# Patient Record
Sex: MALE | Race: BLACK OR AFRICAN AMERICAN | NOT HISPANIC OR LATINO | Employment: OTHER | URBAN - METROPOLITAN AREA
[De-identification: names, ages, dates, MRNs, and addresses within clinical notes are randomized per-mention and may not be internally consistent; named-entity substitution may affect disease eponyms.]

---

## 2023-03-13 PROBLEM — D53.9 MACROCYTIC ANEMIA: Status: ACTIVE | Noted: 2023-03-13

## 2023-03-13 PROBLEM — R04.0 EPISTAXIS: Status: ACTIVE | Noted: 2023-03-13

## 2023-03-13 PROBLEM — N28.9 RENAL INSUFFICIENCY: Status: ACTIVE | Noted: 2023-03-13

## 2023-03-13 PROBLEM — R97.20 ELEVATED PSA: Status: ACTIVE | Noted: 2023-03-13

## 2023-03-13 PROBLEM — R73.9 HYPERGLYCEMIA: Status: ACTIVE | Noted: 2023-03-13

## 2023-03-13 PROBLEM — J06.9 URTI (ACUTE UPPER RESPIRATORY INFECTION): Status: ACTIVE | Noted: 2023-03-13

## 2023-03-13 PROBLEM — N28.9 ABNORMAL KIDNEY FUNCTION: Status: ACTIVE | Noted: 2023-03-13

## 2023-03-13 PROBLEM — I10 BENIGN ESSENTIAL HYPERTENSION: Status: ACTIVE | Noted: 2023-03-13

## 2023-03-13 PROBLEM — K63.5 COLON POLYP: Status: ACTIVE | Noted: 2023-03-13

## 2023-03-13 PROBLEM — R91.8 PULMONARY NODULES: Status: ACTIVE | Noted: 2023-03-13

## 2023-03-13 PROBLEM — R50.9 FEVER WITH CHILLS: Status: ACTIVE | Noted: 2023-03-13
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
## 2023-03-13 PROBLEM — R29.6 RECURRENT FALLS: Status: ACTIVE | Noted: 2023-03-13

## 2023-03-13 PROBLEM — R36.1 HEMATOSPERMIA: Status: ACTIVE | Noted: 2023-03-13

## 2023-03-13 PROBLEM — F17.200 NICOTINE DEPENDENCE: Status: ACTIVE | Noted: 2023-03-13

## 2023-03-13 PROBLEM — R60.0 BILATERAL LEG EDEMA: Status: ACTIVE | Noted: 2023-03-13

## 2023-03-13 PROBLEM — I10 SEVERE HYPERTENSION: Status: ACTIVE | Noted: 2023-03-13

## 2023-03-13 PROBLEM — J44.9 COPD (CHRONIC OBSTRUCTIVE PULMONARY DISEASE) (MULTI): Status: ACTIVE | Noted: 2023-03-13

## 2023-03-13 PROBLEM — R79.89 ELEVATED LFTS: Status: ACTIVE | Noted: 2023-03-13

## 2023-03-13 PROBLEM — B19.20 HEPATITIS C: Status: ACTIVE | Noted: 2023-03-13

## 2023-03-13 PROBLEM — R51.9 FREQUENT HEADACHES: Status: ACTIVE | Noted: 2023-03-13

## 2023-03-13 PROBLEM — M79.672 LEFT FOOT PAIN: Status: ACTIVE | Noted: 2023-03-13

## 2023-03-13 PROBLEM — B18.2 CHRONIC VIRAL HEPATITIS C (MULTI): Status: ACTIVE | Noted: 2023-03-13

## 2023-03-13 PROBLEM — E55.9 VITAMIN D DEFICIENCY: Status: ACTIVE | Noted: 2023-03-13

## 2023-03-13 PROBLEM — N52.9 ORGANIC IMPOTENCE: Status: ACTIVE | Noted: 2023-03-13

## 2023-03-13 PROBLEM — R63.4 WEIGHT LOSS: Status: ACTIVE | Noted: 2023-03-13

## 2023-03-13 PROBLEM — L29.9 PRURITUS: Status: ACTIVE | Noted: 2023-03-13

## 2023-03-13 PROBLEM — J11.1 INFLUENZA: Status: ACTIVE | Noted: 2023-03-13

## 2023-03-13 RX ORDER — PANTOPRAZOLE SODIUM 40 MG/1
TABLET, DELAYED RELEASE ORAL
COMMUNITY
End: 2023-11-16 | Stop reason: WASHOUT

## 2023-03-13 RX ORDER — ALBUTEROL SULFATE 90 UG/1
1 AEROSOL, METERED RESPIRATORY (INHALATION)
COMMUNITY

## 2023-03-13 RX ORDER — AMLODIPINE BESYLATE 10 MG/1
1 TABLET ORAL DAILY
COMMUNITY
End: 2023-10-03 | Stop reason: SDUPTHER

## 2023-03-13 RX ORDER — ALBUTEROL SULFATE 0.83 MG/ML
SOLUTION RESPIRATORY (INHALATION) 4 TIMES DAILY PRN
COMMUNITY

## 2023-03-13 RX ORDER — LISINOPRIL 20 MG/1
1 TABLET ORAL DAILY
COMMUNITY
End: 2023-03-28 | Stop reason: SDUPTHER

## 2023-03-13 RX ORDER — FLUTICASONE FUROATE, UMECLIDINIUM BROMIDE AND VILANTEROL TRIFENATATE 100; 62.5; 25 UG/1; UG/1; UG/1
1 POWDER RESPIRATORY (INHALATION)
COMMUNITY

## 2023-03-13 RX ORDER — AZITHROMYCIN 250 MG/1
250 TABLET, FILM COATED ORAL DAILY
COMMUNITY

## 2023-03-13 RX ORDER — PREDNISONE 10 MG/1
0.5 TABLET ORAL DAILY
COMMUNITY
End: 2023-10-04 | Stop reason: SDUPTHER

## 2023-03-16 ENCOUNTER — OFFICE VISIT (OUTPATIENT)
Dept: PRIMARY CARE | Facility: CLINIC | Age: 73
End: 2023-03-16
Payer: COMMERCIAL

## 2023-03-16 VITALS
WEIGHT: 140 LBS | BODY MASS INDEX: 20.67 KG/M2 | DIASTOLIC BLOOD PRESSURE: 80 MMHG | OXYGEN SATURATION: 92 % | SYSTOLIC BLOOD PRESSURE: 130 MMHG

## 2023-03-16 DIAGNOSIS — Z00.00 HEALTHCARE MAINTENANCE: ICD-10-CM

## 2023-03-16 DIAGNOSIS — J44.9 CHRONIC OBSTRUCTIVE PULMONARY DISEASE, UNSPECIFIED COPD TYPE (MULTI): Primary | ICD-10-CM

## 2023-03-16 DIAGNOSIS — I10 BENIGN ESSENTIAL HYPERTENSION: ICD-10-CM
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
## 2023-03-16 PROBLEM — M79.672 LEFT FOOT PAIN: Status: RESOLVED | Noted: 2023-03-13 | Resolved: 2023-03-16

## 2023-03-16 PROBLEM — R36.1 HEMATOSPERMIA: Status: RESOLVED | Noted: 2023-03-13 | Resolved: 2023-03-16

## 2023-03-16 PROCEDURE — 3075F SYST BP GE 130 - 139MM HG: CPT | Performed by: INTERNAL MEDICINE

## 2023-03-16 PROCEDURE — 3079F DIAST BP 80-89 MM HG: CPT | Performed by: INTERNAL MEDICINE

## 2023-03-16 PROCEDURE — 3008F BODY MASS INDEX DOCD: CPT | Performed by: INTERNAL MEDICINE

## 2023-03-16 PROCEDURE — 99214 OFFICE O/P EST MOD 30 MIN: CPT | Performed by: INTERNAL MEDICINE

## 2023-03-16 ASSESSMENT — ENCOUNTER SYMPTOMS
DYSURIA: 0
HEMATURIA: 0
ABDOMINAL DISTENTION: 0
TREMORS: 0
CHILLS: 0
HEADACHES: 0
DIFFICULTY URINATING: 0
MYALGIAS: 0
WHEEZING: 0
STRIDOR: 0
WEAKNESS: 0
POLYDIPSIA: 0
COUGH: 0
SHORTNESS OF BREATH: 0
NUMBNESS: 0
CONFUSION: 0
APPETITE CHANGE: 0
FATIGUE: 0
FEVER: 0
PALPITATIONS: 0

## 2023-03-16 NOTE — ASSESSMENT & PLAN NOTE
- on 3L oxygen at baseline  - controlled with albuterol and trelegy  - recent hospitalization for acute on chronic resp failure in February 2023  - back to baseline

## 2023-03-16 NOTE — PROGRESS NOTES
Subjective   Patient ID: Kalyan Armstrong is a 72 y.o. male with a PMH of COPD (on 3L oxygen at base), HTN, and recent hospitalization, who presents to [facility] clinic for post-hospitalization follow-up. He states that he is back at his baseline. Chronically, gets short of breath with exertion but returns back to baseline after a short break. Does not need any medication refill at this moment.    Likes to fish for fun.     HPI  Past Medical History:   Diagnosis Date    Cough, unspecified 06/20/2014    Cough    Encounter for immunization 01/19/2015    Need for influenza vaccination    Encounter for screening for malignant neoplasm of prostate     Encounter for screening for malignant neoplasm of prostate    Other conditions influencing health status 05/20/2013    Digital Blood Vessel Rupture    Personal history of colonic polyps     History of colon polyps    Personal history of other specified conditions 05/20/2013    History of shortness of breath    Personal history of other specified conditions 06/20/2014    History of shortness of breath     Past Surgical History:   Procedure Laterality Date    APPENDECTOMY  02/21/2013    Appendectomy    COLONOSCOPY  02/21/2013    Complete Colonoscopy     Family History   Problem Relation Name Age of Onset    Dementia Mother          Patient has no known allergies.    Review of Systems   Constitutional:  Negative for appetite change, chills, fatigue and fever.   Eyes:  Negative for visual disturbance.   Respiratory:  Negative for cough, shortness of breath, wheezing and stridor.    Cardiovascular:  Negative for chest pain and palpitations.   Gastrointestinal:  Negative for abdominal distention.   Endocrine: Negative for polydipsia and polyuria.   Genitourinary:  Negative for difficulty urinating, dysuria, enuresis and hematuria.   Musculoskeletal:  Negative for myalgias.   Skin:  Negative for pallor.   Neurological:  Negative for tremors, weakness, numbness and headaches.    Psychiatric/Behavioral:  Negative for confusion and suicidal ideas.        Objective   Physical Exam  Constitutional:       General: He is not in acute distress.     Appearance: Normal appearance.   HENT:      Head: Normocephalic and atraumatic.      Nose: Nose normal.      Mouth/Throat:      Mouth: Mucous membranes are moist.   Eyes:      Conjunctiva/sclera: Conjunctivae normal.   Cardiovascular:      Rate and Rhythm: Normal rate and regular rhythm.      Heart sounds: Normal heart sounds.   Pulmonary:      Breath sounds: Normal breath sounds.   Abdominal:      General: Bowel sounds are normal.      Palpations: Abdomen is soft.   Musculoskeletal:         General: Swelling (LLE swelling (chronic)) present.      Cervical back: Neck supple.   Skin:     General: Skin is warm and dry.   Neurological:      General: No focal deficit present.      Mental Status: He is alert.       /80   Wt 63.5 kg (140 lb)   SpO2 92%   BMI 20.67 kg/m²     Assessment/Plan   Problem List Items Addressed This Visit          Respiratory    COPD (chronic obstructive pulmonary disease) (CMS/[facility]) - Primary     - on 3L oxygen at baseline  - controlled with albuterol and trelegy  - recent hospitalization for acute on chronic resp failure in February 2023  - back to baseline            Circulatory    Benign essential hypertension     - BP at 130/80   - controlled with amlodipine and lisinopril  - no labs at this time            Other    Healthcare maintenance

## 2023-03-28 DIAGNOSIS — I10 SEVERE HYPERTENSION: Primary | ICD-10-CM

## 2023-03-28 RX ORDER — LISINOPRIL 20 MG/1
20 TABLET ORAL DAILY
Qty: 90 TABLET | Refills: 0 | Status: SHIPPED | OUTPATIENT
Start: 2023-03-28 | End: 2023-07-06
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
## 2023-05-03 PROBLEM — N17.9 AKI (ACUTE KIDNEY INJURY) (CMS-HCC): Status: ACTIVE | Noted: 2023-05-03

## 2023-05-03 PROBLEM — R06.00 DYSPNEA: Status: ACTIVE | Noted: 2023-05-01

## 2023-05-03 PROBLEM — J96.90 RESPIRATORY FAILURE (MULTI): Status: ACTIVE | Noted: 2023-05-03

## 2023-05-03 RX ORDER — CHOLECALCIFEROL (VITAMIN D3) 50 MCG
1 TABLET ORAL DAILY
COMMUNITY
End: 2023-11-16 | Stop reason: SINTOL

## 2023-05-03 RX ORDER — HYDROCHLOROTHIAZIDE 12.5 MG/1
1 TABLET ORAL DAILY
COMMUNITY
Start: 2022-08-08 | End: 2023-11-20 | Stop reason: SDUPTHER

## 2023-05-03 RX ORDER — BUDESONIDE 0.5 MG/2ML
0.5 INHALANT ORAL
COMMUNITY
Start: 2023-03-29

## 2023-05-03 RX ORDER — ACETAMINOPHEN 325 MG/1
325 TABLET ORAL EVERY 4 HOURS PRN
COMMUNITY
Start: 2022-09-29

## 2023-05-04 ENCOUNTER — OFFICE VISIT (OUTPATIENT)
Dept: PRIMARY CARE | Facility: CLINIC | Age: 73
End: 2023-05-04
Payer: COMMERCIAL

## 2023-05-04 ENCOUNTER — PATIENT OUTREACH (OUTPATIENT)
Dept: PRIMARY CARE | Facility: CLINIC | Age: 73
End: 2023-05-04

## 2023-05-04 VITALS
HEART RATE: 115 BPM | DIASTOLIC BLOOD PRESSURE: 96 MMHG | SYSTOLIC BLOOD PRESSURE: 149 MMHG | OXYGEN SATURATION: 94 % | BODY MASS INDEX: 19.79 KG/M2 | WEIGHT: 134 LBS

## 2023-05-04 DIAGNOSIS — R06.02 SHORTNESS OF BREATH: ICD-10-CM

## 2023-05-04 DIAGNOSIS — J44.9 CHRONIC OBSTRUCTIVE PULMONARY DISEASE, UNSPECIFIED COPD TYPE (MULTI): Primary | ICD-10-CM

## 2023-05-04 DIAGNOSIS — J96.21 ACUTE ON CHRONIC RESPIRATORY FAILURE WITH HYPOXIA (MULTI): ICD-10-CM

## 2023-05-04 DIAGNOSIS — J44.1 COPD EXACERBATION (MULTI): ICD-10-CM

## 2023-05-04 DIAGNOSIS — I10 BENIGN ESSENTIAL HYPERTENSION: ICD-10-CM

## 2023-05-04 PROCEDURE — 3077F SYST BP >= 140 MM HG: CPT | Performed by: INTERNAL MEDICINE

## 2023-05-04 PROCEDURE — 3008F BODY MASS INDEX DOCD: CPT | Performed by: INTERNAL MEDICINE

## 2023-05-04 PROCEDURE — 3080F DIAST BP >= 90 MM HG: CPT | Performed by: INTERNAL MEDICINE

## 2023-05-04 PROCEDURE — 99214 OFFICE O/P EST MOD 30 MIN: CPT | Performed by: INTERNAL MEDICINE

## 2023-05-04 RX ORDER — PREDNISONE 10 MG/1
TABLET ORAL
Qty: 42 TABLET | Refills: 0 | Status: SHIPPED | OUTPATIENT
Start: 2023-05-04 | End: 2023-05-16

## 2023-05-04 NOTE — PROGRESS NOTES
Discharge Facility: [facility]  Discharge Diagnosis: Acute on chronic respiratory failure, COPD exacerbation, Hypoxia  Admission Date: 4/29/2023  Discharge Date: 5/2/2023    PCP Appointment Date: 5/4/2023 10:00  Specialist Appointment Date: 5/9/2023 11:00   Hospital Encounter and Summary: Linked

## 2023-05-04 NOTE — PROGRESS NOTES
Subjective   Patient ID: Kalyan Armstrong is a 72 y.o. male who presents for Hospital Follow-up.  HPI  A 72-year-old male with past medical history of COPD, hypertension is here in clinic today for hospital follow-up.  The patient has been admitted for COPD exacerbation and was discharged a couple of days before.  The patient denied any shortness of breath or cough and has been on prednisone taper that was prescribed at the time of discharge.  The patient is compliant with all his medications and is on his baseline oxygen 3 L at home  Review of Systems  10 point review of systems negative  Objective   Physical Exam  CVS S1-S2 normal  Respiratory system no wheezing CTAB        Extremities within normal limits      Assessment/Plan   There are no diagnoses linked to this encounter.     History of Present Illness  Hospital follow-up after COPD exacerbation  On baseline oxygen 3 L  Patient was asking about extra dose of steroid taper to have it in hand  Will prescribe tapering dose of prednisone to prevent hospital discharge  Inform him not to take this dose and call the clinic once before taking    Hypertension  Compliant to medications stable no change to any medications

## 2023-05-18 ENCOUNTER — PATIENT OUTREACH (OUTPATIENT)
Dept: PRIMARY CARE | Facility: CLINIC | Age: 73
End: 2023-05-18
Payer: COMMERCIAL

## 2023-05-18 NOTE — PROGRESS NOTES
Call regarding appt. with Dr. Lees on 5/4/2023 after hospitalization for a COPD exacerbation. At time of outreach call, the patient feels as if his condition has stabilized. The patient remains on O2 3L/NC and he is taking his medications as directed. No questions or concerns at this time.

## 2023-06-06 ENCOUNTER — TELEMEDICINE (OUTPATIENT)
Dept: PHARMACY | Facility: HOSPITAL | Age: 73
End: 2023-06-06
Payer: COMMERCIAL

## 2023-06-06 DIAGNOSIS — J44.9 CHRONIC OBSTRUCTIVE PULMONARY DISEASE, UNSPECIFIED COPD TYPE (MULTI): ICD-10-CM

## 2023-06-06 NOTE — PROGRESS NOTES
Pharmacy Post-Discharge Visit  Kalyan Arsmtrong is a 72 y.o. male was referred to Clinical Pharmacy Team to complete a post-discharge medication optimization and monitoring visit.  The patient was referred for their COPD.    Admission Date: 4/29/2023  Discharge Date: 5/2/2023    Referring Provider: Rosa Lees, DO    Subjective   Allergies   Allergen Reactions    Other Unknown       [facility] [address] [place] [address] AT [facility] & [address] [address]  Phone: [phone] Fax: [phone]      Social History     Social History Narrative    Not on file      Notable Medication changes following discharge:  None    HPI    Patient presents today for initial post-discharge platinum visit with the pharmacist for COPD management. Patient mentioned he has not been a smoker for about 10 years and no longer has any cravings.    COPD ASSESSMENT    Rescue Inhaler Use:  -How many times per week do you use your rescue inhale? Patient uses his albuterol nebulizing solution 2-3 times per day and budesonide nebulizer once per day    Inhaler Technique:  -How do you use your rescue inhaler?  -nebulizer    -How do you use your maintenance inhaler?  -Patient uses Trelegy inhaler properly    Sx Management:  -Increased cough? no  -Increased sputum production? no  -Increased SOB? yes - patient was experiencing SOB on Sunday but took an extra dose of prednisone and used his nebulizers per instructions from his PCP    Exacerbation Hx:  -When was your last hospitalization for an exacerbation? 4/29  -When was the last time you were treated with antibiotics and/or steroids? 4/29    Review of Systems    Objective     There were no vitals taken for this visit.     LAB  Lab Results   Component Value Date    BILITOT 0.6 04/29/2023    CALCIUM 8.9 05/02/2023    CO2 28 05/02/2023     05/02/2023    CREATININE 1.18 05/02/2023    GLUCOSE 80 05/02/2023    ALKPHOS 37 04/29/2023    K 4.1  05/02/2023    PROT 6.9 04/29/2023     05/02/2023    AST 29 04/29/2023    ALT 22 04/29/2023    BUN 21 05/02/2023    ANIONGAP 11 05/02/2023    MG 2.20 09/27/2022    PHOS 5.1 (H) 02/04/2023    ALBUMIN 4.0 04/29/2023    GFRMALE 66 05/02/2023     Lab Results   Component Value Date    TRIG 98 06/30/2022    CHOL 217 (H) 06/30/2022    .7 06/30/2022     Lab Results   Component Value Date    HGBA1C 5.3 06/30/2022       Current Outpatient Medications on File Prior to Visit   Medication Sig Dispense Refill    acetaminophen (Tylenol) 325 mg tablet Take 1 tablet (325 mg) by mouth every 4 hours if needed for moderate pain (4 - 6).      albuterol 2.5 mg /3 mL (0.083 %) nebulizer solution Take by nebulization 4 times a day as needed. 1 vial      albuterol 90 mcg/actuation inhaler Inhale 1 puff. Every 4-6 hrs prn      amLODIPine (Norvasc) 10 mg tablet Take 1 tablet (10 mg) by mouth once daily.      azithromycin (Zithromax) 250 mg tablet Take 1 tablet (250 mg) by mouth once daily.      budesonide (Pulmicort) 0.5 mg/2 mL nebulizer solution Take 2 mL (0.5 mg) by nebulization 2 times a day.      fluticasone-umeclidin-vilanter (Trelegy Ellipta) 100-62.5-25 mcg blister with device Inhale 1 puff once daily.      lisinopril 20 mg tablet Take 1 tablet (20 mg) by mouth once daily. 90 tablet 0    predniSONE (Deltasone) 10 mg tablet Take 0.5 tablets (5 mg) by mouth once daily.      cholecalciferol (Vitamin D-3) 50 MCG (2000 UT) tablet Take 1 tablet (50 mcg) by mouth once daily.      hydroCHLOROthiazide (HYDRODiuril) 12.5 mg tablet Take 1 tablet (12.5 mg) by mouth once daily.      pantoprazole (ProtoNix) 40 mg EC tablet Take by mouth. Do not crush, chew, or split.       No current facility-administered medications on file prior to visit.      HISTORICAL PHARMACOTHERAPY  -Martin Butt    DRUG INTERACTIONS  - none    Assessment/Plan   Problem List Items Addressed This Visit          Respiratory    COPD (chronic obstructive pulmonary  disease) (CMS/[facility])   1) Continue all medications as prescribed. Patient is being treated appropriately for his COPD. Med list updated and all patient questions were answered.    Continue all meds under the continuation of care with the referring provider and clinical pharmacy team.    Nano Simon, PharmTAYLOR  [facility] PGY-1 Resident   [phone]    Verbal consent to manage patient's drug therapy was obtained from the patient. They were informed they may decline to participate or withdraw from participation in pharmacy services at any time.

## 2023-06-28 ENCOUNTER — PATIENT OUTREACH (OUTPATIENT)
Dept: PRIMARY CARE | Facility: CLINIC | Age: 73
End: 2023-06-28
Payer: COMMERCIAL

## 2023-06-28 NOTE — PROGRESS NOTES
Call placed regarding two month post discharge follow up call. At time of outreach call, the patient feels as if his condition has returned to baseline. He states he is doing well and his breathing is good. The pt is staying indoors with current air quality warnings. No questions or concerns at this time.

## 2023-07-03 DIAGNOSIS — I10 SEVERE HYPERTENSION: ICD-10-CM

## 2023-07-06 RX ORDER — LISINOPRIL 20 MG/1
TABLET ORAL
Qty: 90 TABLET | Refills: 0 | Status: SHIPPED | OUTPATIENT
Start: 2023-07-06 | End: 2023-11-20 | Stop reason: SDUPTHER

## 2023-08-08 ENCOUNTER — PATIENT OUTREACH (OUTPATIENT)
Dept: PRIMARY CARE | Facility: CLINIC | Age: 73
End: 2023-08-08
Payer: COMMERCIAL

## 2023-09-12 ENCOUNTER — PATIENT OUTREACH (OUTPATIENT)
Dept: PRIMARY CARE | Facility: CLINIC | Age: 73
End: 2023-09-12
Payer: COMMERCIAL

## 2023-09-12 NOTE — PROGRESS NOTES
Call placed regarding one monthly post discharge follow up. At time of outreach call, the patient feels as if his condition has returned to baseline. The pt denies any SOB or chest pain. The pt states he will call the office if he has any needs. The pt has been enrolled in TCM services since 5/4/2023 and is graduated at this time.

## 2023-10-03 DIAGNOSIS — I10 BENIGN ESSENTIAL HYPERTENSION: ICD-10-CM

## 2023-10-03 RX ORDER — AMLODIPINE BESYLATE 10 MG/1
10 TABLET ORAL DAILY
Qty: 90 TABLET | Refills: 3 | Status: SHIPPED | OUTPATIENT
Start: 2023-10-03 | End: 2023-11-16 | Stop reason: SDUPTHER

## 2023-10-04 DIAGNOSIS — J44.9 CHRONIC OBSTRUCTIVE PULMONARY DISEASE, UNSPECIFIED COPD TYPE (MULTI): Primary | ICD-10-CM

## 2023-10-04 RX ORDER — PREDNISONE 10 MG/1
5 TABLET ORAL DAILY
Qty: 30 TABLET | Refills: 2 | Status: SHIPPED | OUTPATIENT
Start: 2023-10-04 | End: 2023-12-19 | Stop reason: SDUPTHER

## 2023-11-16 ENCOUNTER — OFFICE VISIT (OUTPATIENT)
Dept: PRIMARY CARE | Facility: CLINIC | Age: 73
End: 2023-11-16
Payer: COMMERCIAL

## 2023-11-16 VITALS — OXYGEN SATURATION: 89 % | SYSTOLIC BLOOD PRESSURE: 129 MMHG | DIASTOLIC BLOOD PRESSURE: 81 MMHG

## 2023-11-16 DIAGNOSIS — Z87.891 ENCOUNTER FOR SCREENING FOR ABDOMINAL AORTIC ANEURYSM (AAA) IN PATIENT 50 YEARS OF AGE OR OLDER WITH HISTORY OF SMOKING: Primary | ICD-10-CM

## 2023-11-16 DIAGNOSIS — Z13.6 ENCOUNTER FOR SCREENING FOR ABDOMINAL AORTIC ANEURYSM (AAA) IN PATIENT 50 YEARS OF AGE OR OLDER WITH HISTORY OF SMOKING: Primary | ICD-10-CM

## 2023-11-16 DIAGNOSIS — I10 BENIGN ESSENTIAL HYPERTENSION: ICD-10-CM

## 2023-11-16 DIAGNOSIS — R91.8 PULMONARY NODULES: ICD-10-CM

## 2023-11-16 PROBLEM — Z74.1: Status: ACTIVE | Noted: 2023-11-16

## 2023-11-16 PROCEDURE — 1126F AMNT PAIN NOTED NONE PRSNT: CPT | Performed by: INTERNAL MEDICINE

## 2023-11-16 PROCEDURE — 3079F DIAST BP 80-89 MM HG: CPT | Performed by: INTERNAL MEDICINE

## 2023-11-16 PROCEDURE — 1160F RVW MEDS BY RX/DR IN RCRD: CPT | Performed by: INTERNAL MEDICINE

## 2023-11-16 PROCEDURE — 1036F TOBACCO NON-USER: CPT | Performed by: INTERNAL MEDICINE

## 2023-11-16 PROCEDURE — 1159F MED LIST DOCD IN RCRD: CPT | Performed by: INTERNAL MEDICINE

## 2023-11-16 PROCEDURE — G0439 PPPS, SUBSEQ VISIT: HCPCS | Performed by: INTERNAL MEDICINE

## 2023-11-16 PROCEDURE — 3074F SYST BP LT 130 MM HG: CPT | Performed by: INTERNAL MEDICINE

## 2023-11-16 PROCEDURE — 3008F BODY MASS INDEX DOCD: CPT | Performed by: INTERNAL MEDICINE

## 2023-11-16 PROCEDURE — 1170F FXNL STATUS ASSESSED: CPT | Performed by: INTERNAL MEDICINE

## 2023-11-16 PROCEDURE — 99214 OFFICE O/P EST MOD 30 MIN: CPT | Performed by: INTERNAL MEDICINE

## 2023-11-16 RX ORDER — AMLODIPINE BESYLATE 10 MG/1
10 TABLET ORAL DAILY
Qty: 90 TABLET | Refills: 1 | Status: SHIPPED | OUTPATIENT
Start: 2023-11-16 | End: 2023-11-20 | Stop reason: SDUPTHER

## 2023-11-16 ASSESSMENT — ENCOUNTER SYMPTOMS
OCCASIONAL FEELINGS OF UNSTEADINESS: 1
DYSPNEA ON EXERTION: 1
COUGH: 0
SNORING: 0
PALPITATIONS: 0
NAUSEA: 0
WHEEZING: 0
CHILLS: 0
WEIGHT LOSS: 0
DEPRESSION: 0
WEIGHT GAIN: 0
DIARRHEA: 0
HEADACHES: 0
DIZZINESS: 0
CONSTIPATION: 0
HEMATURIA: 0
SORE THROAT: 0
HEARTBURN: 0
VOMITING: 0
ABDOMINAL PAIN: 0
DECREASED APPETITE: 0
LOSS OF SENSATION IN FEET: 0
EYES NEGATIVE: 1
LIGHT-HEADEDNESS: 0
DYSURIA: 0
ORTHOPNEA: 0
HEMATOCHEZIA: 0
FEVER: 0
MUSCULOSKELETAL NEGATIVE: 1
SHORTNESS OF BREATH: 1

## 2023-11-16 ASSESSMENT — PATIENT HEALTH QUESTIONNAIRE - PHQ9
2. FEELING DOWN, DEPRESSED OR HOPELESS: NOT AT ALL
1. LITTLE INTEREST OR PLEASURE IN DOING THINGS: NOT AT ALL
SUM OF ALL RESPONSES TO PHQ9 QUESTIONS 1 AND 2: 0

## 2023-11-16 ASSESSMENT — ACTIVITIES OF DAILY LIVING (ADL)
BATHING: INDEPENDENT
TAKING_MEDICATION: INDEPENDENT
MANAGING_FINANCES: NEEDS ASSISTANCE
DRESSING: INDEPENDENT
DOING_HOUSEWORK: INDEPENDENT
GROCERY_SHOPPING: NEEDS ASSISTANCE

## 2023-11-16 NOTE — PROGRESS NOTES
Subjective   Kalyan Armstrong is a 73 y.o. male with PMH of COPD on 4L O2, lung nodules, chronic Hep C, HTN, who presents for a wellness visit. Currently, no acute complaints. Since most recent admission for COPD exacerbation in July, the patient has not felt any acute worsening of breathing. Tolerating his current regimen well. Frequently follows up with pulmonology. Patient and wife inquiring about wheelchair.    Chief Complaint:  Medicare Annual Wellness Visit Subsequent (Request orders for wheel chair)      Review of Systems   Constitutional: Negative for chills, decreased appetite, fever, weight gain and weight loss.   HENT:  Positive for hearing loss. Negative for ear pain, sore throat and tinnitus.    Eyes: Negative.         Reports dry eyes from oxygen, otherwise unremarkable   Cardiovascular:  Positive for dyspnea on exertion (Reports mild SOB on exertion, relieved at rest). Negative for chest pain, orthopnea and palpitations.   Respiratory:  Positive for shortness of breath (On exertion, better at rest). Negative for cough, snoring and wheezing.    Musculoskeletal: Negative.    Gastrointestinal:  Negative for abdominal pain, constipation, diarrhea, heartburn, hematochezia, melena, nausea and vomiting.   Genitourinary:  Negative for dysuria, hematuria and urgency.   Neurological:  Negative for dizziness (Only has mild dizziness on exertion in association with SOB, better at rest), headaches and light-headedness.       Objective   Vitals reviewed.   Constitutional:       Appearance: Not in distress. Chronically ill-appearing.   Pulmonary:      Effort: Pulmonary effort is normal.      Breath sounds: Normal breath sounds. No wheezing. No rhonchi.      Comments: On 4L  Chest:      Chest wall: Not tender to palpatation.   Cardiovascular:      Normal rate. Regular rhythm.      Murmurs: There is no murmur.   Pulses:     Intact distal pulses.   Edema:     Peripheral edema absent.   Abdominal:      General: There is  no distension.      Palpations: Abdomen is soft.      Tenderness: There is no abdominal tenderness.   Musculoskeletal: Normal range of motion.         General: No tenderness. Neurological:      General: No focal deficit present.      Mental Status: Alert and oriented to person, place and time.       Lab Review:   No visits with results within 2 Month(s) from this visit.   Latest known visit with results is:   [facility] Encounter on 10/26/2022   Component Date Value    Magnesium RBC 10/26/2022 5.4     Glucose 10/26/2022 124 (H)     Sodium 10/26/2022 139     Potassium 10/26/2022 4.9     Chloride 10/26/2022 98     Bicarbonate 10/26/2022 31     Anion Gap 10/26/2022 15     Urea Nitrogen 10/26/2022 22     Creatinine 10/26/2022 1.25     GFR MALE 10/26/2022 62     Calcium 10/26/2022 9.9     Phosphorus 10/26/2022 4.3     Albumin 10/26/2022 4.3      Lab Results   Component Value Date     (L) 07/08/2023    K 4.1 07/08/2023    CL 95 (L) 07/08/2023    CO2 28 07/08/2023    BUN 21 07/08/2023    CREATININE 1.29 07/08/2023    GLUCOSE 86 07/08/2023    CALCIUM 9.1 07/08/2023     Lab Results   Component Value Date    TROPONINI 0.02 03/09/2022     Lab Results   Component Value Date    WBC 10.8 07/08/2023    HGB 13.4 (L) 07/08/2023    HCT 39.8 (L) 07/08/2023    MCV 91 07/08/2023     07/08/2023     Lab Results   Component Value Date    CHOL 217 (H) 06/30/2022    TRIG 98 06/30/2022    .7 06/30/2022       Assessment/Plan   The encounter diagnosis was Benign essential hypertension.    Medicare Wellness Billing Compliance Satisfied    *This is a visual tool to show completion of required items on the day of the visit. Green checks will only appear on the date of visit.    Review all medications by prescribing practitioner or clinical pharmacist (such as prescriptions, OTCs, herbal therapies and supplements) documented in the medical record    Past Medical, Surgical, and Family History reviewed and updated in chart     Tobacco Use Reviewed    Alcohol Use Reviewed    Illicit Drug Use Reviewed    PHQ2/9    Falls in Last Year Reviewed    Home Safety Risk Factors Reviewed    Cognitive Impairment Reviewed    Patient Self Assessment and Health Status    Current Diet Reviewed    Exercise Frequency    ADL - Hearing Impairment    ADL - Bathing    ADL - Dressing    ADL - Walks in Home    IADL - Managing Finances    IADL - Grocery Shopping    IADL - Taking Medications    IADL - Doing Housework      #Medicare wellness  #Wheelchair request  - Patient declined flu shot   - Discussed wheelchair options with patient. Patient and wife would like a fold up manual wheelchair. Will fax form to [facility]  - Ordered AAA screening  - Ordered repeat low dose CT scan for next year in January to follow up with lung nodules  - Patient will follow up in 6 months    #HTN  - /81 and stable  - Continue home amlodipine 10 mg, ordered refill  - Continue home hydrochlorothiazide 12.5 mg  - Continue home lisinopril 20 mg    #COPD  - On 4L O2 @ baseline  - Patient currently denies any signs of exacerbation since most recent admission in July  - Continue albuterol nebulizer and inhaler  - Continue azithromycin 250 mg daily  - Continue Pulmicort  - Continue Trelegy  - Continue prednisone 5 mg daily  - Patient frequently follows up with pulmonology outpatient, continue to do so

## 2023-11-20 ENCOUNTER — OFFICE VISIT (OUTPATIENT)
Dept: CARDIOLOGY | Facility: CLINIC | Age: 73
End: 2023-11-20
Payer: COMMERCIAL

## 2023-11-20 VITALS
OXYGEN SATURATION: 92 % | HEIGHT: 69 IN | DIASTOLIC BLOOD PRESSURE: 76 MMHG | WEIGHT: 136 LBS | HEART RATE: 84 BPM | BODY MASS INDEX: 20.14 KG/M2 | SYSTOLIC BLOOD PRESSURE: 116 MMHG

## 2023-11-20 DIAGNOSIS — I10 SEVERE HYPERTENSION: ICD-10-CM

## 2023-11-20 DIAGNOSIS — I10 BENIGN ESSENTIAL HYPERTENSION: Primary | ICD-10-CM

## 2023-11-20 PROCEDURE — 1126F AMNT PAIN NOTED NONE PRSNT: CPT | Performed by: INTERNAL MEDICINE

## 2023-11-20 PROCEDURE — 1160F RVW MEDS BY RX/DR IN RCRD: CPT | Performed by: INTERNAL MEDICINE

## 2023-11-20 PROCEDURE — 1159F MED LIST DOCD IN RCRD: CPT | Performed by: INTERNAL MEDICINE

## 2023-11-20 PROCEDURE — 1036F TOBACCO NON-USER: CPT | Performed by: INTERNAL MEDICINE

## 2023-11-20 PROCEDURE — 93005 ELECTROCARDIOGRAM TRACING: CPT | Performed by: INTERNAL MEDICINE

## 2023-11-20 PROCEDURE — 99214 OFFICE O/P EST MOD 30 MIN: CPT | Performed by: INTERNAL MEDICINE

## 2023-11-20 PROCEDURE — 3078F DIAST BP <80 MM HG: CPT | Performed by: INTERNAL MEDICINE

## 2023-11-20 PROCEDURE — 3074F SYST BP LT 130 MM HG: CPT | Performed by: INTERNAL MEDICINE

## 2023-11-20 PROCEDURE — 93010 ELECTROCARDIOGRAM REPORT: CPT | Performed by: INTERNAL MEDICINE

## 2023-11-20 PROCEDURE — 99214 OFFICE O/P EST MOD 30 MIN: CPT | Mod: 25 | Performed by: INTERNAL MEDICINE

## 2023-11-20 PROCEDURE — 3008F BODY MASS INDEX DOCD: CPT | Performed by: INTERNAL MEDICINE

## 2023-11-20 RX ORDER — AMLODIPINE BESYLATE 10 MG/1
10 TABLET ORAL DAILY
Qty: 90 TABLET | Refills: 1 | Status: SHIPPED | OUTPATIENT
Start: 2023-11-20

## 2023-11-20 RX ORDER — HYDROCHLOROTHIAZIDE 12.5 MG/1
12.5 TABLET ORAL DAILY
Qty: 90 TABLET | Refills: 3 | Status: SHIPPED | OUTPATIENT
Start: 2023-11-20 | End: 2024-11-14

## 2023-11-20 RX ORDER — LISINOPRIL 20 MG/1
20 TABLET ORAL DAILY
Qty: 90 TABLET | Refills: 0 | Status: SHIPPED | OUTPATIENT
Start: 2023-11-20 | End: 2024-05-16

## 2023-11-20 ASSESSMENT — COLUMBIA-SUICIDE SEVERITY RATING SCALE - C-SSRS
2. HAVE YOU ACTUALLY HAD ANY THOUGHTS OF KILLING YOURSELF?: NO
6. HAVE YOU EVER DONE ANYTHING, STARTED TO DO ANYTHING, OR PREPARED TO DO ANYTHING TO END YOUR LIFE?: NO
1. IN THE PAST MONTH, HAVE YOU WISHED YOU WERE DEAD OR WISHED YOU COULD GO TO SLEEP AND NOT WAKE UP?: NO

## 2023-11-20 ASSESSMENT — PAIN SCALES - GENERAL: PAINLEVEL: 0-NO PAIN

## 2023-11-20 ASSESSMENT — PATIENT HEALTH QUESTIONNAIRE - PHQ9
SUM OF ALL RESPONSES TO PHQ9 QUESTIONS 1 AND 2: 0
2. FEELING DOWN, DEPRESSED OR HOPELESS: NOT AT ALL
1. LITTLE INTEREST OR PLEASURE IN DOING THINGS: NOT AT ALL

## 2023-11-20 ASSESSMENT — ENCOUNTER SYMPTOMS
LOSS OF SENSATION IN FEET: 0
OCCASIONAL FEELINGS OF UNSTEADINESS: 0
DEPRESSION: 0

## 2023-11-20 NOTE — PROGRESS NOTES
Primary Care Physician: Rosa Lees DO  Date of Visit: 11/20/2023  1:40 PM EST  [place] [facility] [place] [address]     Chief Complaint:   Chief Complaint   Patient presents with    Follow-up        HPI / Summary:   Kalyan Armstrong is a 73 y.o. male presents for followup.  73-year-old male with a history of heart failure preserved EF COPD is presenting in 6-month office follow-up evaluation I picked up his care from my colleague Dr. Craig who is retired his respiratory status remained stable he has not experienced any signs of hypervolemia        Specialty Problems          Cardiology Problems    Benign essential hypertension     Added by problem list migration; 2013-2-19 Moved to suppressed 11/25/13 9:12PM         Nicotine dependence    Severe hypertension        Past Medical History:   Diagnosis Date    Cataract     COPD (chronic obstructive pulmonary disease) (CMS/[facility])     Cough, unspecified 06/20/2014    Cough    Emphysema of lung (CMS/[facility])     Encounter for immunization 01/19/2015    Need for influenza vaccination    Encounter for screening for malignant neoplasm of prostate     Encounter for screening for malignant neoplasm of prostate    Other conditions influencing health status 05/20/2013    Digital Blood Vessel Rupture    Personal history of colonic polyps     History of colon polyps    Personal history of other specified conditions 05/20/2013    History of shortness of breath    Personal history of other specified conditions 06/20/2014    History of shortness of breath          Past Surgical History:   Procedure Laterality Date    APPENDECTOMY  02/21/2013    Appendectomy    COLONOSCOPY  02/21/2013    Complete Colonoscopy    EYE SURGERY            Social History:   reports that he quit smoking about 17 years ago. His smoking use included cigarettes. He has never used smokeless tobacco. He reports current alcohol use. He reports that he does not currently use drugs.      Allergies:  Allergies  "  Allergen Reactions    Other Unknown       Outpatient Medications:  Current Outpatient Medications   Medication Instructions    acetaminophen (TYLENOL) 325 mg, oral, Every 4 hours PRN    albuterol 2.5 mg /3 mL (0.083 %) nebulizer solution nebulization, 4 times daily PRN, 1 vial    albuterol 90 mcg/actuation inhaler 1 puff, inhalation, Every 4-6 hrs prn    amLODIPine (NORVASC) 10 mg, oral, Daily    azithromycin (ZITHROMAX) 250 mg, oral, Daily    budesonide (PULMICORT) 0.5 mg, nebulization, 2 times daily RT    fluticasone-umeclidin-vilanter (Trelegy Ellipta) 100-62.5-25 mcg blister with device 1 puff, inhalation, Daily RT    hydroCHLOROthiazide (HYDRODIURIL) 12.5 mg, oral, Daily    lisinopril 20 mg, oral, Daily    predniSONE (DELTASONE) 5 mg, oral, Daily       ROS     Physical Exam:  Vitals:    11/20/23 1355   BP: 116/76   BP Location: Left arm   Patient Position: Sitting   BP Cuff Size: Adult   Pulse: 84   SpO2: 92%   Weight: 61.7 kg (136 lb)   Height: 1.753 m (5' 9\")     Wt Readings from Last 5 Encounters:   11/20/23 61.7 kg (136 lb)   07/12/23 59.4 kg (131 lb)   06/23/23 60.9 kg (134 lb 4 oz)   05/04/23 60.8 kg (134 lb)   03/29/23 63 kg (139 lb)     Body mass index is 20.08 kg/m².   Well-developed male flat JVP distant heart sounds without gallop.  Lungs were clear abdomen soft no dependent edema     Last Labs:  CMP:  Recent Labs     07/08/23  0524 07/07/23  0544 07/05/23 2040 05/02/23  0628 05/01/23  0617   * 133* 138 137 136   K 4.1 4.1 4.7 4.1 4.1   CL 95* 98 97* 102 97*   CO2 28 26 29 28 32   ANIONGAP 15 13 17 11 11   BUN 21 30* 17 21 25*   CREATININE 1.29 1.64* 1.36* 1.18 1.35*   GLUCOSE 86 130* 116* 80 80     Recent Labs     07/05/23 2040 04/29/23  0924 03/07/23  0838 02/05/23  0510 02/04/23  1617 02/04/23  0828   ALBUMIN 4.5 4.0 4.5 4.0 4.4 4.7   ALKPHOS 37 37 56 31*  --  45   ALT 21 22 23 14  --  19   AST 28 29 34 20  --  30   BILITOT 0.6 0.6 0.4 0.4  --  0.5     CBC:  Recent Labs     " 07/08/23  0524 07/07/23  0544 07/05/23  2040 05/02/23  0628 05/01/23  0617   WBC 10.8 14.4* 10.1 10.5 12.9*   HGB 13.4* 11.8* 14.7 12.0* 12.9*   HCT 39.8* 32.6* 42.2 36.7* 38.5*    255 306 243 271   MCV 91 85 88 93 91     COAG:   Recent Labs     07/05/23  2040 03/07/23  0838 02/04/23  0828 08/05/22  1014 06/21/22  0607 06/17/22  1800 06/17/22  0726 04/19/22  2130 03/16/22  1620 10/01/21  0112 12/07/20  1921   INR 0.9 1.0 1.0 1.0 0.9  --    < >  --    < >  --   --    DDIMERVTE  --   --   --   --   --  1,109*  --  798*  --  739* 1,669*    < > = values in this interval not displayed.     HEME/ENDO:  Recent Labs     06/30/22  1100 06/19/22  1727 02/08/22  0556 02/20/20  0955 08/02/19  0845   FERRITIN  --  369*  --   --   --    IRONSAT  --  41  --   --   --    TSH 1.28  --  0.88 0.90  --    HGBA1C 5.3  --   --   --  4.9      CARDIAC:   Recent Labs     07/06/23  0156 07/05/23  2209 07/05/23 2040 04/29/23  0924 03/07/23  0838 02/04/23  0828 12/15/22  1302 12/15/22  0906 09/27/22  1848 09/27/22  1609   TROPHS 22* 26* 24* 14 15 16   < > 17   < > 13   BNP  --   --  66  --  25 116*  --  45  --  75    < > = values in this interval not displayed.     Recent Labs     06/30/22  1100 08/02/19  0845   CHOL 217* 195   LDLF 92 68   .7 118.0   TRIG 98 45       Last Cardiology Tests:  ECG:      Echo:  Echo Results:  No results found for this or any previous visit from the past 3650 days.       Cath:      Stress Test:  Stress Results:  No results found for this or any previous visit from the past 365 days.         Cardiac Imaging:  Vascular US lower extremity venous duplex bilateral  Narrative: Interpreted By:  SOFIA BLAKE MD  MRN: [id number]  Patient Name: DASHA MEDEROS     STUDY:  DUPLEX LOWER EXTREMITY VEINS, BILATERAL;  7/5/2023 9:29 pm     INDICATION:  lle edema sob .     COMPARISON:  None.     ACCESSION NUMBER(S):  [id number]     ORDERING CLINICIAN:  MANSOOR RAMIREZ     TECHNIQUE:  Vascular ultrasound of  the bilateral lower extremities was performed.  Real-time compression views as well as Gray scale, color Doppler and  spectral Doppler waveform analysis was performed.     FINDINGS:  Evaluation of the visualized portions of the bilateral common femoral  vein, proximal, mid, and distal femoral vein, and popliteal vein were  performed.  Evaluation of the visualized portions of the  posterior  tibial and peroneal veins were also performed.     Limitations:  None.     The evaluated veins demonstrate normal compressibility. There is  intact venous flow demonstrating normal respiratory variability and  normal augmentation of flow with calf compression. Therefore, there  is no ultrasonographic evidence for deep vein thrombosis within the  evaluated veins.     Respiratory variation and augmentation to calf pressure was noted.     Impression: No evidence of deep vein thrombosis in the bilateral lower  extremities.  XR chest 1 view  Narrative: Interpreted By:  SOFIA BLAKE MD  MRN: [id number]  Patient Name: DASHA MEDEROS     STUDY:  CHEST 1 VIEW;  7/5/2023 9:00 pm     INDICATION:  Chest Pain .     COMPARISON:  Radiographs of the chest dated 04/29/2023.     ACCESSION NUMBER(S):  [id number]     ORDERING CLINICIAN:  MNASOOR RAMIREZ     FINDINGS:  AP radiograph of the chest was provided.           CARDIOMEDIASTINAL SILHOUETTE:  Cardiomediastinal silhouette is normal in size and configuration.     LUNGS:  Lungs are hyperinflated, consistent with patient's history of  advanced emphysema, without evidence of focal consolidation, pleural  effusion or pneumothorax. Mild prominence of the interstitial  markings in the upper lungs similar to prior exam     ABDOMEN:  No remarkable upper abdominal findings.     BONES:  No acute osseous changes.     Impression: 1.  Hyperinflated lungs consistent with advanced emphysema, without  evidence of focal consolidation, pleural effusion or pneumothorax.           Assessment/Plan      73-year-old male with heart failure and preserved LV ejection fraction severe COPD on oxygen.  Euvolemic consider addition of SGLT2i and/or spironolactone if evidence of hypervolemia develops.  Reviewed signs and symptoms of heart failure.  I placed referral for RSV vaccine through his community pharmacy at his request thank you for allowing me to participate in his care we will follow-up with us in 6 months      Orders:  Orders Placed This Encounter   Procedures    RSV, Recombinant, 60 years and older (AREXVY)    ECG 12 Lead      Followup Appts:  Future Appointments   Date Time Provider Department Center   5/9/2024  8:00 AM Rosa Lees DO [id number]           ____________________________________________________________  Zoran Infante MD    Senior Attending Physician  [facility]  [facility]

## 2023-11-22 LAB
ATRIAL RATE: 84 BPM
P AXIS: 82 DEGREES
P OFFSET: 195 MS
P ONSET: 153 MS
PR INTERVAL: 126 MS
Q ONSET: 216 MS
QRS COUNT: 14 BEATS
QRS DURATION: 88 MS
QT INTERVAL: 360 MS
QTC CALCULATION(BAZETT): 425 MS
QTC FREDERICIA: 402 MS
R AXIS: 80 DEGREES
T AXIS: 60 DEGREES
T OFFSET: 396 MS
VENTRICULAR RATE: 84 BPM

## 2023-11-23 ENCOUNTER — APPOINTMENT (OUTPATIENT)
Dept: RADIOLOGY | Facility: HOSPITAL | Age: 73
DRG: 191 | End: 2023-11-23
Payer: COMMERCIAL

## 2023-11-23 ENCOUNTER — HOSPITAL ENCOUNTER (INPATIENT)
Facility: HOSPITAL | Age: 73
LOS: 2 days | Discharge: HOME | DRG: 191 | End: 2023-11-26
Attending: EMERGENCY MEDICINE | Admitting: HOSPITALIST
Payer: COMMERCIAL

## 2023-11-23 DIAGNOSIS — J44.1 ACUTE EXACERBATION OF CHRONIC OBSTRUCTIVE PULMONARY DISEASE (COPD) (MULTI): Primary | ICD-10-CM

## 2023-11-23 DIAGNOSIS — J44.9 CHRONIC OBSTRUCTIVE PULMONARY DISEASE, UNSPECIFIED COPD TYPE (MULTI): ICD-10-CM

## 2023-11-23 LAB
ALBUMIN SERPL BCP-MCNC: 4.8 G/DL (ref 3.4–5)
ALP SERPL-CCNC: 59 U/L (ref 33–136)
ALT SERPL W P-5'-P-CCNC: 16 U/L (ref 10–52)
ANION GAP SERPL CALC-SCNC: 21 MMOL/L (ref 10–20)
AST SERPL W P-5'-P-CCNC: 40 U/L (ref 9–39)
BASOPHILS # BLD AUTO: 0.2 X10*3/UL (ref 0–0.1)
BASOPHILS NFR BLD AUTO: 1.6 %
BILIRUB SERPL-MCNC: 0.5 MG/DL (ref 0–1.2)
BNP SERPL-MCNC: 24 PG/ML (ref 0–99)
BUN SERPL-MCNC: 21 MG/DL (ref 6–23)
CALCIUM SERPL-MCNC: 9.9 MG/DL (ref 8.6–10.3)
CARDIAC TROPONIN I PNL SERPL HS: 15 NG/L (ref 0–20)
CHLORIDE SERPL-SCNC: 98 MMOL/L (ref 98–107)
CO2 SERPL-SCNC: 23 MMOL/L (ref 21–32)
CREAT SERPL-MCNC: 1.19 MG/DL (ref 0.5–1.3)
EOSINOPHIL # BLD AUTO: 0.17 X10*3/UL (ref 0–0.4)
EOSINOPHIL NFR BLD AUTO: 1.4 %
ERYTHROCYTE [DISTWIDTH] IN BLOOD BY AUTOMATED COUNT: 13 % (ref 11.5–14.5)
GFR SERPL CREATININE-BSD FRML MDRD: 65 ML/MIN/1.73M*2
GLUCOSE SERPL-MCNC: 96 MG/DL (ref 74–99)
HCT VFR BLD AUTO: 39.5 % (ref 41–52)
HGB BLD-MCNC: 13.1 G/DL (ref 13.5–17.5)
IMM GRANULOCYTES # BLD AUTO: 0.04 X10*3/UL (ref 0–0.5)
IMM GRANULOCYTES NFR BLD AUTO: 0.3 % (ref 0–0.9)
LYMPHOCYTES # BLD AUTO: 1.86 X10*3/UL (ref 0.8–3)
LYMPHOCYTES NFR BLD AUTO: 15.3 %
MCH RBC QN AUTO: 29.6 PG (ref 26–34)
MCHC RBC AUTO-ENTMCNC: 33.2 G/DL (ref 32–36)
MCV RBC AUTO: 89 FL (ref 80–100)
MONOCYTES # BLD AUTO: 1.19 X10*3/UL (ref 0.05–0.8)
MONOCYTES NFR BLD AUTO: 9.8 %
NEUTROPHILS # BLD AUTO: 8.7 X10*3/UL (ref 1.6–5.5)
NEUTROPHILS NFR BLD AUTO: 71.6 %
NRBC BLD-RTO: 0 /100 WBCS (ref 0–0)
PLATELET # BLD AUTO: 291 X10*3/UL (ref 150–450)
POTASSIUM SERPL-SCNC: 5.6 MMOL/L (ref 3.5–5.3)
PROT SERPL-MCNC: 8.1 G/DL (ref 6.4–8.2)
RBC # BLD AUTO: 4.42 X10*6/UL (ref 4.5–5.9)
SODIUM SERPL-SCNC: 136 MMOL/L (ref 136–145)
WBC # BLD AUTO: 12.2 X10*3/UL (ref 4.4–11.3)

## 2023-11-23 PROCEDURE — 85025 COMPLETE CBC W/AUTO DIFF WBC: CPT | Performed by: EMERGENCY MEDICINE

## 2023-11-23 PROCEDURE — 83880 ASSAY OF NATRIURETIC PEPTIDE: CPT | Performed by: EMERGENCY MEDICINE

## 2023-11-23 PROCEDURE — 80053 COMPREHEN METABOLIC PANEL: CPT | Performed by: EMERGENCY MEDICINE

## 2023-11-23 PROCEDURE — 71045 X-RAY EXAM CHEST 1 VIEW: CPT | Performed by: RADIOLOGY

## 2023-11-23 PROCEDURE — 36415 COLL VENOUS BLD VENIPUNCTURE: CPT | Performed by: EMERGENCY MEDICINE

## 2023-11-23 PROCEDURE — 36600 WITHDRAWAL OF ARTERIAL BLOOD: CPT

## 2023-11-23 PROCEDURE — 94660 CPAP INITIATION&MGMT: CPT

## 2023-11-23 PROCEDURE — 84484 ASSAY OF TROPONIN QUANT: CPT | Performed by: EMERGENCY MEDICINE

## 2023-11-23 PROCEDURE — 82435 ASSAY OF BLOOD CHLORIDE: CPT | Mod: 59 | Performed by: EMERGENCY MEDICINE

## 2023-11-23 PROCEDURE — 94640 AIRWAY INHALATION TREATMENT: CPT

## 2023-11-23 PROCEDURE — 2500000002 HC RX 250 W HCPCS SELF ADMINISTERED DRUGS (ALT 637 FOR MEDICARE OP, ALT 636 FOR OP/ED): Performed by: EMERGENCY MEDICINE

## 2023-11-23 PROCEDURE — 71045 X-RAY EXAM CHEST 1 VIEW: CPT

## 2023-11-23 PROCEDURE — 2500000005 HC RX 250 GENERAL PHARMACY W/O HCPCS: Performed by: EMERGENCY MEDICINE

## 2023-11-23 PROCEDURE — 99285 EMERGENCY DEPT VISIT HI MDM: CPT | Mod: 25 | Performed by: EMERGENCY MEDICINE

## 2023-11-23 RX ORDER — IPRATROPIUM BROMIDE AND ALBUTEROL SULFATE 2.5; .5 MG/3ML; MG/3ML
SOLUTION RESPIRATORY (INHALATION)
Status: DISPENSED
Start: 2023-11-23 | End: 2023-11-24

## 2023-11-23 RX ORDER — IPRATROPIUM BROMIDE AND ALBUTEROL SULFATE 2.5; .5 MG/3ML; MG/3ML
9 SOLUTION RESPIRATORY (INHALATION) ONCE
Status: COMPLETED | OUTPATIENT
Start: 2023-11-23 | End: 2023-11-23

## 2023-11-23 RX ADMIN — Medication 70 PERCENT: at 20:27

## 2023-11-23 RX ADMIN — IPRATROPIUM BROMIDE AND ALBUTEROL SULFATE 9 ML: 2.5; .5 SOLUTION RESPIRATORY (INHALATION) at 20:26

## 2023-11-23 ASSESSMENT — PAIN SCALES - GENERAL
PAINLEVEL_OUTOF10: 5 - MODERATE PAIN
PAINLEVEL_OUTOF10: 3

## 2023-11-23 ASSESSMENT — COLUMBIA-SUICIDE SEVERITY RATING SCALE - C-SSRS
1. IN THE PAST MONTH, HAVE YOU WISHED YOU WERE DEAD OR WISHED YOU COULD GO TO SLEEP AND NOT WAKE UP?: NO
2. HAVE YOU ACTUALLY HAD ANY THOUGHTS OF KILLING YOURSELF?: NO
6. HAVE YOU EVER DONE ANYTHING, STARTED TO DO ANYTHING, OR PREPARED TO DO ANYTHING TO END YOUR LIFE?: NO

## 2023-11-23 ASSESSMENT — PAIN - FUNCTIONAL ASSESSMENT: PAIN_FUNCTIONAL_ASSESSMENT: WONG-BAKER FACES

## 2023-11-23 NOTE — Clinical Note
ED UM Review Complete - Patient Meets Inpatient Criteria  [facility]

## 2023-11-24 PROBLEM — J44.1 ACUTE EXACERBATION OF CHRONIC OBSTRUCTIVE PULMONARY DISEASE (COPD) (MULTI): Status: ACTIVE | Noted: 2023-11-24

## 2023-11-24 LAB
ANION GAP SERPL CALC-SCNC: 14 MMOL/L (ref 10–20)
BUN SERPL-MCNC: 26 MG/DL (ref 6–23)
CALCIUM SERPL-MCNC: 9.4 MG/DL (ref 8.6–10.3)
CHLORIDE SERPL-SCNC: 98 MMOL/L (ref 98–107)
CO2 SERPL-SCNC: 27 MMOL/L (ref 21–32)
CREAT SERPL-MCNC: 1.18 MG/DL (ref 0.5–1.3)
GFR SERPL CREATININE-BSD FRML MDRD: 66 ML/MIN/1.73M*2
GLUCOSE SERPL-MCNC: 168 MG/DL (ref 74–99)
POTASSIUM SERPL-SCNC: 4 MMOL/L (ref 3.5–5.3)
SODIUM SERPL-SCNC: 135 MMOL/L (ref 136–145)

## 2023-11-24 PROCEDURE — 2500000004 HC RX 250 GENERAL PHARMACY W/ HCPCS (ALT 636 FOR OP/ED): Mod: 59 | Performed by: HOSPITALIST

## 2023-11-24 PROCEDURE — 36415 COLL VENOUS BLD VENIPUNCTURE: CPT | Performed by: INTERNAL MEDICINE

## 2023-11-24 PROCEDURE — 1100000001 HC PRIVATE ROOM DAILY

## 2023-11-24 PROCEDURE — 80048 BASIC METABOLIC PNL TOTAL CA: CPT | Performed by: INTERNAL MEDICINE

## 2023-11-24 PROCEDURE — 94660 CPAP INITIATION&MGMT: CPT

## 2023-11-24 PROCEDURE — 94640 AIRWAY INHALATION TREATMENT: CPT | Mod: MUEWO

## 2023-11-24 PROCEDURE — 99222 1ST HOSP IP/OBS MODERATE 55: CPT | Performed by: NURSE PRACTITIONER

## 2023-11-24 PROCEDURE — 94640 AIRWAY INHALATION TREATMENT: CPT

## 2023-11-24 PROCEDURE — 96372 THER/PROPH/DIAG INJ SC/IM: CPT | Performed by: HOSPITALIST

## 2023-11-24 PROCEDURE — 96374 THER/PROPH/DIAG INJ IV PUSH: CPT | Mod: 59

## 2023-11-24 PROCEDURE — 2500000001 HC RX 250 WO HCPCS SELF ADMINISTERED DRUGS (ALT 637 FOR MEDICARE OP): Performed by: HOSPITALIST

## 2023-11-24 PROCEDURE — 2500000002 HC RX 250 W HCPCS SELF ADMINISTERED DRUGS (ALT 637 FOR MEDICARE OP, ALT 636 FOR OP/ED): Mod: MUE | Performed by: HOSPITALIST

## 2023-11-24 PROCEDURE — 99223 1ST HOSP IP/OBS HIGH 75: CPT | Performed by: HOSPITALIST

## 2023-11-24 PROCEDURE — 5A09357 ASSISTANCE WITH RESPIRATORY VENTILATION, LESS THAN 24 CONSECUTIVE HOURS, CONTINUOUS POSITIVE AIRWAY PRESSURE: ICD-10-PCS | Performed by: INTERNAL MEDICINE

## 2023-11-24 PROCEDURE — 2500000002 HC RX 250 W HCPCS SELF ADMINISTERED DRUGS (ALT 637 FOR MEDICARE OP, ALT 636 FOR OP/ED): Performed by: HOSPITALIST

## 2023-11-24 PROCEDURE — 96376 TX/PRO/DX INJ SAME DRUG ADON: CPT

## 2023-11-24 RX ORDER — GUAIFENESIN 100 MG/5ML
200 SOLUTION ORAL EVERY 4 HOURS PRN
Status: DISCONTINUED | OUTPATIENT
Start: 2023-11-24 | End: 2023-11-26 | Stop reason: HOSPADM

## 2023-11-24 RX ORDER — ONDANSETRON HYDROCHLORIDE 2 MG/ML
4 INJECTION, SOLUTION INTRAVENOUS EVERY 8 HOURS PRN
Status: DISCONTINUED | OUTPATIENT
Start: 2023-11-24 | End: 2023-11-26 | Stop reason: HOSPADM

## 2023-11-24 RX ORDER — FLUTICASONE FUROATE AND VILANTEROL 100; 25 UG/1; UG/1
1 POWDER RESPIRATORY (INHALATION)
Status: DISCONTINUED | OUTPATIENT
Start: 2023-11-24 | End: 2023-11-24 | Stop reason: CLARIF

## 2023-11-24 RX ORDER — IPRATROPIUM BROMIDE AND ALBUTEROL SULFATE 2.5; .5 MG/3ML; MG/3ML
3 SOLUTION RESPIRATORY (INHALATION) EVERY 2 HOUR PRN
Status: DISCONTINUED | OUTPATIENT
Start: 2023-11-24 | End: 2023-11-26 | Stop reason: HOSPADM

## 2023-11-24 RX ORDER — ACETAMINOPHEN 325 MG/1
650 TABLET ORAL EVERY 4 HOURS PRN
Status: DISCONTINUED | OUTPATIENT
Start: 2023-11-24 | End: 2023-11-26 | Stop reason: HOSPADM

## 2023-11-24 RX ORDER — IPRATROPIUM BROMIDE AND ALBUTEROL SULFATE 2.5; .5 MG/3ML; MG/3ML
3 SOLUTION RESPIRATORY (INHALATION)
Status: DISCONTINUED | OUTPATIENT
Start: 2023-11-24 | End: 2023-11-24

## 2023-11-24 RX ORDER — FORMOTEROL FUMARATE DIHYDRATE 20 UG/2ML
20 SOLUTION RESPIRATORY (INHALATION)
Status: DISCONTINUED | OUTPATIENT
Start: 2023-11-24 | End: 2023-11-26 | Stop reason: HOSPADM

## 2023-11-24 RX ORDER — BUDESONIDE 0.5 MG/2ML
0.5 INHALANT ORAL
Status: DISCONTINUED | OUTPATIENT
Start: 2023-11-24 | End: 2023-11-24

## 2023-11-24 RX ORDER — LISINOPRIL 20 MG/1
20 TABLET ORAL DAILY
Status: DISCONTINUED | OUTPATIENT
Start: 2023-11-24 | End: 2023-11-26 | Stop reason: HOSPADM

## 2023-11-24 RX ORDER — ENOXAPARIN SODIUM 100 MG/ML
40 INJECTION SUBCUTANEOUS EVERY 24 HOURS
Status: DISCONTINUED | OUTPATIENT
Start: 2023-11-24 | End: 2023-11-26 | Stop reason: HOSPADM

## 2023-11-24 RX ORDER — BUDESONIDE 0.5 MG/2ML
0.5 INHALANT ORAL
Status: DISCONTINUED | OUTPATIENT
Start: 2023-11-24 | End: 2023-11-26 | Stop reason: HOSPADM

## 2023-11-24 RX ORDER — POLYETHYLENE GLYCOL 3350 17 G/17G
17 POWDER, FOR SOLUTION ORAL DAILY
Status: DISCONTINUED | OUTPATIENT
Start: 2023-11-24 | End: 2023-11-26 | Stop reason: HOSPADM

## 2023-11-24 RX ORDER — AZITHROMYCIN 500 MG/1
250 TABLET, FILM COATED ORAL
Status: DISCONTINUED | OUTPATIENT
Start: 2023-11-24 | End: 2023-11-26 | Stop reason: HOSPADM

## 2023-11-24 RX ORDER — AMLODIPINE BESYLATE 10 MG/1
10 TABLET ORAL DAILY
Status: DISCONTINUED | OUTPATIENT
Start: 2023-11-24 | End: 2023-11-26 | Stop reason: HOSPADM

## 2023-11-24 RX ORDER — ONDANSETRON 4 MG/1
4 TABLET, ORALLY DISINTEGRATING ORAL EVERY 8 HOURS PRN
Status: DISCONTINUED | OUTPATIENT
Start: 2023-11-24 | End: 2023-11-26 | Stop reason: HOSPADM

## 2023-11-24 RX ORDER — IPRATROPIUM BROMIDE AND ALBUTEROL SULFATE 2.5; .5 MG/3ML; MG/3ML
3 SOLUTION RESPIRATORY (INHALATION)
Status: DISCONTINUED | OUTPATIENT
Start: 2023-11-24 | End: 2023-11-26 | Stop reason: HOSPADM

## 2023-11-24 RX ADMIN — AMLODIPINE BESYLATE 10 MG: 10 TABLET ORAL at 10:20

## 2023-11-24 RX ADMIN — IPRATROPIUM BROMIDE AND ALBUTEROL SULFATE 3 ML: 2.5; .5 SOLUTION RESPIRATORY (INHALATION) at 03:46

## 2023-11-24 RX ADMIN — METHYLPREDNISOLONE SODIUM SUCCINATE 40 MG: 40 INJECTION, POWDER, FOR SOLUTION INTRAMUSCULAR; INTRAVENOUS at 11:37

## 2023-11-24 RX ADMIN — METHYLPREDNISOLONE SODIUM SUCCINATE 40 MG: 40 INJECTION, POWDER, FOR SOLUTION INTRAMUSCULAR; INTRAVENOUS at 07:22

## 2023-11-24 RX ADMIN — IPRATROPIUM BROMIDE AND ALBUTEROL SULFATE 3 ML: 2.5; .5 SOLUTION RESPIRATORY (INHALATION) at 10:18

## 2023-11-24 RX ADMIN — FORMOTEROL FUMARATE DIHYDRATE 20 MCG: 20 SOLUTION RESPIRATORY (INHALATION) at 07:20

## 2023-11-24 RX ADMIN — BUDESONIDE INHALATION 0.5 MG: 0.5 SUSPENSION RESPIRATORY (INHALATION) at 19:55

## 2023-11-24 RX ADMIN — IPRATROPIUM BROMIDE AND ALBUTEROL SULFATE 3 ML: 2.5; .5 SOLUTION RESPIRATORY (INHALATION) at 07:20

## 2023-11-24 RX ADMIN — ENOXAPARIN SODIUM 40 MG: 40 INJECTION SUBCUTANEOUS at 10:19

## 2023-11-24 RX ADMIN — LISINOPRIL 20 MG: 20 TABLET ORAL at 10:20

## 2023-11-24 RX ADMIN — AZITHROMYCIN 250 MG: 500 TABLET, FILM COATED ORAL at 10:19

## 2023-11-24 RX ADMIN — IPRATROPIUM BROMIDE AND ALBUTEROL SULFATE 3 ML: 2.5; .5 SOLUTION RESPIRATORY (INHALATION) at 19:54

## 2023-11-24 RX ADMIN — BUDESONIDE INHALATION 0.5 MG: 0.5 SUSPENSION RESPIRATORY (INHALATION) at 07:20

## 2023-11-24 RX ADMIN — ACETAMINOPHEN 650 MG: 325 TABLET ORAL at 20:52

## 2023-11-24 RX ADMIN — ACETAMINOPHEN 650 MG: 325 TABLET ORAL at 10:20

## 2023-11-24 RX ADMIN — METHYLPREDNISOLONE SODIUM SUCCINATE 40 MG: 40 INJECTION, POWDER, FOR SOLUTION INTRAMUSCULAR; INTRAVENOUS at 20:52

## 2023-11-24 RX ADMIN — IPRATROPIUM BROMIDE AND ALBUTEROL SULFATE 3 ML: 2.5; .5 SOLUTION RESPIRATORY (INHALATION) at 14:40

## 2023-11-24 RX ADMIN — FORMOTEROL FUMARATE DIHYDRATE 20 MCG: 20 SOLUTION RESPIRATORY (INHALATION) at 19:55

## 2023-11-24 SDOH — ECONOMIC STABILITY: FOOD INSECURITY: WITHIN THE PAST 12 MONTHS, THE FOOD YOU BOUGHT JUST DIDN'T LAST AND YOU DIDN'T HAVE MONEY TO GET MORE.: NEVER TRUE

## 2023-11-24 SDOH — ECONOMIC STABILITY: INCOME INSECURITY: IN THE PAST 12 MONTHS, HAS THE ELECTRIC, GAS, OIL, OR WATER COMPANY THREATENED TO SHUT OFF SERVICE IN YOUR HOME?: NO

## 2023-11-24 SDOH — SOCIAL STABILITY: SOCIAL INSECURITY: ARE YOU OR HAVE YOU BEEN THREATENED OR ABUSED PHYSICALLY, EMOTIONALLY, OR SEXUALLY BY ANYONE?: NO

## 2023-11-24 SDOH — HEALTH STABILITY: PHYSICAL HEALTH: ON AVERAGE, HOW MANY MINUTES DO YOU ENGAGE IN EXERCISE AT THIS LEVEL?: 0 MIN

## 2023-11-24 SDOH — HEALTH STABILITY: MENTAL HEALTH: HOW MANY STANDARD DRINKS CONTAINING ALCOHOL DO YOU HAVE ON A TYPICAL DAY?: PATIENT DOES NOT DRINK

## 2023-11-24 SDOH — HEALTH STABILITY: MENTAL HEALTH: HOW OFTEN DO YOU HAVE A DRINK CONTAINING ALCOHOL?: NEVER

## 2023-11-24 SDOH — SOCIAL STABILITY: SOCIAL NETWORK: ARE YOU MARRIED, WIDOWED, DIVORCED, SEPARATED, NEVER MARRIED, OR LIVING WITH A PARTNER?: MARRIED

## 2023-11-24 SDOH — SOCIAL STABILITY: SOCIAL INSECURITY
WITHIN THE LAST YEAR, HAVE TO BEEN RAPED OR FORCED TO HAVE ANY KIND OF SEXUAL ACTIVITY BY YOUR PARTNER OR EX-PARTNER?: NO

## 2023-11-24 SDOH — ECONOMIC STABILITY: HOUSING INSECURITY
IN THE LAST 12 MONTHS, WAS THERE A TIME WHEN YOU DID NOT HAVE A STEADY PLACE TO SLEEP OR SLEPT IN A SHELTER (INCLUDING NOW)?: NO

## 2023-11-24 SDOH — HEALTH STABILITY: MENTAL HEALTH
STRESS IS WHEN SOMEONE FEELS TENSE, NERVOUS, ANXIOUS, OR CAN'T SLEEP AT NIGHT BECAUSE THEIR MIND IS TROUBLED. HOW STRESSED ARE YOU?: TO SOME EXTENT

## 2023-11-24 SDOH — SOCIAL STABILITY: SOCIAL NETWORK: HOW OFTEN DO YOU ATTENT MEETINGS OF THE CLUB OR ORGANIZATION YOU BELONG TO?: NEVER

## 2023-11-24 SDOH — ECONOMIC STABILITY: INCOME INSECURITY: HOW HARD IS IT FOR YOU TO PAY FOR THE VERY BASICS LIKE FOOD, HOUSING, MEDICAL CARE, AND HEATING?: NOT HARD AT ALL

## 2023-11-24 SDOH — HEALTH STABILITY: MENTAL HEALTH: HOW OFTEN DO YOU HAVE 6 OR MORE DRINKS ON ONE OCCASION?: NEVER

## 2023-11-24 SDOH — ECONOMIC STABILITY: TRANSPORTATION INSECURITY
IN THE PAST 12 MONTHS, HAS LACK OF TRANSPORTATION KEPT YOU FROM MEETINGS, WORK, OR FROM GETTING THINGS NEEDED FOR DAILY LIVING?: NO

## 2023-11-24 SDOH — SOCIAL STABILITY: SOCIAL INSECURITY: DO YOU FEEL UNSAFE GOING BACK TO THE PLACE WHERE YOU ARE LIVING?: NO

## 2023-11-24 SDOH — HEALTH STABILITY: PHYSICAL HEALTH: ON AVERAGE, HOW MANY DAYS PER WEEK DO YOU ENGAGE IN MODERATE TO STRENUOUS EXERCISE (LIKE A BRISK WALK)?: 0 DAYS

## 2023-11-24 SDOH — SOCIAL STABILITY: SOCIAL NETWORK
DO YOU BELONG TO ANY CLUBS OR ORGANIZATIONS SUCH AS CHURCH GROUPS UNIONS, FRATERNAL OR ATHLETIC GROUPS, OR SCHOOL GROUPS?: NO

## 2023-11-24 SDOH — SOCIAL STABILITY: SOCIAL INSECURITY
WITHIN THE LAST YEAR, HAVE YOU BEEN KICKED, HIT, SLAPPED, OR OTHERWISE PHYSICALLY HURT BY YOUR PARTNER OR EX-PARTNER?: NO

## 2023-11-24 SDOH — SOCIAL STABILITY: SOCIAL INSECURITY: ABUSE: ADULT

## 2023-11-24 SDOH — ECONOMIC STABILITY: FOOD INSECURITY: WITHIN THE PAST 12 MONTHS, YOU WORRIED THAT YOUR FOOD WOULD RUN OUT BEFORE YOU GOT MONEY TO BUY MORE.: NEVER TRUE

## 2023-11-24 SDOH — SOCIAL STABILITY: SOCIAL INSECURITY: WITHIN THE LAST YEAR, HAVE YOU BEEN HUMILIATED OR EMOTIONALLY ABUSED IN OTHER WAYS BY YOUR PARTNER OR EX-PARTNER?: NO

## 2023-11-24 SDOH — SOCIAL STABILITY: SOCIAL INSECURITY: ARE THERE ANY APPARENT SIGNS OF INJURIES/BEHAVIORS THAT COULD BE RELATED TO ABUSE/NEGLECT?: NO

## 2023-11-24 SDOH — SOCIAL STABILITY: SOCIAL INSECURITY: WITHIN THE LAST YEAR, HAVE YOU BEEN AFRAID OF YOUR PARTNER OR EX-PARTNER?: NO

## 2023-11-24 SDOH — SOCIAL STABILITY: SOCIAL NETWORK
IN A TYPICAL WEEK, HOW MANY TIMES DO YOU TALK ON THE PHONE WITH FAMILY, FRIENDS, OR NEIGHBORS?: MORE THAN THREE TIMES A WEEK

## 2023-11-24 SDOH — ECONOMIC STABILITY: HOUSING INSECURITY: IN THE LAST 12 MONTHS, HOW MANY PLACES HAVE YOU LIVED?: 1

## 2023-11-24 SDOH — SOCIAL STABILITY: SOCIAL NETWORK: HOW OFTEN DO YOU ATTEND CHURCH OR RELIGIOUS SERVICES?: NEVER

## 2023-11-24 SDOH — ECONOMIC STABILITY: INCOME INSECURITY: IN THE LAST 12 MONTHS, WAS THERE A TIME WHEN YOU WERE NOT ABLE TO PAY THE MORTGAGE OR RENT ON TIME?: NO

## 2023-11-24 SDOH — SOCIAL STABILITY: SOCIAL INSECURITY: DO YOU FEEL ANYONE HAS EXPLOITED OR TAKEN ADVANTAGE OF YOU FINANCIALLY OR OF YOUR PERSONAL PROPERTY?: NO

## 2023-11-24 SDOH — SOCIAL STABILITY: SOCIAL INSECURITY: WERE YOU ABLE TO COMPLETE ALL THE BEHAVIORAL HEALTH SCREENINGS?: YES

## 2023-11-24 SDOH — SOCIAL STABILITY: SOCIAL INSECURITY: HAVE YOU HAD THOUGHTS OF HARMING ANYONE ELSE?: YES

## 2023-11-24 SDOH — SOCIAL STABILITY: SOCIAL INSECURITY: DOES ANYONE TRY TO KEEP YOU FROM HAVING/CONTACTING OTHER FRIENDS OR DOING THINGS OUTSIDE YOUR HOME?: NO

## 2023-11-24 SDOH — SOCIAL STABILITY: SOCIAL INSECURITY: HAS ANYONE EVER THREATENED TO HURT YOUR FAMILY OR YOUR PETS?: NO

## 2023-11-24 SDOH — ECONOMIC STABILITY: TRANSPORTATION INSECURITY
IN THE PAST 12 MONTHS, HAS THE LACK OF TRANSPORTATION KEPT YOU FROM MEDICAL APPOINTMENTS OR FROM GETTING MEDICATIONS?: NO

## 2023-11-24 SDOH — SOCIAL STABILITY: SOCIAL NETWORK: HOW OFTEN DO YOU GET TOGETHER WITH FRIENDS OR RELATIVES?: MORE THAN THREE TIMES A WEEK

## 2023-11-24 ASSESSMENT — COGNITIVE AND FUNCTIONAL STATUS - GENERAL
CLIMB 3 TO 5 STEPS WITH RAILING: A LITTLE
DAILY ACTIVITIY SCORE: 24
MOBILITY SCORE: 23
PATIENT BASELINE BEDBOUND: NO

## 2023-11-24 ASSESSMENT — ENCOUNTER SYMPTOMS
WOUND: 0
PSYCHIATRIC NEGATIVE: 1
SORE THROAT: 0
ARTHRALGIAS: 0
DIARRHEA: 0
FEVER: 1
MUSCULOSKELETAL NEGATIVE: 1
NEUROLOGICAL NEGATIVE: 1
CONSTIPATION: 0
CHILLS: 0
DIZZINESS: 0
CHEST TIGHTNESS: 0
CARDIOVASCULAR NEGATIVE: 1
DIAPHORESIS: 1
FATIGUE: 1
WEAKNESS: 0
VOMITING: 0
ALLERGIC/IMMUNOLOGIC NEGATIVE: 1
MYALGIAS: 0
WHEEZING: 1
SHORTNESS OF BREATH: 1
HEADACHES: 0
ACTIVITY CHANGE: 1
NERVOUS/ANXIOUS: 0
NAUSEA: 0
COUGH: 1
UNEXPECTED WEIGHT CHANGE: 0
HEMATOLOGIC/LYMPHATIC NEGATIVE: 1
ABDOMINAL DISTENTION: 0
CONSTITUTIONAL NEGATIVE: 1
APPETITE CHANGE: 0
EYES NEGATIVE: 1
DYSPHORIC MOOD: 0
BRUISES/BLEEDS EASILY: 0
GASTROINTESTINAL NEGATIVE: 1

## 2023-11-24 ASSESSMENT — ACTIVITIES OF DAILY LIVING (ADL)
DRESSING YOURSELF: INDEPENDENT
FEEDING YOURSELF: INDEPENDENT
HEARING - RIGHT EAR: FUNCTIONAL
BATHING: INDEPENDENT
TOILETING: INDEPENDENT
JUDGMENT_ADEQUATE_SAFELY_COMPLETE_DAILY_ACTIVITIES: YES
LACK_OF_TRANSPORTATION: NO
ASSISTIVE_DEVICE: WALKER
GROOMING: INDEPENDENT
PATIENT'S MEMORY ADEQUATE TO SAFELY COMPLETE DAILY ACTIVITIES?: YES
ADEQUATE_TO_COMPLETE_ADL: YES
HEARING - LEFT EAR: FUNCTIONAL
WALKS IN HOME: INDEPENDENT

## 2023-11-24 ASSESSMENT — PATIENT HEALTH QUESTIONNAIRE - PHQ9
SUM OF ALL RESPONSES TO PHQ9 QUESTIONS 1 & 2: 0
2. FEELING DOWN, DEPRESSED OR HOPELESS: NOT AT ALL
1. LITTLE INTEREST OR PLEASURE IN DOING THINGS: NOT AT ALL

## 2023-11-24 ASSESSMENT — PAIN SCALES - GENERAL: PAINLEVEL_OUTOF10: 3

## 2023-11-24 ASSESSMENT — LIFESTYLE VARIABLES
HOW OFTEN DO YOU HAVE A DRINK CONTAINING ALCOHOL: MONTHLY OR LESS
HOW OFTEN DO YOU HAVE 6 OR MORE DRINKS ON ONE OCCASION: NEVER
SKIP TO QUESTIONS 9-10: 1
AUDIT-C TOTAL SCORE: 0
HOW MANY STANDARD DRINKS CONTAINING ALCOHOL DO YOU HAVE ON A TYPICAL DAY: 1 OR 2
AUDIT-C TOTAL SCORE: 1
SKIP TO QUESTIONS 9-10: 1
AUDIT-C TOTAL SCORE: 1

## 2023-11-24 ASSESSMENT — PAIN - FUNCTIONAL ASSESSMENT: PAIN_FUNCTIONAL_ASSESSMENT: 0-10

## 2023-11-24 ASSESSMENT — PAIN DESCRIPTION - LOCATION: LOCATION: HEAD

## 2023-11-24 NOTE — PROGRESS NOTES
Home with wife     11/24/23 0906   Current Planned Discharge Disposition   Current Planned Discharge Disposition Home

## 2023-11-24 NOTE — PROGRESS NOTES
Transitional Care Coordination Progress Note:  Plan per Medical/Surgical team: treatment of COPD with PO ATB, IV solumedrol, Duoneb, inhalers, BIPAP  Status:inpatient  Payor source: medicare A/B  Discharge disposition: home with wife  Potential Barriers: CARMEN 5.6  ADOD: 11/26/2023  BAILEY Velasquez RN, BSN Transitional Care Coordinator ED# [phone]      11/24/23 0907   Discharge Planning   Living Arrangements Spouse/significant other   Support Systems Spouse/significant other   Assistance Needed pulm work up, home oxygen in place   Type of Residence Private residence   Number of Stairs to Enter Residence 5   Number of Stairs Within Residence 14   Home or Post Acute Services None   Patient expects to be discharged to: home with wife   Does the patient need discharge transport arranged? Yes   RoundTrip coordination needed? Yes   Has discharge transport been arranged? No   Financial Resource Strain   How hard is it for you to pay for the very basics like food, housing, medical care, and heating? Not hard   Housing Stability   In the last 12 months, was there a time when you were not able to pay the mortgage or rent on time? N   In the last 12 months, how many places have you lived? 1   In the last 12 months, was there a time when you did not have a steady place to sleep or slept in a shelter (including now)? N   Transportation Needs   In the past 12 months, has lack of transportation kept you from medical appointments or from getting medications? no   In the past 12 months, has lack of transportation kept you from meetings, work, or from getting things needed for daily living? No

## 2023-11-24 NOTE — PROGRESS NOTES
11/24/23 0910   Physical Activity   On average, how many days per week do you engage in moderate to strenuous exercise (like a brisk walk)? 0 days   On average, how many minutes do you engage in exercise at this level? 0 min   Financial Resource Strain   How hard is it for you to pay for the very basics like food, housing, medical care, and heating? Not hard   Housing Stability   In the last 12 months, was there a time when you were not able to pay the mortgage or rent on time? N   In the last 12 months, how many places have you lived? 1   In the last 12 months, was there a time when you did not have a steady place to sleep or slept in a shelter (including now)? N   Transportation Needs   In the past 12 months, has lack of transportation kept you from medical appointments or from getting medications? no   In the past 12 months, has lack of transportation kept you from meetings, work, or from getting things needed for daily living? No   Food Insecurity   Within the past 12 months, you worried that your food would run out before you got the money to buy more. Never true   Within the past 12 months, the food you bought just didn't last and you didn't have money to get more. Never true   Stress   Do you feel stress - tense, restless, nervous, or anxious, or unable to sleep at night because your mind is troubled all the time - these days? To some exte   Social Connections   In a typical week, how many times do you talk on the phone with family, friends, or neighbors? More than 3   How often do you get together with friends or relatives? More than 3   How often do you attend Latter day or Roman Catholic services? Never   Do you belong to any clubs or organizations such as Latter day groups, unions, fraternal or athletic groups, or school groups? No   How often do you attend meetings of the clubs or organizations you belong to? Never   Are you , , , , never , or living with a partner?     Intimate Partner Violence   Within the last year, have you been afraid of your partner or ex-partner? No   Within the last year, have you been humiliated or emotionally abused in other ways by your partner or ex-partner? No   Within the last year, have you been kicked, hit, slapped, or otherwise physically hurt by your partner or ex-partner? No   Within the last year, have you been raped or forced to have any kind of sexual activity by your partner or ex-partner? No   Alcohol Use   Q1: How often do you have a drink containing alcohol? Never   Q2: How many drinks containing alcohol do you have on a typical day when you are drinking? None   Q3: How often do you have six or more drinks on one occasion? Never   Utilities   In the past 12 months has the electric, gas, oil, or water company threatened to shut off services in your home? No

## 2023-11-24 NOTE — ED PROVIDER NOTES
HPI   No chief complaint on file.      Shortness of breath    History of present patient is a 73-year-old male with history of COPD, hepatitis presenting to the emergency department with complaints of shortness of breath.  According to the patient, his shortness of breath started this morning.  Patient states that this evening, the shortness of breath became unbearable and as result EMS was called.  On arrival of EMS, the patient was found to be hypoxic and in respiratory distress.  The patient was given DuoNebs and arrived to the hospital as well as Solu-Medrol.  After this, the patient was still extremely short of breath but somewhat improved.  Patient was brought to the emergency department for further evaluation.        History provided by:  EMS personnel  History limited by:  Acuity of condition   used: No                        No data recorded                Patient History   Past Medical History:   Diagnosis Date    Cataract     COPD (chronic obstructive pulmonary disease) (CMS/[facility])     Cough, unspecified 06/20/2014    Cough    Emphysema of lung (CMS/[facility])     Encounter for immunization 01/19/2015    Need for influenza vaccination    Encounter for screening for malignant neoplasm of prostate     Encounter for screening for malignant neoplasm of prostate    Other conditions influencing health status 05/20/2013    Digital Blood Vessel Rupture    Personal history of colonic polyps     History of colon polyps    Personal history of other specified conditions 05/20/2013    History of shortness of breath    Personal history of other specified conditions 06/20/2014    History of shortness of breath     Past Surgical History:   Procedure Laterality Date    APPENDECTOMY  02/21/2013    Appendectomy    COLONOSCOPY  02/21/2013    Complete Colonoscopy    EYE SURGERY       Family History   Problem Relation Name Age of Onset    Dementia Mother      Diabetes Sister       Social History     Tobacco Use     Smoking status: Former     Years: 15     Types: Cigarettes     Quit date:      Years since quittin.9    Smokeless tobacco: Never   Substance Use Topics    Alcohol use: Yes     Comment: 1 cup of scotch a week    Drug use: Not Currently     Comment: Former drug user, has not used any in 50 years       Physical Exam   ED Triage Vitals   Temp Pulse Resp BP   -- -- -- --      SpO2 Temp src Heart Rate Source Patient Position   -- -- -- --      BP Location FiO2 (%)     -- --       Physical Exam  Vitals and nursing note reviewed.   Constitutional:       General: He is in acute distress.      Appearance: He is well-developed.   HENT:      Head: Normocephalic and atraumatic.   Eyes:      Conjunctiva/sclera: Conjunctivae normal.   Cardiovascular:      Rate and Rhythm: Normal rate and regular rhythm.      Heart sounds: No murmur heard.  Pulmonary:      Effort: Tachypnea and accessory muscle usage present. No respiratory distress.      Breath sounds: Decreased breath sounds and wheezing present.   Abdominal:      Palpations: Abdomen is soft.      Tenderness: There is no abdominal tenderness.   Musculoskeletal:         General: No swelling.      Cervical back: Neck supple.   Skin:     General: Skin is warm and dry.      Capillary Refill: Capillary refill takes less than 2 seconds.   Neurological:      Mental Status: He is alert.   Psychiatric:         Mood and Affect: Mood normal.         ED Course & MDM   Diagnoses as of 23 0344   Acute exacerbation of chronic obstructive pulmonary disease (COPD) (CMS/[facility])       Medical Decision Making  Medical decision making: On arrival to the emergency department, the patient was visibly dyspneic as result I ordered the patient further breathing treatments as well as laboratory studies and imaging.    CBC demonstrated a white cell count of 12.2 but no other significant abnormalities Chem-7 was essentially within normal limits.  ABG demonstrated a mild hypercapnic respiratory  failure with pH of 7.38 and pCO2 of 55.  Patient's BNP was only 24 troponin was normal.  Patient's chest x-ray demonstrated no significant acute intrathoracic abnormality.    During his time in the emergency department, the patient had gradual improvement of his dyspnea.  I spoke with the hospitalist regarding this patient's case they agreed the patient could be bed to their service for further evaluation and therapy.  The patient was then admitted to the hospital in improving condition.    Amount and/or Complexity of Data Reviewed  Independent Historian: EMS  Labs: ordered. Decision-making details documented in ED Course.  Radiology: ordered. Decision-making details documented in ED Course.        Procedure  Procedures     Alexander Castillo MD  12/08/23 0358

## 2023-11-24 NOTE — H&P
"History Of Present Illness  Kalyan Armstrong is a 73 y.o. male with a PMH of chronic hypoxemic respiratory failure, uses 4 LPM oxygen continuously at home, COPD, HTN, CKD,  presenting with SOB.  Shortness of breath started yesterday in the morning.   Denies associated chest pain.   Had a mild fever, temp 100. \"Something\". Felt hot.   No shaking chills.   Used both his MDI and his nebulizer x 1 treatment with out relief.   Has been wearing his oxygen 4 LPM all the time at home.   Denies N/V/D    Has been placed on BiPAp with previous admissions.   Never intubated for COPD exacerbation.     Work up in the ED:  WBC ct slightly elevated at 12.2.   BMP unremarkable.   CXR unremarkable.          Past Medical History  Past Medical History:   Diagnosis Date    Cataract     COPD (chronic obstructive pulmonary disease) (CMS/[facility])     Cough, unspecified 06/20/2014    Cough    Emphysema of lung (CMS/[facility])     Encounter for immunization 01/19/2015    Need for influenza vaccination    Encounter for screening for malignant neoplasm of prostate     Encounter for screening for malignant neoplasm of prostate    Other conditions influencing health status 05/20/2013    Digital Blood Vessel Rupture    Personal history of colonic polyps     History of colon polyps    Personal history of other specified conditions 05/20/2013    History of shortness of breath    Personal history of other specified conditions 06/20/2014    History of shortness of breath       Surgical History  Past Surgical History:   Procedure Laterality Date    APPENDECTOMY  02/21/2013    Appendectomy    COLONOSCOPY  02/21/2013    Complete Colonoscopy    EYE SURGERY          Social History  He reports that he quit smoking about 17 years ago. His smoking use included cigarettes. He has never used smokeless tobacco. He reports current alcohol use. He reports that he does not currently use drugs.    Family History  Family History   Problem Relation Name Age of Onset    Dementia " Mother      Diabetes Sister          Allergies  Other    Review of Systems   Constitutional: Negative.    HENT: Negative.     Eyes: Negative.    Respiratory:  Positive for cough and shortness of breath. Negative for chest tightness.    Cardiovascular: Negative.  Negative for chest pain.   Gastrointestinal: Negative.    Genitourinary: Negative.    Musculoskeletal: Negative.    Skin: Negative.    Allergic/Immunologic: Negative.    Neurological: Negative.    Hematological: Negative.    Psychiatric/Behavioral: Negative.          Physical Exam  Vitals reviewed.   Constitutional:       Comments: Thin elderly male, currently on BiPAP. No distress. Alert and able to answer questions appropriately.    HENT:      Head: Normocephalic and atraumatic.      Mouth/Throat:      Mouth: Mucous membranes are moist.   Eyes:      Extraocular Movements: Extraocular movements intact.      Conjunctiva/sclera: Conjunctivae normal.      Pupils: Pupils are equal, round, and reactive to light.   Cardiovascular:      Rate and Rhythm: Regular rhythm. Tachycardia present.      Pulses: Normal pulses.      Heart sounds: Normal heart sounds. No murmur heard.  Pulmonary:      Effort: Pulmonary effort is normal.      Breath sounds: Normal breath sounds.      Comments: No wheezing heard. Good airflow.  Abdominal:      General: Abdomen is flat. Bowel sounds are normal.      Palpations: Abdomen is soft.   Musculoskeletal:         General: Normal range of motion.   Skin:     General: Skin is warm and dry.   Neurological:      General: No focal deficit present.      Mental Status: He is alert and oriented to person, place, and time.   Psychiatric:         Mood and Affect: Mood normal.         Behavior: Behavior normal.         Thought Content: Thought content normal.         Judgment: Judgment normal.          Last Recorded Vitals  Blood pressure 117/76, pulse 86, temperature 36.7 °C (98.1 °F), temperature source Temporal, resp. rate (!) 30, height 1.829 m  (6'), weight 72.6 kg (160 lb), SpO2 99 %.    Relevant Results      Results for orders placed or performed during the hospital encounter of 11/23/23 (from the past 24 hour(s))   CBC and Auto Differential   Result Value Ref Range    WBC 12.2 (H) 4.4 - 11.3 x10*3/uL    nRBC 0.0 0.0 - 0.0 /100 WBCs    RBC 4.42 (L) 4.50 - 5.90 x10*6/uL    Hemoglobin 13.1 (L) 13.5 - 17.5 g/dL    Hematocrit 39.5 (L) 41.0 - 52.0 %    MCV 89 80 - 100 fL    MCH 29.6 26.0 - 34.0 pg    MCHC 33.2 32.0 - 36.0 g/dL    RDW 13.0 11.5 - 14.5 %    Platelets 291 150 - 450 x10*3/uL    Neutrophils % 71.6 40.0 - 80.0 %    Immature Granulocytes %, Automated 0.3 0.0 - 0.9 %    Lymphocytes % 15.3 13.0 - 44.0 %    Monocytes % 9.8 2.0 - 10.0 %    Eosinophils % 1.4 0.0 - 6.0 %    Basophils % 1.6 0.0 - 2.0 %    Neutrophils Absolute 8.70 (H) 1.60 - 5.50 x10*3/uL    Immature Granulocytes Absolute, Automated 0.04 0.00 - 0.50 x10*3/uL    Lymphocytes Absolute 1.86 0.80 - 3.00 x10*3/uL    Monocytes Absolute 1.19 (H) 0.05 - 0.80 x10*3/uL    Eosinophils Absolute 0.17 0.00 - 0.40 x10*3/uL    Basophils Absolute 0.20 (H) 0.00 - 0.10 x10*3/uL   Comprehensive metabolic panel   Result Value Ref Range    Glucose 96 74 - 99 mg/dL    Sodium 136 136 - 145 mmol/L    Potassium 5.6 (H) 3.5 - 5.3 mmol/L    Chloride 98 98 - 107 mmol/L    Bicarbonate 23 21 - 32 mmol/L    Anion Gap 21 (H) 10 - 20 mmol/L    Urea Nitrogen 21 6 - 23 mg/dL    Creatinine 1.19 0.50 - 1.30 mg/dL    eGFR 65 >60 mL/min/1.73m*2    Calcium 9.9 8.6 - 10.3 mg/dL    Albumin 4.8 3.4 - 5.0 g/dL    Alkaline Phosphatase 59 33 - 136 U/L    Total Protein 8.1 6.4 - 8.2 g/dL    AST 40 (H) 9 - 39 U/L    Bilirubin, Total 0.5 0.0 - 1.2 mg/dL    ALT 16 10 - 52 U/L   Troponin I, High Sensitivity   Result Value Ref Range    Troponin I, High Sensitivity 15 0 - 20 ng/L   B-Type Natriuretic Peptide   Result Value Ref Range    BNP 24 0 - 99 pg/mL     XR chest 1 view    Result Date: 11/23/2023  Interpreted By:  Gaston Smith,  STUDY: XR CHEST 1 VIEW;  11/23/2023 9:29 pm   INDICATION: Signs/Symptoms:SOB.   COMPARISON: 07/05/2023, 01/09/2023   ACCESSION NUMBER(S): [id number]   ORDERING CLINICIAN: LIZ CAMPBELL   FINDINGS:     There is basilar hyperlucency, coarsened interstitial markings and streaky bibasilar airspace opacities, likely sequelae of severe emphysema and scarring/fibrotic change. No consolidation, pleural effusion, or pneumothorax. Normal heart size. No acute osseous abnormality. Pulmonary nodules described on the 01/09/2023 chest CT are not well evaluated radiographically.       1. No acute cardiopulmonary process. 2. Pulmonary nodules described on the 01/09/2023 chest CT are not well evaluated radiographically. Continued surveillance/screening is recommended.     Signed by: Gaston Smith 11/23/2023 9:36 PM Dictation workstation:   [id number]    ECG 12 Lead    Result Date: 11/22/2023  Normal sinus rhythm with sinus arrhythmia Normal ECG When compared with ECG of 05-JUL-2023 20:35, Previous ECG has undetermined rhythm, needs review QRS axis Shifted left Criteria for Septal infarct are no longer Present Criteria for Lateral infarct are no longer Present T wave inversion no longer evident in Lateral leads Confirmed by Zoran Infante ([id number]) on 11/22/2023 2:56:04 PM        Assessment/Plan   Principal Problem:    Acute exacerbation of chronic obstructive pulmonary disease (COPD) (CMS/[facility], [facility])  - Currently on BiPAP  - continue oxygen 4 LPM, keep saturation greater than 89%  - Duonebs around the clock, every 4 hrs  - Azithromycin  - Solumedrol 40mg q8hrs  - Pulmonary consult    HTN  - continue Lisinopril and norvasc  - hold hydrochlorothiazide    Code status   FULL         I spent 45 minutes in the professional and overall care of this patient.      Sabrina Hathaway MD

## 2023-11-24 NOTE — PROGRESS NOTES
Walks with cane, has home oxygen through [place] in place     11/24/23 2069   [facility] Disability Status   Are you deaf or do you have serious difficulty hearing? N   Are you blind or do you have serious difficulty seeing, even when wearing glasses? N   Because of a physical, mental, or emotional condition, do you have serious difficulty concentrating, remembering, or making decisions? (5 years old or older) N   Do you have serious difficulty walking or climbing stairs? N   Do you have serious difficulty dressing or bathing? N   Because of a physical, mental, or emotional condition, do you have serious difficulty doing errands alone such as visiting the doctor? N

## 2023-11-24 NOTE — CONSULTS
Inpatient consult to Pulmonology  Consult performed by: BEATRIZ Choe-CNP  Consult ordered by: Sabrina Hathaway MD        Reason For Consult  Recurrent hospitalizations for COPD exacerbations    History Of Present Illness  Kalyan Armstrong is a 73 y.o. male (former smoker, quit 2006, ~35 pack year history) with a PMHx of HFpEF, HTN, HLD, CKD, COPD c/b chronic hypoxic respiratory failure on 4 L NC. Admitted 11/23/23 after presented to the ED with c/o SOB, cough and fever x 1 day without improvement in SOB with inhaler/nebulizer use. Per EMS note, patient noted to be in tripod position, diminished lung sounds, access muscle usage and tachypnea; was treated with duoneb x 2, solumedrol without much improvement. In ED WBC 12.2, troponin 15, BNP 24, bicarb 23, CXR without acute findings but showing severe emphysema and scarring; was treated with additional duoneb treatment, placed on Bipap and admitted for COPD exacerbation. Pulmonary consulted for assistance with COPD exacerbation.    Patient seen and examined, sitting in bed on 4 L NC and in no apparent distress; noted pursed lip breathing and mild-moderate conversational dyspnea during exam. He reports that he had been feeling pretty well up until yesterday when he was cooking Thanksgiving dinner and became very hot and short of breath; he then when to his in-laws house and his SOB became worse, noting fever and diaphoresis. He used his mother-in-law's nebulizer which helped some but was still experiencing significant SOB. He denies any recent sick contacts. Denies cough or sputum production; he had wheezing which is now improved. Per patient this is similar to his COPD exacerbations in the past. He is a patient of Dr. Villagomez and reports he wears 4 L O2 continuously, uses Trelegy 1 puff once daily, azithromycin daily, mixes budesonide/albuterol nebs and uses BID and inhaler/albuterol nebs PRN. Per Dr. Diana's last note 7/12/23, it was recommended that  patient have sleep study to attempt to qualify for Bipap but he never had the study completed.      Past Medical History  Past Medical History:   Diagnosis Date    Cataract     COPD (chronic obstructive pulmonary disease) (CMS/[facility])     Cough, unspecified 2014    Cough    Emphysema of lung (CMS/[facility])     Encounter for immunization 2015    Need for influenza vaccination    Encounter for screening for malignant neoplasm of prostate     Encounter for screening for malignant neoplasm of prostate    Other conditions influencing health status 2013    Digital Blood Vessel Rupture    Personal history of colonic polyps     History of colon polyps    Personal history of other specified conditions 2013    History of shortness of breath    Personal history of other specified conditions 2014    History of shortness of breath       Surgical History  Past Surgical History:   Procedure Laterality Date    APPENDECTOMY  2013    Appendectomy    COLONOSCOPY  2013    Complete Colonoscopy    EYE SURGERY          Social History  Social History     Tobacco Use    Smoking status: Former     Years: 15     Types: Cigarettes     Quit date:      Years since quittin.9    Smokeless tobacco: Never   Substance Use Topics    Alcohol use: Yes     Comment: 1 cup of scotch a week    Drug use: Not Currently     Comment: Former drug user, has not used any in 50 years       Family History  Family History   Problem Relation Name Age of Onset    Dementia Mother      Diabetes Sister           Allergies  Patient has no known allergies.    Review of Systems   Constitutional:  Positive for activity change, diaphoresis, fatigue and fever. Negative for appetite change, chills and unexpected weight change.   HENT:  Negative for congestion, postnasal drip and sore throat.    Eyes:  Negative for visual disturbance.   Respiratory:  Positive for cough, shortness of breath and wheezing. Negative for chest tightness.     Cardiovascular:  Negative for chest pain.   Gastrointestinal:  Negative for abdominal distention, constipation, diarrhea, nausea and vomiting.   Musculoskeletal:  Negative for arthralgias and myalgias.   Skin:  Negative for rash and wound.   Neurological:  Negative for dizziness, weakness and headaches.   Hematological:  Does not bruise/bleed easily.   Psychiatric/Behavioral:  Negative for dysphoric mood. The patient is not nervous/anxious.           Physical Exam  Vitals and nursing note reviewed.   Constitutional:       General: He is not in acute distress.     Appearance: Normal appearance. He is ill-appearing.      Comments: thin   HENT:      Head: Normocephalic.      Nose: Nose normal.      Mouth/Throat:      Mouth: Mucous membranes are moist.      Pharynx: Oropharynx is clear.   Eyes:      General: No scleral icterus.  Cardiovascular:      Rate and Rhythm: Normal rate and regular rhythm.      Pulses: Normal pulses.      Heart sounds: Normal heart sounds. No murmur heard.  Pulmonary:      Effort: No respiratory distress.      Breath sounds: No wheezing, rhonchi or rales.      Comments: Diminished breath sounds throughout, poor air movement but no obvious bronchospasm   Abdominal:      General: Bowel sounds are normal. There is no distension.      Palpations: Abdomen is soft.   Musculoskeletal:         General: Normal range of motion.      Cervical back: Normal range of motion and neck supple.      Right lower leg: No edema.      Left lower leg: No edema.   Lymphadenopathy:      Cervical: No cervical adenopathy.   Skin:     General: Skin is warm and dry.   Neurological:      Mental Status: He is alert and oriented to person, place, and time.   Psychiatric:         Mood and Affect: Mood normal.         Behavior: Behavior normal.          Vital Signs  Blood pressure 119/76, pulse 98, temperature 36.7 °C (98 °F), temperature source Temporal, resp. rate 20, height 1.829 m (6'), weight 72.6 kg (160 lb), SpO2 98  %.  Oxygen Therapy  SpO2: 98 %  Medical Gas Therapy: Supplemental oxygen  O2 Delivery Method: Nasal cannula  FiO2 (%): 40 %    Medications:  Scheduled medications  amLODIPine, 10 mg, oral, Daily  azithromycin, 250 mg, oral, q24h GULSHAN  budesonide, 0.5 mg, nebulization, BID   And  formoterol, 20 mcg, nebulization, BID  enoxaparin, 40 mg, subcutaneous, q24h  ipratropium-albuteroL, 3 mL, nebulization, 4x daily  lisinopril, 20 mg, oral, Daily  methylPREDNISolone sodium succinate (PF), 40 mg, intravenous, q8h  polyethylene glycol, 17 g, oral, Daily       PRN medications  PRN medications: acetaminophen, acetaminophen, guaiFENesin, ipratropium-albuteroL, ondansetron ODT **OR** ondansetron, oxygen      Relevant Results  Labs:  Results for orders placed or performed during the hospital encounter of 11/23/23 (from the past 24 hour(s))   CBC and Auto Differential   Result Value Ref Range    WBC 12.2 (H) 4.4 - 11.3 x10*3/uL    nRBC 0.0 0.0 - 0.0 /100 WBCs    RBC 4.42 (L) 4.50 - 5.90 x10*6/uL    Hemoglobin 13.1 (L) 13.5 - 17.5 g/dL    Hematocrit 39.5 (L) 41.0 - 52.0 %    MCV 89 80 - 100 fL    MCH 29.6 26.0 - 34.0 pg    MCHC 33.2 32.0 - 36.0 g/dL    RDW 13.0 11.5 - 14.5 %    Platelets 291 150 - 450 x10*3/uL    Neutrophils % 71.6 40.0 - 80.0 %    Immature Granulocytes %, Automated 0.3 0.0 - 0.9 %    Lymphocytes % 15.3 13.0 - 44.0 %    Monocytes % 9.8 2.0 - 10.0 %    Eosinophils % 1.4 0.0 - 6.0 %    Basophils % 1.6 0.0 - 2.0 %    Neutrophils Absolute 8.70 (H) 1.60 - 5.50 x10*3/uL    Immature Granulocytes Absolute, Automated 0.04 0.00 - 0.50 x10*3/uL    Lymphocytes Absolute 1.86 0.80 - 3.00 x10*3/uL    Monocytes Absolute 1.19 (H) 0.05 - 0.80 x10*3/uL    Eosinophils Absolute 0.17 0.00 - 0.40 x10*3/uL    Basophils Absolute 0.20 (H) 0.00 - 0.10 x10*3/uL   Comprehensive metabolic panel   Result Value Ref Range    Glucose 96 74 - 99 mg/dL    Sodium 136 136 - 145 mmol/L    Potassium 5.6 (H) 3.5 - 5.3 mmol/L    Chloride 98 98 - 107 mmol/L     Bicarbonate 23 21 - 32 mmol/L    Anion Gap 21 (H) 10 - 20 mmol/L    Urea Nitrogen 21 6 - 23 mg/dL    Creatinine 1.19 0.50 - 1.30 mg/dL    eGFR 65 >60 mL/min/1.73m*2    Calcium 9.9 8.6 - 10.3 mg/dL    Albumin 4.8 3.4 - 5.0 g/dL    Alkaline Phosphatase 59 33 - 136 U/L    Total Protein 8.1 6.4 - 8.2 g/dL    AST 40 (H) 9 - 39 U/L    Bilirubin, Total 0.5 0.0 - 1.2 mg/dL    ALT 16 10 - 52 U/L   Troponin I, High Sensitivity   Result Value Ref Range    Troponin I, High Sensitivity 15 0 - 20 ng/L   B-Type Natriuretic Peptide   Result Value Ref Range    BNP 24 0 - 99 pg/mL   Basic metabolic panel   Result Value Ref Range    Glucose 168 (H) 74 - 99 mg/dL    Sodium 135 (L) 136 - 145 mmol/L    Potassium 4.0 3.5 - 5.3 mmol/L    Chloride 98 98 - 107 mmol/L    Bicarbonate 27 21 - 32 mmol/L    Anion Gap 14 10 - 20 mmol/L    Urea Nitrogen 26 (H) 6 - 23 mg/dL    Creatinine 1.18 0.50 - 1.30 mg/dL    eGFR 66 >60 mL/min/1.73m*2    Calcium 9.4 8.6 - 10.3 mg/dL       Imaging:  XR chest 1 view  Result Date: 11/23/2023  FINDINGS:     There is basilar hyperlucency, coarsened interstitial markings and streaky bibasilar airspace opacities, likely sequelae of severe emphysema and scarring/fibrotic change. No consolidation, pleural effusion, or pneumothorax. Normal heart size. No acute osseous abnormality. Pulmonary nodules described on the 01/09/2023 chest CT are not well evaluated radiographically.       1. No acute cardiopulmonary process. 2. Pulmonary nodules described on the 01/09/2023 chest CT are not well evaluated radiographically. Continued surveillance/screening is recommended.     Signed by: Gaston Smith 11/23/2023 9:36 PM Dictation workstation:   [id number]      CT lung screening follow up CT chest wo contrast  1/9/23: IMPRESSION:  1.  Scattered pulmonary nodules and nodular opacities in bilateral  lungs, stable. There is also bandlike areas of atelectasis or  scarring in bilateral lungs more in the right lower lobe which  appear  slightly improved/less nodular compared to prior study. Recommend  follow-up chest CT in 12 months.  2. Moderate to severe diffuse emphysema with more severe involvement  of the lower lobes and lung bases.  3. Other findings as described above.         Assessment/Plan   Kalyan Armstrong is a 73 y.o. male (former smoker, quit 2006, ~35 pack year history) with a PMHx of HFpEF, HTN, HLD, CKD, COPD c/b chronic hypoxic respiratory failure on 4 L NC. Admitted 11/23/23 after presented to the ED with c/o SOB, cough and fever x 1 day without improvement in SOB with inhaler/nebulizer use. Per EMS note, patient noted to be in tripod position, diminished lung sounds, access muscle usage and tachypnea; was treated with duoneb x 2, solumedrol without much improvement. In ED WBC 12.2, troponin 15, BNP 24, bicarb 23, CXR without acute findings but showing severe emphysema and scarring; was treated with additional duoneb treatment, placed on Bipap and admitted for COPD exacerbation. Pulmonary consulted for assistance with COPD exacerbation.    Impression:  -COPD with acute exacerbation - severe emphysema on imaging; follows with Dr. Villagomez, last seen 7/12/23; home meds (Trelegy, azithromycin daily, budesonide nebs BID, albuterol nebs BID and PRN); no bronchospasm/wheezing currently   -Chronic hypoxic respiratory failure - 4 L O2 at baseline   -Pulmonary nodules - on CT 1/9/23, recommended repeat CT in 12 months     Recommendations:  -Continue spiriva respimat & breo ellipta while inpatient  -continue daily azithromycin  -continue duonebs PRN (no need to scheduled, no bronchospasm currently)  -continue supplemental O2, wean to maintain pulse ox 88-92% (on baseline 4 L NC)  -continue solumedrol 40 mg q8hr today; can likely change to prednisone taper tomorrow over 12 days (40 mg x 3, 30 mg x 3, 20 mg x 3, 10 mg x 3)  -will need pulmonary follow-up within 1-2 weeks of discharge, can either be seen in COPD follow-up clinic or  with Dr. Villagomez if able to get appointment during that time frame  -repeat CT chest outpatient around 1/9/24 for follow-up on pulmonary nodules   -will need outpatient sleep study as recommended by Dr. Villagomez to determine if qualifies for bipap     I spent 65 minutes in the professional and overall care of this patient.      Erica Mckenna, APRN-CNP

## 2023-11-25 LAB
ERYTHROCYTE [DISTWIDTH] IN BLOOD BY AUTOMATED COUNT: 13.2 % (ref 11.5–14.5)
HCT VFR BLD AUTO: 32.6 % (ref 41–52)
HGB BLD-MCNC: 10.9 G/DL (ref 13.5–17.5)
HOLD SPECIMEN: NORMAL
MCH RBC QN AUTO: 29.5 PG (ref 26–34)
MCHC RBC AUTO-ENTMCNC: 33.4 G/DL (ref 32–36)
MCV RBC AUTO: 88 FL (ref 80–100)
NRBC BLD-RTO: 0 /100 WBCS (ref 0–0)
PLATELET # BLD AUTO: 249 X10*3/UL (ref 150–450)
RBC # BLD AUTO: 3.7 X10*6/UL (ref 4.5–5.9)
WBC # BLD AUTO: 11.1 X10*3/UL (ref 4.4–11.3)

## 2023-11-25 PROCEDURE — 2500000001 HC RX 250 WO HCPCS SELF ADMINISTERED DRUGS (ALT 637 FOR MEDICARE OP): Performed by: HOSPITALIST

## 2023-11-25 PROCEDURE — 2500000002 HC RX 250 W HCPCS SELF ADMINISTERED DRUGS (ALT 637 FOR MEDICARE OP, ALT 636 FOR OP/ED): Performed by: HOSPITALIST

## 2023-11-25 PROCEDURE — 94660 CPAP INITIATION&MGMT: CPT | Mod: MUEWO

## 2023-11-25 PROCEDURE — 1100000001 HC PRIVATE ROOM DAILY

## 2023-11-25 PROCEDURE — 96376 TX/PRO/DX INJ SAME DRUG ADON: CPT

## 2023-11-25 PROCEDURE — 99232 SBSQ HOSP IP/OBS MODERATE 35: CPT | Performed by: INTERNAL MEDICINE

## 2023-11-25 PROCEDURE — 99232 SBSQ HOSP IP/OBS MODERATE 35: CPT | Performed by: NURSE PRACTITIONER

## 2023-11-25 PROCEDURE — 36415 COLL VENOUS BLD VENIPUNCTURE: CPT | Performed by: INTERNAL MEDICINE

## 2023-11-25 PROCEDURE — 96372 THER/PROPH/DIAG INJ SC/IM: CPT | Performed by: HOSPITALIST

## 2023-11-25 PROCEDURE — 2500000004 HC RX 250 GENERAL PHARMACY W/ HCPCS (ALT 636 FOR OP/ED): Performed by: HOSPITALIST

## 2023-11-25 PROCEDURE — 85027 COMPLETE CBC AUTOMATED: CPT | Performed by: INTERNAL MEDICINE

## 2023-11-25 PROCEDURE — 94640 AIRWAY INHALATION TREATMENT: CPT | Mod: MUEWO

## 2023-11-25 RX ADMIN — ACETAMINOPHEN 650 MG: 325 TABLET ORAL at 21:53

## 2023-11-25 RX ADMIN — IPRATROPIUM BROMIDE AND ALBUTEROL SULFATE 3 ML: 2.5; .5 SOLUTION RESPIRATORY (INHALATION) at 07:18

## 2023-11-25 RX ADMIN — BUDESONIDE INHALATION 0.5 MG: 0.5 SUSPENSION RESPIRATORY (INHALATION) at 20:01

## 2023-11-25 RX ADMIN — METHYLPREDNISOLONE SODIUM SUCCINATE 40 MG: 40 INJECTION, POWDER, FOR SOLUTION INTRAMUSCULAR; INTRAVENOUS at 21:51

## 2023-11-25 RX ADMIN — IPRATROPIUM BROMIDE AND ALBUTEROL SULFATE 3 ML: 2.5; .5 SOLUTION RESPIRATORY (INHALATION) at 11:04

## 2023-11-25 RX ADMIN — AZITHROMYCIN 250 MG: 500 TABLET, FILM COATED ORAL at 11:01

## 2023-11-25 RX ADMIN — ENOXAPARIN SODIUM 40 MG: 40 INJECTION SUBCUTANEOUS at 11:01

## 2023-11-25 RX ADMIN — LISINOPRIL 20 MG: 20 TABLET ORAL at 11:01

## 2023-11-25 RX ADMIN — IPRATROPIUM BROMIDE AND ALBUTEROL SULFATE 3 ML: 2.5; .5 SOLUTION RESPIRATORY (INHALATION) at 15:00

## 2023-11-25 RX ADMIN — AMLODIPINE BESYLATE 10 MG: 10 TABLET ORAL at 11:01

## 2023-11-25 RX ADMIN — FORMOTEROL FUMARATE DIHYDRATE 20 MCG: 20 SOLUTION RESPIRATORY (INHALATION) at 07:18

## 2023-11-25 RX ADMIN — IPRATROPIUM BROMIDE AND ALBUTEROL SULFATE 3 ML: 2.5; .5 SOLUTION RESPIRATORY (INHALATION) at 20:01

## 2023-11-25 RX ADMIN — POLYETHYLENE GLYCOL 3350 17 G: 17 POWDER, FOR SOLUTION ORAL at 11:05

## 2023-11-25 RX ADMIN — METHYLPREDNISOLONE SODIUM SUCCINATE 40 MG: 40 INJECTION, POWDER, FOR SOLUTION INTRAMUSCULAR; INTRAVENOUS at 11:31

## 2023-11-25 RX ADMIN — BUDESONIDE INHALATION 0.5 MG: 0.5 SUSPENSION RESPIRATORY (INHALATION) at 07:18

## 2023-11-25 RX ADMIN — METHYLPREDNISOLONE SODIUM SUCCINATE 40 MG: 40 INJECTION, POWDER, FOR SOLUTION INTRAMUSCULAR; INTRAVENOUS at 03:32

## 2023-11-25 RX ADMIN — FORMOTEROL FUMARATE DIHYDRATE 20 MCG: 20 SOLUTION RESPIRATORY (INHALATION) at 20:01

## 2023-11-25 ASSESSMENT — COGNITIVE AND FUNCTIONAL STATUS - GENERAL
DAILY ACTIVITIY SCORE: 24
MOBILITY SCORE: 22
WALKING IN HOSPITAL ROOM: A LITTLE
CLIMB 3 TO 5 STEPS WITH RAILING: A LITTLE

## 2023-11-25 ASSESSMENT — PAIN SCALES - GENERAL
PAINLEVEL_OUTOF10: 1
PAINLEVEL_OUTOF10: 0 - NO PAIN
PAINLEVEL_OUTOF10: 0 - NO PAIN

## 2023-11-25 NOTE — PROGRESS NOTES
Kalyan Armstrong is a 73 y.o. male on day 1 of admission presenting with Acute exacerbation of chronic obstructive pulmonary disease (COPD) (CMS/[facility]).      Subjective   Pt seen this morning. He's on 4L NC. Afebrile. Feels better but still very MARTINES. No overnight events reported.        Objective     Last Recorded Vitals  /71 (BP Location: Right arm, Patient Position: Lying)   Pulse 87   Temp 36.6 °C (97.9 °F) (Oral)   Resp 18   Wt 72.6 kg (160 lb)   SpO2 98%   Intake/Output last 3 Shifts:    Intake/Output Summary (Last 24 hours) at 11/25/2023 1746  Last data filed at 11/25/2023 1225  Gross per 24 hour   Intake 480 ml   Output --   Net 480 ml       Admission Weight  Weight: 72.6 kg (160 lb) (11/23/23 2004)    Daily Weight  11/23/23 : 72.6 kg (160 lb)    Image Results  XR chest 1 view  Narrative: Interpreted By:  Gaston Smith,   STUDY:  XR CHEST 1 VIEW;  11/23/2023 9:29 pm      INDICATION:  Signs/Symptoms:SOB.      COMPARISON:  07/05/2023, 01/09/2023      ACCESSION NUMBER(S):  [id number]      ORDERING CLINICIAN:  LIZ CAMPBELL      FINDINGS:          There is basilar hyperlucency, coarsened interstitial markings and  streaky bibasilar airspace opacities, likely sequelae of severe  emphysema and scarring/fibrotic change. No consolidation, pleural  effusion, or pneumothorax. Normal heart size. No acute osseous  abnormality. Pulmonary nodules described on the 01/09/2023 chest CT  are not well evaluated radiographically.      Impression: 1. No acute cardiopulmonary process.  2. Pulmonary nodules described on the 01/09/2023 chest CT are not  well evaluated radiographically. Continued surveillance/screening is  recommended.          Signed by: Gaston Smith 11/23/2023 9:36 PM  Dictation workstation:   [id number]      Physical Exam  Constitutional:       Appearance: Normal appearance.   Cardiovascular:      Rate and Rhythm: Normal rate and regular rhythm.   Pulmonary:      Effort: Pulmonary effort  is normal.      Breath sounds: Wheezing (mild) present.   Abdominal:      General: Abdomen is flat. Bowel sounds are normal.      Palpations: Abdomen is soft.   Musculoskeletal:      Cervical back: Normal range of motion.   Skin:     General: Skin is warm.   Neurological:      General: No focal deficit present.      Mental Status: He is alert and oriented to person, place, and time. Mental status is at baseline.   Psychiatric:         Mood and Affect: Mood normal.         Behavior: Behavior normal.         Thought Content: Thought content normal.         Judgment: Judgment normal.         Relevant Results               Assessment/Plan          Principal Problem:    Acute exacerbation of chronic obstructive pulmonary disease (COPD) (CMS/[facility])    11/25:  AECOPD... cxr clear, former smoker, home o2 4L, trigger likely cooking fumes related to thanksgiving meal... cont iv steroids, duonebs, inh steroids, pulm recs.   Home regimen for chronic conditions  Dvt px with lovenox  Dispo home tomorrow              Brayden Giordano MD

## 2023-11-25 NOTE — SIGNIFICANT EVENT
11/25  pt is back down to 3 liters,was told 4 liters is critical,so pt will stay on 3 per a healthcare worker,eventhough pt wears 4 at home,he will now apparently be 33 at home as well.  
Female

## 2023-11-25 NOTE — PROGRESS NOTES
Kalyan Armstrong is a 73 y.o. male on day 1 of admission presenting with Acute exacerbation of chronic obstructive pulmonary disease (COPD) (CMS/[facility]).    Subjective   Chart reviewed - afebrile, pulse ox % on 3-4 L NC. No acute events documented overnight.     Patient seen and examined, feels his breathing is almost back to baseline. His SOB/MARTINES is much improved. Only has cough in the morning with small amount of white sputum which he reports is normal for him; denies yellow sputum production. Denies wheezing, chest pain, fever or chills. Pulse ox has been in upper 90's, was on 4 L NC, turned down to 3 L NC and requested RN to reassess pulse ox in about 15 minutes.       Objective     Physical Exam  Vitals and nursing note reviewed.   Constitutional:       General: He is not in acute distress.     Appearance: Normal appearance.   HENT:      Head: Normocephalic.      Nose: Nose normal.      Mouth/Throat:      Mouth: Mucous membranes are moist.      Pharynx: Oropharynx is clear.   Eyes:      General: No scleral icterus.  Cardiovascular:      Rate and Rhythm: Normal rate and regular rhythm.      Pulses: Normal pulses.      Heart sounds: Normal heart sounds. No murmur heard.  Pulmonary:      Effort: Pulmonary effort is normal. No respiratory distress.      Breath sounds: No wheezing, rhonchi or rales.      Comments: Diminished in bilateral bases, improved air movement in upper lobes compared to yesterday  Abdominal:      General: Bowel sounds are normal. There is no distension.      Palpations: Abdomen is soft.   Musculoskeletal:         General: Normal range of motion.      Cervical back: Normal range of motion and neck supple.      Right lower leg: No edema.      Left lower leg: No edema.   Skin:     General: Skin is warm and dry.   Neurological:      Mental Status: He is alert and oriented to person, place, and time.   Psychiatric:         Mood and Affect: Mood normal.         Behavior: Behavior normal.          Last Recorded Vitals  Blood pressure 118/77, pulse 77, temperature 36.6 °C (97.9 °F), temperature source Oral, resp. rate 18, height 1.829 m (6'), weight 72.6 kg (160 lb), SpO2 98 %.  Intake/Output last 3 Shifts:  No intake/output data recorded.    Relevant Results  Medications:  Scheduled medications  amLODIPine, 10 mg, oral, Daily  azithromycin, 250 mg, oral, q24h GULSHAN  budesonide, 0.5 mg, nebulization, BID   And  formoterol, 20 mcg, nebulization, BID  enoxaparin, 40 mg, subcutaneous, q24h  ipratropium-albuteroL, 3 mL, nebulization, 4x daily  lisinopril, 20 mg, oral, Daily  methylPREDNISolone sodium succinate (PF), 40 mg, intravenous, q8h  polyethylene glycol, 17 g, oral, Daily       PRN medications  PRN medications: acetaminophen, acetaminophen, guaiFENesin, ipratropium-albuteroL, ondansetron ODT **OR** ondansetron, oxygen    Labs:  Results for orders placed or performed during the hospital encounter of 11/23/23 (from the past 24 hour(s))   Basic metabolic panel   Result Value Ref Range    Glucose 168 (H) 74 - 99 mg/dL    Sodium 135 (L) 136 - 145 mmol/L    Potassium 4.0 3.5 - 5.3 mmol/L    Chloride 98 98 - 107 mmol/L    Bicarbonate 27 21 - 32 mmol/L    Anion Gap 14 10 - 20 mmol/L    Urea Nitrogen 26 (H) 6 - 23 mg/dL    Creatinine 1.18 0.50 - 1.30 mg/dL    eGFR 66 >60 mL/min/1.73m*2    Calcium 9.4 8.6 - 10.3 mg/dL   CBC   Result Value Ref Range    WBC 11.1 4.4 - 11.3 x10*3/uL    nRBC 0.0 0.0 - 0.0 /100 WBCs    RBC 3.70 (L) 4.50 - 5.90 x10*6/uL    Hemoglobin 10.9 (L) 13.5 - 17.5 g/dL    Hematocrit 32.6 (L) 41.0 - 52.0 %    MCV 88 80 - 100 fL    MCH 29.5 26.0 - 34.0 pg    MCHC 33.4 32.0 - 36.0 g/dL    RDW 13.2 11.5 - 14.5 %    Platelets 249 150 - 450 x10*3/uL   SST TOP   Result Value Ref Range    Extra Tube Hold for add-ons.      Imaging:  XR chest 1 view  Result Date: 11/23/2023  Interpreted By:  Gaston Smith, STUDY: XR CHEST 1 VIEW;  11/23/2023 9:29 pm   INDICATION: Signs/Symptoms:SOB.   COMPARISON:  07/05/2023, 01/09/2023   ACCESSION NUMBER(S): [id number]   ORDERING CLINICIAN: LIZ CAMPBELL   FINDINGS:     There is basilar hyperlucency, coarsened interstitial markings and streaky bibasilar airspace opacities, likely sequelae of severe emphysema and scarring/fibrotic change. No consolidation, pleural effusion, or pneumothorax. Normal heart size. No acute osseous abnormality. Pulmonary nodules described on the 01/09/2023 chest CT are not well evaluated radiographically.       1. No acute cardiopulmonary process. 2. Pulmonary nodules described on the 01/09/2023 chest CT are not well evaluated radiographically. Continued surveillance/screening is recommended.     Signed by: Gaston Smith 11/23/2023 9:36 PM Dictation workstation:   [id number]    CT lung screening follow up CT chest wo contrast  1/9/23: IMPRESSION:  1.  Scattered pulmonary nodules and nodular opacities in bilateral  lungs, stable. There is also bandlike areas of atelectasis or  scarring in bilateral lungs more in the right lower lobe which appear  slightly improved/less nodular compared to prior study. Recommend  follow-up chest CT in 12 months.  2. Moderate to severe diffuse emphysema with more severe involvement  of the lower lobes and lung bases.  3. Other findings as described above.           Assessment/Plan   Kalyan Armstrong is a 73 y.o. male (former smoker, quit 2006, ~35 pack year history) with a PMHx of HFpEF, HTN, HLD, CKD, COPD c/b chronic hypoxic respiratory failure on 4 L NC. Admitted 11/23/23 after presented to the ED with c/o SOB, cough and fever x 1 day without improvement in SOB with inhaler/nebulizer use. Per EMS note, patient noted to be in tripod position, diminished lung sounds, access muscle usage and tachypnea; was treated with duoneb x 2, solumedrol without much improvement. In ED WBC 12.2, troponin 15, BNP 24, bicarb 23, CXR without acute findings but showing severe emphysema and scarring; was treated with  additional duoneb treatment, placed on Bipap and admitted for COPD exacerbation. Pulmonary consulted for assistance with COPD exacerbation.     Impression:  -COPD with acute exacerbation - severe emphysema on imaging; follows with Dr. Villagomez, last seen 7/12/23; home meds (Trelegy, azithromycin daily, budesonide nebs BID, albuterol nebs BID and PRN); no bronchospasm/wheezing currently, improved air movement 11/25  -Chronic hypoxic respiratory failure - 4 L O2 at baseline   -Pulmonary nodules - on CT 1/9/23, recommended repeat CT in 12 months      Recommendations:  -Continue formoterol, budesonide & duonebs while inpatient (resume home meds at discharge)  -continue daily azithromycin  -continue supplemental O2, wean to maintain pulse ox 88-92% (on baseline 4 L NC)  -change solumedrol to once daily today; can likely change to prednisone taper tomorrow over 12 days (40 mg x 3, 30 mg x 3, 20 mg x 3, 10 mg x 3)  -will need pulmonary follow-up within 1-2 weeks of discharge, can either be seen in COPD follow-up clinic or with Dr. Villagomez if able to get appointment during that time frame  -repeat CT chest outpatient around 1/9/24 for follow-up on pulmonary nodules   -will need outpatient sleep study as recommended by Dr. Villagomez to determine if qualifies for bipap   -ambulatory homegoing O2 study prior to discharge to re-evaluate O2 needs     I spent 35 minutes in the professional and overall care of this patient.      Erica Mckenna, APRN-CNP

## 2023-11-26 VITALS
WEIGHT: 160 LBS | TEMPERATURE: 98 F | BODY MASS INDEX: 21.67 KG/M2 | DIASTOLIC BLOOD PRESSURE: 75 MMHG | SYSTOLIC BLOOD PRESSURE: 116 MMHG | HEIGHT: 72 IN | RESPIRATION RATE: 22 BRPM | OXYGEN SATURATION: 96 % | HEART RATE: 72 BPM

## 2023-11-26 PROCEDURE — 2500000002 HC RX 250 W HCPCS SELF ADMINISTERED DRUGS (ALT 637 FOR MEDICARE OP, ALT 636 FOR OP/ED): Performed by: HOSPITALIST

## 2023-11-26 PROCEDURE — 96376 TX/PRO/DX INJ SAME DRUG ADON: CPT

## 2023-11-26 PROCEDURE — 2500000001 HC RX 250 WO HCPCS SELF ADMINISTERED DRUGS (ALT 637 FOR MEDICARE OP): Performed by: HOSPITALIST

## 2023-11-26 PROCEDURE — 94640 AIRWAY INHALATION TREATMENT: CPT | Mod: MUEWO

## 2023-11-26 PROCEDURE — 99239 HOSP IP/OBS DSCHRG MGMT >30: CPT | Performed by: INTERNAL MEDICINE

## 2023-11-26 PROCEDURE — 96372 THER/PROPH/DIAG INJ SC/IM: CPT | Performed by: HOSPITALIST

## 2023-11-26 PROCEDURE — 2500000004 HC RX 250 GENERAL PHARMACY W/ HCPCS (ALT 636 FOR OP/ED): Mod: 59 | Performed by: HOSPITALIST

## 2023-11-26 RX ORDER — PREDNISONE 20 MG/1
20 TABLET ORAL DAILY
Qty: 5 TABLET | Refills: 0 | Status: SHIPPED | OUTPATIENT
Start: 2023-11-26 | End: 2023-12-01

## 2023-11-26 RX ADMIN — ENOXAPARIN SODIUM 40 MG: 40 INJECTION SUBCUTANEOUS at 08:30

## 2023-11-26 RX ADMIN — AZITHROMYCIN 250 MG: 500 TABLET, FILM COATED ORAL at 08:29

## 2023-11-26 RX ADMIN — IPRATROPIUM BROMIDE AND ALBUTEROL SULFATE 3 ML: 2.5; .5 SOLUTION RESPIRATORY (INHALATION) at 11:08

## 2023-11-26 RX ADMIN — FORMOTEROL FUMARATE DIHYDRATE 20 MCG: 20 SOLUTION RESPIRATORY (INHALATION) at 07:33

## 2023-11-26 RX ADMIN — BUDESONIDE INHALATION 0.5 MG: 0.5 SUSPENSION RESPIRATORY (INHALATION) at 07:33

## 2023-11-26 RX ADMIN — METHYLPREDNISOLONE SODIUM SUCCINATE 40 MG: 40 INJECTION, POWDER, FOR SOLUTION INTRAMUSCULAR; INTRAVENOUS at 06:49

## 2023-11-26 RX ADMIN — AMLODIPINE BESYLATE 10 MG: 10 TABLET ORAL at 08:29

## 2023-11-26 RX ADMIN — IPRATROPIUM BROMIDE AND ALBUTEROL SULFATE 3 ML: 2.5; .5 SOLUTION RESPIRATORY (INHALATION) at 07:33

## 2023-11-26 RX ADMIN — LISINOPRIL 20 MG: 20 TABLET ORAL at 08:30

## 2023-11-26 ASSESSMENT — COGNITIVE AND FUNCTIONAL STATUS - GENERAL
WALKING IN HOSPITAL ROOM: A LITTLE
DAILY ACTIVITIY SCORE: 24
CLIMB 3 TO 5 STEPS WITH RAILING: A LITTLE
MOBILITY SCORE: 22

## 2023-11-26 NOTE — CARE PLAN
Pt remained safe and stable throughout shift. Pt denies shortness of breath. Lung sounds clear. Denies pain. Pt tolerated 4 hours of CPAP overnight. Remained otherwise on 3L. Possible discharge in the AM. No acute events overnight.     Problem: Pain  Goal: My pain/discomfort is manageable  Outcome: Progressing     Problem: Safety  Goal: Patient will be injury free during hospitalization  Outcome: Progressing     Problem: Daily Care  Goal: Daily care needs are met  Outcome: Progressing     Problem: Psychosocial Needs  Goal: Demonstrates ability to cope with hospitalization/illness  Outcome: Progressing  Goal: Collaborate with me, my family, and caregiver to identify my specific goals  Outcome: Progressing     Problem: Discharge Barriers  Goal: My discharge needs are met  Outcome: Progressing     Problem: Respiratory  Goal: Minimal/no exertional discomfort or dyspnea this shift  Outcome: Progressing  Goal: No signs of respiratory distress (eg. Use of accessory muscles. Peds grunting)  Outcome: Progressing  Goal: Verbalize decreased shortness of breath this shift  Outcome: Progressing  Goal: Wean oxygen to maintain O2 saturation per order/standard this shift  Outcome: Progressing

## 2023-11-26 NOTE — DISCHARGE SUMMARY
Discharge Diagnosis  Acute exacerbation of chronic obstructive pulmonary disease (COPD) (CMS/[facility])      Discharge Meds     Your medication list        CHANGE how you take these medications        Instructions Last Dose Given Next Dose Due   predniSONE 10 mg tablet  Commonly known as: Deltasone  What changed: Another medication with the same name was added. Make sure you understand how and when to take each.      Take 0.5 tablets (5 mg) by mouth once daily.       predniSONE 20 mg tablet  Commonly known as: Deltasone  What changed: You were already taking a medication with the same name, and this prescription was added. Make sure you understand how and when to take each.      Take 1 tablet (20 mg) by mouth once daily for 5 days.              CONTINUE taking these medications        Instructions Last Dose Given Next Dose Due   acetaminophen 325 mg tablet  Commonly known as: Tylenol           albuterol 90 mcg/actuation inhaler           albuterol 2.5 mg /3 mL (0.083 %) nebulizer solution           amLODIPine 10 mg tablet  Commonly known as: Norvasc      Take 1 tablet (10 mg) by mouth once daily.       azithromycin 250 mg tablet  Commonly known as: Zithromax           budesonide 0.5 mg/2 mL nebulizer solution  Commonly known as: Pulmicort           hydroCHLOROthiazide 12.5 mg tablet  Commonly known as: HYDRODiuril      Take 1 tablet (12.5 mg) by mouth once daily.       lisinopril 20 mg tablet      Take 1 tablet (20 mg) by mouth once daily.       Trelegy Ellipta 100-62.5-25 mcg blister with device  Generic drug: fluticasone-umeclidin-vilanter                     Where to Get Your Medications        These medications were sent to [id number] [address] AT [address] [address]      Phone: [phone]   predniSONE 20 mg tablet         Test Results Pending At Discharge  Pending Labs       Order Current Status    Blood Gas Arterial Full Panel In  process            Hospital Course   Principal Problem:    Acute exacerbation of chronic obstructive pulmonary disease (COPD) (CMS/[facility])     11/26:  Tolerating home o2 regimen.   Will dc with course of prednisone 20mg for 5 days and pt instructed to resume his normal home steroids regimen of 5mg after that.   Instructed to f/up with his primary pulmonologist... appt requested.     Pt clinically and hemodynamically stable, tolerating PO intake.  Instructed to F/U with PCP within 5 days.   He understands and agrees with the dc plan.     More than 30 min spent on dc.             11/25:  AECOPD... cxr clear, former smoker, home o2 4L, trigger likely cooking fumes related to thanksgiving meal... cont iv steroids, duonebs, inh steroids, pulm recs.   Home regimen for chronic conditions  Dvt px with lovenox  Dispo home tomorrow    Pertinent Physical Exam At Time of Discharge  Physical Exam  Constitutional:       Appearance: Normal appearance.   Cardiovascular:      Rate and Rhythm: Normal rate and regular rhythm.   Pulmonary:      Effort: Pulmonary effort is normal.      Breath sounds: Normal breath sounds.   Abdominal:      General: Abdomen is flat. Bowel sounds are normal.      Palpations: Abdomen is soft.   Musculoskeletal:      Cervical back: Normal range of motion.   Skin:     General: Skin is warm.   Neurological:      General: No focal deficit present.      Mental Status: He is alert and oriented to person, place, and time. Mental status is at baseline.   Psychiatric:         Mood and Affect: Mood normal.         Behavior: Behavior normal.         Thought Content: Thought content normal.         Judgment: Judgment normal.       Vitals:    11/26/23 0809   BP: 116/75   Pulse: 72   Resp: 22   Temp: 36.7 °C (98 °F)   SpO2: 99%           Outpatient Follow-Up  Future Appointments   Date Time Provider Department Center   5/9/2024  8:00 AM Rosa Lees DO [id number]         Brayden Giordano MD

## 2023-11-27 ENCOUNTER — PATIENT OUTREACH (OUTPATIENT)
Dept: PRIMARY CARE | Facility: CLINIC | Age: 73
End: 2023-11-27
Payer: COMMERCIAL

## 2023-11-27 DIAGNOSIS — J44.1 ACUTE EXACERBATION OF CHRONIC OBSTRUCTIVE PULMONARY DISEASE (MULTI): ICD-10-CM

## 2023-11-27 NOTE — PROGRESS NOTES
Discharge Facility: [facility]  Discharge Diagnosis: Acute exacerbation of chronic obstructive pulmonary disease   Admission Date: 11/24/2023  Discharge Date: 11/26/2023    PCP Appointment Date: 11/28/2023 13:00  Specialist Appointment Date: Pulmonology to be scheduled  Hospital Encounter and Summary: Linked   See discharge assessment below for further details    Medications  Medications reviewed with patient/caregiver?: Yes (Prednisone 20 mg daily X5 days then resume previous dose of prednisone 5 mg daily.) (11/27/2023  9:32 AM)  Is the patient having any side effects they believe may be caused by any medication additions or changes?: No (11/27/2023  9:32 AM)  Does the patient have all medications ordered at discharge?: Yes (11/27/2023  9:32 AM)  Care Management Interventions: No intervention needed (11/27/2023  9:32 AM)  Is the patient taking all medications as directed (includes completed medication regime)?: Yes (11/27/2023  9:32 AM)    Appointments  Does the patient have a primary care provider?: Yes (11/27/2023  9:32 AM)  Care Management Interventions: Verified appointment date/time/provider (11/27/2023  9:32 AM)  Has the patient kept scheduled appointments due by today?: Not applicable (11/27/2023  9:32 AM)    Self Management  What is the home health agency?: N/A (11/27/2023  9:32 AM)  What Durable Medical Equipment (DME) was ordered?: N/A (11/27/2023  9:32 AM)    Patient Teaching  Does the patient have access to their discharge instructions?: Yes (11/27/2023  9:32 AM)  Care Management Interventions: Reviewed instructions with patient (11/27/2023  9:32 AM)  What is the patient's perception of their health status since discharge?: Improving (States he is feeling much better. Denies SOB.) (11/27/2023  9:32 AM)  Is the patient/caregiver able to teach back the hierarchy of who to call/visit for symptoms/problems? PCP, Specialist, Home Health nurse, Urgent Care, ED, 911: Yes (11/27/2023  9:32 AM)    Wrap Up  Wrap Up  Additional Comments: 73yoM PMHx chronic hypoxemic respiratory failure, uses 4 LPM oxygen continuously at home, COPD, HTN, CKD,  presented with SOB X1 day. Patient admitted with acute exacerbation of COPD. Trigger thought to be cooking fumes from Thanksgiving day meal preparation. Patient responded well to treatment and discharged to home with course of prednisone 20mg for 5 days and instructed to resume his normal home steroids regimen of 5mg daily after that. Request sent to pulmonology for follow up. (11/27/2023  9:32 AM)

## 2023-11-28 ENCOUNTER — TELEMEDICINE (OUTPATIENT)
Dept: PRIMARY CARE | Facility: CLINIC | Age: 73
End: 2023-11-28
Payer: COMMERCIAL

## 2023-11-28 DIAGNOSIS — B18.2 CHRONIC HEPATITIS C WITHOUT HEPATIC COMA (MULTI): Primary | ICD-10-CM

## 2023-11-28 DIAGNOSIS — J44.9 CHRONIC OBSTRUCTIVE PULMONARY DISEASE, UNSPECIFIED COPD TYPE (MULTI): ICD-10-CM

## 2023-11-28 PROBLEM — N17.9 AKI (ACUTE KIDNEY INJURY) (CMS-HCC): Status: RESOLVED | Noted: 2023-05-03 | Resolved: 2023-11-28

## 2023-11-28 PROBLEM — J06.9 URTI (ACUTE UPPER RESPIRATORY INFECTION): Status: RESOLVED | Noted: 2023-03-13 | Resolved: 2023-11-28

## 2023-11-28 PROBLEM — I10 SEVERE HYPERTENSION: Status: RESOLVED | Noted: 2023-03-13 | Resolved: 2023-11-28

## 2023-11-28 PROBLEM — R51.9 FREQUENT HEADACHES: Status: RESOLVED | Noted: 2023-03-13 | Resolved: 2023-11-28

## 2023-11-28 PROBLEM — Z00.00 HEALTHCARE MAINTENANCE: Status: RESOLVED | Noted: 2023-03-16 | Resolved: 2023-11-28

## 2023-11-28 PROBLEM — J11.1 INFLUENZA: Status: RESOLVED | Noted: 2023-03-13 | Resolved: 2023-11-28

## 2023-11-28 PROBLEM — R04.0 EPISTAXIS: Status: RESOLVED | Noted: 2023-03-13 | Resolved: 2023-11-28
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
## 2023-11-28 PROCEDURE — 99213 OFFICE O/P EST LOW 20 MIN: CPT | Performed by: INTERNAL MEDICINE

## 2023-11-28 NOTE — PROGRESS NOTES
Subjective   Patient ID: Kalyan Armstrong is a 73 y.o. male who presents via virtual visit for Hospital Follow-up.  An interactive audio and video telecommunication system which permits real time communications between the patient (at the originating site) and provider (at the distant site) was utilized to provide this telehealth service.    Verbal consent was requested and obtained from the patient on the day of encounter.    HPI  Pt was just dischaged from the hospital on Sunday for COPDE   Feels much better.     Having cramps in his hands  States that mustard helps to relieve this  Making sure he is staying well hydrated    Review of Systems   All other systems reviewed and are negative.      Assessment/Plan     Problem List Items Addressed This Visit       Chronic viral hepatitis C (CMS/HCC) - Primary     Stable         COPD (chronic obstructive pulmonary disease) (CMS/HCC)     Recent exacerbation and hospital stay

## 2023-11-29 LAB
ANION GAP BLDA CALCULATED.4IONS-SCNC: 13 MMO/L (ref 10–25)
BASE EXCESS BLDA CALC-SCNC: 0.3 MMOL/L (ref -2–3)
BODY TEMPERATURE: ABNORMAL
CA-I BLDA-SCNC: 1.22 MMOL/L (ref 1.1–1.33)
CHLORIDE BLDA-SCNC: 101 MMOL/L (ref 98–107)
GLUCOSE BLDA-MCNC: 92 MG/DL (ref 74–99)
HCO3 BLDA-SCNC: 27.7 MMOL/L (ref 22–26)
HCT VFR BLD EST: 44 % (ref 41–52)
HGB BLDA-MCNC: 14.5 G/DL (ref 13.5–17.5)
INHALED O2 CONCENTRATION: 70 %
LACTATE BLDA-SCNC: 1.9 MMOL/L (ref 0.4–2)
OXYHGB MFR BLDA: 97 % (ref 94–98)
PCO2 BLDA: 55 MM HG (ref 38–42)
PH BLDA: 7.31 PH (ref 7.38–7.42)
PO2 BLDA: 188 MM HG (ref 85–95)
POTASSIUM BLDA-SCNC: 4.4 MMOL/L (ref 3.5–5.3)
SAO2 % BLDA: 100 % (ref 94–100)
SODIUM BLDA-SCNC: 137 MMOL/L (ref 136–145)

## 2023-12-14 ENCOUNTER — PATIENT OUTREACH (OUTPATIENT)
Dept: PRIMARY CARE | Facility: CLINIC | Age: 73
End: 2023-12-14
Payer: COMMERCIAL

## 2023-12-14 NOTE — PROGRESS NOTES
Call regarding appt. with Dr. Lees on 11/28/2023 after hospitalization. At time of outreach call, the patient states he is doing very well. No questions regarding appt. Confirmed upcoming appt with Dr. Villagomez, Pulmonology 12/19/2023 09:30.

## 2023-12-19 ENCOUNTER — APPOINTMENT (OUTPATIENT)
Dept: PULMONOLOGY | Facility: CLINIC | Age: 73
End: 2023-12-19
Payer: COMMERCIAL

## 2023-12-19 ENCOUNTER — TELEMEDICINE (OUTPATIENT)
Dept: PULMONOLOGY | Facility: CLINIC | Age: 73
End: 2023-12-19
Payer: COMMERCIAL

## 2023-12-19 DIAGNOSIS — J44.9 CHRONIC OBSTRUCTIVE PULMONARY DISEASE, UNSPECIFIED COPD TYPE (MULTI): ICD-10-CM

## 2023-12-19 PROCEDURE — 99442 PR PHYS/QHP TELEPHONE EVALUATION 11-20 MIN: CPT | Performed by: INTERNAL MEDICINE

## 2023-12-19 RX ORDER — PREDNISONE 10 MG/1
5 TABLET ORAL DAILY
Qty: 30 TABLET | Refills: 2 | Status: SHIPPED | OUTPATIENT
Start: 2023-12-19 | End: 2024-04-01 | Stop reason: SDUPTHER

## 2023-12-19 ASSESSMENT — ENCOUNTER SYMPTOMS
WEAKNESS: 0
EYE DISCHARGE: 0
RHINORRHEA: 0
ADENOPATHY: 0
LIGHT-HEADEDNESS: 0
AGITATION: 0
SINUS PRESSURE: 0
HEMATURIA: 0
FEVER: 0
DIZZINESS: 0
ARTHRALGIAS: 0
CONSTIPATION: 0
CHOKING: 0
FREQUENCY: 0
STRIDOR: 0
SPEECH DIFFICULTY: 0
SHORTNESS OF BREATH: 1
DIFFICULTY URINATING: 0
FACIAL SWELLING: 0
ABDOMINAL DISTENTION: 0
SINUS PAIN: 0
FATIGUE: 0
HEADACHES: 0
DYSURIA: 0
COUGH: 1
APNEA: 0
JOINT SWELLING: 0
NAUSEA: 0
BRUISES/BLEEDS EASILY: 0
EYE REDNESS: 0
UNEXPECTED WEIGHT CHANGE: 0
SLEEP DISTURBANCE: 0
NERVOUS/ANXIOUS: 0
WHEEZING: 0
PALPITATIONS: 0
NUMBNESS: 0
ABDOMINAL PAIN: 0
TREMORS: 0

## 2023-12-19 NOTE — PROGRESS NOTES
Pulmonary follow-up visit        Reason: COPD    ASSESSMENT:  Patient is a 73-year-old with a history of category 3D COPD with a recent flare of.  He is steroid-dependent and is on maximal controller medications.  He seems to be doing much better at the present time and based on the virtual interaction he seems stable.  He will come back and see me in 2 months.     PLAN:   The patient will continue present medications and return in 2 months to see me in person=      HISTORY OF PRESENT ILLNESS:  Telemedicine Visit     Verbal consent was given for the following virtual visit. The visit below was conducted because of restrictions due to the COVID-19 pandemic. All issues discussed and addressed below were done so without a physical examination. If it was felt the patient needed be seen in clinic in person they were directed there.    The virtual visit conducted with Audio only   Prep time on date of the patient encounter: 15 minutes.   Time spent directly with patient/family/caregiver: 8minutes.   Additional time spent on patient care activities: 10 minutes.     Total time on date of patient encounter: 32 minutes      The patient is a 73-year-old with caD COPD who stopped smoking around 5 years before and he has a severe reduction of his FEV1. He has been on daily azithromycin to reduce congestion. He was intolerant of Daliresp in the past. A CT scan last year revealed emphysema without nodularity. It was noted that he was admitted from March 9 to March 11, 2022 with acute renal failure and a COPD exacerbation. He presented to the emergency room with shortness of breath and hypoxemia and was treated with bronchodilators corticosteroids and positive pressure ventilation. He also has some renal insufficiency which improved during his hospital course. His respiratory status returned to normal baseline. He was discharged on a tapering dose of corticosteroids. He also has hepatitis C unable to tolerate interferon in the past  but is being reassessed by Dr. Wyatt. The patient continues on his Trelegy and will has not required any rescue inhaler. When seen on April 5, 2022 he was doing decently and continued on his azithromycin. He was taking the latter as an anti-inflammatory agent. It is noted that he was admitted from June 17 to June 21, 2022 with a COPD exacerbation. Blood gas revealed a pH of 7.29, PCO2 of 58 and a PO2 of 72. He was treated with antibiotics and corticosteroids as well as BiPAP. He had no evidence of a PE.     When seen on July 18, 2022 he recently been discharged from the hospital on Trelegy inhaler and albuterol. He was on oxygen at 3 L/min. He has no fevers or chills or swelling. He has had 4 admissions for his COPD flaring over the last 7 months. He continues on the azithromycin for treatment of his COPD. Currently, he has no coughing congestion or wheezing. I subsequently obtained an arterial blood gas to see if he would qualify for noninvasive pressure ventilation. The arterial blood gas revealed a pH of 7.46, PCO2 of 44 mmHg and a PO2 of 60 mmHg. With that level of CO2 he does not qualify for the NPV.     He subsequently was readmitted to the hospital from August 5 August 8, 2022 with a COPD flare. He was discharged on his Trelegy and Augmentin along with a prednisone taper. It is noted that he is on lisinopril 40 mg a day. His chest x-ray on admission revealed hyperinflation. There are no infiltrates. He was doing after my last visit but subsequently was admitted from September 27 to September 30, 2022 with a COPD exacerbation. There was a question of atypical pneumonia and was treated for such. Some hyperkalemia was noted and he was treated with Kayexalate. I obtained a blood gas several months ago to see if he would qualify for NPV. His CO2 was only 44 mmHg. The CT scan also revealed a new nodular lesion at the right lung base and suggested follow-up in 3 to 6 months.      When seen October 12, 2022 he  continued on Trelegy,, daily azithromycin, and uses albuterol as needed. He was intolerant of Daliresp in the past. His daughter statedhe is starting to get increasing congestion again and occasionally has a cough. I elected to place him on some low-dose prednisone at 5 mg a day. Since then, he has had 3 hospitalizations for COPD flaring. His last was from April 29, 2023 to May 2, 2023. His arterial blood gas on March 9, 2023 revealed a pH 7.44 PCO2 of 42 mmHg and a PO2 of 98 mmHg        When seen on May 9 2023, the patient is down to 20 mg of prednisone being tapered after his hospitalization. He does have an upcoming sleep study to see if he qualifies for BiPAP therapies. He does not qualify in the setting of an elevated CO2. He was to continue on his Trelegy inhaler along with albuterol as needed. He is also using budesonide nebulization twice daily coupled with azithromycin daily. Currently he is having no coughing or congestion or wheezing. When he starts flaring, he develops increasing congestion. It is always white in color.    It was noted that he was admitted from July 5 to July 8, 2023 with a typical flare of COPD. He was treated with corticosteroids and bronchodilators and subsequent discharge on prednisone antibiotics etc. He continued on his Trelegy. He is on budesonide nebulization. He also was continued on oxygen at 3 L/min. His chest x-ray revealed hyperinflation. The patient was discharged on prednisone at 50 mg a day. He is completing his third day today. He has no coughing or congestion. He continues on the oxygen at 3 L/min. He is not producing any phlegm. It should be noted that he was scheduled for sleep study the day he was admitted to the hospital.    He also was admitted to the hospital from November 23 through November 26, 2023 with a COPD flare.  He had no active infiltrates and he was treated for a COPD flare with bronchodilators corticosteroids etc. he was given BiPAP.  It was noted that  he was at 4 L/min of oxygen at baseline.  Nodules of his lungs have been noted on CT scan and were to be followed up CT scan in January 20, 2024    Patient is doing much better at the present time with his breathing.  He is using the budesonide twice a day and taking prednisone 5 mg daily along with his Trelegy inhaler.  He is not coughing up any green or yellow.  He has no fevers or chills.  He is utilizing his oxygen at 4 L/min.  He is actually feeling pretty good at the present time.              No Known Allergies       Current Outpatient Medications:     acetaminophen (Tylenol) 325 mg tablet, Take 1 tablet (325 mg) by mouth every 4 hours if needed for moderate pain (4 - 6)., Disp: , Rfl:     albuterol 2.5 mg /3 mL (0.083 %) nebulizer solution, Take by nebulization 4 times a day as needed. 1 vial, Disp: , Rfl:     albuterol 90 mcg/actuation inhaler, Inhale 1 puff. Every 4-6 hrs prn, Disp: , Rfl:     amLODIPine (Norvasc) 10 mg tablet, Take 1 tablet (10 mg) by mouth once daily., Disp: 90 tablet, Rfl: 1    azithromycin (Zithromax) 250 mg tablet, Take 1 tablet (250 mg) by mouth once daily., Disp: , Rfl:     budesonide (Pulmicort) 0.5 mg/2 mL nebulizer solution, Take 2 mL (0.5 mg) by nebulization 2 times a day., Disp: , Rfl:     fluticasone-umeclidin-vilanter (Trelegy Ellipta) 100-62.5-25 mcg blister with device, Inhale 1 puff once daily., Disp: , Rfl:     hydroCHLOROthiazide (HYDRODiuril) 12.5 mg tablet, Take 1 tablet (12.5 mg) by mouth once daily., Disp: 90 tablet, Rfl: 3    lisinopril 20 mg tablet, Take 1 tablet (20 mg) by mouth once daily., Disp: 90 tablet, Rfl: 0    predniSONE (Deltasone) 10 mg tablet, Take 0.5 tablets (5 mg) by mouth once daily., Disp: 30 tablet, Rfl: 2       Review of Systems   Constitutional:  Negative for fatigue, fever and unexpected weight change.   HENT:  Negative for congestion, facial swelling, nosebleeds, postnasal drip, rhinorrhea, sinus pressure and sinus pain.    Eyes:  Negative for  discharge, redness and visual disturbance.   Respiratory:  Positive for cough and shortness of breath. Negative for apnea, choking, wheezing and stridor.    Cardiovascular:  Negative for chest pain, palpitations and leg swelling.   Gastrointestinal:  Negative for abdominal distention, abdominal pain, constipation and nausea.   Endocrine: Negative for cold intolerance and heat intolerance.   Genitourinary:  Negative for difficulty urinating, dysuria, frequency and hematuria.   Musculoskeletal:  Negative for arthralgias, gait problem and joint swelling.   Allergic/Immunologic: Negative for environmental allergies, food allergies and immunocompromised state.   Neurological:  Negative for dizziness, tremors, syncope, speech difficulty, weakness, light-headedness, numbness and headaches.   Hematological:  Negative for adenopathy. Does not bruise/bleed easily.   Psychiatric/Behavioral:  Negative for agitation, behavioral problems and sleep disturbance. The patient is not nervous/anxious.

## 2024-01-18 ENCOUNTER — PATIENT OUTREACH (OUTPATIENT)
Dept: PRIMARY CARE | Facility: CLINIC | Age: 74
End: 2024-01-18
Payer: COMMERCIAL

## 2024-01-18 NOTE — PROGRESS NOTES
Call placed regarding monthly post discharge follow up. At time of outreach call, the patient states he is feeling well. Denies SOB. Reviewed appt with Dr. Villagomez. Patient has no questions or concerns at this time.

## 2024-01-24 ENCOUNTER — TELEPHONE (OUTPATIENT)
Dept: PULMONOLOGY | Facility: CLINIC | Age: 74
End: 2024-01-24
Payer: COMMERCIAL

## 2024-01-24 NOTE — TELEPHONE ENCOUNTER
Wife sts her  received a letter stating he is due for his repeat CT scan. Please submit the order and call when this has been done so they can schedule the appointment.

## 2024-01-29 ENCOUNTER — OFFICE VISIT (OUTPATIENT)
Dept: ORTHOPEDIC SURGERY | Facility: CLINIC | Age: 74
End: 2024-01-29
Payer: COMMERCIAL

## 2024-01-29 ENCOUNTER — HOSPITAL ENCOUNTER (OUTPATIENT)
Dept: RADIOLOGY | Facility: CLINIC | Age: 74
Discharge: HOME | End: 2024-01-29
Payer: COMMERCIAL

## 2024-01-29 VITALS — HEIGHT: 69 IN | BODY MASS INDEX: 19.26 KG/M2 | WEIGHT: 130 LBS

## 2024-01-29 DIAGNOSIS — M79.672 LEFT FOOT PAIN: ICD-10-CM

## 2024-01-29 DIAGNOSIS — T14.8XXA CONTUSION OF BONE: ICD-10-CM

## 2024-01-29 DIAGNOSIS — S90.30XS: Primary | ICD-10-CM

## 2024-01-29 DIAGNOSIS — M19.079 ARTHRITIS OF FOOT: ICD-10-CM

## 2024-01-29 PROCEDURE — 99213 OFFICE O/P EST LOW 20 MIN: CPT | Performed by: ORTHOPAEDIC SURGERY

## 2024-01-29 PROCEDURE — 1036F TOBACCO NON-USER: CPT | Performed by: ORTHOPAEDIC SURGERY

## 2024-01-29 PROCEDURE — 73630 X-RAY EXAM OF FOOT: CPT | Mod: LEFT SIDE | Performed by: RADIOLOGY

## 2024-01-29 PROCEDURE — 1159F MED LIST DOCD IN RCRD: CPT | Performed by: ORTHOPAEDIC SURGERY

## 2024-01-29 PROCEDURE — 1126F AMNT PAIN NOTED NONE PRSNT: CPT | Performed by: ORTHOPAEDIC SURGERY

## 2024-01-29 PROCEDURE — 99203 OFFICE O/P NEW LOW 30 MIN: CPT | Performed by: ORTHOPAEDIC SURGERY

## 2024-01-29 PROCEDURE — 3008F BODY MASS INDEX DOCD: CPT | Performed by: ORTHOPAEDIC SURGERY

## 2024-01-29 PROCEDURE — 73630 X-RAY EXAM OF FOOT: CPT | Mod: LT

## 2024-01-29 ASSESSMENT — PAIN - FUNCTIONAL ASSESSMENT: PAIN_FUNCTIONAL_ASSESSMENT: 0-10

## 2024-01-29 ASSESSMENT — PAIN SCALES - GENERAL: PAINLEVEL_OUTOF10: 7

## 2024-01-29 NOTE — PROGRESS NOTES
73-year-old male here for left foot.  Dropped a cell phone on his left foot about 2 weeks ago.  Had remote foot surgery.  Still has some achiness in his foot but not getting worse.  Wearing regular athletic shoes.  Is full weightbearing.    On exam:  WD/WN thin male  A+O X3  NAD  No lymphedema  Inspection of both feet and ankles show symmetric arches.   Mild bruising the base of the central toes.  No warmth or swelling.  No crepitus.  5/5 strength in all 4 planes.   Sensation intact to LT.   Good pulses.   Stable anterior drawer.  No peroneal subluxation.    (-) Debbie.     I personally reviewed the following radiographic exams: X-ray left foot shows postsurgical change of first metatarsal from osteotomy.  Previous fifth metatarsal exostectomy.  Remote healed second metatarsal stress fracture.  No acute changes.    Assessment: Left foot contusion status post remote forefoot surgery.    Plan: Discussed nonoperative and operative options in detail.   Risk and benefits discussed in detail. All questions answered today.  Recovery timeline and expectations discussed in detail.  Reassured patient.  Supportive shoe wear.  Follow-up as needed.

## 2024-02-13 ENCOUNTER — HOSPITAL ENCOUNTER (OUTPATIENT)
Dept: RADIOLOGY | Facility: HOSPITAL | Age: 74
Discharge: HOME | End: 2024-02-13
Payer: COMMERCIAL

## 2024-02-13 DIAGNOSIS — R91.8 PULMONARY NODULES: ICD-10-CM

## 2024-02-13 PROCEDURE — 71271 CT THORAX LUNG CANCER SCR C-: CPT

## 2024-02-27 ENCOUNTER — PATIENT OUTREACH (OUTPATIENT)
Dept: PRIMARY CARE | Facility: CLINIC | Age: 74
End: 2024-02-27
Payer: COMMERCIAL

## 2024-02-27 NOTE — PROGRESS NOTES
Outreach made to wrap up Transitional Care Management (TCM) program. At the time of call, the patient states he is doing well. His COPD has been stable. Confirmed upcoming appt with Maria Alejandra Hernandez CNP on 3/8/2024 08:00. The patient has met target of no readmission for (90) days post hospital discharge and is graduated from the TCM program at this time.

## 2024-03-08 ENCOUNTER — OFFICE VISIT (OUTPATIENT)
Dept: PRIMARY CARE | Facility: CLINIC | Age: 74
End: 2024-03-08
Payer: MEDICARE

## 2024-03-08 ENCOUNTER — LAB (OUTPATIENT)
Dept: LAB | Facility: LAB | Age: 74
End: 2024-03-08
Payer: MEDICARE

## 2024-03-08 VITALS — WEIGHT: 130 LBS | SYSTOLIC BLOOD PRESSURE: 120 MMHG | DIASTOLIC BLOOD PRESSURE: 70 MMHG | BODY MASS INDEX: 19.2 KG/M2

## 2024-03-08 DIAGNOSIS — T14.8XXA BRUISING: ICD-10-CM

## 2024-03-08 DIAGNOSIS — L29.9 ITCHING: ICD-10-CM

## 2024-03-08 DIAGNOSIS — L29.9 ITCHING: Primary | ICD-10-CM

## 2024-03-08 DIAGNOSIS — E87.6 HYPOKALEMIA: Primary | ICD-10-CM

## 2024-03-08 LAB
ALBUMIN SERPL BCP-MCNC: 4.4 G/DL (ref 3.4–5)
ALP SERPL-CCNC: 67 U/L (ref 33–136)
ALT SERPL W P-5'-P-CCNC: 13 U/L (ref 10–52)
ANION GAP SERPL CALC-SCNC: 13 MMOL/L (ref 10–20)
AST SERPL W P-5'-P-CCNC: 25 U/L (ref 9–39)
BASOPHILS # BLD AUTO: 0.09 X10*3/UL (ref 0–0.1)
BASOPHILS NFR BLD AUTO: 1.3 %
BILIRUB SERPL-MCNC: 0.6 MG/DL (ref 0–1.2)
BUN SERPL-MCNC: 14 MG/DL (ref 6–23)
CALCIUM SERPL-MCNC: 10 MG/DL (ref 8.6–10.6)
CHLORIDE SERPL-SCNC: 94 MMOL/L (ref 98–107)
CO2 SERPL-SCNC: 35 MMOL/L (ref 21–32)
CREAT SERPL-MCNC: 1.08 MG/DL (ref 0.5–1.3)
EGFRCR SERPLBLD CKD-EPI 2021: 73 ML/MIN/1.73M*2
EOSINOPHIL # BLD AUTO: 0.23 X10*3/UL (ref 0–0.4)
EOSINOPHIL NFR BLD AUTO: 3.2 %
ERYTHROCYTE [DISTWIDTH] IN BLOOD BY AUTOMATED COUNT: 13.5 % (ref 11.5–14.5)
GLUCOSE SERPL-MCNC: 90 MG/DL (ref 74–99)
HCT VFR BLD AUTO: 40 % (ref 41–52)
HGB BLD-MCNC: 13.7 G/DL (ref 13.5–17.5)
IMM GRANULOCYTES # BLD AUTO: 0.02 X10*3/UL (ref 0–0.5)
IMM GRANULOCYTES NFR BLD AUTO: 0.3 % (ref 0–0.9)
INR PPP: 1 (ref 0.9–1.1)
LYMPHOCYTES # BLD AUTO: 0.61 X10*3/UL (ref 0.8–3)
LYMPHOCYTES NFR BLD AUTO: 8.5 %
MCH RBC QN AUTO: 29.1 PG (ref 26–34)
MCHC RBC AUTO-ENTMCNC: 34.3 G/DL (ref 32–36)
MCV RBC AUTO: 85 FL (ref 80–100)
MONOCYTES # BLD AUTO: 0.41 X10*3/UL (ref 0.05–0.8)
MONOCYTES NFR BLD AUTO: 5.7 %
NEUTROPHILS # BLD AUTO: 5.78 X10*3/UL (ref 1.6–5.5)
NEUTROPHILS NFR BLD AUTO: 81 %
NRBC BLD-RTO: 0 /100 WBCS (ref 0–0)
PLATELET # BLD AUTO: 327 X10*3/UL (ref 150–450)
POTASSIUM SERPL-SCNC: 3.3 MMOL/L (ref 3.5–5.3)
PROT SERPL-MCNC: 7.7 G/DL (ref 6.4–8.2)
PROTHROMBIN TIME: 11.1 SECONDS (ref 9.8–12.8)
RBC # BLD AUTO: 4.7 X10*6/UL (ref 4.5–5.9)
SODIUM SERPL-SCNC: 139 MMOL/L (ref 136–145)
WBC # BLD AUTO: 7.1 X10*3/UL (ref 4.4–11.3)

## 2024-03-08 PROCEDURE — 1126F AMNT PAIN NOTED NONE PRSNT: CPT

## 2024-03-08 PROCEDURE — 80053 COMPREHEN METABOLIC PANEL: CPT

## 2024-03-08 PROCEDURE — 99214 OFFICE O/P EST MOD 30 MIN: CPT

## 2024-03-08 PROCEDURE — 85025 COMPLETE CBC W/AUTO DIFF WBC: CPT

## 2024-03-08 PROCEDURE — 3074F SYST BP LT 130 MM HG: CPT

## 2024-03-08 PROCEDURE — 85610 PROTHROMBIN TIME: CPT

## 2024-03-08 PROCEDURE — 1160F RVW MEDS BY RX/DR IN RCRD: CPT

## 2024-03-08 PROCEDURE — 1036F TOBACCO NON-USER: CPT

## 2024-03-08 PROCEDURE — 1159F MED LIST DOCD IN RCRD: CPT

## 2024-03-08 PROCEDURE — 36415 COLL VENOUS BLD VENIPUNCTURE: CPT

## 2024-03-08 PROCEDURE — 3078F DIAST BP <80 MM HG: CPT

## 2024-03-08 PROCEDURE — 3008F BODY MASS INDEX DOCD: CPT

## 2024-03-08 RX ORDER — TRIAMCINOLONE ACETONIDE 1 MG/G
CREAM TOPICAL 2 TIMES DAILY
Qty: 453 G | Refills: 0 | Status: SHIPPED | OUTPATIENT
Start: 2024-03-08

## 2024-03-08 NOTE — PROGRESS NOTES
Subjective   Patient ID: Kalyan Armstrong is a 73 y.o. male who presents for Itching.  HPI  Kalyan Armstrong is a 73 y.o. male with PMH of COPD on 4L O2, lung nodules, chronic Hep C, HTN presents today due to itching on his back.   Has tried cortisone cream and neosporin with no relief.   No history of eczema.     All systems have been reviewed and are negative for complaint other than those mentioned in the HPI.     Objective   /70   Wt 59 kg (130 lb)   BMI 19.20 kg/m²    Physical Exam  Constitutional:       General: He is awake.      Appearance: Normal appearance.   HENT:      Head: Normocephalic and atraumatic.   Eyes:      Extraocular Movements: Extraocular movements intact.      Pupils: Pupils are equal, round, and reactive to light.   Cardiovascular:      Rate and Rhythm: Normal rate and regular rhythm.      Heart sounds: S1 normal and S2 normal. No murmur heard.  Pulmonary:      Effort: Pulmonary effort is normal.      Breath sounds: Normal breath sounds.   Musculoskeletal:      Cervical back: Normal range of motion and neck supple.      Right lower leg: No edema.      Left lower leg: No edema.   Skin:     General: Skin is warm and dry.   Neurological:      General: No focal deficit present.      Mental Status: He is alert and oriented to person, place, and time.   Psychiatric:         Mood and Affect: Mood and affect normal.         Behavior: Behavior normal. Behavior is cooperative.         Thought Content: Thought content normal.         Judgment: Judgment normal.     Kalyan was seen today for itching.  Diagnoses and all orders for this visit:  Itching (Primary)  -     triamcinolone (Kenalog) 0.1 % cream; Apply topically 2 times a day. Apply to affected area 1-2 times daily as needed.  - Eczema and xerosis noted on back, rx for triamcinolone sent.   - Does also report generalized itching, history of elevated LFT, will obtain CMP.   -     Comprehensive metabolic panel; Future  Bruising  -   Wife  notes intermittent bruising on arms, no longer taking aspirin.   - Will obtain CBC and PT/INR to evaluate clotting.   -   Protime-INR; Future    Follow up as regularly scheduled or PRN

## 2024-03-11 RX ORDER — POTASSIUM CHLORIDE 20 MEQ/1
20 TABLET, EXTENDED RELEASE ORAL DAILY
Qty: 3 TABLET | Refills: 0 | Status: SHIPPED | OUTPATIENT
Start: 2024-03-11 | End: 2024-03-14

## 2024-04-01 DIAGNOSIS — J44.9 CHRONIC OBSTRUCTIVE PULMONARY DISEASE, UNSPECIFIED COPD TYPE (MULTI): ICD-10-CM

## 2024-04-01 RX ORDER — PREDNISONE 10 MG/1
5 TABLET ORAL DAILY
Qty: 30 TABLET | Refills: 2 | Status: SHIPPED | OUTPATIENT
Start: 2024-04-01

## 2024-04-01 NOTE — TELEPHONE ENCOUNTER
Patti Garcia is requesting a refill on prednisone.  Please send to [facility]'s pharmacy on file

## 2024-04-23 ENCOUNTER — OFFICE VISIT (OUTPATIENT)
Dept: PULMONOLOGY | Facility: CLINIC | Age: 74
End: 2024-04-23
Payer: MEDICARE

## 2024-04-23 VITALS
WEIGHT: 131 LBS | HEIGHT: 69 IN | TEMPERATURE: 98.9 F | SYSTOLIC BLOOD PRESSURE: 111 MMHG | HEART RATE: 112 BPM | BODY MASS INDEX: 19.4 KG/M2 | DIASTOLIC BLOOD PRESSURE: 70 MMHG

## 2024-04-23 DIAGNOSIS — J44.9 CHRONIC OBSTRUCTIVE PULMONARY DISEASE, UNSPECIFIED COPD TYPE (MULTI): Primary | ICD-10-CM

## 2024-04-23 PROCEDURE — 1160F RVW MEDS BY RX/DR IN RCRD: CPT | Performed by: INTERNAL MEDICINE

## 2024-04-23 PROCEDURE — 99214 OFFICE O/P EST MOD 30 MIN: CPT | Performed by: INTERNAL MEDICINE

## 2024-04-23 PROCEDURE — 3074F SYST BP LT 130 MM HG: CPT | Performed by: INTERNAL MEDICINE

## 2024-04-23 PROCEDURE — 3078F DIAST BP <80 MM HG: CPT | Performed by: INTERNAL MEDICINE

## 2024-04-23 PROCEDURE — 3008F BODY MASS INDEX DOCD: CPT | Performed by: INTERNAL MEDICINE

## 2024-04-23 PROCEDURE — 1126F AMNT PAIN NOTED NONE PRSNT: CPT | Performed by: INTERNAL MEDICINE

## 2024-04-23 PROCEDURE — 1036F TOBACCO NON-USER: CPT | Performed by: INTERNAL MEDICINE

## 2024-04-23 PROCEDURE — 1159F MED LIST DOCD IN RCRD: CPT | Performed by: INTERNAL MEDICINE

## 2024-04-23 ASSESSMENT — ENCOUNTER SYMPTOMS
EYE REDNESS: 0
ARTHRALGIAS: 0
STRIDOR: 0
DYSURIA: 0
PALPITATIONS: 0
FEVER: 0
NERVOUS/ANXIOUS: 0
ABDOMINAL PAIN: 0
BRUISES/BLEEDS EASILY: 0
TREMORS: 0
FATIGUE: 0
ABDOMINAL DISTENTION: 0
FREQUENCY: 0
CONSTIPATION: 0
HEMATURIA: 0
SINUS PAIN: 0
SHORTNESS OF BREATH: 1
UNEXPECTED WEIGHT CHANGE: 0
AGITATION: 0
EYE DISCHARGE: 0
HEADACHES: 0
CHOKING: 0
WHEEZING: 0
SPEECH DIFFICULTY: 0
ADENOPATHY: 0
FACIAL SWELLING: 0
SLEEP DISTURBANCE: 0
SINUS PRESSURE: 0
NAUSEA: 0
APNEA: 0
NUMBNESS: 0
COUGH: 0
LIGHT-HEADEDNESS: 0
WEAKNESS: 0
DIFFICULTY URINATING: 0
DIZZINESS: 0
RHINORRHEA: 0
JOINT SWELLING: 0

## 2024-04-23 ASSESSMENT — PAIN SCALES - GENERAL: PAINLEVEL: 0-NO PAIN

## 2024-04-23 NOTE — PROGRESS NOTES
@[email or web address] FOLLOW-UP@       PROBLEM:  COPD; oxygen recertification    ASSESSMENT:  The patient is a 73-year-old with severe COPD having profound emphysema with multiple admissions for COPD.  He is on azithromycin regularly to reduce inflammation and he continues on Trelegy and albuterol as needed although he rarely has been using the albuterol.  He has not had any recent hospitalizations and he is on oxygen at 3 L/min.  His percent saturation on 3 L/min in the office was 95% and it dropped to 91% on room air.  He walked around minimally in association with the visit and his percent saturation fell markedly down to 88%.  He was markedly tachypneic with a respiratory rate of 30/min and he was placed back on oxygen and his percent saturations came back to 95%.    PLAN:  Patient's pulse oximetry on 3 L/min was 95%.  Taking all the oxygen it dropped to 91% right away with minimal activity it dropped to 88% and he was markedly tachypneic with his respiratory rate increasing up to 30/min.  He was quickly placed back on his oxygen at 3 L/min.  We will send appropriate information for recertification of his oxygen administration.      HISTORY OF PRESENT ILLNESS:  The patient is a 73-year-old with caD COPD who stopped smoking around 5 years before and he has a severe reduction of his FEV1. He has been on daily azithromycin to reduce congestion. He was intolerant of Daliresp in the past. A CT scan last year revealed emphysema without nodularity. It was noted that he was admitted from March 9 to March 11, 2022 with acute renal failure and a COPD exacerbation. He presented to the emergency room with shortness of breath and hypoxemia and was treated with bronchodilators corticosteroids and positive pressure ventilation. He also has some renal insufficiency which improved during his hospital course. His respiratory status returned to normal baseline. He was discharged on a tapering dose of corticosteroids. He also has hepatitis C  unable to tolerate interferon in the past but is being reassessed by Dr. Wyatt. The patient continues on his Trelegy and will has not required any rescue inhaler. When seen on April 5, 2022 he was doing decently and continued on his azithromycin. He was taking the latter as an anti-inflammatory agent. It is noted that he was admitted from June 17 to June 21, 2022 with a COPD exacerbation. Blood gas revealed a pH of 7.29, PCO2 of 58 and a PO2 of 72. He was treated with antibiotics and corticosteroids as well as BiPAP. He had no evidence of a PE.     When seen on July 18, 2022 he recently been discharged from the hospital on Trelegy inhaler and albuterol. He was on oxygen at 3 L/min. He has no fevers or chills or swelling. He has had 4 admissions for his COPD flaring over the last 7 months. He continues on the azithromycin for treatment of his COPD. Currently, he has no coughing congestion or wheezing. I subsequently obtained an arterial blood gas to see if he would qualify for noninvasive pressure ventilation. The arterial blood gas revealed a pH of 7.46, PCO2 of 44 mmHg and a PO2 of 60 mmHg. With that level of CO2 he does not qualify for the NPV.     He subsequently was readmitted to the hospital from August 5 August 8, 2022 with a COPD flare. He was discharged on his Trelegy and Augmentin along with a prednisone taper. It is noted that he is on lisinopril 40 mg a day. His chest x-ray on admission revealed hyperinflation. There are no infiltrates. He was doing after my last visit but subsequently was admitted from September 27 to September 30, 2022 with a COPD exacerbation. There was a question of atypical pneumonia and was treated for such. Some hyperkalemia was noted and he was treated with Kayexalate. I obtained a blood gas several months ago to see if he would qualify for NPV. His CO2 was only 44 mmHg. The CT scan also revealed a new nodular lesion at the right lung base and suggested follow-up in 3 to 6 months.       When seen October 12, 2022 he continued on Trelegy,, daily azithromycin, and uses albuterol as needed. He was intolerant of Daliresp in the past. His daughter statedhe is starting to get increasing congestion again and occasionally has a cough. I elected to place him on some low-dose prednisone at 5 mg a day. Since then, he has had 3 hospitalizations for COPD flaring. His last was from April 29, 2023 to May 2, 2023. His arterial blood gas on March 9, 2023 revealed a pH 7.44 PCO2 of 42 mmHg and a PO2 of 98 mmHg        When seen on May 9 2023, the patient is down to 20 mg of prednisone being tapered after his hospitalization. He does have an upcoming sleep study to see if he qualifies for BiPAP therapies. He does not qualify in the setting of an elevated CO2. He was to continue on his Trelegy inhaler along with albuterol as needed. He is also using budesonide nebulization twice daily coupled with azithromycin daily. Currently he is having no coughing or congestion or wheezing. When he starts flaring, he develops increasing congestion. It is always white in color.     It was noted that he was admitted from July 5 to July 8, 2023 with a typical flare of COPD. He was treated with corticosteroids and bronchodilators and subsequent discharge on prednisone antibiotics etc. He continued on his Trelegy. He is on budesonide nebulization. He also was continued on oxygen at 3 L/min. His chest x-ray revealed hyperinflation. The patient was discharged on prednisone at 50 mg a day. He is completing his third day today. He has no coughing or congestion. He continues on the oxygen at 3 L/min. He is not producing any phlegm. It should be noted that he was scheduled for sleep study the day he was admitted to the hospital.     He also was admitted to the hospital from November 23 through November 26, 2023 with a COPD flare.  He had no active infiltrates and he was treated for a COPD flare with bronchodilators corticosteroids etc.  he was given BiPAP.  It was noted that he was at 4 L/min of oxygen at baseline.  Nodules of his lungs have been noted on CT scan and were to be followed up CT scan in January 20, 2024     On December 19, 2023 it was reported that he was doing much better at the present time with his breathing.  He is using the budesonide twice a day and taking prednisone 5 mg daily along with his Trelegy inhaler.  He is not coughing up any green or yellow.  He has no fevers or chills.  He is utilizing his oxygen at 4 L/min.  He is actually feeling pretty good at the present time.  He was felt to have category 3D COPD with recent flare on maximal therapies.     The patient is on his trelogy inhaler and he rarely uses his albuterol.  He is on oxygen at 3 L/min.  He generally feels compensated at the present time without chest pains or pressures.  He had no wheezing.  No swelling of his legs are noted.  He needs recertification of his oxygen administration.  He does wear 24/7.    No Known Allergies         Current Outpatient Medications:     acetaminophen (Tylenol) 325 mg tablet, Take 1 tablet (325 mg) by mouth every 4 hours if needed for moderate pain (4 - 6)., Disp: , Rfl:     albuterol 2.5 mg /3 mL (0.083 %) nebulizer solution, Take by nebulization 4 times a day as needed. 1 vial, Disp: , Rfl:     albuterol 90 mcg/actuation inhaler, Inhale 1 puff. Every 4-6 hrs prn, Disp: , Rfl:     amLODIPine (Norvasc) 10 mg tablet, Take 1 tablet (10 mg) by mouth once daily., Disp: 90 tablet, Rfl: 1    azithromycin (Zithromax) 250 mg tablet, Take 1 tablet (250 mg) by mouth once daily., Disp: , Rfl:     budesonide (Pulmicort) 0.5 mg/2 mL nebulizer solution, Take 2 mL (0.5 mg) by nebulization 2 times a day., Disp: , Rfl:     fluticasone-umeclidin-vilanter (Trelegy Ellipta) 100-62.5-25 mcg blister with device, Inhale 1 puff once daily., Disp: , Rfl:     hydroCHLOROthiazide (HYDRODiuril) 12.5 mg tablet, Take 1 tablet (12.5 mg) by mouth once daily.,  Disp: 90 tablet, Rfl: 3    lisinopril 20 mg tablet, Take 1 tablet (20 mg) by mouth once daily., Disp: 90 tablet, Rfl: 0    predniSONE (Deltasone) 10 mg tablet, Take 0.5 tablets (5 mg) by mouth once daily., Disp: 30 tablet, Rfl: 2    triamcinolone (Kenalog) 0.1 % cream, Apply topically 2 times a day. Apply to affected area 1-2 times daily as needed., Disp: 453 g, Rfl: 0          Review of Systems   Constitutional:  Negative for fatigue, fever and unexpected weight change.   HENT:  Negative for congestion, facial swelling, nosebleeds, postnasal drip, rhinorrhea, sinus pressure and sinus pain.    Eyes:  Negative for discharge, redness and visual disturbance.   Respiratory:  Positive for shortness of breath. Negative for apnea, cough, choking, wheezing and stridor.    Cardiovascular:  Negative for chest pain, palpitations and leg swelling.   Gastrointestinal:  Negative for abdominal distention, abdominal pain, constipation and nausea.   Endocrine: Negative for cold intolerance and heat intolerance.   Genitourinary:  Negative for difficulty urinating, dysuria, frequency and hematuria.   Musculoskeletal:  Negative for arthralgias, gait problem and joint swelling.   Allergic/Immunologic: Negative for environmental allergies, food allergies and immunocompromised state.   Neurological:  Negative for dizziness, tremors, syncope, speech difficulty, weakness, light-headedness, numbness and headaches.   Hematological:  Negative for adenopathy. Does not bruise/bleed easily.   Psychiatric/Behavioral:  Negative for agitation, behavioral problems and sleep disturbance. The patient is not nervous/anxious.         Vitals:    04/23/24 1138   BP: 111/70   Pulse: (!) 112   Temp: 37.2 °C (98.9 °F)        Physical Exam  Vitals reviewed.   Constitutional:       Appearance: Normal appearance.      Comments: Chronically ill in a wheelchair on oxygen   HENT:      Head: Normocephalic and atraumatic.   Eyes:      Extraocular Movements:  Extraocular movements intact.   Cardiovascular:      Rate and Rhythm: Normal rate and regular rhythm.      Heart sounds: No murmur heard.     No friction rub. No gallop.   Pulmonary:      Effort: Pulmonary effort is normal. No respiratory distress.      Breath sounds: No stridor. No wheezing, rhonchi or rales.      Comments: Markedly diminished breath sounds without rales rhonchi or wheezes  Chest:      Chest wall: No tenderness.   Abdominal:      General: Abdomen is flat. There is no distension.      Palpations: Abdomen is soft. There is no mass.      Tenderness: There is no abdominal tenderness.   Musculoskeletal:         General: Normal range of motion.      Cervical back: Normal range of motion.      Right lower leg: No edema.      Left lower leg: No edema.   Skin:     General: Skin is warm and dry.   Neurological:      Mental Status: He is alert and oriented to person, place, and time.   Psychiatric:         Mood and Affect: Mood normal.         Behavior: Behavior normal.

## 2024-04-23 NOTE — PATIENT INSTRUCTIONS
Patient's pulse oximetry on 3 L/min was 95%.  Taking all the oxygen it dropped to 91% right away with minimal activity it dropped to 88% and he was markedly tachypneic with his respiratory rate increasing up to 30/min.  He was quickly placed back on his oxygen at 3 L/min.  We will send appropriate information for recertification of his oxygen administration.

## 2024-05-09 ENCOUNTER — OFFICE VISIT (OUTPATIENT)
Dept: PRIMARY CARE | Facility: CLINIC | Age: 74
End: 2024-05-09
Payer: MEDICARE

## 2024-05-09 VITALS — BODY MASS INDEX: 19.05 KG/M2 | WEIGHT: 129 LBS | DIASTOLIC BLOOD PRESSURE: 60 MMHG | SYSTOLIC BLOOD PRESSURE: 110 MMHG

## 2024-05-09 DIAGNOSIS — Z73.3 MENTAL DISTRESS EVIDENT ON EXAMINATION: ICD-10-CM

## 2024-05-09 DIAGNOSIS — I10 BENIGN ESSENTIAL HYPERTENSION: ICD-10-CM

## 2024-05-09 DIAGNOSIS — B49 FUNGAL INFECTION: Primary | ICD-10-CM

## 2024-05-09 DIAGNOSIS — Z60.4 SOCIAL ISOLATION: ICD-10-CM

## 2024-05-09 DIAGNOSIS — Z72.0 TOBACCO USE: ICD-10-CM

## 2024-05-09 DIAGNOSIS — R21 RASH: ICD-10-CM

## 2024-05-09 DIAGNOSIS — Z72.3 LACK OF PHYSICAL ACTIVITY: ICD-10-CM

## 2024-05-09 PROCEDURE — 1160F RVW MEDS BY RX/DR IN RCRD: CPT | Performed by: INTERNAL MEDICINE

## 2024-05-09 PROCEDURE — 1158F ADVNC CARE PLAN TLK DOCD: CPT | Performed by: INTERNAL MEDICINE

## 2024-05-09 PROCEDURE — G2211 COMPLEX E/M VISIT ADD ON: HCPCS | Performed by: INTERNAL MEDICINE

## 2024-05-09 PROCEDURE — 3074F SYST BP LT 130 MM HG: CPT | Performed by: INTERNAL MEDICINE

## 2024-05-09 PROCEDURE — 99214 OFFICE O/P EST MOD 30 MIN: CPT | Performed by: INTERNAL MEDICINE

## 2024-05-09 PROCEDURE — 1123F ACP DISCUSS/DSCN MKR DOCD: CPT | Performed by: INTERNAL MEDICINE

## 2024-05-09 PROCEDURE — 1159F MED LIST DOCD IN RCRD: CPT | Performed by: INTERNAL MEDICINE

## 2024-05-09 PROCEDURE — 3078F DIAST BP <80 MM HG: CPT | Performed by: INTERNAL MEDICINE

## 2024-05-09 PROCEDURE — 3008F BODY MASS INDEX DOCD: CPT | Performed by: INTERNAL MEDICINE

## 2024-05-09 RX ORDER — CLOTRIMAZOLE 1 %
CREAM (GRAM) TOPICAL 2 TIMES DAILY
Qty: 45 G | Refills: 1 | Status: SHIPPED | OUTPATIENT
Start: 2024-05-09 | End: 2024-09-06

## 2024-05-09 SDOH — SOCIAL STABILITY - SOCIAL INSECURITY: SOCIAL EXCLUSION AND REJECTION: Z60.4

## 2024-05-09 NOTE — PROGRESS NOTES
Primary care office Note      Name: Kalyan Armstrong, Age: 73 y.o., Gender: male, MRN: [id number]   Pharmacy:   [facility] #[id number] - [address]  [address] [facility] [address]  Phone: [phone] Fax: [phone]    [facility] #[id number] - [place], [place] - [address] [facility]  [address] [facility] [address]  Phone: [phone] Fax: [phone]     PCP: Rosa Lees        Subjective:   Chief Complaint   Patient presents with    Follow-up    Rash        Kalyan Armstrong is a 73 y.o. male who presented to the clinic today for follow-up    HPI:   Kalyan Armstrong is a 73 y.o. male  Patient presents today complaining of rash that is on his back and along his pant line or the line of his depends pull-ups.  The rash is red and itchy.  The patient has tried triamcinolone cream and it has helped but has not relieved or resolved the rash.      The patient denies headaches, lightheadedness, changes in speech/vision, photophobia, dysphagia, diaphoresis, Chest pain, dyspnea, Abdominal pain, recent falls or trauma, and changes in bowel/urinary habits.     ROS:   12 points review of system is negative excepted as stated above in the HPI.    Medical History      PMH:    has a past medical history of CATHIE (acute kidney injury) (CMS-HCC) (05/03/2023), Cataract, COPD (chronic obstructive pulmonary disease) (Multi), Cough, unspecified (06/20/2014), Emphysema of lung (Multi), Encounter for immunization (01/19/2015), Encounter for screening for malignant neoplasm of prostate, Other conditions influencing health status (05/20/2013), Personal history of colonic polyps, Personal history of other specified conditions (05/20/2013), and Personal history of other specified conditions (06/20/2014).   Allergies:   No Known Allergies   Surgical Hx:   Past Surgical History:   Procedure Laterality Date    APPENDECTOMY  02/21/2013    Appendectomy    COLONOSCOPY  02/21/2013    Complete  Colonoscopy    EYE SURGERY        Social HX:   Social History     Tobacco Use    Smoking status: Former     Current packs/day: 0.00     Types: Cigarettes     Start date:      Quit date: 2006     Years since quittin.3    Smokeless tobacco: Never   Substance Use Topics    Alcohol use: Yes     Comment: 1 cup of scotch a week    Drug use: Not Currently     Comment: Former drug user, has not used any in 50 years        MEDS:   Current Outpatient Medications   Medication Instructions    acetaminophen (TYLENOL) 325 mg, oral, Every 4 hours PRN    albuterol 2.5 mg /3 mL (0.083 %) nebulizer solution nebulization, 4 times daily PRN, 1 vial    albuterol 90 mcg/actuation inhaler 1 puff, inhalation, Every 4-6 hrs prn    amLODIPine (NORVASC) 10 mg, oral, Daily    azithromycin (ZITHROMAX) 250 mg, oral, Daily    budesonide (PULMICORT) 0.5 mg, nebulization, 2 times daily RT    clotrimazole (Lotrimin) 1 % cream Topical, 2 times daily, apply to affected area    fluticasone-umeclidin-vilanter (Trelegy Ellipta) 100-62.5-25 mcg blister with device 1 puff, inhalation, Daily RT    hydroCHLOROthiazide (MICROZIDE) 12.5 mg, oral, Daily    lisinopril 20 mg, oral, Daily    predniSONE (DELTASONE) 5 mg, oral, Daily    triamcinolone (Kenalog) 0.1 % cream Topical, 2 times daily, Apply to affected area 1-2 times daily as needed.        Objective Data     Objective:   Visit Vitals  /60        Physical Examination:   GE; sitting comfortably in a chair, in NAD  HEENT; AT/NC, no conjunctival pallor, anicteric sclera  Neck; Supple neck, no LAD/JVD  Chest; reduced sounds bilaterally, no adventitious sounds, he is wearing nasal cannula oxygen  CVS; s1 and s2 only no MGR, RRR  Abd; soft, NT/ND, normoactive BS  Ext; no cyanosis/clubbing/edema, pulses intact  Neuro; Aox3  Psych; normal mood     Last Labs:   CBC:   WBC   Date Value Ref Range Status   2024 7.1 4.4 - 11.3 x10*3/uL Final   2023 11.1 4.4 - 11.3 x10*3/uL Final   2023  12.2 (H) 4.4 - 11.3 x10*3/uL Final     Hemoglobin   Date Value Ref Range Status   03/08/2024 13.7 13.5 - 17.5 g/dL Final   11/25/2023 10.9 (L) 13.5 - 17.5 g/dL Final   11/23/2023 13.1 (L) 13.5 - 17.5 g/dL Final     MCV   Date Value Ref Range Status   03/08/2024 85 80 - 100 fL Final   11/25/2023 88 80 - 100 fL Final   11/23/2023 89 80 - 100 fL Final     Platelets   Date Value Ref Range Status   03/08/2024 327 150 - 450 x10*3/uL Final   11/25/2023 249 150 - 450 x10*3/uL Final   11/23/2023 291 150 - 450 x10*3/uL Final      CMP:   Sodium   Date Value Ref Range Status   03/08/2024 139 136 - 145 mmol/L Final   11/24/2023 135 (L) 136 - 145 mmol/L Final   11/23/2023 136 136 - 145 mmol/L Final     Potassium   Date Value Ref Range Status   03/08/2024 3.3 (L) 3.5 - 5.3 mmol/L Final   11/24/2023 4.0 3.5 - 5.3 mmol/L Final   11/23/2023 5.6 (H) 3.5 - 5.3 mmol/L Final     Comment:     MODERATE HEMOLYSIS DETECTED. The result may be falsely elevated due to hemolysis or other interferents. Clinical correlation is recommended. Repeat testing may be considered.     Chloride   Date Value Ref Range Status   03/08/2024 94 (L) 98 - 107 mmol/L Final   11/24/2023 98 98 - 107 mmol/L Final   11/23/2023 98 98 - 107 mmol/L Final     Urea Nitrogen   Date Value Ref Range Status   03/08/2024 14 6 - 23 mg/dL Final   11/24/2023 26 (H) 6 - 23 mg/dL Final   11/23/2023 21 6 - 23 mg/dL Final     Creatinine   Date Value Ref Range Status   03/08/2024 1.08 0.50 - 1.30 mg/dL Final   11/24/2023 1.18 0.50 - 1.30 mg/dL Final   11/23/2023 1.19 0.50 - 1.30 mg/dL Final     eGFR   Date Value Ref Range Status   03/08/2024 73 >60 mL/min/1.73m*2 Final     Comment:     Calculations of estimated GFR are performed using the 2021 CKD-EPI Study Refit equation without the race variable for the IDMS-Traceable creatinine methods.  https://jasn.asnjournals.org/content/early/2021/09/22/ASN.7064215280   11/24/2023 66 >60 mL/min/1.73m*2 Final     Comment:     Calculations of  estimated GFR are performed using the 2021 CKD-EPI Study Refit equation without the race variable for the IDMS-Traceable creatinine methods.  https://jasn.asnjournals.org/content/early/2021/09/22/ASN.5046148394   11/23/2023 65 >60 mL/min/1.73m*2 Final     Comment:     Calculations of estimated GFR are performed using the 2021 CKD-EPI Study Refit equation without the race variable for the IDMS-Traceable creatinine methods.  https://jasn.asnjournals.org/content/early/2021/09/22/ASN.8597449042      A1c:   Hemoglobin A1C   Date Value Ref Range Status   06/30/2022 5.3 % Final     Comment:          Diagnosis of Diabetes-Adults   Non-Diabetic: < or = 5.6%   Increased risk for developing diabetes: 5.7-6.4%   Diagnostic of diabetes: > or = 6.5%  .       Monitoring of Diabetes                Age (y)     Therapeutic Goal (%)   Adults:          >18           <7.0   Pediatrics:    13-18           <7.5                   7-12           <8.0                   0- 6            7.5-8.5   American Diabetes Association. Diabetes Care 33(S1), Jan 2010.     08/02/2019 4.9 % Final     Comment:          Diagnosis of Diabetes-Adults   Non-Diabetic: < or = 5.6%   Increased risk for developing diabetes: 5.7-6.4%   Diagnostic of diabetes: > or = 6.5%  .       Monitoring of Diabetes                Age (y)     Therapeutic Goal (%)   Adults:          >18           <7.0   Pediatrics:    13-18           <7.5                   7-12           <8.0                   0- 6            7.5-8.5   American Diabetes Association. Diabetes Care 33(S1), Jan 2010.          Other labs;   Vit D:   Vitamin D, 25-Hydroxy   Date Value Ref Range Status   06/30/2022 16 (A) ng/mL Final     Comment:     .  DEFICIENCY:         < 20   NG/ML  INSUFFICIENCY:      20-29  NG/ML  SUFFICIENCY:         NG/ML    THIS ASSAY ACCURATELY QUANTIFIES THE SUM OF  VITAMIN D3, 25-HYDROXY AND VIT D2,25-HYDROXY.     04/20/2022 24 (A) ng/mL Final     Comment:     .  DEFICIENCY:         <  20   NG/ML  INSUFFICIENCY:      20-29  NG/ML  SUFFICIENCY:         NG/ML    THIS ASSAY ACCURATELY QUANTIFIES THE SUM OF  VITAMIN D3, 25-HYDROXY AND VIT D2,25-HYDROXY.        TSH:  TSH   Date Value Ref Range Status   06/30/2022 1.28 0.44 - 3.98 mIU/L Final     Comment:      TSH testing is performed using different testing    methodology at [facility] than at other    [facility]. Direct result comparisons should    only be made within the same method.     02/08/2022 0.88 0.44 - 3.98 mIU/L Final     Comment:      TSH testing is performed using different testing    methodology at [facility] than at other    [facility]. Direct result comparisons should    only be made within the same method.     02/20/2020 0.90 0.44 - 3.98 mIU/L Final     Comment:      TSH testing is performed using different testing    methodology at [facility] than at other    [facility]. Direct result comparisons should    only be made within the same method.  .   Patients receiving more than 5 mg/day of biotin may have interference   in test results.  A sample should be taken no sooner than eight hours   after  previous dose. Contact [phone] for additional information.          Assessment and Plan     Problem List Items Addressed This Visit       Benign essential hypertension     Well-controlled with current treatment          Other Visit Diagnoses       Fungal infection    -  Primary    Relevant Medications    clotrimazole (Lotrimin) 1 % cream    Rash        Relevant Orders    Referral to Dermatology    Lack of physical activity        Social isolation        Mental distress evident on examination        Tobacco use                Rosa Lees DO

## 2024-05-09 NOTE — PATIENT INSTRUCTIONS
[facility]   [address], [address]  [place] [address]    Phone: [phone]  Fax: [phone]

## 2024-05-15 DIAGNOSIS — I10 SEVERE HYPERTENSION: ICD-10-CM

## 2024-05-16 RX ORDER — LISINOPRIL 20 MG/1
20 TABLET ORAL DAILY
Qty: 90 TABLET | Refills: 3 | Status: SHIPPED | OUTPATIENT
Start: 2024-05-16

## 2024-06-17 DIAGNOSIS — J44.9 CHRONIC OBSTRUCTIVE PULMONARY DISEASE, UNSPECIFIED COPD TYPE (MULTI): Primary | ICD-10-CM

## 2024-06-17 RX ORDER — FLUTICASONE FUROATE, UMECLIDINIUM BROMIDE AND VILANTEROL TRIFENATATE 100; 62.5; 25 UG/1; UG/1; UG/1
1 POWDER RESPIRATORY (INHALATION)
Qty: 60 EACH | Refills: 11 | Status: SHIPPED | OUTPATIENT
Start: 2024-06-17

## 2024-07-08 ENCOUNTER — OFFICE VISIT (OUTPATIENT)
Dept: CARDIOLOGY | Facility: CLINIC | Age: 74
End: 2024-07-08
Payer: MEDICARE

## 2024-07-08 VITALS
DIASTOLIC BLOOD PRESSURE: 73 MMHG | WEIGHT: 127.5 LBS | BODY MASS INDEX: 18.88 KG/M2 | HEART RATE: 93 BPM | SYSTOLIC BLOOD PRESSURE: 113 MMHG | OXYGEN SATURATION: 90 % | HEIGHT: 69 IN

## 2024-07-08 DIAGNOSIS — I10 BENIGN ESSENTIAL HYPERTENSION: Primary | ICD-10-CM

## 2024-07-08 PROCEDURE — 1159F MED LIST DOCD IN RCRD: CPT | Performed by: INTERNAL MEDICINE

## 2024-07-08 PROCEDURE — 93005 ELECTROCARDIOGRAM TRACING: CPT | Performed by: INTERNAL MEDICINE

## 2024-07-08 PROCEDURE — 3078F DIAST BP <80 MM HG: CPT | Performed by: INTERNAL MEDICINE

## 2024-07-08 PROCEDURE — 1036F TOBACCO NON-USER: CPT | Performed by: INTERNAL MEDICINE

## 2024-07-08 PROCEDURE — 1126F AMNT PAIN NOTED NONE PRSNT: CPT | Performed by: INTERNAL MEDICINE

## 2024-07-08 PROCEDURE — 1160F RVW MEDS BY RX/DR IN RCRD: CPT | Performed by: INTERNAL MEDICINE

## 2024-07-08 PROCEDURE — 99213 OFFICE O/P EST LOW 20 MIN: CPT | Mod: 25 | Performed by: INTERNAL MEDICINE

## 2024-07-08 PROCEDURE — 3074F SYST BP LT 130 MM HG: CPT | Performed by: INTERNAL MEDICINE

## 2024-07-08 PROCEDURE — 93010 ELECTROCARDIOGRAM REPORT: CPT | Performed by: INTERNAL MEDICINE

## 2024-07-08 PROCEDURE — 99213 OFFICE O/P EST LOW 20 MIN: CPT | Performed by: INTERNAL MEDICINE

## 2024-07-08 ASSESSMENT — PATIENT HEALTH QUESTIONNAIRE - PHQ9
2. FEELING DOWN, DEPRESSED OR HOPELESS: NOT AT ALL
SUM OF ALL RESPONSES TO PHQ9 QUESTIONS 1 AND 2: 0
1. LITTLE INTEREST OR PLEASURE IN DOING THINGS: NOT AT ALL

## 2024-07-08 ASSESSMENT — PAIN SCALES - GENERAL: PAINLEVEL: 0-NO PAIN

## 2024-07-08 NOTE — PROGRESS NOTES
Primary Care Physician: Rosa Lees DO  Date of Visit: 07/08/2024  4:40 PM EDT  Location of visit: [facility] [place], [place] [address]     Chief Complaint:   No chief complaint on file.       HPI / Summary:   Kalyan Armstrong is a 73 y.o. male presents for followup.     HFpEF, COPD supplemental oxygen,, former patient of Dr. Marsha Craig.  6-month office follow-up  Specialty Problems          Cardiology Problems    Benign essential hypertension     Added by problem list migration; 2013-2-19 Moved to [facility] 11/25/13 9:12PM         Nicotine dependence        Past Medical History:   Diagnosis Date    CATHIE (acute kidney injury) (CMS-HCC) 05/03/2023    Cataract     COPD (chronic obstructive pulmonary disease) (Multi)     Cough, unspecified 06/20/2014    Cough    Emphysema of lung (Multi)     Encounter for immunization 01/19/2015    Need for influenza vaccination    Encounter for screening for malignant neoplasm of prostate     Encounter for screening for malignant neoplasm of prostate    Other conditions influencing health status 05/20/2013    Digital Blood Vessel Rupture    Personal history of colonic polyps     History of colon polyps    Personal history of other specified conditions 05/20/2013    History of shortness of breath    Personal history of other specified conditions 06/20/2014    History of shortness of breath          Past Surgical History:   Procedure Laterality Date    APPENDECTOMY  02/21/2013    Appendectomy    COLONOSCOPY  02/21/2013    Complete Colonoscopy    EYE SURGERY            Social History:   reports that he quit smoking about 18 years ago. His smoking use included cigarettes. He started smoking about 33 years ago. He has never used smokeless tobacco. He reports current alcohol use. He reports that he does not currently use drugs.      Allergies:  No Known Allergies    Outpatient Medications:  Current Outpatient Medications   Medication Instructions    acetaminophen (TYLENOL) 325 mg, oral, Every 4 hours  PRN    albuterol 2.5 mg /3 mL (0.083 %) nebulizer solution nebulization, 4 times daily PRN, 1 vial    albuterol 90 mcg/actuation inhaler 1 puff, inhalation, Every 4-6 hrs prn    amLODIPine (NORVASC) 10 mg, oral, Daily    azithromycin (ZITHROMAX) 250 mg, oral, Daily    budesonide (PULMICORT) 0.5 mg, nebulization, 2 times daily RT    clotrimazole (Lotrimin) 1 % cream Topical, 2 times daily, apply to affected area    fluticasone-umeclidin-vilanter (Trelegy Ellipta) 100-62.5-25 mcg blister with device 1 puff, inhalation, Daily RT    hydroCHLOROthiazide (MICROZIDE) 12.5 mg, oral, Daily    lisinopril 20 mg, oral, Daily    predniSONE (DELTASONE) 5 mg, oral, Daily    triamcinolone (Kenalog) 0.1 % cream Topical, 2 times daily, Apply to affected area 1-2 times daily as needed.       ROS     Physical Exam:  There were no vitals filed for this visit.  Wt Readings from Last 5 Encounters:   05/09/24 58.5 kg (129 lb)   04/23/24 59.4 kg (131 lb)   03/08/24 59 kg (130 lb)   01/29/24 59 kg (130 lb)   11/23/23 72.6 kg (160 lb)     There is no height or weight on file to calculate BMI.     Chronically ill-appearing male in no acute distress.  JVP was not elevated.  Distant heart sounds without gallop.  Clear lungs although diminished breath sounds throughout.  No dependent edema  Last Labs:  CMP:  Recent Labs     03/08/24  1044 11/24/23  1206 11/23/23 2016 07/08/23  0524 07/07/23  0544    135* 136 134* 133*   K 3.3* 4.0 5.6* 4.1 4.1   CL 94* 98 98 95* 98   CO2 35* 27 23 28 26   ANIONGAP 13 14 21* 15 13   BUN 14 26* 21 21 30*   CREATININE 1.08 1.18 1.19 1.29 1.64*   EGFR 73 66 65  --   --    GLUCOSE 90 168* 96 86 130*     Recent Labs     03/08/24  1044 11/23/23 2016 07/05/23  2040 04/29/23  0924 03/07/23  0838   ALBUMIN 4.4 4.8 4.5 4.0 4.5   ALKPHOS 67 59 37 37 56   ALT 13 16 21 22 23   AST 25 40* 28 29 34   BILITOT 0.6 0.5 0.6 0.6 0.4     CBC:  Recent Labs     03/08/24  1044 11/25/23  0520 11/23/23 2016 07/08/23  0524  07/07/23  0544   WBC 7.1 11.1 12.2* 10.8 14.4*   HGB 13.7 10.9* 13.1* 13.4* 11.8*   HCT 40.0* 32.6* 39.5* 39.8* 32.6*    249 291 291 255   MCV 85 88 89 91 85     COAG:   Recent Labs     03/08/24  1044 07/05/23  2040 03/07/23  0838 02/04/23  0828 08/05/22  1014 06/21/22  0607 06/17/22  1800 06/17/22  0726 04/19/22  2130 03/16/22  1620 10/01/21  0112 12/07/20  1921   INR 1.0 0.9 1.0 1.0 1.0   < >  --    < >  --    < >  --   --    DDIMERVTE  --   --   --   --   --   --  1,109*  --  798*  --  739* 1,669*    < > = values in this interval not displayed.     HEME/ENDO:  Recent Labs     06/30/22  1100 06/19/22  1727 02/08/22  0556 02/20/20  0955 08/02/19  0845   FERRITIN  --  369*  --   --   --    IRONSAT  --  41  --   --   --    TSH 1.28  --  0.88 0.90  --    HGBA1C 5.3  --   --   --  4.9      CARDIAC:   Recent Labs     11/23/23  2016 07/06/23  0156 07/05/23  2209 07/05/23 2040 04/29/23  0924 03/07/23  0838 02/04/23  0828 12/15/22  1302 12/15/22  0906   TROPHS 15 22* 26* 24* 14 15 16   < > 17   BNP 24  --   --  66  --  25 116*  --  45    < > = values in this interval not displayed.     Recent Labs     06/30/22  1100 08/02/19  0845   CHOL 217* 195   LDLF 92 68   .7 118.0   TRIG 98 45       Last Cardiology Tests:  ECG:      Echo:  Echo Results:  No results found for this or any previous visit from the past 3650 days.       Cath:      Stress Test:  Stress Results:  No results found for this or any previous visit from the past 365 days.         Cardiac Imaging:  CT lung screening low dose  Narrative: Interpreted By:  Jerome Yuen,   STUDY:  CT LUNG SCREENING LOW DOSE;  2/13/2024 10:13 am      INDICATION:  Signs/Symptoms:Lung nodule monitoring.      COMPARISON:  01/09/2023,          ACCESSION NUMBER(S):  [id number]      ORDERING CLINICIAN:  RAE DSOUZA      TECHNIQUE:  Helical data acquisition of the chest was obtained without IV  contrast material.  Images were reformatted in axial, coronal,  and  sagittal planes.      FINDINGS:  LUNGS AND AIRWAYS:  The trachea and central airways are patent. No endobronchial lesion  is seen.      There is severe emphysematous changes with panlobular emphysematous  changes within the lung bases.There are bibasilar atelectatic  opacities most likely secondary to marked lower lobe pulmonary  hyperinflation. There are no new suspicious lung nodules.  There is branching tubular nodular opacities most consistent with  mucoid impaction in the anterior segment of the left upper lobe  stable when compared to a study of 01/09/2023 and improved when  compared to a study of 09/29/2022.      There are no suspicious parenchymal lung nodules.      MEDIASTINUM AND ROSALES, LOWER NECK AND AXILLA:  The visualized thyroid gland is within normal limits.  There are scattered mediastinal lymph nodes inflammatory/reactive in  nature. Esophagus appears within normal limits as seen.      HEART AND VESSELS:  The thoracic aorta normal in course and caliber.  Main pulmonary artery and its branches are normal in caliber.  No coronary artery calcifications are seen. Please note, the study is  not optimized for evaluation of coronary arteries. The cardiac  chambers are not enlarged. There is no pericardial effusion seen.      UPPER ABDOMEN:  The visualized subdiaphragmatic structures demonstrate no remarkable  findings. There are calcified peripancreatic lymph nodes most likely  post infectious/granulomatous.          CHEST WALL AND OSSEOUS STRUCTURES:  Chest wall is within normal limits.  No acute osseous pathology.There are no suspicious osseous  lesions.Stable lipoma of the infraspinatus muscle.      Impression: 1. There are no suspicious parenchymal lung nodules.  2. Recommend a 1 year low-dose chest CT for surveillance.  3. Severe predominantly basilar emphysematous changes with multifocal  atelectasis. Correlate history of alpha-1 antitrypsin disease.  4.          LUNG RADS CATEGORY:  Lung Rad:  Lung-RADS 2 (Benign Appearance or Indolent Behavior)      Recommendation: Continue annual screening with Low Dose Chest CT in  12 months, recommended as per American College of [employer]iology  Guidelines Lung-RADS Version 2022.          MACRO:  None      Signed by: Jerome Yuen 2/13/2024 2:57 PM  Dictation workstation:   [id number]        Assessment/Plan   Severe emphysema but I did not appreciate any signs of heart failure.  He will follow-up with us in 6 months as per his wishes wife requested medication renewal was not sure which which 1 needs renewal she will call        Orders:  No orders of the defined types were placed in this encounter.     Followup Appts:  Future Appointments   Date Time Provider Department Center   8/13/2024  2:00 PM Rosa Lees DO [id number]           ____________________________________________________________  Zoran Infante MD    Senior Attending Physician  [facility]  [facility]

## 2024-07-10 LAB
ATRIAL RATE: 93 BPM
P AXIS: 83 DEGREES
P OFFSET: 203 MS
P ONSET: 156 MS
PR INTERVAL: 134 MS
Q ONSET: 223 MS
QRS COUNT: 15 BEATS
QRS DURATION: 84 MS
QT INTERVAL: 342 MS
QTC CALCULATION(BAZETT): 425 MS
QTC FREDERICIA: 396 MS
R AXIS: 87 DEGREES
T AXIS: 77 DEGREES
T OFFSET: 394 MS
VENTRICULAR RATE: 93 BPM

## 2024-08-13 ENCOUNTER — APPOINTMENT (OUTPATIENT)
Dept: PRIMARY CARE | Facility: CLINIC | Age: 74
End: 2024-08-13
Payer: MEDICARE

## 2024-08-13 VITALS
SYSTOLIC BLOOD PRESSURE: 100 MMHG | BODY MASS INDEX: 18.07 KG/M2 | WEIGHT: 122 LBS | DIASTOLIC BLOOD PRESSURE: 62 MMHG | HEIGHT: 69 IN

## 2024-08-13 DIAGNOSIS — N18.31 STAGE 3A CHRONIC KIDNEY DISEASE (MULTI): ICD-10-CM

## 2024-08-13 DIAGNOSIS — I10 BENIGN ESSENTIAL HYPERTENSION: Primary | ICD-10-CM

## 2024-08-13 DIAGNOSIS — B18.2 CHRONIC HEPATITIS C WITHOUT HEPATIC COMA (MULTI): ICD-10-CM

## 2024-08-13 DIAGNOSIS — E43 UNSPECIFIED SEVERE PROTEIN-CALORIE MALNUTRITION (MULTI): ICD-10-CM

## 2024-08-13 DIAGNOSIS — R73.9 HYPERGLYCEMIA: ICD-10-CM

## 2024-08-13 DIAGNOSIS — J96.21 ACUTE AND CHRONIC RESPIRATORY FAILURE WITH HYPOXIA (MULTI): ICD-10-CM

## 2024-08-13 DIAGNOSIS — E55.9 VITAMIN D DEFICIENCY: ICD-10-CM

## 2024-08-13 DIAGNOSIS — I70.0 ATHEROSCLEROSIS OF AORTA (CMS-HCC): ICD-10-CM

## 2024-08-13 PROCEDURE — 3074F SYST BP LT 130 MM HG: CPT | Performed by: INTERNAL MEDICINE

## 2024-08-13 PROCEDURE — 3078F DIAST BP <80 MM HG: CPT | Performed by: INTERNAL MEDICINE

## 2024-08-13 PROCEDURE — 3008F BODY MASS INDEX DOCD: CPT | Performed by: INTERNAL MEDICINE

## 2024-08-13 PROCEDURE — 99214 OFFICE O/P EST MOD 30 MIN: CPT | Performed by: INTERNAL MEDICINE

## 2024-08-13 PROCEDURE — 1160F RVW MEDS BY RX/DR IN RCRD: CPT | Performed by: INTERNAL MEDICINE

## 2024-08-13 PROCEDURE — G2211 COMPLEX E/M VISIT ADD ON: HCPCS | Performed by: INTERNAL MEDICINE

## 2024-08-13 PROCEDURE — 1158F ADVNC CARE PLAN TLK DOCD: CPT | Performed by: INTERNAL MEDICINE

## 2024-08-13 PROCEDURE — 1159F MED LIST DOCD IN RCRD: CPT | Performed by: INTERNAL MEDICINE

## 2024-08-13 PROCEDURE — 1123F ACP DISCUSS/DSCN MKR DOCD: CPT | Performed by: INTERNAL MEDICINE

## 2024-08-13 ASSESSMENT — LIFESTYLE VARIABLES
HOW MANY STANDARD DRINKS CONTAINING ALCOHOL DO YOU HAVE ON A TYPICAL DAY: 1 OR 2
AUDIT-C TOTAL SCORE: 4
SKIP TO QUESTIONS 9-10: 1
HOW OFTEN DO YOU HAVE SIX OR MORE DRINKS ON ONE OCCASION: NEVER
HOW OFTEN DO YOU HAVE A DRINK CONTAINING ALCOHOL: 4 OR MORE TIMES A WEEK

## 2024-08-13 NOTE — PROGRESS NOTES
Primary care office Note      Name: Klayan Armstrong, Age: 73 y.o., Gender: male, MRN: [id number]   Pharmacy:   [facility] #[id number] - [address]  [address] [facility] [address]  Phone: [phone] Fax: [phone]    [facility] #[id number] - [place], [place] - [address] [facility]  [address] [facility] [address]  Phone: [phone] Fax: [phone]     PCP: Rosa Lees        Subjective:   Chief Complaint   Patient presents with    Follow-up        Kalyan Armstrong is a 73 y.o. male who presented to the clinic today for follow up     HPI:   Kalyan Armstrong is a 73 y.o. male     Feeling well  No complaints today.     The patient denies headaches, lightheadedness, changes in speech/vision, photophobia, dysphagia, diaphoresis, Chest pain, dyspnea, Abdominal pain, recent falls or trauma, and changes in bowel/urinary habits.     ROS:   12 points review of system is negative excepted as stated above in the HPI.    Medical History      PMH:    has a past medical history of CATHIE (acute kidney injury) (CMS-HCC) (2023), Cataract, COPD (chronic obstructive pulmonary disease) (Multi), Cough, unspecified (2014), Emphysema of lung (Multi), Encounter for immunization (2015), Encounter for screening for malignant neoplasm of prostate, Other conditions influencing health status (2013), Personal history of colonic polyps, Personal history of other specified conditions (2013), and Personal history of other specified conditions (2014).   Allergies:   No Known Allergies   Surgical Hx:   Past Surgical History:   Procedure Laterality Date    APPENDECTOMY  2013    Appendectomy    COLONOSCOPY  2013    Complete Colonoscopy    EYE SURGERY        Social HX:   Social History     Tobacco Use    Smoking status: Former     Current packs/day: 0.00     Types: Cigarettes     Start date:      Quit date:      Years since quittin.6      Passive exposure: Past    Smokeless tobacco: Never   Substance Use Topics    Alcohol use: Yes     Comment: 1 cup of scotch a week    Drug use: Never     Comment: Former drug user, has not used any in 50 years        MEDS:   Current Outpatient Medications   Medication Instructions    acetaminophen (TYLENOL) 325 mg, oral, Every 4 hours PRN    albuterol 2.5 mg /3 mL (0.083 %) nebulizer solution nebulization, 4 times daily PRN, 1 vial    albuterol 90 mcg/actuation inhaler 1 puff, inhalation, Every 4-6 hrs prn    amLODIPine (NORVASC) 10 mg, oral, Daily    azithromycin (ZITHROMAX) 250 mg, oral, Daily    budesonide (PULMICORT) 0.5 mg, nebulization, 2 times daily RT    clotrimazole (Lotrimin) 1 % cream Topical, 2 times daily, apply to affected area    fluticasone-umeclidin-vilanter (Trelegy Ellipta) 100-62.5-25 mcg blister with device 1 puff, inhalation, Daily RT    hydroCHLOROthiazide (MICROZIDE) 12.5 mg, oral, Daily    lisinopril 20 mg, oral, Daily    predniSONE (DELTASONE) 5 mg, oral, Daily    triamcinolone (Kenalog) 0.1 % cream Topical, 2 times daily, Apply to affected area 1-2 times daily as needed.        Objective Data     Objective:   Visit Vitals  /62 (BP Location: Right arm, Patient Position: Sitting, BP Cuff Size: Adult)        Physical Examination:   GE; sitting comfortably in a chair, in NAD, o2 on   HEENT; AT/NC, no conjunctival pallor, anicteric sclera  Neck; Supple neck, no LAD/JVD  Chest; CTAbl, no adventitious sounds  CVS; s1 and s2 only no MGR, RRR  Ext; no cyanosis/clubbing/edema, pulses intact  Neuro; Aox3  Psych; normal mood     Last Labs:   CBC:   WBC   Date Value Ref Range Status   03/08/2024 7.1 4.4 - 11.3 x10*3/uL Final   11/25/2023 11.1 4.4 - 11.3 x10*3/uL Final   11/23/2023 12.2 (H) 4.4 - 11.3 x10*3/uL Final     Hemoglobin   Date Value Ref Range Status   03/08/2024 13.7 13.5 - 17.5 g/dL Final   11/25/2023 10.9 (L) 13.5 - 17.5 g/dL Final   11/23/2023 13.1 (L) 13.5 - 17.5 g/dL Final      MCV   Date Value Ref Range Status   03/08/2024 85 80 - 100 fL Final   11/25/2023 88 80 - 100 fL Final   11/23/2023 89 80 - 100 fL Final     Platelets   Date Value Ref Range Status   03/08/2024 327 150 - 450 x10*3/uL Final   11/25/2023 249 150 - 450 x10*3/uL Final   11/23/2023 291 150 - 450 x10*3/uL Final      CMP:   Sodium   Date Value Ref Range Status   03/08/2024 139 136 - 145 mmol/L Final   11/24/2023 135 (L) 136 - 145 mmol/L Final   11/23/2023 136 136 - 145 mmol/L Final     Potassium   Date Value Ref Range Status   03/08/2024 3.3 (L) 3.5 - 5.3 mmol/L Final   11/24/2023 4.0 3.5 - 5.3 mmol/L Final   11/23/2023 5.6 (H) 3.5 - 5.3 mmol/L Final     Comment:     MODERATE HEMOLYSIS DETECTED. The result may be falsely elevated due to hemolysis or other interferents. Clinical correlation is recommended. Repeat testing may be considered.     Chloride   Date Value Ref Range Status   03/08/2024 94 (L) 98 - 107 mmol/L Final   11/24/2023 98 98 - 107 mmol/L Final   11/23/2023 98 98 - 107 mmol/L Final     Urea Nitrogen   Date Value Ref Range Status   03/08/2024 14 6 - 23 mg/dL Final   11/24/2023 26 (H) 6 - 23 mg/dL Final   11/23/2023 21 6 - 23 mg/dL Final     Creatinine   Date Value Ref Range Status   03/08/2024 1.08 0.50 - 1.30 mg/dL Final   11/24/2023 1.18 0.50 - 1.30 mg/dL Final   11/23/2023 1.19 0.50 - 1.30 mg/dL Final     eGFR   Date Value Ref Range Status   03/08/2024 73 >60 mL/min/1.73m*2 Final     Comment:     Calculations of estimated GFR are performed using the 2021 CKD-EPI Study Refit equation without the race variable for the IDMS-Traceable creatinine methods.  https://jasn.asnjournals.org/content/early/2021/09/22/ASN.7377325641   11/24/2023 66 >60 mL/min/1.73m*2 Final     Comment:     Calculations of estimated GFR are performed using the 2021 CKD-EPI Study Refit equation without the race variable for the IDMS-Traceable creatinine methods.  https://jasn.asnjournals.org/content/early/2021/09/22/ASN.6121172944    11/23/2023 65 >60 mL/min/1.73m*2 Final     Comment:     Calculations of estimated GFR are performed using the 2021 CKD-EPI Study Refit equation without the race variable for the IDMS-Traceable creatinine methods.  https://jasn.asnjournals.org/content/early/2021/09/22/ASN.8888541985      A1c:   Hemoglobin A1C   Date Value Ref Range Status   06/30/2022 5.3 % Final     Comment:          Diagnosis of Diabetes-Adults   Non-Diabetic: < or = 5.6%   Increased risk for developing diabetes: 5.7-6.4%   Diagnostic of diabetes: > or = 6.5%  .       Monitoring of Diabetes                Age (y)     Therapeutic Goal (%)   Adults:          >18           <7.0   Pediatrics:    13-18           <7.5                   7-12           <8.0                   0- 6            7.5-8.5   American Diabetes Association. Diabetes Care 33(S1), Jan 2010.     08/02/2019 4.9 % Final     Comment:          Diagnosis of Diabetes-Adults   Non-Diabetic: < or = 5.6%   Increased risk for developing diabetes: 5.7-6.4%   Diagnostic of diabetes: > or = 6.5%  .       Monitoring of Diabetes                Age (y)     Therapeutic Goal (%)   Adults:          >18           <7.0   Pediatrics:    13-18           <7.5                   7-12           <8.0                   0- 6            7.5-8.5   American Diabetes Association. Diabetes Care 33(S1), Jan 2010.          Other labs;   Vit D:   Vitamin D, 25-Hydroxy   Date Value Ref Range Status   06/30/2022 16 (A) ng/mL Final     Comment:     .  DEFICIENCY:         < 20   NG/ML  INSUFFICIENCY:      20-29  NG/ML  SUFFICIENCY:         NG/ML    THIS ASSAY ACCURATELY QUANTIFIES THE SUM OF  VITAMIN D3, 25-HYDROXY AND VIT D2,25-HYDROXY.     04/20/2022 24 (A) ng/mL Final     Comment:     .  DEFICIENCY:         < 20   NG/ML  INSUFFICIENCY:      20-29  NG/ML  SUFFICIENCY:         NG/ML    THIS ASSAY ACCURATELY QUANTIFIES THE SUM OF  VITAMIN D3, 25-HYDROXY AND VIT D2,25-HYDROXY.        TSH:  TSH   Date Value Ref  Range Status   06/30/2022 1.28 0.44 - 3.98 mIU/L Final     Comment:      TSH testing is performed using different testing    methodology at [facility] than at other    [facility]. Direct result comparisons should    only be made within the same method.     02/08/2022 0.88 0.44 - 3.98 mIU/L Final     Comment:      TSH testing is performed using different testing    methodology at [facility] than at other    [facility]. Direct result comparisons should    only be made within the same method.     02/20/2020 0.90 0.44 - 3.98 mIU/L Final     Comment:      TSH testing is performed using different testing    methodology at [facility] than at other    [facility]. Direct result comparisons should    only be made within the same method.  .   Patients receiving more than 5 mg/day of biotin may have interference   in test results.  A sample should be taken no sooner than eight hours   after  previous dose. Contact [phone] for additional information.          Assessment and Plan     Problem List Items Addressed This Visit       Benign essential hypertension - Primary    Relevant Orders    CBC and Auto Differential    Comprehensive Metabolic Panel    Lipid Panel    TSH with reflex to Free T4 if abnormal    Hepatitis C    Hyperglycemia    Relevant Orders    Hemoglobin A1C    Vitamin D deficiency    Relevant Orders    Vitamin D 25-Hydroxy,Total (for eval of Vitamin D levels)    Unspecified severe protein-calorie malnutrition (Multi)    Atherosclerosis of aorta (CMS-HCC)    Stage 3a chronic kidney disease (Multi)     Other Visit Diagnoses       Acute and chronic respiratory failure with hypoxia (Multi)                Rosa Lees DO

## 2024-08-21 ENCOUNTER — LAB (OUTPATIENT)
Dept: LAB | Facility: HOSPITAL | Age: 74
End: 2024-08-21
Payer: MEDICARE

## 2024-08-21 DIAGNOSIS — R73.9 HYPERGLYCEMIA: ICD-10-CM

## 2024-08-21 DIAGNOSIS — E55.9 VITAMIN D DEFICIENCY: ICD-10-CM

## 2024-08-21 DIAGNOSIS — I10 BENIGN ESSENTIAL HYPERTENSION: ICD-10-CM

## 2024-08-21 LAB
25(OH)D3 SERPL-MCNC: 25 NG/ML (ref 30–100)
ALBUMIN SERPL BCP-MCNC: 4.5 G/DL (ref 3.4–5)
ALP SERPL-CCNC: 66 U/L (ref 33–136)
ALT SERPL W P-5'-P-CCNC: 10 U/L (ref 10–52)
ANION GAP SERPL CALC-SCNC: 16 MMOL/L (ref 10–20)
AST SERPL W P-5'-P-CCNC: 19 U/L (ref 9–39)
BASOPHILS # BLD AUTO: 0.08 X10*3/UL (ref 0–0.1)
BASOPHILS NFR BLD AUTO: 1 %
BILIRUB SERPL-MCNC: 0.5 MG/DL (ref 0–1.2)
BUN SERPL-MCNC: 31 MG/DL (ref 6–23)
CALCIUM SERPL-MCNC: 10.3 MG/DL (ref 8.6–10.3)
CHLORIDE SERPL-SCNC: 93 MMOL/L (ref 98–107)
CHOLEST SERPL-MCNC: 244 MG/DL (ref 0–199)
CHOLESTEROL/HDL RATIO: 2.5
CO2 SERPL-SCNC: 37 MMOL/L (ref 21–32)
CREAT SERPL-MCNC: 2.66 MG/DL (ref 0.5–1.3)
EGFRCR SERPLBLD CKD-EPI 2021: 25 ML/MIN/1.73M*2
EOSINOPHIL # BLD AUTO: 0.08 X10*3/UL (ref 0–0.4)
EOSINOPHIL NFR BLD AUTO: 1 %
ERYTHROCYTE [DISTWIDTH] IN BLOOD BY AUTOMATED COUNT: 13.2 % (ref 11.5–14.5)
EST. AVERAGE GLUCOSE BLD GHB EST-MCNC: 105 MG/DL
GLUCOSE SERPL-MCNC: 123 MG/DL (ref 74–99)
HBA1C MFR BLD: 5.3 %
HCT VFR BLD AUTO: 36.8 % (ref 41–52)
HDLC SERPL-MCNC: 98.8 MG/DL
HGB BLD-MCNC: 12.9 G/DL (ref 13.5–17.5)
IMM GRANULOCYTES # BLD AUTO: 0.03 X10*3/UL (ref 0–0.5)
IMM GRANULOCYTES NFR BLD AUTO: 0.4 % (ref 0–0.9)
LDLC SERPL CALC-MCNC: 129 MG/DL
LYMPHOCYTES # BLD AUTO: 0.74 X10*3/UL (ref 0.8–3)
LYMPHOCYTES NFR BLD AUTO: 9.5 %
MCH RBC QN AUTO: 29.9 PG (ref 26–34)
MCHC RBC AUTO-ENTMCNC: 35.1 G/DL (ref 32–36)
MCV RBC AUTO: 85 FL (ref 80–100)
MONOCYTES # BLD AUTO: 0.42 X10*3/UL (ref 0.05–0.8)
MONOCYTES NFR BLD AUTO: 5.4 %
NEUTROPHILS # BLD AUTO: 6.43 X10*3/UL (ref 1.6–5.5)
NEUTROPHILS NFR BLD AUTO: 82.7 %
NON HDL CHOLESTEROL: 145 MG/DL (ref 0–149)
NRBC BLD-RTO: 0 /100 WBCS (ref 0–0)
PLATELET # BLD AUTO: 258 X10*3/UL (ref 150–450)
POTASSIUM SERPL-SCNC: 3.9 MMOL/L (ref 3.5–5.3)
PROT SERPL-MCNC: 8.7 G/DL (ref 6.4–8.2)
RBC # BLD AUTO: 4.31 X10*6/UL (ref 4.5–5.9)
SODIUM SERPL-SCNC: 142 MMOL/L (ref 136–145)
TRIGL SERPL-MCNC: 80 MG/DL (ref 0–149)
TSH SERPL-ACNC: 2.07 MIU/L (ref 0.44–3.98)
VLDL: 16 MG/DL (ref 0–40)
WBC # BLD AUTO: 7.8 X10*3/UL (ref 4.4–11.3)

## 2024-08-21 PROCEDURE — 85025 COMPLETE CBC W/AUTO DIFF WBC: CPT

## 2024-08-21 PROCEDURE — 84443 ASSAY THYROID STIM HORMONE: CPT | Performed by: INTERNAL MEDICINE

## 2024-08-21 PROCEDURE — 80061 LIPID PANEL: CPT | Performed by: INTERNAL MEDICINE

## 2024-08-21 PROCEDURE — 82306 VITAMIN D 25 HYDROXY: CPT | Performed by: INTERNAL MEDICINE

## 2024-08-21 PROCEDURE — 83036 HEMOGLOBIN GLYCOSYLATED A1C: CPT | Performed by: INTERNAL MEDICINE

## 2024-08-21 PROCEDURE — 84075 ASSAY ALKALINE PHOSPHATASE: CPT

## 2024-08-21 PROCEDURE — 36415 COLL VENOUS BLD VENIPUNCTURE: CPT

## 2024-08-29 DIAGNOSIS — E55.9 VITAMIN D DEFICIENCY: ICD-10-CM

## 2024-08-29 DIAGNOSIS — R35.0 URINARY FREQUENCY: Primary | ICD-10-CM

## 2024-08-29 DIAGNOSIS — N17.9 AKI (ACUTE KIDNEY INJURY) (CMS-HCC): ICD-10-CM

## 2024-08-29 DIAGNOSIS — E78.5 HYPERLIPIDEMIA, UNSPECIFIED HYPERLIPIDEMIA TYPE: ICD-10-CM

## 2024-08-29 RX ORDER — ASPIRIN 325 MG
50000 TABLET, DELAYED RELEASE (ENTERIC COATED) ORAL
Qty: 8 CAPSULE | Refills: 0 | Status: SHIPPED | OUTPATIENT
Start: 2024-09-01 | End: 2025-09-01

## 2024-08-29 RX ORDER — ROSUVASTATIN CALCIUM 40 MG/1
40 TABLET, COATED ORAL DAILY
Qty: 90 TABLET | Refills: 1 | Status: SHIPPED | OUTPATIENT
Start: 2024-08-29 | End: 2025-02-25

## 2024-09-20 ENCOUNTER — TELEPHONE (OUTPATIENT)
Dept: CARDIOLOGY | Facility: HOSPITAL | Age: 74
End: 2024-09-20
Payer: MEDICARE

## 2024-09-20 DIAGNOSIS — I10 BENIGN ESSENTIAL HYPERTENSION: ICD-10-CM

## 2024-09-20 RX ORDER — AMLODIPINE BESYLATE 10 MG/1
10 TABLET ORAL DAILY
Qty: 90 TABLET | Refills: 1 | Status: SHIPPED | OUTPATIENT
Start: 2024-09-20

## 2024-09-24 ENCOUNTER — HOSPITAL ENCOUNTER (INPATIENT)
Facility: HOSPITAL | Age: 74
LOS: 4 days | Discharge: HOME | End: 2024-09-28
Attending: EMERGENCY MEDICINE | Admitting: STUDENT IN AN ORGANIZED HEALTH CARE EDUCATION/TRAINING PROGRAM
Payer: MEDICARE

## 2024-09-24 ENCOUNTER — APPOINTMENT (OUTPATIENT)
Dept: RADIOLOGY | Facility: HOSPITAL | Age: 74
End: 2024-09-24
Payer: MEDICARE

## 2024-09-24 ENCOUNTER — APPOINTMENT (OUTPATIENT)
Dept: CARDIOLOGY | Facility: HOSPITAL | Age: 74
End: 2024-09-24
Payer: MEDICARE

## 2024-09-24 DIAGNOSIS — I10 BENIGN ESSENTIAL HYPERTENSION: ICD-10-CM

## 2024-09-24 DIAGNOSIS — N28.9 RENAL INSUFFICIENCY: ICD-10-CM

## 2024-09-24 DIAGNOSIS — J44.9 CHRONIC OBSTRUCTIVE PULMONARY DISEASE, UNSPECIFIED COPD TYPE (MULTI): Primary | ICD-10-CM

## 2024-09-24 DIAGNOSIS — I10 SEVERE HYPERTENSION: ICD-10-CM

## 2024-09-24 DIAGNOSIS — J18.9 PNEUMONIA OF RIGHT LOWER LOBE DUE TO INFECTIOUS ORGANISM: ICD-10-CM

## 2024-09-24 LAB
ALBUMIN SERPL BCP-MCNC: 3.8 G/DL (ref 3.4–5)
ALP SERPL-CCNC: 59 U/L (ref 33–136)
ALT SERPL W P-5'-P-CCNC: 14 U/L (ref 10–52)
ANION GAP BLDV CALCULATED.4IONS-SCNC: 5 MMOL/L (ref 10–25)
ANION GAP SERPL CALC-SCNC: 15 MMOL/L (ref 10–20)
APPEARANCE UR: CLEAR
AST SERPL W P-5'-P-CCNC: 27 U/L (ref 9–39)
BASE EXCESS BLDV CALC-SCNC: 13.2 MMOL/L (ref -2–3)
BASOPHILS # BLD AUTO: 0.07 X10*3/UL (ref 0–0.1)
BASOPHILS NFR BLD AUTO: 0.7 %
BILIRUB SERPL-MCNC: 0.6 MG/DL (ref 0–1.2)
BILIRUB UR STRIP.AUTO-MCNC: NEGATIVE MG/DL
BODY TEMPERATURE: 37 DEGREES CELSIUS
BUN SERPL-MCNC: 55 MG/DL (ref 6–23)
CA-I BLDV-SCNC: 1.21 MMOL/L (ref 1.1–1.33)
CALCIUM SERPL-MCNC: 9.4 MG/DL (ref 8.6–10.3)
CARDIAC TROPONIN I PNL SERPL HS: 16 NG/L (ref 0–20)
CARDIAC TROPONIN I PNL SERPL HS: 16 NG/L (ref 0–20)
CHLORIDE BLDV-SCNC: 89 MMOL/L (ref 98–107)
CHLORIDE SERPL-SCNC: 84 MMOL/L (ref 98–107)
CO2 SERPL-SCNC: 32 MMOL/L (ref 21–32)
COLOR UR: COLORLESS
CREAT SERPL-MCNC: 3.97 MG/DL (ref 0.5–1.3)
CREAT UR-MCNC: 66.3 MG/DL (ref 20–370)
CREAT UR-MCNC: 66.3 MG/DL (ref 20–370)
EGFRCR SERPLBLD CKD-EPI 2021: 15 ML/MIN/1.73M*2
EOSINOPHIL # BLD AUTO: 0.15 X10*3/UL (ref 0–0.4)
EOSINOPHIL NFR BLD AUTO: 1.4 %
EOSINOPHIL SMEAR: ABNORMAL
ERYTHROCYTE [DISTWIDTH] IN BLOOD BY AUTOMATED COUNT: 12.7 % (ref 11.5–14.5)
FLUAV RNA RESP QL NAA+PROBE: NOT DETECTED
FLUBV RNA RESP QL NAA+PROBE: NOT DETECTED
GLUCOSE BLDV-MCNC: 146 MG/DL (ref 74–99)
GLUCOSE SERPL-MCNC: 133 MG/DL (ref 74–99)
GLUCOSE UR STRIP.AUTO-MCNC: NORMAL MG/DL
HCO3 BLDV-SCNC: 38.7 MMOL/L (ref 22–26)
HCT VFR BLD AUTO: 32.4 % (ref 41–52)
HCT VFR BLD EST: 37 % (ref 41–52)
HGB BLD-MCNC: 11.7 G/DL (ref 13.5–17.5)
HGB BLDV-MCNC: 12.4 G/DL (ref 13.5–17.5)
IMM GRANULOCYTES # BLD AUTO: 0.05 X10*3/UL (ref 0–0.5)
IMM GRANULOCYTES NFR BLD AUTO: 0.5 % (ref 0–0.9)
INHALED O2 CONCENTRATION: 32 %
KETONES UR STRIP.AUTO-MCNC: NEGATIVE MG/DL
LACTATE BLDV-SCNC: 2.5 MMOL/L (ref 0.4–2)
LACTATE BLDV-SCNC: 3.1 MMOL/L (ref 0.4–2)
LACTATE SERPL-SCNC: 2.2 MMOL/L (ref 0.4–2)
LEUKOCYTE ESTERASE UR QL STRIP.AUTO: ABNORMAL
LYMPHOCYTES # BLD AUTO: 1.19 X10*3/UL (ref 0.8–3)
LYMPHOCYTES NFR BLD AUTO: 11.1 %
MAGNESIUM SERPL-MCNC: 1.8 MG/DL (ref 1.6–2.4)
MCH RBC QN AUTO: 29.8 PG (ref 26–34)
MCHC RBC AUTO-ENTMCNC: 36.1 G/DL (ref 32–36)
MCV RBC AUTO: 83 FL (ref 80–100)
MONOCYTES # BLD AUTO: 0.8 X10*3/UL (ref 0.05–0.8)
MONOCYTES NFR BLD AUTO: 7.5 %
MUCOUS THREADS #/AREA URNS AUTO: ABNORMAL /LPF
NEUTROPHILS # BLD AUTO: 8.45 X10*3/UL (ref 1.6–5.5)
NEUTROPHILS NFR BLD AUTO: 78.8 %
NITRITE UR QL STRIP.AUTO: NEGATIVE
NRBC BLD-RTO: 0 /100 WBCS (ref 0–0)
OSMOLALITY UR: 250 MOSM/KG (ref 200–1200)
OXYHGB MFR BLDV: 30.3 % (ref 45–75)
PCO2 BLDV: 52 MM HG (ref 41–51)
PH BLDV: 7.48 PH (ref 7.33–7.43)
PH UR STRIP.AUTO: 6 [PH]
PLATELET # BLD AUTO: 284 X10*3/UL (ref 150–450)
PO2 BLDV: 31 MM HG (ref 35–45)
POTASSIUM BLDV-SCNC: 3.6 MMOL/L (ref 3.5–5.3)
POTASSIUM SERPL-SCNC: 3.1 MMOL/L (ref 3.5–5.3)
PROT SERPL-MCNC: 8 G/DL (ref 6.4–8.2)
PROT UR STRIP.AUTO-MCNC: ABNORMAL MG/DL
PROT UR-ACNC: 48 MG/DL (ref 5–25)
PROT/CREAT UR: 0.72 MG/MG CREAT (ref 0–0.17)
RBC # BLD AUTO: 3.92 X10*6/UL (ref 4.5–5.9)
RBC # UR STRIP.AUTO: ABNORMAL /UL
RBC #/AREA URNS AUTO: ABNORMAL /HPF
SAO2 % BLDV: 31 % (ref 45–75)
SARS-COV-2 RNA RESP QL NAA+PROBE: NOT DETECTED
SODIUM BLDV-SCNC: 129 MMOL/L (ref 136–145)
SODIUM SERPL-SCNC: 128 MMOL/L (ref 136–145)
SODIUM UR-SCNC: 34 MMOL/L
SODIUM/CREAT UR-RTO: 51 MMOL/G CREAT
SP GR UR STRIP.AUTO: 1.01
UROBILINOGEN UR STRIP.AUTO-MCNC: NORMAL MG/DL
WBC # BLD AUTO: 10.7 X10*3/UL (ref 4.4–11.3)
WBC #/AREA URNS AUTO: ABNORMAL /HPF

## 2024-09-24 PROCEDURE — 36415 COLL VENOUS BLD VENIPUNCTURE: CPT

## 2024-09-24 PROCEDURE — 84132 ASSAY OF SERUM POTASSIUM: CPT

## 2024-09-24 PROCEDURE — 85025 COMPLETE CBC W/AUTO DIFF WBC: CPT

## 2024-09-24 PROCEDURE — 94640 AIRWAY INHALATION TREATMENT: CPT

## 2024-09-24 PROCEDURE — 96365 THER/PROPH/DIAG IV INF INIT: CPT

## 2024-09-24 PROCEDURE — 84484 ASSAY OF TROPONIN QUANT: CPT

## 2024-09-24 PROCEDURE — 87636 SARSCOV2 & INF A&B AMP PRB: CPT

## 2024-09-24 PROCEDURE — 2500000001 HC RX 250 WO HCPCS SELF ADMINISTERED DRUGS (ALT 637 FOR MEDICARE OP)

## 2024-09-24 PROCEDURE — 99223 1ST HOSP IP/OBS HIGH 75: CPT | Performed by: NURSE PRACTITIONER

## 2024-09-24 PROCEDURE — 76770 US EXAM ABDO BACK WALL COMP: CPT | Performed by: RADIOLOGY

## 2024-09-24 PROCEDURE — 2500000004 HC RX 250 GENERAL PHARMACY W/ HCPCS (ALT 636 FOR OP/ED)

## 2024-09-24 PROCEDURE — 81001 URINALYSIS AUTO W/SCOPE: CPT | Mod: AHULAB | Performed by: STUDENT IN AN ORGANIZED HEALTH CARE EDUCATION/TRAINING PROGRAM

## 2024-09-24 PROCEDURE — 87899 AGENT NOS ASSAY W/OPTIC: CPT | Mod: AHULAB | Performed by: NURSE PRACTITIONER

## 2024-09-24 PROCEDURE — 76770 US EXAM ABDO BACK WALL COMP: CPT

## 2024-09-24 PROCEDURE — 96367 TX/PROPH/DG ADDL SEQ IV INF: CPT

## 2024-09-24 PROCEDURE — 99285 EMERGENCY DEPT VISIT HI MDM: CPT | Mod: 25

## 2024-09-24 PROCEDURE — 89190 NASAL SMEAR FOR EOSINOPHILS: CPT | Mod: AHULAB | Performed by: STUDENT IN AN ORGANIZED HEALTH CARE EDUCATION/TRAINING PROGRAM

## 2024-09-24 PROCEDURE — 83935 ASSAY OF URINE OSMOLALITY: CPT | Mod: AHULAB | Performed by: STUDENT IN AN ORGANIZED HEALTH CARE EDUCATION/TRAINING PROGRAM

## 2024-09-24 PROCEDURE — 96361 HYDRATE IV INFUSION ADD-ON: CPT

## 2024-09-24 PROCEDURE — 2500000002 HC RX 250 W HCPCS SELF ADMINISTERED DRUGS (ALT 637 FOR MEDICARE OP, ALT 636 FOR OP/ED)

## 2024-09-24 PROCEDURE — 83605 ASSAY OF LACTIC ACID: CPT

## 2024-09-24 PROCEDURE — 93005 ELECTROCARDIOGRAM TRACING: CPT

## 2024-09-24 PROCEDURE — 2500000004 HC RX 250 GENERAL PHARMACY W/ HCPCS (ALT 636 FOR OP/ED): Performed by: NURSE PRACTITIONER

## 2024-09-24 PROCEDURE — 87449 NOS EACH ORGANISM AG IA: CPT | Mod: AHULAB | Performed by: NURSE PRACTITIONER

## 2024-09-24 PROCEDURE — 83735 ASSAY OF MAGNESIUM: CPT

## 2024-09-24 PROCEDURE — 83930 ASSAY OF BLOOD OSMOLALITY: CPT | Mod: AHULAB | Performed by: INTERNAL MEDICINE

## 2024-09-24 PROCEDURE — 84300 ASSAY OF URINE SODIUM: CPT | Mod: AHULAB | Performed by: STUDENT IN AN ORGANIZED HEALTH CARE EDUCATION/TRAINING PROGRAM

## 2024-09-24 PROCEDURE — 71045 X-RAY EXAM CHEST 1 VIEW: CPT

## 2024-09-24 PROCEDURE — 1200000002 HC GENERAL ROOM WITH TELEMETRY DAILY

## 2024-09-24 PROCEDURE — 2500000002 HC RX 250 W HCPCS SELF ADMINISTERED DRUGS (ALT 637 FOR MEDICARE OP, ALT 636 FOR OP/ED): Performed by: NURSE PRACTITIONER

## 2024-09-24 PROCEDURE — 83605 ASSAY OF LACTIC ACID: CPT | Performed by: NURSE PRACTITIONER

## 2024-09-24 PROCEDURE — 96375 TX/PRO/DX INJ NEW DRUG ADDON: CPT

## 2024-09-24 PROCEDURE — 71045 X-RAY EXAM CHEST 1 VIEW: CPT | Performed by: RADIOLOGY

## 2024-09-24 PROCEDURE — 2500000005 HC RX 250 GENERAL PHARMACY W/O HCPCS: Performed by: STUDENT IN AN ORGANIZED HEALTH CARE EDUCATION/TRAINING PROGRAM

## 2024-09-24 PROCEDURE — 82570 ASSAY OF URINE CREATININE: CPT | Mod: AHULAB | Performed by: STUDENT IN AN ORGANIZED HEALTH CARE EDUCATION/TRAINING PROGRAM

## 2024-09-24 RX ORDER — IPRATROPIUM BROMIDE AND ALBUTEROL SULFATE 2.5; .5 MG/3ML; MG/3ML
3 SOLUTION RESPIRATORY (INHALATION)
Status: DISCONTINUED | OUTPATIENT
Start: 2024-09-24 | End: 2024-09-28 | Stop reason: HOSPADM

## 2024-09-24 RX ORDER — ACETAMINOPHEN 650 MG/1
650 SUPPOSITORY RECTAL EVERY 4 HOURS PRN
Status: DISCONTINUED | OUTPATIENT
Start: 2024-09-24 | End: 2024-09-28 | Stop reason: HOSPADM

## 2024-09-24 RX ORDER — PANTOPRAZOLE SODIUM 40 MG/1
40 TABLET, DELAYED RELEASE ORAL
Status: DISCONTINUED | OUTPATIENT
Start: 2024-09-25 | End: 2024-09-28 | Stop reason: HOSPADM

## 2024-09-24 RX ORDER — POTASSIUM CHLORIDE 1.5 G/1.58G
40 POWDER, FOR SOLUTION ORAL ONCE
Status: COMPLETED | OUTPATIENT
Start: 2024-09-24 | End: 2024-09-24

## 2024-09-24 RX ORDER — BUDESONIDE 0.5 MG/2ML
0.5 INHALANT ORAL
Status: DISCONTINUED | OUTPATIENT
Start: 2024-09-24 | End: 2024-09-28 | Stop reason: HOSPADM

## 2024-09-24 RX ORDER — ACETAMINOPHEN 325 MG/1
650 TABLET ORAL EVERY 4 HOURS PRN
Status: DISCONTINUED | OUTPATIENT
Start: 2024-09-24 | End: 2024-09-28 | Stop reason: HOSPADM

## 2024-09-24 RX ORDER — ACETAMINOPHEN 160 MG/5ML
650 SOLUTION ORAL EVERY 4 HOURS PRN
Status: DISCONTINUED | OUTPATIENT
Start: 2024-09-24 | End: 2024-09-28 | Stop reason: HOSPADM

## 2024-09-24 RX ORDER — CEFTRIAXONE 1 G/50ML
1 INJECTION, SOLUTION INTRAVENOUS ONCE
Status: COMPLETED | OUTPATIENT
Start: 2024-09-24 | End: 2024-09-24

## 2024-09-24 RX ORDER — FORMOTEROL FUMARATE DIHYDRATE 20 UG/2ML
20 SOLUTION RESPIRATORY (INHALATION)
Status: DISCONTINUED | OUTPATIENT
Start: 2024-09-24 | End: 2024-09-28 | Stop reason: HOSPADM

## 2024-09-24 RX ORDER — ROSUVASTATIN CALCIUM 20 MG/1
40 TABLET, COATED ORAL DAILY
Status: DISCONTINUED | OUTPATIENT
Start: 2024-09-24 | End: 2024-09-28 | Stop reason: HOSPADM

## 2024-09-24 RX ORDER — FLUTICASONE FUROATE AND VILANTEROL 100; 25 UG/1; UG/1
1 POWDER RESPIRATORY (INHALATION)
Status: DISCONTINUED | OUTPATIENT
Start: 2024-09-24 | End: 2024-09-24

## 2024-09-24 RX ORDER — AMLODIPINE BESYLATE 10 MG/1
10 TABLET ORAL DAILY
Status: DISCONTINUED | OUTPATIENT
Start: 2024-09-25 | End: 2024-09-28 | Stop reason: HOSPADM

## 2024-09-24 RX ORDER — HEPARIN SODIUM 5000 [USP'U]/ML
5000 INJECTION, SOLUTION INTRAVENOUS; SUBCUTANEOUS EVERY 8 HOURS SCHEDULED
Status: DISCONTINUED | OUTPATIENT
Start: 2024-09-24 | End: 2024-09-28 | Stop reason: HOSPADM

## 2024-09-24 RX ORDER — IPRATROPIUM BROMIDE AND ALBUTEROL SULFATE 2.5; .5 MG/3ML; MG/3ML
3 SOLUTION RESPIRATORY (INHALATION) EVERY 2 HOUR PRN
Status: DISCONTINUED | OUTPATIENT
Start: 2024-09-24 | End: 2024-09-28 | Stop reason: HOSPADM

## 2024-09-24 RX ORDER — IPRATROPIUM BROMIDE AND ALBUTEROL SULFATE 2.5; .5 MG/3ML; MG/3ML
3 SOLUTION RESPIRATORY (INHALATION) EVERY 20 MIN
Status: COMPLETED | OUTPATIENT
Start: 2024-09-24 | End: 2024-09-24

## 2024-09-24 RX ORDER — IPRATROPIUM BROMIDE AND ALBUTEROL SULFATE 2.5; .5 MG/3ML; MG/3ML
3 SOLUTION RESPIRATORY (INHALATION)
Status: DISCONTINUED | OUTPATIENT
Start: 2024-09-24 | End: 2024-09-24

## 2024-09-24 RX ORDER — MAGNESIUM SULFATE HEPTAHYDRATE 40 MG/ML
2 INJECTION, SOLUTION INTRAVENOUS ONCE
Status: COMPLETED | OUTPATIENT
Start: 2024-09-24 | End: 2024-09-24

## 2024-09-24 RX ORDER — SODIUM CHLORIDE 9 MG/ML
75 INJECTION, SOLUTION INTRAVENOUS CONTINUOUS
Status: DISCONTINUED | OUTPATIENT
Start: 2024-09-24 | End: 2024-09-25

## 2024-09-24 ASSESSMENT — PAIN - FUNCTIONAL ASSESSMENT
PAIN_FUNCTIONAL_ASSESSMENT: 0-10
PAIN_FUNCTIONAL_ASSESSMENT: 0-10

## 2024-09-24 ASSESSMENT — PAIN SCALES - GENERAL
PAINLEVEL_OUTOF10: 6
PAINLEVEL_OUTOF10: 7
PAINLEVEL_OUTOF10: 0 - NO PAIN

## 2024-09-24 ASSESSMENT — ENCOUNTER SYMPTOMS
NEUROLOGICAL NEGATIVE: 1
MUSCULOSKELETAL NEGATIVE: 1
APPETITE CHANGE: 1
SHORTNESS OF BREATH: 1
PSYCHIATRIC NEGATIVE: 1
GASTROINTESTINAL NEGATIVE: 1
FEVER: 1
CARDIOVASCULAR NEGATIVE: 1
CHILLS: 1

## 2024-09-24 ASSESSMENT — ACTIVITIES OF DAILY LIVING (ADL): LACK_OF_TRANSPORTATION: NO

## 2024-09-24 ASSESSMENT — PAIN DESCRIPTION - DESCRIPTORS
DESCRIPTORS: PRESSURE
DESCRIPTORS: PRESSURE

## 2024-09-24 ASSESSMENT — PAIN DESCRIPTION - LOCATION: LOCATION: CHEST

## 2024-09-24 NOTE — PROGRESS NOTES
09/24/24 1235   [facility] Disability Status   Are you deaf or do you have serious difficulty hearing? N   Are you blind or do you have serious difficulty seeing, even when wearing glasses? N   Because of a physical, mental, or emotional condition, do you have serious difficulty concentrating, remembering, or making decisions? (5 years old or older) N   Do you have serious difficulty walking or climbing stairs? N   Do you have serious difficulty dressing or bathing? N   Because of a physical, mental, or emotional condition, do you have serious difficulty doing errands alone such as visiting the doctor? N

## 2024-09-24 NOTE — ASSESSMENT & PLAN NOTE
Pneumonia complicated by COPD exacerbation  CATHIE on CKD, hyponatremia   chronic hypoxic respiratory failure on 3 L nasal cannula continuously at home

## 2024-09-24 NOTE — PROGRESS NOTES
Transitional Care Coordination Progress Note:  Plan per Medical/Surgical team: treatment of COPD with IV ATB, douneb, inhalers, IV solumedrol, oxygen   Status: Inpatient   Payor source: medicare A/B, [employer]  Discharge disposition: Home with wife  ?candidate for healthy @ home program?  Potential Barriers: CP & SOB, , renal 55/3.97  ADOD: 9/26/2024  BAILEY Velasquez RN, BSN Transitional Care Coordinator ED# [phone]      09/24/24 1235   Discharge Planning   Living Arrangements Spouse/significant other   Support Systems Spouse/significant other   Assistance Needed established with home oxygen @ 3 liters NC   Type of Residence Private residence   Number of Stairs to Enter Residence 5   Number of Stairs Within Residence 14   Do you have animals or pets at home? No   Home or Post Acute Services None   Expected Discharge Disposition Home   Does the patient need discharge transport arranged? No   Financial Resource Strain   How hard is it for you to pay for the very basics like food, housing, medical care, and heating? Not hard   Housing Stability   In the last 12 months, was there a time when you were not able to pay the mortgage or rent on time? N   In the past 12 months, how many times have you moved where you were living? 1   At any time in the past 12 months, were you homeless or living in a shelter (including now)? N   Transportation Needs   In the past 12 months, has lack of transportation kept you from medical appointments or from getting medications? no   In the past 12 months, has lack of transportation kept you from meetings, work, or from getting things needed for daily living? No

## 2024-09-24 NOTE — CONSULTS
Inpatient consult to Renal Care  Consult performed by: Barrington Xiong DO  Consult ordered by: Michael Black, APRN-CNP      Reason For Consult  Acute kidney injury    History Of Present Illness  Kalyan Armstrong is a 74 y.o. male with a past medical history of HFpEF with an LVEF of 50% on echo in 2020, moderate pulmonary hypertension, severe emphysematous changes of the lung treated with prophylactic azithromycin, Trelegy and albuterol , chronic hypoxic respiratory failure requiring 3-4 LPM of supplemental O2, hypertension, chronic hepatitis C who presented with progressive dyspnea and chest discomfort.  Reportedly the patient has had increasing left-sided chest discomfort with associated shortness of breath x 1.5 weeks.  He reports the associated symptoms of nonproductive cough.  In the ED, he was hemodynamically stable.  Low-grade tachycardia noted.  ECG showed regular NM intervals with no ischemic changes.  Chest plain film showed bilateral lung hyperinflation and coarse interstitial lung markings. These are chronic findings. There was a right lung base opacity concerning for pneumonia.  COVID, flu Legionella and strep were negative. He was covered with Rocephin and azithromycin.  He was noted to be hyponatremic, hypokalemic with acute kidney injury having a creatinine of 3.97 (previously 2.66 on 8/21 and 1 mg as a deciliter on 3/8/2024).  Nephrology is consulted for renal care.    Past Medical History:   Diagnosis Date    CATHIE (acute kidney injury) (CMS-HCC) 05/03/2023    Cataract     COPD (chronic obstructive pulmonary disease) (Multi)     Cough, unspecified 06/20/2014    Cough    Emphysema of lung (Multi)     Encounter for immunization 01/19/2015    Need for influenza vaccination    Encounter for screening for malignant neoplasm of prostate     Encounter for screening for malignant neoplasm of prostate    Other conditions influencing health status 05/20/2013    Digital Blood Vessel Rupture    Personal history of  colonic polyps     History of colon polyps    Personal history of other specified conditions 2013    History of shortness of breath    Personal history of other specified conditions 2014    History of shortness of breath       Past Surgical History:   Procedure Laterality Date    APPENDECTOMY  2013    Appendectomy    COLONOSCOPY  2013    Complete Colonoscopy    EYE SURGERY         Social History     Tobacco Use    Smoking status: Former     Current packs/day: 0.00     Types: Cigarettes     Start date:      Quit date:      Years since quittin.7     Passive exposure: Past    Smokeless tobacco: Never   Substance Use Topics    Alcohol use: Yes     Comment: 1 cup of scotch a week    Drug use: Never     Comment: Former drug user, has not used any in 50 years        Family History  Family History   Problem Relation Name Age of Onset    Dementia Mother      Diabetes Sister          Allergies  Patient has no known allergies.    Review of Systems   Constitutional:  Positive for appetite change, chills and fever.   HENT: Negative.     Respiratory:  Positive for shortness of breath.    Cardiovascular: Negative.    Gastrointestinal: Negative.    Genitourinary: Negative.    Musculoskeletal: Negative.    Skin: Negative.    Neurological: Negative.    Psychiatric/Behavioral: Negative.      Physical Exam   HENT:      Head: Normocephalic.      Mouth/Throat:      Mouth: Mucous membranes are dry.   Cardiovascular:      Rate and Rhythm: Normal rate and regular rhythm.   Pulmonary:      Effort: Pulmonary effort is normal.      Breath sounds: Decreased breath sounds present.   Abdominal:      General: Bowel sounds are normal.      Palpations: Abdomen is soft.   Musculoskeletal:         General: Normal range of motion.      Cervical back: Neck supple.   Skin:     General: Skin is warm.   Neurological:      Mental Status: He is alert and oriented to person, place, and time.   Psychiatric:         Mood and  "Affect: Mood normal.         Behavior: Behavior normal.      I&O 24HR  No intake or output data in the 24 hours ending 09/24/24 1501    Vitals 24HR  Heart Rate:  []   Temperature:  [36.8 °C (98.2 °F)]   Respirations:  [17-23]   BP: (112-122)/(69-88)   Height:  [175.3 cm (5' 9\")]   Weight:  [59.4 kg (131 lb)]   Pulse Ox:  [95 %-100 %]     Scheduled Medications  [START ON 9/25/2024] amLODIPine, 10 mg, oral, Daily  [START ON 9/25/2024] azithromycin, 500 mg, intravenous, q24h  budesonide, 0.5 mg, nebulization, BID  [START ON 9/25/2024] cefTRIAXone, 2 g, intravenous, q24h  formoterol, 20 mcg, nebulization, BID  heparin (porcine), 5,000 Units, subcutaneous, q8h GULSHAN  ipratropium-albuteroL, 3 mL, nebulization, TID  methylPREDNISolone sodium succinate (PF), 40 mg, intravenous, q12h  [START ON 9/25/2024] pantoprazole, 40 mg, oral, Daily before breakfast  rosuvastatin, 40 mg, oral, Daily  sodium chloride, 500 mL, intravenous, Once  tiotropium, 2 puff, inhalation, Daily      Continuous medications  sodium chloride 0.9%, 75 mL/hr      PRN medications: acetaminophen **OR** acetaminophen **OR** acetaminophen, ipratropium-albuteroL     Relevant Results  Results from last 7 days   Lab Units 09/24/24  0944   WBC AUTO x10*3/uL 10.7   HEMOGLOBIN g/dL 11.7*   HEMATOCRIT % 32.4*   PLATELETS AUTO x10*3/uL 284   NEUTROS PCT AUTO % 78.8   LYMPHS PCT AUTO % 11.1   MONOS PCT AUTO % 7.5   EOS PCT AUTO % 1.4      Results from last 7 days   Lab Units 09/24/24  0944   SODIUM mmol/L 128*   POTASSIUM mmol/L 3.1*   CHLORIDE mmol/L 84*   CO2 mmol/L 32   BUN mg/dL 55*   CREATININE mg/dL 3.97*   CALCIUM mg/dL 9.4   PROTEIN TOTAL g/dL 8.0   BILIRUBIN TOTAL mg/dL 0.6   ALK PHOS U/L 59   ALT U/L 14   AST U/L 27   GLUCOSE mg/dL 133*      XR chest 1 view   Final Result   1.  hazy airspace opacities in the right lung base, possible   pneumonia.   2. Persistent bilateral lung hyperinflation and bibasilar predominant   coarse interstitial lung markings, " correlating with patient's   underlying COPD.        I personally reviewed the images/study and I agree with the findings   as stated by Radiology resident Dr. Rodas.        MACRO:   None        Signed by: Geena Henderson 9/24/2024 10:40 AM   Dictation workstation:   [id number]      Point of Care Ultrasound    (Results Pending)         Assessment/Plan   Kalyan Armstrong is a 74 y.o. male with a past medical history of HFpEF with an LVEF of 50% on echo in 2020, moderate pulmonary hypertension, severe emphysematous changes of the lung treated with prophylactic azithromycin, Trelegy and albuterol , chronic hypoxic respiratory failure requiring 3-4 LPM of supplemental O2, hypertension, chronic hepatitis C who presented with progressive dyspnea and chest discomfort.  Reportedly the patient has had increasing left-sided chest discomfort with associated shortness of breath x 1.5 weeks.  He reports the associated symptoms of nonproductive cough.  In the ED, he was hemodynamically stable.  Low-grade tachycardia noted.  ECG showed regular [place] intervals with no ischemic changes.  Chest plain film showed bilateral lung hyperinflation and coarse interstitial lung markings. These are chronic findings. There was a right lung base opacity concerning for pneumonia.  COVID, flu Legionella and strep were negative. He was covered with Rocephin and azithromycin.  He was noted to be hyponatremic, hypokalemic with acute kidney injury having a creatinine of 3.97 (previously 2.66 on 8/21 and 1 mg as a deciliter on 3/8/2024).  Nephrology is consulted for renal care.    Mr Armstrong was noted to have acute kidney injury that occurred over 1 month ago as of 8/21.  Reportedly he felt well at that time.  He saw his PCP on 8/13 prior to labs being drawn.  Per the note he felt well.  He reports no medication changes.  He denies nausea, emesis, poor by mouth intake and diarrhea.  He does not utilize anti-inflammatories.  After the creatinine mora he  continued his lisinopril 20 mg and HCTZ 12.5 mg daily.  Thus it is not surprising that his creatinine mora to 3.97 upon admission. Upon presentation he was hemodynamically stable.  The ACE inhibitor and HCTZ were held.  There was low-level lactate elevation.  The initial etiology of his acute kidney injury is somewhat elusive. His hyponatremia is related to the hydrochlorothiazide.  Will obtain a urinalysis, proteinuria quantification, urine indices, and check for eosinophils in the urine. I will check a CK level and renal US. Trend his RFP. Will follow with his care.     Principal Problem:    Chronic obstructive pulmonary disease, unspecified COPD type (Multi)      I spent 55 minutes in the professional and overall care of this patient.      Barrington Xiong, DO

## 2024-09-24 NOTE — PROGRESS NOTES
Pharmacy Medication History Review   Spoke to the patient. Pt is currently on Azithromycin and Prednisone    Kalyan Armstrong is a 74 y.o. male admitted for Chronic obstructive pulmonary disease, unspecified COPD type (Multi). Pharmacy reviewed the patient's [id number] medications and allergies for accuracy.    The list below reflectives the updated PTA list. Please review each medication in order reconciliation for additional clarification and justification.     Prior to Admission Medications   Prescriptions Last Dose Informant   acetaminophen (Tylenol) 325 mg tablet     Sig: Take 1 tablet (325 mg) by mouth every 4 hours if needed for moderate pain (4 - 6).   albuterol 2.5 mg /3 mL (0.083 %) nebulizer solution     Sig: Take by nebulization 4 times a day as needed. 1 vial   albuterol 90 mcg/actuation inhaler     Sig: Inhale 1 puff every 4 hours if needed for wheezing or shortness of breath. Every 4-6 hrs prn   amLODIPine (Norvasc) 10 mg tablet 9/23/2024    Sig: Take 1 tablet (10 mg) by mouth once daily.   azithromycin (Zithromax) 250 mg tablet 9/23/2024    Sig: Take 1 tablet (250 mg) by mouth once daily.   budesonide (Pulmicort) 0.5 mg/2 mL nebulizer solution     Sig: Take 2 mL (0.5 mg) by nebulization 2 times a day.   cholecalciferol (Vitamin D-3) 50,000 unit capsule     Sig: Take 1 capsule (50,000 Units) by mouth 1 (one) time per week.   fluticasone-umeclidin-vilanter (Trelegy Ellipta) 100-62.5-25 mcg blister with device     Sig: Inhale 1 puff once daily.   hydroCHLOROthiazide (HYDRODiuril) 12.5 mg tablet 9/23/2024    Sig: Take 1 tablet (12.5 mg) by mouth once daily.   lisinopril 20 mg tablet 9/23/2024    Sig: TAKE ONE TABLET BY MOUTH EVERY DAY   predniSONE (Deltasone) 10 mg tablet 9/23/2024    Sig: Take 0.5 tablets (5 mg) by mouth once daily.   rosuvastatin (Crestor) 40 mg tablet 9/23/2024    Sig: Take 1 tablet (40 mg) by mouth once daily.   triamcinolone (Kenalog) 0.1 % cream     Sig: Apply topically 2  times a day. Apply to affected area 1-2 times daily as needed.      Facility-Administered Medications: None       The list below reflectives the updated allergy list. Please review each documented allergy for additional clarification and justification.  Allergies  Reviewed by Kayleen Peralta on 9/24/2024   No Known Allergies         Below are additional concerns with the patient's PTA list.      Kayleen Peralta

## 2024-09-24 NOTE — ED PROVIDER NOTES
"History of Present Illness   Information Gathering: History collected from patient and chart review    HPI:  Kalyan Armstrong is a 74 y.o. male with Pmh significant for chronic hypoxemic respiratory failure, uses 4 LPM oxygen continuously at home, COPD, HTN, CKD presenting with CP and shortness of breath. Patient has required multiple admissions for COPD exacerbations and supposed to be on azithromycin daily with Trelegy and albuterol use as needed. Patient states that for the past week and a half he has been dealing with increasingly worsening left-sided chest pain and shortness of breath. States that pain is not exertional and he has no associated diaphoresis. When asked what brought him in specifically today, he states that he \"cannot take it anymore\". Patient endorses cough which she says is chronic and unchanged. Appears to be nonproductive. He denies headache, neck pain or abdominal pain. Denies any trauma or falls. States that he last used his Trelegy nebulizer at home yesterday.    Physical Exam   Triage vitals:  T 36.8 °C (98.2 °F)  HR (!) 110  /78  RR 20  O2 95 % Supplemental oxygen    Physical Exam  Constitutional:       Appearance: Normal appearance.   HENT:      Head: Normocephalic and atraumatic.   Eyes:      Extraocular Movements: Extraocular movements intact.      Pupils: Pupils are equal, round, and reactive to light.   Cardiovascular:      Rate and Rhythm: Normal rate and regular rhythm.   Pulmonary:      Comments: Moderately increased respiratory effort saturating 100% on 3 L nasal cannula. Mildly tachypneic at 23 breaths/min with small lung volumes. No wheezes rales or rhonchi  Abdominal:      General: Abdomen is flat.      Palpations: Abdomen is soft.   Musculoskeletal:         General: No swelling or signs of injury. Normal range of motion.   Neurological:      General: No focal deficit present.      Mental Status: He is alert and oriented to person, place, and time.         Medical " Decision Making & ED Course   Medical Decision Makin y.o. male with Pmh significant for chronic hypoxemic respiratory failure, uses 4 LPM oxygen continuously at home, COPD, HTN, CKD presenting with CP and shortness of breath. Insert fine. ACS, dissection, pneumothorax, esophageal tear, COPD exacerbation, pneumonia, tamponade all considered as part of the differential. On arrival, patient on baseline 3 L of oxygen saturating 97%. Lung exam reveals scattered rhonchi but overall patient without significant increased work of breathing. Patient states that symptoms feel like previous COPD exacerbations so patient was administered 3 rounds of DuoNebs along with 125 mg of Solu-Medrol. Review of chart reveals that patient is supposed to be taking daily azithromycin so he was given this as well as ceftriaxone for coverage of community-acquired pneumonia. Patient also appears clinically dry on exam so he was given 500 mL of LR. On laboratory workup, CBC demonstrates mild anemia without evidence of leukocytosis and CMP with new hyponatremia of 128. Patient not seizing or with altered mental status so aggressive treatment of this was deferred. Potassium 3.1 repleted. In addition, patient does appear to have CATHIE with creatinine of 3.9 up from creatinine baseline of less than 2. VBG demonstrates hypercarbia. Troponin within normal limits and delta of 0 and COVID viral swab returned as positive which may explain patient's exacerbation in symptoms. As result of patient's high risk COPD with COVID and need for ongoing respiratory treatments, patient was admitted to the hospital.    ED Course:  ED Course as of 09/24/24 1628   Tue Sep 24, 2024   1623 ECG 12 Lead  EKG interpretation:  Heart rate 108 bpm  Regular [place] interval  Narrow QRS  No ST changes appreciated  Normal axis. [ELIAS]      ED Course User Index  [ELIAS] Edmond Traylor MD         Diagnoses as of 24 1628   Chronic obstructive pulmonary disease, unspecified COPD type  (Multi)   Pneumonia of right lower lobe due to infectious organism       ----  EKG Independent Interpretation: EKG interpreted by myself. Please see ED Course for full interpretation.    Independent Result Review and Interpretation: Relevant laboratory and radiographic results were reviewed and independently interpreted by myself.  As necessary, they are commented on in the ED Course.    Chronic conditions affecting the patient's care: As documented above in MDM    The patient was discussed with the following consultants/services: As described in MDM      Disposition   As a result of their workup, the patient will require admission to the hospital.  The patient was informed of his diagnosis.  The patient was given the opportunity to ask questions and I answered them. The patient agreed to be admitted to the hospital.    Procedures   Procedures    Patient seen and discussed with ED attending physician.    Bucky Traylor MD  Emergency Medicine, PGY-2      Edmond Traylor MD  Resident  09/24/24 [id number]

## 2024-09-24 NOTE — H&P
History Of Present Illness  Kalyan Armstrong is a 74 y.o. male with a past medical history of CKD, hypertension, COPD, chronic hypoxic respiratory failure on 3-4 L nasal cannula continuously at home, pulmonary nodules, chronic hepatitis C presenting with shortness of breath.  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  They report the patient's symptoms started on Thursday.  Patient developed increased shortness of breath worse than his baseline, fever, chills and decreased appetite.  Patient denies any sick contacts.  Patient also reports chest pain did not improve while in the emergency department.  He worsened with coughing and deep breathing.  Patient currently on 3 L nasal cannula.  Chest x-ray obtained did reveal  hazy airspace opacities in the right lung base, possible pneumonia.  Persistent bilateral lung hyperinflation and bibasilar predominant coarse interstitial lung markings, correlating with patient's underlying COPD.  Sodium noted to be 128, potassium 3.1, BUN 55, creatinine 3.97, WBC 10.7, hematocrit 11.7, hematocrit 32.4, platelets 284, troponin 16 x 2, lactate 3.1, with a repeat of 2.5.  Patient is negative for flu and COVID.     Past Medical History  He has a past medical history of CATHIE (acute kidney injury) (CMS-HCC) (05/03/2023), Cataract, COPD (chronic obstructive pulmonary disease) (Multi), Cough, unspecified (06/20/2014), Emphysema of lung (Multi), Encounter for immunization (01/19/2015), Encounter for screening for malignant neoplasm of prostate, Other conditions influencing health status (05/20/2013), Personal history of colonic polyps, Personal history of other specified conditions (05/20/2013), and Personal history of other specified conditions (06/20/2014).    Surgical History  He has a past surgical history that includes Colonoscopy (02/21/2013); Appendectomy (02/21/2013); and Eye surgery.     Social History  He reports that he quit smoking about 18 years ago. His smoking use included  cigarettes. He started smoking about 33 years ago. He has been exposed to tobacco smoke. He has never used smokeless tobacco. He reports current alcohol use. He reports that he does not use drugs.    Family History  Family History   Problem Relation Name Age of Onset    Dementia Mother      Diabetes Sister          Allergies  Patient has no known allergies.    Review of Systems   Constitutional:  Positive for appetite change, chills and fever.   HENT: Negative.     Respiratory:  Positive for shortness of breath.    Cardiovascular: Negative.    Gastrointestinal: Negative.    Genitourinary: Negative.    Musculoskeletal: Negative.    Skin: Negative.    Neurological: Negative.    Psychiatric/Behavioral: Negative.          Physical Exam  HENT:      Head: Normocephalic.      Mouth/Throat:      Mouth: Mucous membranes are dry.   Cardiovascular:      Rate and Rhythm: Normal rate and regular rhythm.   Pulmonary:      Effort: Pulmonary effort is normal.      Breath sounds: Decreased breath sounds present.   Abdominal:      General: Bowel sounds are normal.      Palpations: Abdomen is soft.   Musculoskeletal:         General: Normal range of motion.      Cervical back: Neck supple.   Skin:     General: Skin is warm.   Neurological:      Mental Status: He is alert and oriented to person, place, and time.   Psychiatric:         Mood and Affect: Mood normal.         Behavior: Behavior normal.          Last Recorded Vitals  /80   Pulse 85   Temp 36.8 °C (98.2 °F)   Resp 20   Wt 59.4 kg (131 lb)   SpO2 98%     Relevant Results        Results for orders placed or performed during the hospital encounter of 09/24/24 (from the past 24 hour(s))   ECG 12 Lead   Result Value Ref Range    Ventricular Rate 108 BPM    Atrial Rate 108 BPM    MD Interval 136 ms    QRS Duration 84 ms    QT Interval 322 ms    QTC Calculation(Bazett) 431 ms    P Axis 79 degrees    R Axis 81 degrees    T Axis 73 degrees    QRS Count 17 beats    Q Onset  228 ms    P Onset 160 ms    P Offset 206 ms    T Offset 389 ms    QTC Fredericia 391 ms   CBC and Auto Differential   Result Value Ref Range    WBC 10.7 4.4 - 11.3 x10*3/uL    nRBC 0.0 0.0 - 0.0 /100 WBCs    RBC 3.92 (L) 4.50 - 5.90 x10*6/uL    Hemoglobin 11.7 (L) 13.5 - 17.5 g/dL    Hematocrit 32.4 (L) 41.0 - 52.0 %    MCV 83 80 - 100 fL    MCH 29.8 26.0 - 34.0 pg    MCHC 36.1 (H) 32.0 - 36.0 g/dL    RDW 12.7 11.5 - 14.5 %    Platelets 284 150 - 450 x10*3/uL    Neutrophils % 78.8 40.0 - 80.0 %    Immature Granulocytes %, Automated 0.5 0.0 - 0.9 %    Lymphocytes % 11.1 13.0 - 44.0 %    Monocytes % 7.5 2.0 - 10.0 %    Eosinophils % 1.4 0.0 - 6.0 %    Basophils % 0.7 0.0 - 2.0 %    Neutrophils Absolute 8.45 (H) 1.60 - 5.50 x10*3/uL    Immature Granulocytes Absolute, Automated 0.05 0.00 - 0.50 x10*3/uL    Lymphocytes Absolute 1.19 0.80 - 3.00 x10*3/uL    Monocytes Absolute 0.80 0.05 - 0.80 x10*3/uL    Eosinophils Absolute 0.15 0.00 - 0.40 x10*3/uL    Basophils Absolute 0.07 0.00 - 0.10 x10*3/uL   Comprehensive metabolic panel   Result Value Ref Range    Glucose 133 (H) 74 - 99 mg/dL    Sodium 128 (L) 136 - 145 mmol/L    Potassium 3.1 (L) 3.5 - 5.3 mmol/L    Chloride 84 (L) 98 - 107 mmol/L    Bicarbonate 32 21 - 32 mmol/L    Anion Gap 15 10 - 20 mmol/L    Urea Nitrogen 55 (H) 6 - 23 mg/dL    Creatinine 3.97 (H) 0.50 - 1.30 mg/dL    eGFR 15 (L) >60 mL/min/1.73m*2    Calcium 9.4 8.6 - 10.3 mg/dL    Albumin 3.8 3.4 - 5.0 g/dL    Alkaline Phosphatase 59 33 - 136 U/L    Total Protein 8.0 6.4 - 8.2 g/dL    AST 27 9 - 39 U/L    Bilirubin, Total 0.6 0.0 - 1.2 mg/dL    ALT 14 10 - 52 U/L   Magnesium   Result Value Ref Range    Magnesium 1.80 1.60 - 2.40 mg/dL   Blood Gas Venous Full Panel   Result Value Ref Range    POCT pH, Venous 7.48 (H) 7.33 - 7.43 pH    POCT pCO2, Venous 52 (H) 41 - 51 mm Hg    POCT pO2, Venous 31 (L) 35 - 45 mm Hg    POCT SO2, Venous 31 (L) 45 - 75 %    POCT Oxy Hemoglobin, Venous 30.3 (L) 45.0 - 75.0 %     POCT Hematocrit Calculated, Venous 37.0 (L) 41.0 - 52.0 %    POCT Sodium, Venous 129 (L) 136 - 145 mmol/L    POCT Potassium, Venous 3.6 3.5 - 5.3 mmol/L    POCT Chloride, Venous 89 (L) 98 - 107 mmol/L    POCT Ionized Calicum, Venous 1.21 1.10 - 1.33 mmol/L    POCT Glucose, Venous 146 (H) 74 - 99 mg/dL    POCT Lactate, Venous 3.1 (H) 0.4 - 2.0 mmol/L    POCT Base Excess, Venous 13.2 (H) -2.0 - 3.0 mmol/L    POCT HCO3 Calculated, Venous 38.7 (H) 22.0 - 26.0 mmol/L    POCT Hemoglobin, Venous 12.4 (L) 13.5 - 17.5 g/dL    POCT Anion Gap, Venous 5.0 (L) 10.0 - 25.0 mmol/L    Patient Temperature 37.0 degrees Celsius    FiO2 32 %   Troponin I, High Sensitivity, Initial   Result Value Ref Range    Troponin I, High Sensitivity 16 0 - 20 ng/L   Sars-CoV-2 PCR   Result Value Ref Range    Coronavirus 2019, PCR Not Detected Not Detected   Influenza A, and B PCR   Result Value Ref Range    Flu A Result Not Detected Not Detected    Flu B Result Not Detected Not Detected   Troponin, High Sensitivity, 1 Hour   Result Value Ref Range    Troponin I, High Sensitivity 16 0 - 20 ng/L   Blood Gas Lactic Acid, Venous   Result Value Ref Range    POCT Lactate, Venous 2.5 (H) 0.4 - 2.0 mmol/L          Assessment/Plan   Assessment & Plan  Chronic obstructive pulmonary disease, unspecified COPD type (Multi)  Pneumonia complicated by COPD exacerbation  CATHIE on CKD, hyponatremia   chronic hypoxic respiratory failure on 3 L nasal cannula continuously at home    Plan:  Continue Rocephin and azithromycin  Continue home respiratory medications  DuoNeb nebulizers  Solumedrol 40 mg bid, patient on 5mg daily at home  Nephrology consult  Check pro lily and urine antigens   Consider pulmonology consult if no improvement     Hypertension  -Holding lisinopril and hydrochlorothiazide due to elevated creatinine  -Continue amlodipine 10 mg daily      DVT prophylaxis-heparin subcutaneous       Michael Black, APRN-CNP

## 2024-09-24 NOTE — PROGRESS NOTES
Home with wife      09/24/24 [id number]   Current Planned Discharge Disposition   Current Planned Discharge Disposition Home

## 2024-09-24 NOTE — ED TRIAGE NOTES
Patient came from home via EMS, complaining of CP and SOB. He is on 3L of NS at home 24/7. He has had chest pain for the past 3-4 days. EMS gave him Asprin and takes blood thinners at home. He is A&O x4. With a 22 in the left hand placed by EMS.

## 2024-09-25 LAB
ALBUMIN SERPL BCP-MCNC: 3.7 G/DL (ref 3.4–5)
ALP SERPL-CCNC: 52 U/L (ref 33–136)
ALT SERPL W P-5'-P-CCNC: 12 U/L (ref 10–52)
ANION GAP SERPL CALC-SCNC: 16 MMOL/L (ref 10–20)
APPEARANCE UR: CLEAR
APPEARANCE UR: CLEAR
AST SERPL W P-5'-P-CCNC: 20 U/L (ref 9–39)
ATRIAL RATE: 108 BPM
BACTERIA #/AREA URNS AUTO: ABNORMAL /HPF
BACTERIA FOR EOSINOPHIL SMEAR: ABNORMAL
BASOPHILS # BLD AUTO: 0.01 X10*3/UL (ref 0–0.1)
BASOPHILS NFR BLD AUTO: 0.1 %
BILIRUB SERPL-MCNC: 0.3 MG/DL (ref 0–1.2)
BILIRUB UR STRIP.AUTO-MCNC: NEGATIVE MG/DL
BILIRUB UR STRIP.AUTO-MCNC: NEGATIVE MG/DL
BUN SERPL-MCNC: 52 MG/DL (ref 6–23)
CALCIUM SERPL-MCNC: 8.9 MG/DL (ref 8.6–10.3)
CHLORIDE SERPL-SCNC: 91 MMOL/L (ref 98–107)
CK SERPL-CCNC: 56 U/L (ref 0–325)
CO2 SERPL-SCNC: 28 MMOL/L (ref 21–32)
COLOR UR: COLORLESS
COLOR UR: COLORLESS
CREAT SERPL-MCNC: 3.32 MG/DL (ref 0.5–1.3)
CREAT UR-MCNC: 70.7 MG/DL (ref 20–370)
EGFRCR SERPLBLD CKD-EPI 2021: 19 ML/MIN/1.73M*2
EOSINOPHIL # BLD AUTO: 0 X10*3/UL (ref 0–0.4)
EOSINOPHIL NFR BLD AUTO: 0 %
EOSINOPHIL SMEAR: ABNORMAL
ERYTHROCYTE [DISTWIDTH] IN BLOOD BY AUTOMATED COUNT: 12.6 % (ref 11.5–14.5)
GLUCOSE SERPL-MCNC: 153 MG/DL (ref 74–99)
GLUCOSE UR STRIP.AUTO-MCNC: ABNORMAL MG/DL
GLUCOSE UR STRIP.AUTO-MCNC: ABNORMAL MG/DL
HCT VFR BLD AUTO: 28.2 % (ref 41–52)
HGB BLD-MCNC: 9.9 G/DL (ref 13.5–17.5)
IMM GRANULOCYTES # BLD AUTO: 0.04 X10*3/UL (ref 0–0.5)
IMM GRANULOCYTES NFR BLD AUTO: 0.6 % (ref 0–0.9)
KETONES UR STRIP.AUTO-MCNC: NEGATIVE MG/DL
KETONES UR STRIP.AUTO-MCNC: NEGATIVE MG/DL
LACTATE SERPL-SCNC: 4.6 MMOL/L (ref 0.4–2)
LEGIONELLA AG UR QL: NEGATIVE
LEUKOCYTE ESTERASE UR QL STRIP.AUTO: ABNORMAL
LEUKOCYTE ESTERASE UR QL STRIP.AUTO: NEGATIVE
LYMPHOCYTES # BLD AUTO: 0.56 X10*3/UL (ref 0.8–3)
LYMPHOCYTES NFR BLD AUTO: 7.7 %
MCH RBC QN AUTO: 29.6 PG (ref 26–34)
MCHC RBC AUTO-ENTMCNC: 35.1 G/DL (ref 32–36)
MCV RBC AUTO: 84 FL (ref 80–100)
MONOCYTES # BLD AUTO: 0.31 X10*3/UL (ref 0.05–0.8)
MONOCYTES NFR BLD AUTO: 4.3 %
MUCOUS THREADS #/AREA URNS AUTO: ABNORMAL /LPF
NEUTROPHILS # BLD AUTO: 6.31 X10*3/UL (ref 1.6–5.5)
NEUTROPHILS FOR EOSINOPHIL SMEAR: ABNORMAL
NEUTROPHILS NFR BLD AUTO: 87.3 %
NITRITE UR QL STRIP.AUTO: NEGATIVE
NITRITE UR QL STRIP.AUTO: NEGATIVE
NRBC BLD-RTO: 0 /100 WBCS (ref 0–0)
OSMOLALITY SERPL: 294 MOSM/KG (ref 280–300)
OSMOLALITY UR: 342 MOSM/KG (ref 200–1200)
P AXIS: 79 DEGREES
P OFFSET: 206 MS
P ONSET: 160 MS
PH UR STRIP.AUTO: 6 [PH]
PH UR STRIP.AUTO: 6 [PH]
PLATELET # BLD AUTO: 241 X10*3/UL (ref 150–450)
POTASSIUM SERPL-SCNC: 4.1 MMOL/L (ref 3.5–5.3)
PR INTERVAL: 136 MS
PROCALCITONIN SERPL-MCNC: 0.76 NG/ML
PROT SERPL-MCNC: 7.3 G/DL (ref 6.4–8.2)
PROT UR STRIP.AUTO-MCNC: ABNORMAL MG/DL
PROT UR STRIP.AUTO-MCNC: ABNORMAL MG/DL
Q ONSET: 228 MS
QRS COUNT: 17 BEATS
QRS DURATION: 84 MS
QT INTERVAL: 322 MS
QTC CALCULATION(BAZETT): 431 MS
QTC FREDERICIA: 391 MS
R AXIS: 81 DEGREES
RBC # BLD AUTO: 3.34 X10*6/UL (ref 4.5–5.9)
RBC # UR STRIP.AUTO: ABNORMAL /UL
RBC # UR STRIP.AUTO: ABNORMAL /UL
RBC #/AREA URNS AUTO: ABNORMAL /HPF
RBC #/AREA URNS AUTO: NORMAL /HPF
S PNEUM AG UR QL: NEGATIVE
SODIUM SERPL-SCNC: 131 MMOL/L (ref 136–145)
SODIUM UR-SCNC: 35 MMOL/L
SODIUM/CREAT UR-RTO: 50 MMOL/G CREAT
SP GR UR STRIP.AUTO: 1.01
SP GR UR STRIP.AUTO: 1.01
T AXIS: 73 DEGREES
T OFFSET: 389 MS
UROBILINOGEN UR STRIP.AUTO-MCNC: NORMAL MG/DL
UROBILINOGEN UR STRIP.AUTO-MCNC: NORMAL MG/DL
VENTRICULAR RATE: 108 BPM
WBC # BLD AUTO: 7.2 X10*3/UL (ref 4.4–11.3)
WBC #/AREA URNS AUTO: ABNORMAL /HPF
WBC #/AREA URNS AUTO: NORMAL /HPF

## 2024-09-25 PROCEDURE — 99232 SBSQ HOSP IP/OBS MODERATE 35: CPT | Performed by: STUDENT IN AN ORGANIZED HEALTH CARE EDUCATION/TRAINING PROGRAM

## 2024-09-25 PROCEDURE — 83935 ASSAY OF URINE OSMOLALITY: CPT | Mod: AHULAB | Performed by: INTERNAL MEDICINE

## 2024-09-25 PROCEDURE — 36415 COLL VENOUS BLD VENIPUNCTURE: CPT | Performed by: NURSE PRACTITIONER

## 2024-09-25 PROCEDURE — 9420000001 HC RT PATIENT EDUCATION 5 MIN

## 2024-09-25 PROCEDURE — 80053 COMPREHEN METABOLIC PANEL: CPT | Performed by: NURSE PRACTITIONER

## 2024-09-25 PROCEDURE — 94640 AIRWAY INHALATION TREATMENT: CPT

## 2024-09-25 PROCEDURE — 99223 1ST HOSP IP/OBS HIGH 75: CPT | Performed by: CLINICAL NURSE SPECIALIST

## 2024-09-25 PROCEDURE — 82570 ASSAY OF URINE CREATININE: CPT | Performed by: INTERNAL MEDICINE

## 2024-09-25 PROCEDURE — 82550 ASSAY OF CK (CPK): CPT | Performed by: INTERNAL MEDICINE

## 2024-09-25 PROCEDURE — 2500000005 HC RX 250 GENERAL PHARMACY W/O HCPCS: Performed by: STUDENT IN AN ORGANIZED HEALTH CARE EDUCATION/TRAINING PROGRAM

## 2024-09-25 PROCEDURE — 84145 PROCALCITONIN (PCT): CPT | Mod: AHULAB | Performed by: NURSE PRACTITIONER

## 2024-09-25 PROCEDURE — 2500000004 HC RX 250 GENERAL PHARMACY W/ HCPCS (ALT 636 FOR OP/ED): Performed by: STUDENT IN AN ORGANIZED HEALTH CARE EDUCATION/TRAINING PROGRAM

## 2024-09-25 PROCEDURE — 81001 URINALYSIS AUTO W/SCOPE: CPT | Performed by: INTERNAL MEDICINE

## 2024-09-25 PROCEDURE — 1200000002 HC GENERAL ROOM WITH TELEMETRY DAILY

## 2024-09-25 PROCEDURE — 94667 MNPJ CHEST WALL 1ST: CPT

## 2024-09-25 PROCEDURE — 83605 ASSAY OF LACTIC ACID: CPT | Performed by: NURSE PRACTITIONER

## 2024-09-25 PROCEDURE — 2500000002 HC RX 250 W HCPCS SELF ADMINISTERED DRUGS (ALT 637 FOR MEDICARE OP, ALT 636 FOR OP/ED): Performed by: NURSE PRACTITIONER

## 2024-09-25 PROCEDURE — 2500000001 HC RX 250 WO HCPCS SELF ADMINISTERED DRUGS (ALT 637 FOR MEDICARE OP): Performed by: NURSE PRACTITIONER

## 2024-09-25 PROCEDURE — 85025 COMPLETE CBC W/AUTO DIFF WBC: CPT | Performed by: NURSE PRACTITIONER

## 2024-09-25 PROCEDURE — 94668 MNPJ CHEST WALL SBSQ: CPT

## 2024-09-25 PROCEDURE — 89190 NASAL SMEAR FOR EOSINOPHILS: CPT | Mod: AHULAB | Performed by: INTERNAL MEDICINE

## 2024-09-25 PROCEDURE — 2500000004 HC RX 250 GENERAL PHARMACY W/ HCPCS (ALT 636 FOR OP/ED): Performed by: NURSE PRACTITIONER

## 2024-09-25 RX ORDER — SODIUM CHLORIDE 9 MG/ML
75 INJECTION, SOLUTION INTRAVENOUS CONTINUOUS
Status: ACTIVE | OUTPATIENT
Start: 2024-09-25 | End: 2024-09-26

## 2024-09-25 RX ORDER — PREDNISONE 20 MG/1
40 TABLET ORAL DAILY
Status: DISCONTINUED | OUTPATIENT
Start: 2024-09-26 | End: 2024-09-28 | Stop reason: HOSPADM

## 2024-09-25 SDOH — SOCIAL STABILITY: SOCIAL INSECURITY: HAVE YOU HAD THOUGHTS OF HARMING ANYONE ELSE?: NO

## 2024-09-25 SDOH — SOCIAL STABILITY: SOCIAL INSECURITY: ARE YOU OR HAVE YOU BEEN THREATENED OR ABUSED PHYSICALLY, EMOTIONALLY, OR SEXUALLY BY ANYONE?: NO

## 2024-09-25 SDOH — SOCIAL STABILITY: SOCIAL INSECURITY: WERE YOU ABLE TO COMPLETE ALL THE BEHAVIORAL HEALTH SCREENINGS?: YES

## 2024-09-25 SDOH — SOCIAL STABILITY: SOCIAL INSECURITY: DO YOU FEEL UNSAFE GOING BACK TO THE PLACE WHERE YOU ARE LIVING?: NO

## 2024-09-25 SDOH — SOCIAL STABILITY: SOCIAL INSECURITY: ARE THERE ANY APPARENT SIGNS OF INJURIES/BEHAVIORS THAT COULD BE RELATED TO ABUSE/NEGLECT?: NO

## 2024-09-25 SDOH — SOCIAL STABILITY: SOCIAL INSECURITY: HAS ANYONE EVER THREATENED TO HURT YOUR FAMILY OR YOUR PETS?: NO

## 2024-09-25 SDOH — SOCIAL STABILITY: SOCIAL INSECURITY: DOES ANYONE TRY TO KEEP YOU FROM HAVING/CONTACTING OTHER FRIENDS OR DOING THINGS OUTSIDE YOUR HOME?: NO

## 2024-09-25 SDOH — SOCIAL STABILITY: SOCIAL INSECURITY: DO YOU FEEL ANYONE HAS EXPLOITED OR TAKEN ADVANTAGE OF YOU FINANCIALLY OR OF YOUR PERSONAL PROPERTY?: NO

## 2024-09-25 SDOH — SOCIAL STABILITY: SOCIAL INSECURITY: ABUSE: ADULT

## 2024-09-25 ASSESSMENT — ACTIVITIES OF DAILY LIVING (ADL)
HEARING - LEFT EAR: FUNCTIONAL
PATIENT'S MEMORY ADEQUATE TO SAFELY COMPLETE DAILY ACTIVITIES?: YES
GROOMING: INDEPENDENT
BATHING: INDEPENDENT
WALKS IN HOME: NEEDS ASSISTANCE
DRESSING YOURSELF: INDEPENDENT
FEEDING YOURSELF: INDEPENDENT
ASSISTIVE_DEVICE: WALKER
WALKS IN HOME: INDEPENDENT
JUDGMENT_ADEQUATE_SAFELY_COMPLETE_DAILY_ACTIVITIES: YES
HEARING - RIGHT EAR: FUNCTIONAL
ADEQUATE_TO_COMPLETE_ADL: YES
TOILETING: INDEPENDENT

## 2024-09-25 ASSESSMENT — ENCOUNTER SYMPTOMS
VOMITING: 1
ACTIVITY CHANGE: 0
FATIGUE: 1
SHORTNESS OF BREATH: 1
VOMITING: 0
UNEXPECTED WEIGHT CHANGE: 0
NAUSEA: 1
COUGH: 1
CHEST TIGHTNESS: 1
CHILLS: 0
FEVER: 0
RHINORRHEA: 1
ABDOMINAL PAIN: 0
ABDOMINAL DISTENTION: 0
TROUBLE SWALLOWING: 0

## 2024-09-25 ASSESSMENT — COGNITIVE AND FUNCTIONAL STATUS - GENERAL
PATIENT BASELINE BEDBOUND: NO
MOBILITY SCORE: 24
DAILY ACTIVITIY SCORE: 24

## 2024-09-25 ASSESSMENT — PATIENT HEALTH QUESTIONNAIRE - PHQ9
1. LITTLE INTEREST OR PLEASURE IN DOING THINGS: NOT AT ALL
SUM OF ALL RESPONSES TO PHQ9 QUESTIONS 1 & 2: 0
2. FEELING DOWN, DEPRESSED OR HOPELESS: NOT AT ALL

## 2024-09-25 ASSESSMENT — PAIN - FUNCTIONAL ASSESSMENT: PAIN_FUNCTIONAL_ASSESSMENT: 0-10

## 2024-09-25 ASSESSMENT — LIFESTYLE VARIABLES
HOW OFTEN DO YOU HAVE 6 OR MORE DRINKS ON ONE OCCASION: NEVER
SKIP TO QUESTIONS 9-10: 1
AUDIT-C TOTAL SCORE: 2
HOW MANY STANDARD DRINKS CONTAINING ALCOHOL DO YOU HAVE ON A TYPICAL DAY: 1 OR 2
HOW OFTEN DO YOU HAVE A DRINK CONTAINING ALCOHOL: 2-4 TIMES A MONTH
AUDIT-C TOTAL SCORE: 2

## 2024-09-25 ASSESSMENT — PAIN SCALES - WONG BAKER: WONGBAKER_NUMERICALRESPONSE: NO HURT

## 2024-09-25 ASSESSMENT — PAIN SCALES - GENERAL
PAINLEVEL_OUTOF10: 0 - NO PAIN

## 2024-09-25 NOTE — CONSULTS
"  Inpatient consult to Pulmonology  Consult performed by: BEATRIZ Salazar-CNS  Consult ordered by: Wan Bhakta MD  Reason for consult: COPD exacerbation, possible pneumonia      History Of Present Illness  Kalyan Armstrong is a 74 y.o. male with past medical history of HFpEF, HTN, HLD, CKD, severe COPD/emphysema with chronic hypoxic respiratory failure on 3L NC [place] [facility] [place] with worsening left-sided chest pain and shortness of breath over the past week associated with dry cough.  Patient stated he \"cannot take it anymore\".  Patient found to be tachypneic without wheezing or rales.  EKG with sinus tachycardia without acute ischemia; troponins 16-16. Labs are without leukocytosis, + hyponatremia, lactate 2.2-4.6.  Flu A/B and COVID all negative.  VBG pH 7.48, pCO2 52, HCO3 38.7.  CXR with hazy airspace opacities in right lung base with coarse bilateral interstitial lung markings in lower fields.  He was treated with IV fluids, Solu-Medrol, magnesium and DuoNebs.  Additionally he was given IV azithromycin and ceftriaxone and admitted for further management of COPD and pneumonia.  Pulmonary is consulted for COPD exacerbation and possible pneumonia.    Patient seen and examined with presentation as above.  He is stable on his baseline 3 L NC.  Patient reports that for about the past 3 weeks he has experienced continued shortness of breath both at rest worsening with exertion and a heaviness in his chest.  He additionally has his cough with his usual white clear sputum.  He denied fever and chills but does report episodes of both nausea and emesis.  He has been tolerating the symptoms for the past 3 weeks, presenting to the emergency department yesterday because he \"just could not take it anymore\".  He reports good sleep at night when he sleeps but wakes frequently to watch television. Normally he is able to adjust his physical activity to accommodate his breathing.  He lives at home with his wife and " "does not recall any known ill contacts prior to the worsening of his symptoms.  But he does admit that they go out into the public grocery shopping, etc.. He is a patient of Dr. Holguin and has been compliant with his daily Trelegy, azithromycin and prednisone.  Additionally he uses his albuterol rescue inhaler generally once a day, 3 to 4 days each week generally in the afternoon when he needs his \"second burst of energy\" to accomplish tasks.  He does report relief with its use.  Patient has I-S and Acapella at bedside and reports continued use hourly.     Past Medical History  He has a past medical history of CATHIE (acute kidney injury) (CMS-[facility]) (05/03/2023), Cataract, COPD (chronic obstructive pulmonary disease) (Multi), Cough, unspecified (06/20/2014), Emphysema of lung (Multi), Encounter for immunization (01/19/2015), Encounter for screening for malignant neoplasm of prostate, Other conditions influencing health status (05/20/2013), Personal history of colonic polyps, Personal history of other specified conditions (05/20/2013), and Personal history of other specified conditions (06/20/2014).    Surgical History  He has a past surgical history that includes Colonoscopy (02/21/2013); Appendectomy (02/21/2013); and Eye surgery.     Social History  He reports that he quit smoking about 18 years ago. His smoking use included cigarettes. He started smoking about 33 years ago. He has been exposed to tobacco smoke. He has never used smokeless tobacco. He reports current alcohol use. He reports that he does not use drugs.  Former smoker approximately 35-pack-year history quitting in 2006.    Family History  Family History   Problem Relation Name Age of Onset    Dementia Mother      Diabetes Sister          Allergies  Patient has no known allergies.    Review of Systems   Constitutional:  Positive for fatigue. Negative for activity change, chills, fever and unexpected weight change.   HENT:  Positive for postnasal " "drip and rhinorrhea. Negative for trouble swallowing.    Respiratory:  Positive for cough, chest tightness and shortness of breath.    Cardiovascular:  Negative for chest pain and leg swelling.   Gastrointestinal:  Positive for nausea and vomiting.      Physical Exam  Constitutional:   Chronically ill-appearing, thin, increased work of breathing without acute distress, cooperative  HENT: Atraumatic, moist mucous membranes  Eyes: PERRL, nonicteric  Neck: Supple, no JVD  Cardiovascular: S1, S2 normal, regular, HR 80s, no murmur appreciated  Pulmonary: Fair air entry with diminished right basilar breath sounds, otherwise clear without crackles or wheezes; tachypneic with + conversational dyspnea throughout entire exam  Abdominal: Soft, nontender, nondistended, + BS  Musculoskeletal: Full active range of motion with diffuse muscle wasting and fair strength  Extremities:   No BLE edema  Lymphadenopathy: No nuchal LAP  Skin: Warm and dry  Neurological: Alert and oriented x 3, speech clear and appropriate; CNs grossly intact  Psychiatric:   Appropriate mood and behavior    Medications:  amLODIPine, 10 mg, oral, Daily  azithromycin, 500 mg, intravenous, q24h  budesonide, 0.5 mg, nebulization, BID  cefTRIAXone, 2 g, intravenous, q24h  formoterol, 20 mcg, nebulization, BID  heparin (porcine), 5,000 Units, subcutaneous, q8h GULSHAN  ipratropium-albuteroL, 3 mL, nebulization, TID  methylPREDNISolone sodium succinate (PF), 40 mg, intravenous, q12h  oxygen, , inhalation, Continuous - Inhalation  pantoprazole, 40 mg, oral, Daily before breakfast  rosuvastatin, 40 mg, oral, Daily      sodium chloride 0.9%, 75 mL/hr, Last Rate: 75 mL/hr (09/25/24 0853)      PRN medications: acetaminophen **OR** acetaminophen **OR** acetaminophen, ipratropium-albuteroL      Last Recorded Vitals  Blood pressure 121/75, pulse 82, temperature 36.5 °C (97.7 °F), temperature source Oral, resp. rate 25, height 1.753 m (5' 9\"), weight 59.4 kg (131 lb), SpO2 " 94%.    Relevant Results  Results for orders placed or performed during the hospital encounter of 09/24/24 (from the past 24 hour(s))   Lactate   Result Value Ref Range    Lactate 2.2 (H) 0.4 - 2.0 mmol/L   CBC and Auto Differential   Result Value Ref Range    WBC 7.2 4.4 - 11.3 x10*3/uL    nRBC 0.0 0.0 - 0.0 /100 WBCs    RBC 3.34 (L) 4.50 - 5.90 x10*6/uL    Hemoglobin 9.9 (L) 13.5 - 17.5 g/dL    Hematocrit 28.2 (L) 41.0 - 52.0 %    MCV 84 80 - 100 fL    MCH 29.6 26.0 - 34.0 pg    MCHC 35.1 32.0 - 36.0 g/dL    RDW 12.6 11.5 - 14.5 %    Platelets 241 150 - 450 x10*3/uL    Neutrophils % 87.3 40.0 - 80.0 %    Immature Granulocytes %, Automated 0.6 0.0 - 0.9 %    Lymphocytes % 7.7 13.0 - 44.0 %    Monocytes % 4.3 2.0 - 10.0 %    Eosinophils % 0.0 0.0 - 6.0 %    Basophils % 0.1 0.0 - 2.0 %    Neutrophils Absolute 6.31 (H) 1.60 - 5.50 x10*3/uL    Immature Granulocytes Absolute, Automated 0.04 0.00 - 0.50 x10*3/uL    Lymphocytes Absolute 0.56 (L) 0.80 - 3.00 x10*3/uL    Monocytes Absolute 0.31 0.05 - 0.80 x10*3/uL    Eosinophils Absolute 0.00 0.00 - 0.40 x10*3/uL    Basophils Absolute 0.01 0.00 - 0.10 x10*3/uL   Comprehensive Metabolic Panel   Result Value Ref Range    Glucose 153 (H) 74 - 99 mg/dL    Sodium 131 (L) 136 - 145 mmol/L    Potassium 4.1 3.5 - 5.3 mmol/L    Chloride 91 (L) 98 - 107 mmol/L    Bicarbonate 28 21 - 32 mmol/L    Anion Gap 16 10 - 20 mmol/L    Urea Nitrogen 52 (H) 6 - 23 mg/dL    Creatinine 3.32 (H) 0.50 - 1.30 mg/dL    eGFR 19 (L) >60 mL/min/1.73m*2    Calcium 8.9 8.6 - 10.3 mg/dL    Albumin 3.7 3.4 - 5.0 g/dL    Alkaline Phosphatase 52 33 - 136 U/L    Total Protein 7.3 6.4 - 8.2 g/dL    AST 20 9 - 39 U/L    Bilirubin, Total 0.3 0.0 - 1.2 mg/dL    ALT 12 10 - 52 U/L   Procalcitonin   Result Value Ref Range    Procalcitonin 0.76 (H) <=0.07 ng/mL   Lactate   Result Value Ref Range    Lactate 4.6 (HH) 0.4 - 2.0 mmol/L   Creatine Kinase   Result Value Ref Range    Creatine Kinase 56 0 - 325 U/L    Urinalysis with Reflex Microscopic   Result Value Ref Range    Color, Urine Colorless (N) Light-Yellow, Yellow, Dark-Yellow    Appearance, Urine Clear Clear    Specific Gravity, Urine 1.010 1.005 - 1.035    pH, Urine 6.0 5.0, 5.5, 6.0, 6.5, 7.0, 7.5, 8.0    Protein, Urine 20 (TRACE) NEGATIVE, 10 (TRACE), 20 (TRACE) mg/dL    Glucose, Urine 150 (2+) (A) Normal mg/dL    Blood, Urine 0.1 (1+) (A) NEGATIVE    Ketones, Urine NEGATIVE NEGATIVE mg/dL    Bilirubin, Urine NEGATIVE NEGATIVE    Urobilinogen, Urine Normal Normal mg/dL    Nitrite, Urine NEGATIVE NEGATIVE    Leukocyte Esterase, Urine 75 Cecilia/µL (A) NEGATIVE   Sodium, Urine Random   Result Value Ref Range    Sodium, Urine Random 35 mmol/L    Creatinine, Urine Random 70.7 20.0 - 370.0 mg/dL    Sodium/Creatinine Ratio 50 Not established. mmol/g Creat   Eosinophil smear   Result Value Ref Range    Eosinophils None (N) (none)    Neutrophils Moderate (N) (none)    Bacteria Rare (N) (none)   Osmolality, urine   Result Value Ref Range    Osmolality, Urine Random 342 200 - 1,200 mOsm/kg   Microscopic Only, Urine   Result Value Ref Range    WBC, Urine 6-10 (A) 1-5, NONE /HPF    RBC, Urine 3-5 NONE, 1-2, 3-5 /HPF    Bacteria, Urine 1+ (A) NONE SEEN /HPF    Mucus, Urine FEW Reference range not established. /LPF      XR chest 1 view  Result Date: 9/24/2024  Interpreted By:  Geena Henderson and Sullivan Shannon STUDY: XR CHEST 1 VIEW;  9/24/2024 10:09 am   INDICATION: Signs/Symptoms:Shortness of breath and chest pain.   COMPARISON: Chest radiograph 11/23/2023, correlation with CT chest 02/13/2024.   ACCESSION NUMBER(S): [id number]   ORDERING CLINICIAN: ASHLEY JIMENEZ   FINDINGS: Frontal radiograph of the chest was provided   Monitoring leads project over the thorax.   CARDIOMEDIASTINAL SILHOUETTE: Cardiac silhouette is stable in size there is mild tortuosity of descending thoracic aorta, similar to prior.   LUNGS: Bilateral lung hyperinflation is again noted with flattening of  "the hemidiaphragms, similar to prior. There are hazy airspace opacities in the right lung base, new compared to prior radiograph. There is background of coarse bilateral interstitial lung markings, predominantly in the lower lung zones. No pleural effusion or pneumothorax.   ABDOMEN: No remarkable upper abdominal findings.   BONES: No acute osseous changes.       1.  hazy airspace opacities in the right lung base, possible pneumonia. 2. Persistent bilateral lung hyperinflation and bibasilar predominant coarse interstitial lung markings, correlating with patient's underlying COPD.   I personally reviewed the images/study and I agree with the findings as stated by Radiology resident Dr. Rodas.   MACRO: None   Signed by: Geena Henderson 9/24/2024 10:40 AM Dictation workstation:   [id number]    ECG 12 Lead  Result Date: 9/24/2024  Sinus tachycardia Septal infarct , age undetermined Abnormal ECG When compared with ECG of 08-JUL-2024 17:01, Septal infarct is now Present       Assessment/Plan   Kalyan Armstrong is a 74 y.o. male with past medical history of HFpEF, HTN, HLD, CKD, severe COPD/emphysema with chronic hypoxic respiratory failure on 3L NC presented to [facility] [place] ED on 9/24 with worsening left-sided chest pain and shortness of breath over the past week associated with dry cough.  Patient stated he \"cannot take it anymore\".  Patient found to be tachypneic without wheezing or rales.  EKG with sinus tachycardia without acute ischemia; troponins 16-16. Labs are without leukocytosis, + hyponatremia, lactate 2.2-4.6.  Flu A/B and COVID all negative.  VBG pH 7.48, pCO2 52, HCO3 38.7.  CXR with hazy airspace opacities in right lung base with coarse bilateral interstitial lung markings in lower fields.  He was treated with IV fluids, Solu-Medrol, magnesium and DuoNebs.  Additionally he was given IV azithromycin and ceftriaxone and admitted for further management of COPD and pneumonia.  Pulmonary is consulted for COPD " exacerbation and possible pneumonia.    Impression/recommendations:    #COPD with acute exacerbation: Gold 3E;   Home meds: Trelegy (100-60 2.5-25 mcg ) and albuterol as needed, azithromycin 250 mg daily, prednisone 5 mg daily  -Continue budesonide, formoterol and DuoNebs  -Can add in Spiriva once patient is off of DuoNebs (on albuterol only)  -Transitioning Solu-Medrol to oral prednisone 40 mg daily for 4 more days (start 9/26)  -Follows with Dr. Villagomez last office visit/23/24; follow-up with Dr. Villagomez post discharge    #Chronic hypoxic respiratory failure: Secondary to severe COPD, continuously on 3 L  -Continue supplemental oxygen, wean for saturation 89 to 95%  -BPH with I-S and Acapella    #Pneumonia, CAP: Procalcitonin 0.76 with lactate rising currently 4.6  -Strep and Legionella antigens both negative  -Given Pro-Rito 0.76 continue both azithromycin and ceftriaxone for 5-day course  -Bronchopulmonary hygiene as above    #Pulmonary nodules: Noted on CT 1/9/2023; reimaging completed on 2/13/2024 finding no suspicious parenchymal nodules and recommendation for repeat low-dose chest CT in 1 year  -Continued outpatient surveillance per Dr. Villagomez    Thank you for the consult.  Pulmonology will follow.    I spent 80 minutes in the professional and overall care of this patient.   BEATRIZ Salazar-CNS

## 2024-09-25 NOTE — PROGRESS NOTES
Kalyan Armstrong is a 74 y.o. male on day 1 of admission presenting with Chronic obstructive pulmonary disease, unspecified COPD type (Multi).      Subjective   Seen and examined.   ZAKI. He does not offer specific complaints.   Chart/labs/meds/notes/imaging/VS reviewed.       Objective          Vitals 24HR  Heart Rate:  [67-82]   Temp:  [36.2 °C (97.2 °F)-36.5 °C (97.7 °F)]   Resp:  [18-25]   BP: (105-121)/(67-77)   SpO2:  [94 %-100 %]     Intake/Output last 3 Shifts:    Intake/Output Summary (Last 24 hours) at 9/25/2024 1502  Last data filed at 9/24/2024 1947  Gross per 24 hour   Intake --   Output 250 ml   Net -250 ml       Physical Exam  HENT:      Head: Normocephalic.      Mouth/Throat:      Mouth: Mucous membranes are dry.   Cardiovascular:      Rate and Rhythm: Normal rate and regular rhythm.   Pulmonary:      Effort: Pulmonary effort is normal.      Breath sounds: Decreased breath sounds present.   Abdominal:      General: Bowel sounds are normal.      Palpations: Abdomen is soft.   Musculoskeletal:         General: Normal range of motion.      Cervical back: Neck supple.   Skin:     General: Skin is warm.   Neurological:      Mental Status: He is alert and oriented to person, place, and time.   Psychiatric:         Mood and Affect: Mood normal.         Behavior: Behavior normal.     Scheduled Medications  amLODIPine, 10 mg, oral, Daily  azithromycin, 500 mg, intravenous, q24h  budesonide, 0.5 mg, nebulization, BID  cefTRIAXone, 2 g, intravenous, q24h  formoterol, 20 mcg, nebulization, BID  heparin (porcine), 5,000 Units, subcutaneous, q8h GULSHAN  ipratropium-albuteroL, 3 mL, nebulization, TID  methylPREDNISolone sodium succinate (PF), 40 mg, intravenous, q12h  oxygen, , inhalation, Continuous - Inhalation  pantoprazole, 40 mg, oral, Daily before breakfast  rosuvastatin, 40 mg, oral, Daily      Continuous medications  sodium chloride 0.9%, 75 mL/hr, Last Rate: 75 mL/hr (09/25/24 0853)        PRN medications:  acetaminophen **OR** acetaminophen **OR** acetaminophen, ipratropium-albuteroL     Relevant Results  Results from last 7 days   Lab Units 09/25/24  0542 09/24/24  0944   WBC AUTO x10*3/uL 7.2 10.7   HEMOGLOBIN g/dL 9.9* 11.7*   HEMATOCRIT % 28.2* 32.4*   PLATELETS AUTO x10*3/uL 241 284   NEUTROS PCT AUTO % 87.3 78.8   LYMPHS PCT AUTO % 7.7 11.1   MONOS PCT AUTO % 4.3 7.5   EOS PCT AUTO % 0.0 1.4     Results from last 7 days   Lab Units 09/25/24  0542 09/24/24  0944   SODIUM mmol/L 131* 128*   POTASSIUM mmol/L 4.1 3.1*   CHLORIDE mmol/L 91* 84*   CO2 mmol/L 28 32   BUN mg/dL 52* 55*   CREATININE mg/dL 3.32* 3.97*   GLUCOSE mg/dL 153* 133*   CALCIUM mg/dL 8.9 9.4       US renal complete   Final Result   Unremarkable renal ultrasound.        Signed by: Delvin Wu 9/24/2024 4:27 PM   Dictation workstation:   [id number]      XR chest 1 view   Final Result   1.  hazy airspace opacities in the right lung base, possible   pneumonia.   2. Persistent bilateral lung hyperinflation and bibasilar predominant   coarse interstitial lung markings, correlating with patient's   underlying COPD.        I personally reviewed the images/study and I agree with the findings   as stated by Radiology resident Dr. Rodas.        MACRO:   None        Signed by: Geena Henderson 9/24/2024 10:40 AM   Dictation workstation:   [id number]      Point of Care Ultrasound    (Results Pending)            Assessment/Plan        Kalyan Armstrong is a 74 y.o. male with a past medical history of HFpEF with an LVEF of 50% on echo in 2020, moderate pulmonary hypertension, severe emphysematous changes of the lung treated with prophylactic azithromycin, Trelegy and albuterol , chronic hypoxic respiratory failure requiring 3-4 LPM of supplemental O2, hypertension, chronic hepatitis C who presented with progressive dyspnea and chest discomfort.  Reportedly the patient has had increasing left-sided chest discomfort with associated shortness of breath x 1.5  weeks.  He reports the associated symptoms of nonproductive cough.  In the ED, he was hemodynamically stable.  Low-grade tachycardia noted.  ECG showed regular MN intervals with no ischemic changes.  Chest plain film showed bilateral lung hyperinflation and coarse interstitial lung markings. These are chronic findings. There was a right lung base opacity concerning for pneumonia.  COVID, flu Legionella and strep were negative. He was covered with Rocephin and azithromycin.  He was noted to be hyponatremic, hypokalemic with acute kidney injury having a creatinine of 3.97 (previously 2.66 on 8/21 and 1 mg as a deciliter on 3/8/2024).  Nephrology is consulted for renal care.     Mr Armstrong was noted to have acute kidney injury that occurred over 1 month ago as of 8/21.  Reportedly he felt well at that time.  He saw his PCP on 8/13 prior to labs being drawn.  Per the note he felt well.  He reports no medication changes.  He denies nausea, emesis, poor by mouth intake and diarrhea.  He does not utilize anti-inflammatories.  After the creatinine mora he continued his lisinopril 20 mg and HCTZ 12.5 mg daily.  Thus it is not surprising that his creatinine mora to 3.97 upon admission. Upon presentation he was hemodynamically stable.  The ACE inhibitor and HCTZ were held.  There was low-level lactate elevation.  The initial etiology of his acute kidney injury is somewhat elusive. His hyponatremia is related to the hydrochlorothiazide. His calculated FeNa was 1.3 ~ consistent with intrinsic CATHIE. UP:Cr ratio of 0.72 mg. Unremarkable renal US w/o obstruction. There are no eosinophils in the urine. CK level is normal. I will obtain a urine culture. Complements are pending. His creatinine has started to improve with IV volume expansion. Lactate is persistently elevated despite being normal tensive. He remains on NS at 75 ml/hr. Ok to continue IV fluid for the time being. Trend his RFP. Will follow with his care.            Principal Problem:    Chronic obstructive pulmonary disease, unspecified COPD type (Multi)    I spent 40 minutes in the professional and overall care of this patient.      Barrington Xiong, DO

## 2024-09-25 NOTE — PROGRESS NOTES
[facility] Hospitalist Progress Note        Between 7AM-7PM please message me via Epic Secure Chat.  After 7PM please page No[employer]urnist on call.        Assessment/Plan     Suspected COPD exacerbation  Possible PNA  Chronic respiratory failure on 3-4L NC  CATHIE on CKD  Hyponatremia  Lactic acidosis  Anemia  HTN  Chronic Hep C    - appreciate pulm and nephrology consults  - IV steroids, nebz, supplemental O2  - continue CTX and Azithro for now, follow up procal and urine antigens. Not really producing much sputum      DVT Prophylaxis:  Heparin subq      Discharge Planning: home once med ready      I personally examined the patient and reviewed chart.  Plan of care was discussed with patient, all questions answered.    Total time spent: At least 38 minutes, providing counseling or in coordination of care. Total time on this day of visit includes record and documentation review before and after visit including documentation and time not explicitly included on EMR time stamp.      Subjective     Kalyan Armstrong is a 74 y.o. male on day 1 of admission presenting with Chronic obstructive pulmonary disease, unspecified COPD type (Multi).    NAEON. Overall stable, not much of a cough and he feels his SOB is a bit better compared to yesterday    Review of Systems   Constitutional:  Negative for fever.   Respiratory:  Positive for cough and shortness of breath.    Cardiovascular:  Negative for chest pain.   Gastrointestinal:  Negative for abdominal distention, abdominal pain and vomiting.       Objective     Physical Exam  Constitutional:       General: He is not in acute distress.  Cardiovascular:      Rate and Rhythm: Normal rate and regular rhythm.   Pulmonary:      Effort: Pulmonary effort is normal.      Breath sounds: Normal breath sounds.      Comments:   Distant breath sounds  Abdominal:      General: There is no distension.      Palpations: Abdomen is soft.   Neurological:      Mental Status: He is alert. Mental status is at  "baseline.         Last Recorded Vitals  Blood pressure 121/75, pulse 82, temperature 36.5 °C (97.7 °F), temperature source Oral, resp. rate 25, height 1.753 m (5' 9\"), weight 59.4 kg (131 lb), SpO2 94%.  Intake/Output last 3 Shifts:  I/O last 3 completed shifts:  In: - (0 mL/kg)   Out: 250 (4.2 mL/kg) [Urine:250 (0.1 mL/kg/hr)]  Weight: 59.4 kg     Relevant Results  Results for orders placed or performed during the hospital encounter of 09/24/24 (from the past 24 hour(s))   ECG 12 Lead   Result Value Ref Range    Ventricular Rate 108 BPM    Atrial Rate 108 BPM    CT Interval 136 ms    QRS Duration 84 ms    QT Interval 322 ms    QTC Calculation(Bazett) 431 ms    P Axis 79 degrees    R Axis 81 degrees    T Axis 73 degrees    QRS Count 17 beats    Q Onset 228 ms    P Onset 160 ms    P Offset 206 ms    T Offset 389 ms    QTC Fredericia 391 ms   CBC and Auto Differential   Result Value Ref Range    WBC 10.7 4.4 - 11.3 x10*3/uL    nRBC 0.0 0.0 - 0.0 /100 WBCs    RBC 3.92 (L) 4.50 - 5.90 x10*6/uL    Hemoglobin 11.7 (L) 13.5 - 17.5 g/dL    Hematocrit 32.4 (L) 41.0 - 52.0 %    MCV 83 80 - 100 fL    MCH 29.8 26.0 - 34.0 pg    MCHC 36.1 (H) 32.0 - 36.0 g/dL    RDW 12.7 11.5 - 14.5 %    Platelets 284 150 - 450 x10*3/uL    Neutrophils % 78.8 40.0 - 80.0 %    Immature Granulocytes %, Automated 0.5 0.0 - 0.9 %    Lymphocytes % 11.1 13.0 - 44.0 %    Monocytes % 7.5 2.0 - 10.0 %    Eosinophils % 1.4 0.0 - 6.0 %    Basophils % 0.7 0.0 - 2.0 %    Neutrophils Absolute 8.45 (H) 1.60 - 5.50 x10*3/uL    Immature Granulocytes Absolute, Automated 0.05 0.00 - 0.50 x10*3/uL    Lymphocytes Absolute 1.19 0.80 - 3.00 x10*3/uL    Monocytes Absolute 0.80 0.05 - 0.80 x10*3/uL    Eosinophils Absolute 0.15 0.00 - 0.40 x10*3/uL    Basophils Absolute 0.07 0.00 - 0.10 x10*3/uL   Comprehensive metabolic panel   Result Value Ref Range    Glucose 133 (H) 74 - 99 mg/dL    Sodium 128 (L) 136 - 145 mmol/L    Potassium 3.1 (L) 3.5 - 5.3 mmol/L    Chloride 84 " (L) 98 - 107 mmol/L    Bicarbonate 32 21 - 32 mmol/L    Anion Gap 15 10 - 20 mmol/L    Urea Nitrogen 55 (H) 6 - 23 mg/dL    Creatinine 3.97 (H) 0.50 - 1.30 mg/dL    eGFR 15 (L) >60 mL/min/1.73m*2    Calcium 9.4 8.6 - 10.3 mg/dL    Albumin 3.8 3.4 - 5.0 g/dL    Alkaline Phosphatase 59 33 - 136 U/L    Total Protein 8.0 6.4 - 8.2 g/dL    AST 27 9 - 39 U/L    Bilirubin, Total 0.6 0.0 - 1.2 mg/dL    ALT 14 10 - 52 U/L   Magnesium   Result Value Ref Range    Magnesium 1.80 1.60 - 2.40 mg/dL   Blood Gas Venous Full Panel   Result Value Ref Range    POCT pH, Venous 7.48 (H) 7.33 - 7.43 pH    POCT pCO2, Venous 52 (H) 41 - 51 mm Hg    POCT pO2, Venous 31 (L) 35 - 45 mm Hg    POCT SO2, Venous 31 (L) 45 - 75 %    POCT Oxy Hemoglobin, Venous 30.3 (L) 45.0 - 75.0 %    POCT Hematocrit Calculated, Venous 37.0 (L) 41.0 - 52.0 %    POCT Sodium, Venous 129 (L) 136 - 145 mmol/L    POCT Potassium, Venous 3.6 3.5 - 5.3 mmol/L    POCT Chloride, Venous 89 (L) 98 - 107 mmol/L    POCT Ionized Calicum, Venous 1.21 1.10 - 1.33 mmol/L    POCT Glucose, Venous 146 (H) 74 - 99 mg/dL    POCT Lactate, Venous 3.1 (H) 0.4 - 2.0 mmol/L    POCT Base Excess, Venous 13.2 (H) -2.0 - 3.0 mmol/L    POCT HCO3 Calculated, Venous 38.7 (H) 22.0 - 26.0 mmol/L    POCT Hemoglobin, Venous 12.4 (L) 13.5 - 17.5 g/dL    POCT Anion Gap, Venous 5.0 (L) 10.0 - 25.0 mmol/L    Patient Temperature 37.0 degrees Celsius    FiO2 32 %   Troponin I, High Sensitivity, Initial   Result Value Ref Range    Troponin I, High Sensitivity 16 0 - 20 ng/L   Sars-CoV-2 PCR   Result Value Ref Range    Coronavirus 2019, PCR Not Detected Not Detected   Influenza A, and B PCR   Result Value Ref Range    Flu A Result Not Detected Not Detected    Flu B Result Not Detected Not Detected   Troponin, High Sensitivity, 1 Hour   Result Value Ref Range    Troponin I, High Sensitivity 16 0 - 20 ng/L   Osmolality   Result Value Ref Range    Osmolality, Serum 294 280 - 300 mOsm/kg   Blood Gas Lactic Acid,  Venous   Result Value Ref Range    POCT Lactate, Venous 2.5 (H) 0.4 - 2.0 mmol/L   Urinalysis with Reflex Microscopic   Result Value Ref Range    Color, Urine Colorless (N) Light-Yellow, Yellow, Dark-Yellow    Appearance, Urine Clear Clear    Specific Gravity, Urine 1.007 1.005 - 1.035    pH, Urine 6.0 5.0, 5.5, 6.0, 6.5, 7.0, 7.5, 8.0    Protein, Urine 20 (TRACE) NEGATIVE, 10 (TRACE), 20 (TRACE) mg/dL    Glucose, Urine Normal Normal mg/dL    Blood, Urine 0.1 (1+) (A) NEGATIVE    Ketones, Urine NEGATIVE NEGATIVE mg/dL    Bilirubin, Urine NEGATIVE NEGATIVE    Urobilinogen, Urine Normal Normal mg/dL    Nitrite, Urine NEGATIVE NEGATIVE    Leukocyte Esterase, Urine 250 Cecilia/µL (A) NEGATIVE   Protein, urine, random   Result Value Ref Range    Total Protein, Urine Random 48 (H) 5 - 25 mg/dL    Creatinine, Urine Random 66.3 20.0 - 370.0 mg/dL    T. Protein/Creatinine Ratio 0.72 (H) 0.00 - 0.17 mg/mg Creat   Sodium, Urine Random   Result Value Ref Range    Sodium, Urine Random 34 mmol/L    Creatinine, Urine Random 66.3 20.0 - 370.0 mg/dL    Sodium/Creatinine Ratio 51 Not established. mmol/g Creat   Eosinophil smear   Result Value Ref Range    Eosinophils None (N) (none)   Osmolality, urine   Result Value Ref Range    Osmolality, Urine Random 250 200 - 1,200 mOsm/kg   Microscopic Only, Urine   Result Value Ref Range    WBC, Urine 6-10 (A) 1-5, NONE /HPF    RBC, Urine 1-2 NONE, 1-2, 3-5 /HPF    Mucus, Urine FEW Reference range not established. /LPF   Lactate   Result Value Ref Range    Lactate 2.2 (H) 0.4 - 2.0 mmol/L   CBC and Auto Differential   Result Value Ref Range    WBC 7.2 4.4 - 11.3 x10*3/uL    nRBC 0.0 0.0 - 0.0 /100 WBCs    RBC 3.34 (L) 4.50 - 5.90 x10*6/uL    Hemoglobin 9.9 (L) 13.5 - 17.5 g/dL    Hematocrit 28.2 (L) 41.0 - 52.0 %    MCV 84 80 - 100 fL    MCH 29.6 26.0 - 34.0 pg    MCHC 35.1 32.0 - 36.0 g/dL    RDW 12.6 11.5 - 14.5 %    Platelets 241 150 - 450 x10*3/uL    Neutrophils % 87.3 40.0 - 80.0 %     Immature Granulocytes %, Automated 0.6 0.0 - 0.9 %    Lymphocytes % 7.7 13.0 - 44.0 %    Monocytes % 4.3 2.0 - 10.0 %    Eosinophils % 0.0 0.0 - 6.0 %    Basophils % 0.1 0.0 - 2.0 %    Neutrophils Absolute 6.31 (H) 1.60 - 5.50 x10*3/uL    Immature Granulocytes Absolute, Automated 0.04 0.00 - 0.50 x10*3/uL    Lymphocytes Absolute 0.56 (L) 0.80 - 3.00 x10*3/uL    Monocytes Absolute 0.31 0.05 - 0.80 x10*3/uL    Eosinophils Absolute 0.00 0.00 - 0.40 x10*3/uL    Basophils Absolute 0.01 0.00 - 0.10 x10*3/uL   Comprehensive Metabolic Panel   Result Value Ref Range    Glucose 153 (H) 74 - 99 mg/dL    Sodium 131 (L) 136 - 145 mmol/L    Potassium 4.1 3.5 - 5.3 mmol/L    Chloride 91 (L) 98 - 107 mmol/L    Bicarbonate 28 21 - 32 mmol/L    Anion Gap 16 10 - 20 mmol/L    Urea Nitrogen 52 (H) 6 - 23 mg/dL    Creatinine 3.32 (H) 0.50 - 1.30 mg/dL    eGFR 19 (L) >60 mL/min/1.73m*2    Calcium 8.9 8.6 - 10.3 mg/dL    Albumin 3.7 3.4 - 5.0 g/dL    Alkaline Phosphatase 52 33 - 136 U/L    Total Protein 7.3 6.4 - 8.2 g/dL    AST 20 9 - 39 U/L    Bilirubin, Total 0.3 0.0 - 1.2 mg/dL    ALT 12 10 - 52 U/L   Lactate   Result Value Ref Range    Lactate 4.6 (HH) 0.4 - 2.0 mmol/L   Urinalysis with Reflex Microscopic   Result Value Ref Range    Color, Urine Colorless (N) Light-Yellow, Yellow, Dark-Yellow    Appearance, Urine Clear Clear    Specific Gravity, Urine 1.010 1.005 - 1.035    pH, Urine 6.0 5.0, 5.5, 6.0, 6.5, 7.0, 7.5, 8.0    Protein, Urine 20 (TRACE) NEGATIVE, 10 (TRACE), 20 (TRACE) mg/dL    Glucose, Urine 150 (2+) (A) Normal mg/dL    Blood, Urine 0.1 (1+) (A) NEGATIVE    Ketones, Urine NEGATIVE NEGATIVE mg/dL    Bilirubin, Urine NEGATIVE NEGATIVE    Urobilinogen, Urine Normal Normal mg/dL    Nitrite, Urine NEGATIVE NEGATIVE    Leukocyte Esterase, Urine 75 Cecilia/µL (A) NEGATIVE   Sodium, Urine Random   Result Value Ref Range    Sodium, Urine Random 35 mmol/L    Creatinine, Urine Random 70.7 20.0 - 370.0 mg/dL    Sodium/Creatinine Ratio  50 Not established. mmol/g Creat   Microscopic Only, Urine   Result Value Ref Range    WBC, Urine 6-10 (A) 1-5, NONE /HPF    RBC, Urine 3-5 NONE, 1-2, 3-5 /HPF    Bacteria, Urine 1+ (A) NONE SEEN /HPF    Mucus, Urine FEW Reference range not established. /LPF       Imaging Results  US renal complete    Result Date: 9/24/2024  Interpreted By:  Delvin Wu, STUDY: US RENAL COMPLETE;  9/24/2024 4:04 pm   INDICATION: Signs/Symptoms:CATHIE.   COMPARISON: None.   ACCESSION NUMBER(S): [id number]   ORDERING CLINICIAN: LUCIAN SILVA   TECHNIQUE: Multiple images of the kidneys were obtained  , with color Doppler for blood flow.   FINDINGS: RIGHT KIDNEY: The right kidney measures 9.8 cm in length.  The renal cortex is within normal limits.   No hydronephrosis or evidence of nephrolithiasis. Color Doppler demonstrates renal blood flow.   LEFT KIDNEY: The left kidney measures 9.1 cm in length. The renal cortex is within normal limits.    No hydronephrosis or evidence of nephrolithiasis.. Color Doppler demonstrates renal blood flow.   BLADDER: The urinary bladder is unremarkable in appearance.   OTHER FINDINGS: The prostate measures 3.7 x 4.4 x 2.7 cm, with a volume of 23 mL.       Unremarkable renal ultrasound.   Signed by: Delvin Wu 9/24/2024 4:27 PM Dictation workstation:   [id number]    XR chest 1 view    Result Date: 9/24/2024  Interpreted By:  Geena Henderson and Sullivan Shannon STUDY: XR CHEST 1 VIEW;  9/24/2024 10:09 am   INDICATION: Signs/Symptoms:Shortness of breath and chest pain.   COMPARISON: Chest radiograph 11/23/2023, correlation with CT chest 02/13/2024.   ACCESSION NUMBER(S): [id number]   ORDERING CLINICIAN: ASHLEY JIMENEZ   FINDINGS: Frontal radiograph of the chest was provided   Monitoring leads project over the thorax.   CARDIOMEDIASTINAL SILHOUETTE: Cardiac silhouette is stable in size there is mild tortuosity of descending thoracic aorta, similar to prior.   LUNGS: Bilateral lung hyperinflation is again  noted with flattening of the hemidiaphragms, similar to prior. There are hazy airspace opacities in the right lung base, new compared to prior radiograph. There is background of coarse bilateral interstitial lung markings, predominantly in the lower lung zones. No pleural effusion or pneumothorax.   ABDOMEN: No remarkable upper abdominal findings.   BONES: No acute osseous changes.       1.  hazy airspace opacities in the right lung base, possible pneumonia. 2. Persistent bilateral lung hyperinflation and bibasilar predominant coarse interstitial lung markings, correlating with patient's underlying COPD.   I personally reviewed the images/study and I agree with the findings as stated by Radiology resident Dr. Rodas.   MACRO: None   Signed by: Geena Henedrson 9/24/2024 10:40 AM Dictation workstation:   [id number]    ECG 12 Lead    Result Date: 9/24/2024  Sinus tachycardia Septal infarct , age undetermined Abnormal ECG When compared with ECG of 08-JUL-2024 17:01, Septal infarct is now Present      Medications:  amLODIPine, 10 mg, oral, Daily  azithromycin, 500 mg, intravenous, q24h  budesonide, 0.5 mg, nebulization, BID  cefTRIAXone, 2 g, intravenous, q24h  formoterol, 20 mcg, nebulization, BID  heparin (porcine), 5,000 Units, subcutaneous, q8h GULSHAN  ipratropium-albuteroL, 3 mL, nebulization, TID  methylPREDNISolone sodium succinate (PF), 40 mg, intravenous, q12h  oxygen, , inhalation, Continuous - Inhalation  pantoprazole, 40 mg, oral, Daily before breakfast  rosuvastatin, 40 mg, oral, Daily       PRN medications: acetaminophen **OR** acetaminophen **OR** acetaminophen, ipratropium-albuteroL       Wan Bhakta MD  [facility]

## 2024-09-26 LAB
ALBUMIN SERPL BCP-MCNC: 3.6 G/DL (ref 3.4–5)
ANION GAP SERPL CALC-SCNC: 15 MMOL/L (ref 10–20)
BUN SERPL-MCNC: 40 MG/DL (ref 6–23)
C3 SERPL-MCNC: 129 MG/DL (ref 87–200)
C4 SERPL-MCNC: 57 MG/DL (ref 10–50)
CALCIUM SERPL-MCNC: 8.3 MG/DL (ref 8.6–10.3)
CHLORIDE SERPL-SCNC: 93 MMOL/L (ref 98–107)
CO2 SERPL-SCNC: 28 MMOL/L (ref 21–32)
CREAT SERPL-MCNC: 2.74 MG/DL (ref 0.5–1.3)
EGFRCR SERPLBLD CKD-EPI 2021: 24 ML/MIN/1.73M*2
GLUCOSE BLD MANUAL STRIP-MCNC: 148 MG/DL (ref 74–99)
GLUCOSE SERPL-MCNC: 106 MG/DL (ref 74–99)
HOLD SPECIMEN: NORMAL
HOLD SPECIMEN: NORMAL
PHOSPHATE SERPL-MCNC: 2.9 MG/DL (ref 2.5–4.9)
POTASSIUM SERPL-SCNC: 3.5 MMOL/L (ref 3.5–5.3)
SODIUM SERPL-SCNC: 132 MMOL/L (ref 136–145)

## 2024-09-26 PROCEDURE — 36415 COLL VENOUS BLD VENIPUNCTURE: CPT | Performed by: INTERNAL MEDICINE

## 2024-09-26 PROCEDURE — 2500000005 HC RX 250 GENERAL PHARMACY W/O HCPCS: Performed by: STUDENT IN AN ORGANIZED HEALTH CARE EDUCATION/TRAINING PROGRAM

## 2024-09-26 PROCEDURE — 86160 COMPLEMENT ANTIGEN: CPT | Mod: AHULAB | Performed by: INTERNAL MEDICINE

## 2024-09-26 PROCEDURE — 2500000001 HC RX 250 WO HCPCS SELF ADMINISTERED DRUGS (ALT 637 FOR MEDICARE OP): Performed by: NURSE PRACTITIONER

## 2024-09-26 PROCEDURE — 2500000004 HC RX 250 GENERAL PHARMACY W/ HCPCS (ALT 636 FOR OP/ED): Performed by: NURSE PRACTITIONER

## 2024-09-26 PROCEDURE — 94668 MNPJ CHEST WALL SBSQ: CPT

## 2024-09-26 PROCEDURE — 82947 ASSAY GLUCOSE BLOOD QUANT: CPT

## 2024-09-26 PROCEDURE — 2500000004 HC RX 250 GENERAL PHARMACY W/ HCPCS (ALT 636 FOR OP/ED): Performed by: CLINICAL NURSE SPECIALIST

## 2024-09-26 PROCEDURE — 80069 RENAL FUNCTION PANEL: CPT | Performed by: INTERNAL MEDICINE

## 2024-09-26 PROCEDURE — 99232 SBSQ HOSP IP/OBS MODERATE 35: CPT | Performed by: STUDENT IN AN ORGANIZED HEALTH CARE EDUCATION/TRAINING PROGRAM

## 2024-09-26 PROCEDURE — 2500000002 HC RX 250 W HCPCS SELF ADMINISTERED DRUGS (ALT 637 FOR MEDICARE OP, ALT 636 FOR OP/ED): Performed by: NURSE PRACTITIONER

## 2024-09-26 PROCEDURE — 94640 AIRWAY INHALATION TREATMENT: CPT

## 2024-09-26 PROCEDURE — 2500000004 HC RX 250 GENERAL PHARMACY W/ HCPCS (ALT 636 FOR OP/ED): Performed by: STUDENT IN AN ORGANIZED HEALTH CARE EDUCATION/TRAINING PROGRAM

## 2024-09-26 PROCEDURE — 9420000001 HC RT PATIENT EDUCATION 5 MIN

## 2024-09-26 PROCEDURE — 94664 DEMO&/EVAL PT USE INHALER: CPT

## 2024-09-26 PROCEDURE — 1200000002 HC GENERAL ROOM WITH TELEMETRY DAILY

## 2024-09-26 PROCEDURE — 99232 SBSQ HOSP IP/OBS MODERATE 35: CPT | Performed by: CLINICAL NURSE SPECIALIST

## 2024-09-26 RX ORDER — SODIUM CHLORIDE 9 MG/ML
75 INJECTION, SOLUTION INTRAVENOUS CONTINUOUS
Status: DISCONTINUED | OUTPATIENT
Start: 2024-09-26 | End: 2024-09-27

## 2024-09-26 RX ORDER — AZITHROMYCIN 250 MG/1
250 TABLET, FILM COATED ORAL DAILY
Status: DISCONTINUED | OUTPATIENT
Start: 2024-09-27 | End: 2024-09-28 | Stop reason: HOSPADM

## 2024-09-26 ASSESSMENT — PAIN SCALES - WONG BAKER: WONGBAKER_NUMERICALRESPONSE: NO HURT

## 2024-09-26 ASSESSMENT — ENCOUNTER SYMPTOMS
VOMITING: 0
ABDOMINAL PAIN: 0
SHORTNESS OF BREATH: 1
ABDOMINAL DISTENTION: 0
FEVER: 0
COUGH: 1

## 2024-09-26 ASSESSMENT — PAIN - FUNCTIONAL ASSESSMENT: PAIN_FUNCTIONAL_ASSESSMENT: 0-10

## 2024-09-26 ASSESSMENT — PAIN SCALES - GENERAL
PAINLEVEL_OUTOF10: 0 - NO PAIN
PAINLEVEL_OUTOF10: 0 - NO PAIN

## 2024-09-26 NOTE — PROGRESS NOTES
Kalyan Armstrong is a 74 y.o. male on day 2 of admission presenting with Chronic obstructive pulmonary disease, unspecified COPD type (Multi).    Subjective   Patient seen and examined sitting up eating lunch with wife at bedside.  Patient's work of breathing is significantly improved from yesterday.  Patient reports he is coughing up his usual white off-white mucus, especially in the morning.  Denies pain, fever, chills, nausea and emesis.  Stable on his usual 3 L NC.  He reports his appetite is somewhat better open we discussed dietary consult to help evaluate his nutritional status and to offer recommendations for supplementation at home.  Consult placed.  Patient reports continued use of incentive spirometer and Acapella.    Objective   Physical Exam  Constitutional:   Chronically ill-appearing, thin, NAD, cooperative  HENT: Atraumatic, moist mucous membranes  Eyes:  nonicteric  Neck: Supple, no JVD  Cardiovascular: S1, S2 normal, regular, HR 80s, no murmur appreciated  Pulmonary: Fair air entry with diminished right basilar breath sounds, otherwise clear without crackles or wheezes; tachypneic with + conversational dyspnea throughout entire exam  Abdominal: Soft, nontender, nondistended, + BS  Musculoskeletal: Good range of motion with diffuse muscle wasting and fair strength  Extremities:   No BLE edema  Lymphadenopathy: No nuchal LAP  Skin: Warm and dry  Neurological: Alert and oriented x 3, speech clear and appropriate; CNs grossly intact  Psychiatric:   Appropriate mood and behavior    Medications:  amLODIPine, 10 mg, oral, Daily  [START ON 9/27/2024] azithromycin, 250 mg, oral, Daily  budesonide, 0.5 mg, nebulization, BID  cefTRIAXone, 2 g, intravenous, q24h  formoterol, 20 mcg, nebulization, BID  heparin (porcine), 5,000 Units, subcutaneous, q8h GULSHAN  ipratropium-albuteroL, 3 mL, nebulization, TID  oxygen, , inhalation, Continuous - Inhalation  pantoprazole, 40 mg, oral, Daily before breakfast  predniSONE,  "40 mg, oral, Daily  rosuvastatin, 40 mg, oral, Daily     sodium chloride 0.9%, 75 mL/hr, Last Rate: 75 mL/hr (09/26/24 1222)     PRN medications: acetaminophen **OR** acetaminophen **OR** acetaminophen, ipratropium-albuteroL     Last Recorded Vitals  Blood pressure 113/72, pulse 94, temperature 36.2 °C (97.2 °F), temperature source Temporal, resp. rate 18, height 1.753 m (5' 9\"), weight 59.4 kg (131 lb), SpO2 99%.  Intake/Output last 3 Shifts:  I/O last 3 completed shifts:  In: 1000 (16.8 mL/kg) [I.V.:1000 (16.8 mL/kg)]  Out: 725 (12.2 mL/kg) [Urine:725 (0.3 mL/kg/hr)]  Weight: 59.4 kg     Relevant Results    Assessment/Plan   Assessment & Plan  Chronic obstructive pulmonary disease, unspecified COPD type (Multi)    Kalyan Armstrong is a 74 y.o. male with past medical history of HFpEF, HTN, HLD, CKD, severe COPD/emphysema with chronic hypoxic respiratory failure on 3L NC presented to [facility] ED on 9/24 with worsening left-sided chest pain and shortness of breath over the past week associated with dry cough.  Patient stated he \"cannot take it anymore\".  Patient found to be tachypneic without wheezing or rales.  EKG with sinus tachycardia without acute ischemia; troponins 16-16. Labs are without leukocytosis, + hyponatremia, lactate 2.2-4.6.  Flu A/B and COVID all negative.  VBG pH 7.48, pCO2 52, HCO3 38.7.  CXR with hazy airspace opacities in right lung base with coarse bilateral interstitial lung markings in lower fields.  He was treated with IV fluids, Solu-Medrol, magnesium and DuoNebs.  Additionally he was given IV azithromycin and ceftriaxone and admitted for further management of COPD and pneumonia.  Pulmonary is consulted for COPD exacerbation and possible pneumonia.     Impression/recommendations:     #COPD with acute exacerbation: Gold 3E;   Home meds: Trelegy (100-60 2.5-25 mcg ) and albuterol as needed, azithromycin 250 mg daily, prednisone 5 mg daily  -Continue budesonide, formoterol and DuoNebs  -Can add in " Spiriva once patient is off of DuoNebs (on albuterol only)  -Transitioning Solu-Medrol to oral prednisone 40 mg daily for 4 more days (start 9/26)  -Follows with Dr. Villagomez last office visit/23/24; follow-up with Dr. Villagomez post discharge  -Patient thin with muscle loss, increased calorie needs with COPD nutritional consult placed     #Chronic hypoxic respiratory failure: Secondary to severe COPD, continuously on 3 L  -Continue supplemental oxygen, wean for saturation 89 to 95%  -BPH with I-S and Acapella     #Pneumonia, CAP: Procalcitonin 0.76 with lactate rising currently 4.6  -Strep and Legionella antigens both negative  -Given Pro-Rito 0.76 continue ceftriaxone for 5-day course  -Can transition back to home azithromycin 250 mg oral daily (discussed with Dr. Bhakta)  -Bronchopulmonary hygiene as above     #Pulmonary nodules: Noted on CT 1/9/2023; reimaging completed on 2/13/2024 finding no suspicious parenchymal nodules and recommendation for repeat low-dose chest CT in 1 year  -Continued outpatient surveillance per Dr. Villagomez     Thank you for the consult.  Pulmonology will follow.     I spent 35 minutes in the professional and overall care of this patient.   BEATRIZ Salazar-CNS

## 2024-09-26 NOTE — PROGRESS NOTES
[facility] Hospitalist Progress Note        Between 7AM-7PM please message me via Epic Secure Chat.  After 7PM please page Nocturnist on call.        Assessment/Plan     Suspected COPD exacerbation  Possible PNA  Chronic respiratory failure on 3-4L NC (he is on PO pred 5mg daily and PO azithro 250mg daily at home for COPD through his pulmonologist)  CATHIE on CKD  Hyponatremia  Lactic acidosis  Anemia  HTN  Chronic Hep C    - appreciate pulm and nephrology consults  - IV -> PO steroids, nebz, supplemental O2  - continue CTX for now. Procal 0.76 (although tough to interpret with reduced renal clearance). Negative legionella urine antigen, will switch him back to his home PO azithro he take 250mg daily      DVT Prophylaxis:  Heparin subq      Discharge Planning: home once med ready      I personally examined the patient and reviewed chart.  Plan of care was discussed with patient, all questions answered.    Total time spent: At least 38 minutes, providing counseling or in coordination of care. Total time on this day of visit includes record and documentation review before and after visit including documentation and time not explicitly included on EMR time stamp.      Subjective     Kalyan Armstrong is a 74 y.o. male on day 2 of admission presenting with Chronic obstructive pulmonary disease, unspecified COPD type (Multi).    NAEON. Overall stable, SOB slowly improving.    Review of Systems   Constitutional:  Negative for fever.   Respiratory:  Positive for cough and shortness of breath.    Cardiovascular:  Negative for chest pain.   Gastrointestinal:  Negative for abdominal distention, abdominal pain and vomiting.       Objective     Physical Exam  Constitutional:       General: He is not in acute distress.  Cardiovascular:      Rate and Rhythm: Normal rate and regular rhythm.   Pulmonary:      Effort: Pulmonary effort is normal.      Breath sounds: Normal breath sounds.      Comments:   Distant breath sounds  Abdominal:       "General: There is no distension.      Palpations: Abdomen is soft.   Neurological:      Mental Status: He is alert. Mental status is at baseline.         Last Recorded Vitals  Blood pressure 113/72, pulse 94, temperature 36.2 °C (97.2 °F), temperature source Temporal, resp. rate 18, height 1.753 m (5' 9\"), weight 59.4 kg (131 lb), SpO2 99%.  Intake/Output last 3 Shifts:  I/O last 3 completed shifts:  In: 1000 (16.8 mL/kg) [I.V.:1000 (16.8 mL/kg)]  Out: 725 (12.2 mL/kg) [Urine:725 (0.3 mL/kg/hr)]  Weight: 59.4 kg     Relevant Results  Results for orders placed or performed during the hospital encounter of 09/24/24 (from the past 24 hour(s))   Urinalysis with Reflex Culture and Microscopic   Result Value Ref Range    Color, Urine Colorless (N) Light-Yellow, Yellow, Dark-Yellow    Appearance, Urine Clear Clear    Specific Gravity, Urine 1.010 1.005 - 1.035    pH, Urine 6.0 5.0, 5.5, 6.0, 6.5, 7.0, 7.5, 8.0    Protein, Urine 10 (TRACE) NEGATIVE, 10 (TRACE), 20 (TRACE) mg/dL    Glucose, Urine 50 (TRACE) (A) Normal mg/dL    Blood, Urine 0.06 (1+) (A) NEGATIVE    Ketones, Urine NEGATIVE NEGATIVE mg/dL    Bilirubin, Urine NEGATIVE NEGATIVE    Urobilinogen, Urine Normal Normal mg/dL    Nitrite, Urine NEGATIVE NEGATIVE    Leukocyte Esterase, Urine NEGATIVE NEGATIVE   Urinalysis Microscopic   Result Value Ref Range    WBC, Urine 1-5 1-5, NONE /HPF    RBC, Urine 3-5 NONE, 1-2, 3-5 /HPF   Lavender Top   Result Value Ref Range    Extra Tube Hold for add-ons.    PST Top   Result Value Ref Range    Extra Tube Hold for add-ons.    Renal function panel   Result Value Ref Range    Glucose 106 (H) 74 - 99 mg/dL    Sodium 132 (L) 136 - 145 mmol/L    Potassium 3.5 3.5 - 5.3 mmol/L    Chloride 93 (L) 98 - 107 mmol/L    Bicarbonate 28 21 - 32 mmol/L    Anion Gap 15 10 - 20 mmol/L    Urea Nitrogen 40 (H) 6 - 23 mg/dL    Creatinine 2.74 (H) 0.50 - 1.30 mg/dL    eGFR 24 (L) >60 mL/min/1.73m*2    Calcium 8.3 (L) 8.6 - 10.3 mg/dL    Phosphorus " 2.9 2.5 - 4.9 mg/dL    Albumin 3.6 3.4 - 5.0 g/dL       Imaging Results  US renal complete    Result Date: 9/24/2024  Interpreted By:  Delvin Wu, STUDY: US RENAL COMPLETE;  9/24/2024 4:04 pm   INDICATION: Signs/Symptoms:CATHIE.   COMPARISON: None.   ACCESSION NUMBER(S): [id number]   ORDERING CLINICIAN: LUCIAN SILVA   TECHNIQUE: Multiple images of the kidneys were obtained  , with color Doppler for blood flow.   FINDINGS: RIGHT KIDNEY: The right kidney measures 9.8 cm in length.  The renal cortex is within normal limits.   No hydronephrosis or evidence of nephrolithiasis. Color Doppler demonstrates renal blood flow.   LEFT KIDNEY: The left kidney measures 9.1 cm in length. The renal cortex is within normal limits.    No hydronephrosis or evidence of nephrolithiasis.. Color Doppler demonstrates renal blood flow.   BLADDER: The urinary bladder is unremarkable in appearance.   OTHER FINDINGS: The prostate measures 3.7 x 4.4 x 2.7 cm, with a volume of 23 mL.       Unremarkable renal ultrasound.   Signed by: Delvin Wu 9/24/2024 4:27 PM Dictation workstation:   [id number]      Medications:  amLODIPine, 10 mg, oral, Daily  azithromycin, 500 mg, intravenous, q24h  budesonide, 0.5 mg, nebulization, BID  cefTRIAXone, 2 g, intravenous, q24h  formoterol, 20 mcg, nebulization, BID  heparin (porcine), 5,000 Units, subcutaneous, q8h GULSHAN  ipratropium-albuteroL, 3 mL, nebulization, TID  oxygen, , inhalation, Continuous - Inhalation  pantoprazole, 40 mg, oral, Daily before breakfast  predniSONE, 40 mg, oral, Daily  rosuvastatin, 40 mg, oral, Daily       PRN medications: acetaminophen **OR** acetaminophen **OR** acetaminophen, ipratropium-albuteroL       Wan Bhakta MD  [facility] Medicine

## 2024-09-26 NOTE — PROGRESS NOTES
Kalyan Armstrong is a 74 y.o. male on day 2 of admission presenting with Chronic obstructive pulmonary disease, unspecified COPD type (Multi).      Subjective   Seen and examined.   Daughter is at bedside. On 3 LPM FORREST HAINES. He does not offer specific complaints.   Chart/labs/meds/notes/imaging/VS reviewed.     Objective        Vitals 24HR  Heart Rate:  [69-94]   Temp:  [36.1 °C (97 °F)-36.6 °C (97.9 °F)]   Resp:  [18]   BP: (105-121)/(61-72)   SpO2:  [98 %-100 %]     Intake/Output last 3 Shifts:    Intake/Output Summary (Last 24 hours) at 9/26/2024 1349  Last data filed at 9/26/2024 1225  Gross per 24 hour   Intake 1980 ml   Output 675 ml   Net 1305 ml       Physical Exam  HENT:      Head: Normocephalic.      Mouth/Throat:      Mouth: Mucous membranes are dry.   Cardiovascular:      Rate and Rhythm: Normal rate and regular rhythm.   Pulmonary:      Effort: Pulmonary effort is normal.      Breath sounds: Mildly diminished breath sounds. No rhonchi or wheezing.   Abdominal:      General: Bowel sounds are normal.      Palpations: Abdomen is soft.   Musculoskeletal:         General: Normal range of motion.      Cervical back: Neck supple.   Skin:     General: Skin is warm.   Neurological:      Mental Status: He is alert and oriented to person, place, and time.   Psychiatric:         Mood and Affect: Mood normal.         Behavior: Behavior normal.     Scheduled Medications  amLODIPine, 10 mg, oral, Daily  [START ON 9/27/2024] azithromycin, 250 mg, oral, Daily  budesonide, 0.5 mg, nebulization, BID  cefTRIAXone, 2 g, intravenous, q24h  formoterol, 20 mcg, nebulization, BID  heparin (porcine), 5,000 Units, subcutaneous, q8h GULSHAN  ipratropium-albuteroL, 3 mL, nebulization, TID  oxygen, , inhalation, Continuous - Inhalation  pantoprazole, 40 mg, oral, Daily before breakfast  predniSONE, 40 mg, oral, Daily  rosuvastatin, 40 mg, oral, Daily      Continuous medications  sodium chloride 0.9%, 75 mL/hr, Last Rate: 75 mL/hr  (09/26/24 1222)      PRN medications: acetaminophen **OR** acetaminophen **OR** acetaminophen, ipratropium-albuteroL     Relevant Results  Results from last 7 days   Lab Units 09/25/24  0542 09/24/24  0944   WBC AUTO x10*3/uL 7.2 10.7   HEMOGLOBIN g/dL 9.9* 11.7*   HEMATOCRIT % 28.2* 32.4*   PLATELETS AUTO x10*3/uL 241 284   NEUTROS PCT AUTO % 87.3 78.8   LYMPHS PCT AUTO % 7.7 11.1   MONOS PCT AUTO % 4.3 7.5   EOS PCT AUTO % 0.0 1.4     Results from last 7 days   Lab Units 09/26/24  0548 09/25/24  0542 09/24/24  0944   SODIUM mmol/L 132* 131* 128*   POTASSIUM mmol/L 3.5 4.1 3.1*   CHLORIDE mmol/L 93* 91* 84*   CO2 mmol/L 28 28 32   BUN mg/dL 40* 52* 55*   CREATININE mg/dL 2.74* 3.32* 3.97*   GLUCOSE mg/dL 106* 153* 133*   CALCIUM mg/dL 8.3* 8.9 9.4       US renal complete   Final Result   Unremarkable renal ultrasound.        Signed by: Delvin Wu 9/24/2024 4:27 PM   Dictation workstation:   [id number]      XR chest 1 view   Final Result   1.  hazy airspace opacities in the right lung base, possible   pneumonia.   2. Persistent bilateral lung hyperinflation and bibasilar predominant   coarse interstitial lung markings, correlating with patient's   underlying COPD.        I personally reviewed the images/study and I agree with the findings   as stated by Radiology resident Dr. Rodas.        MACRO:   None        Signed by: Geena Henderson 9/24/2024 10:40 AM   Dictation workstation:   [id number]      Point of Care Ultrasound    (Results Pending)            Assessment/Plan        Kalyan Armstrong is a 74 y.o. male with a past medical history of HFpEF with an LVEF of 50% on echo in 2020, moderate pulmonary hypertension, severe emphysematous changes of the lung treated with prophylactic azithromycin, Trelegy and albuterol , chronic hypoxic respiratory failure requiring 3-4 LPM of supplemental O2, hypertension, chronic hepatitis C who presented with progressive dyspnea and chest discomfort.  Reportedly the patient has  had increasing left-sided chest discomfort with associated shortness of breath x 1.5 weeks.  He reports the associated symptoms of nonproductive cough.  In the ED, he was hemodynamically stable.  Low-grade tachycardia noted.  ECG showed regular DC intervals with no ischemic changes.  Chest plain film showed bilateral lung hyperinflation and coarse interstitial lung markings. These are chronic findings. There was a right lung base opacity concerning for pneumonia.  COVID, flu Legionella and strep were negative. He was covered with Rocephin and azithromycin.  He was noted to be hyponatremic, hypokalemic with acute kidney injury having a creatinine of 3.97 (previously 2.66 on 8/21 and 1 mg as a deciliter on 3/8/2024).  Nephrology is consulted for renal care.     Mr Armstrong was noted to have acute kidney injury that occurred over 1 month ago as of 8/21.  Reportedly he felt well at that time.  He saw his PCP on 8/13 prior to labs being drawn.  Per the note he felt well.  He reports no medication changes.  He denies nausea, emesis, poor by mouth intake and diarrhea.  He does not utilize anti-inflammatories.  After the creatinine mora he continued his lisinopril 20 mg and HCTZ 12.5 mg daily.  Thus it is not surprising that his creatinine mora to 3.97 upon admission. Upon presentation he was hemodynamically stable.  The ACE inhibitor and HCTZ were held.  There was low-level lactate elevation.  The initial etiology of his acute kidney injury is somewhat elusive. His hyponatremia is related to the hydrochlorothiazide more than likely. His calculated FeNa was 1.3 ~ consistent with intrinsic CATHIE. UP:Cr ratio of 0.72 mg. Unremarkable renal US w/o obstruction. There are no eosinophils in the urine. CK level is normal. I asked for a urine culture albeit it looks as if this was not sent. Complements are pending. His creatinine continues to improve with IV volume expansion. After his current bag of IV fluid is complete, ok to hold  off on further IV volume expansion. Blood pressures are normotensive. No repeat lactate ordered today. Trend his RFP. Will follow with his care.           Principal Problem:    Chronic obstructive pulmonary disease, unspecified COPD type (Multi)    I spent 40 minutes in the professional and overall care of this patient.      Barrington Xiong, DO

## 2024-09-26 NOTE — PROGRESS NOTES
09/26/24 1216   Discharge Planning   Expected Discharge Disposition Home     Patient admitted for COPD exacerbation    Pulm following for frequent COPD exacerbation  Renal following for CATHIE on CKD    ADOD 1-3 days  BARRIERS symptom improvement  DISPO home

## 2024-09-26 NOTE — CARE PLAN
The patient's goals for the shift include sleep    The clinical goals for the shift include comfort and rest    Over the shift, the patient did not make progress toward the following goals. Barriers to progression include chronic illness. Recommendations to address these barriers include provide a quiet environment

## 2024-09-27 LAB
ALBUMIN SERPL BCP-MCNC: 3.5 G/DL (ref 3.4–5)
ANION GAP SERPL CALC-SCNC: 16 MMOL/L (ref 10–20)
BUN SERPL-MCNC: 33 MG/DL (ref 6–23)
CALCIUM SERPL-MCNC: 8.1 MG/DL (ref 8.6–10.3)
CHLORIDE SERPL-SCNC: 95 MMOL/L (ref 98–107)
CO2 SERPL-SCNC: 29 MMOL/L (ref 21–32)
CREAT SERPL-MCNC: 2.61 MG/DL (ref 0.5–1.3)
EGFRCR SERPLBLD CKD-EPI 2021: 25 ML/MIN/1.73M*2
GLUCOSE SERPL-MCNC: 78 MG/DL (ref 74–99)
HOLD SPECIMEN: NORMAL
PHOSPHATE SERPL-MCNC: 2.4 MG/DL (ref 2.5–4.9)
POTASSIUM SERPL-SCNC: 3.7 MMOL/L (ref 3.5–5.3)
SODIUM SERPL-SCNC: 136 MMOL/L (ref 136–145)

## 2024-09-27 PROCEDURE — 2500000001 HC RX 250 WO HCPCS SELF ADMINISTERED DRUGS (ALT 637 FOR MEDICARE OP): Performed by: NURSE PRACTITIONER

## 2024-09-27 PROCEDURE — 2500000005 HC RX 250 GENERAL PHARMACY W/O HCPCS: Performed by: STUDENT IN AN ORGANIZED HEALTH CARE EDUCATION/TRAINING PROGRAM

## 2024-09-27 PROCEDURE — 94010 BREATHING CAPACITY TEST: CPT

## 2024-09-27 PROCEDURE — 1200000002 HC GENERAL ROOM WITH TELEMETRY DAILY

## 2024-09-27 PROCEDURE — 2500000004 HC RX 250 GENERAL PHARMACY W/ HCPCS (ALT 636 FOR OP/ED): Performed by: CLINICAL NURSE SPECIALIST

## 2024-09-27 PROCEDURE — 80069 RENAL FUNCTION PANEL: CPT | Performed by: INTERNAL MEDICINE

## 2024-09-27 PROCEDURE — 94668 MNPJ CHEST WALL SBSQ: CPT

## 2024-09-27 PROCEDURE — 9420000001 HC RT PATIENT EDUCATION 5 MIN

## 2024-09-27 PROCEDURE — 2500000004 HC RX 250 GENERAL PHARMACY W/ HCPCS (ALT 636 FOR OP/ED): Performed by: STUDENT IN AN ORGANIZED HEALTH CARE EDUCATION/TRAINING PROGRAM

## 2024-09-27 PROCEDURE — 2500000002 HC RX 250 W HCPCS SELF ADMINISTERED DRUGS (ALT 637 FOR MEDICARE OP, ALT 636 FOR OP/ED): Performed by: STUDENT IN AN ORGANIZED HEALTH CARE EDUCATION/TRAINING PROGRAM

## 2024-09-27 PROCEDURE — 2500000002 HC RX 250 W HCPCS SELF ADMINISTERED DRUGS (ALT 637 FOR MEDICARE OP, ALT 636 FOR OP/ED): Performed by: NURSE PRACTITIONER

## 2024-09-27 PROCEDURE — 99232 SBSQ HOSP IP/OBS MODERATE 35: CPT | Performed by: STUDENT IN AN ORGANIZED HEALTH CARE EDUCATION/TRAINING PROGRAM

## 2024-09-27 PROCEDURE — 36415 COLL VENOUS BLD VENIPUNCTURE: CPT | Performed by: INTERNAL MEDICINE

## 2024-09-27 PROCEDURE — 94640 AIRWAY INHALATION TREATMENT: CPT

## 2024-09-27 PROCEDURE — 2500000004 HC RX 250 GENERAL PHARMACY W/ HCPCS (ALT 636 FOR OP/ED): Performed by: NURSE PRACTITIONER

## 2024-09-27 ASSESSMENT — ENCOUNTER SYMPTOMS
SHORTNESS OF BREATH: 1
ABDOMINAL DISTENTION: 0
VOMITING: 0
ABDOMINAL PAIN: 0
FEVER: 0
COUGH: 1

## 2024-09-27 ASSESSMENT — COGNITIVE AND FUNCTIONAL STATUS - GENERAL
DAILY ACTIVITIY SCORE: 23
MOBILITY SCORE: 21
TURNING FROM BACK TO SIDE WHILE IN FLAT BAD: A LITTLE
DRESSING REGULAR LOWER BODY CLOTHING: A LITTLE
WALKING IN HOSPITAL ROOM: A LITTLE
CLIMB 3 TO 5 STEPS WITH RAILING: A LITTLE

## 2024-09-27 ASSESSMENT — PAIN SCALES - GENERAL
PAINLEVEL_OUTOF10: 0 - NO PAIN

## 2024-09-27 ASSESSMENT — PAIN - FUNCTIONAL ASSESSMENT
PAIN_FUNCTIONAL_ASSESSMENT: 0-10

## 2024-09-27 NOTE — CARE PLAN
The patient's goals for the shift include no sob  Problem: Safety - Adult  Goal: Free from fall injury  Outcome: Progressing     Problem: Chronic Conditions and Co-morbidities  Goal: Patient's chronic conditions and co-morbidity symptoms are monitored and maintained or improved  Outcome: Progressing     Problem: Respiratory  Goal: No signs of respiratory distress (eg. Use of accessory muscles. Peds grunting)  Outcome: Progressing       The clinical goals for the shift include comfort and rest

## 2024-09-27 NOTE — PROGRESS NOTES
"Kalyan Armstrong is a 74 y.o. male on day 3 of admission presenting with Chronic obstructive pulmonary disease, unspecified COPD type (Multi).    Subjective   Feels \"pretty good\".  Denies shortness of breath or pain, but does admit to a cough productive of clear sputum.  On 3 L nasal cannula oxygen.     Objective   Physical Exam  Vitals  Blood pressure 110/69, pulse 96, temperature 36.6 °C (97.9 °F), temperature source Temporal, resp. rate 18, height 1.753 m (5' 9\"), weight 59.4 kg (131 lb), SpO2 96%.  Intake/Output last 3 Shifts:  I/O last 3 completed shifts:  In: 3627.5 (61 mL/kg) [P.O.:880; I.V.:2247.5 (37.8 mL/kg); IV Piggyback:500]  Out: 1900 (32 mL/kg) [Urine:1900 (0.9 mL/kg/hr)]  Weight: 59.4 kg     General-awake, alert and in no acute distress.  Lungs-few rales and faint wheezes without rhonchi.  Heart-regular rate and rhythm with ectopics, versus irregularly irregular.  Abdomen-positive bowel sounds.  Extremities-no edema.  Skin-no rashes.    Scheduled medications  amLODIPine, 10 mg, oral, Daily  azithromycin, 250 mg, oral, Daily  budesonide, 0.5 mg, nebulization, BID  cefTRIAXone, 2 g, intravenous, q24h  formoterol, 20 mcg, nebulization, BID  heparin (porcine), 5,000 Units, subcutaneous, q8h GULSHAN  ipratropium-albuteroL, 3 mL, nebulization, TID  oxygen, , inhalation, Continuous - Inhalation  pantoprazole, 40 mg, oral, Daily before breakfast  predniSONE, 40 mg, oral, Daily  rosuvastatin, 40 mg, oral, Daily      Continuous medications  sodium chloride 0.9%, 75 mL/hr, Last Rate: 75 mL/hr (09/27/24 0051)      PRN medications  PRN medications: acetaminophen **OR** acetaminophen **OR** acetaminophen, ipratropium-albuteroL     Results for orders placed or performed during the hospital encounter of 09/24/24 (from the past 24 hour(s))   POCT GLUCOSE   Result Value Ref Range    POCT Glucose 148 (H) 74 - 99 mg/dL   Renal Function Panel   Result Value Ref Range    Glucose 78 74 - 99 mg/dL    Sodium 136 136 - 145 mmol/L "    Potassium 3.7 3.5 - 5.3 mmol/L    Chloride 95 (L) 98 - 107 mmol/L    Bicarbonate 29 21 - 32 mmol/L    Anion Gap 16 10 - 20 mmol/L    Urea Nitrogen 33 (H) 6 - 23 mg/dL    Creatinine 2.61 (H) 0.50 - 1.30 mg/dL    eGFR 25 (L) >60 mL/min/1.73m*2    Calcium 8.1 (L) 8.6 - 10.3 mg/dL    Phosphorus 2.4 (L) 2.5 - 4.9 mg/dL    Albumin 3.5 3.4 - 5.0 g/dL   Lavender Top   Result Value Ref Range    Extra Tube Hold for add-ons.       Assessment:  74-year-old man with a history of congestive heart failure, hypertension, chronic renal failure, severe COPD, chronic hypoxia-on 3 L oxygen at home, chronic hypercapnia and colon polyps, admitted with shortness of breath, cough and left chest pain, and found to have an abnormal chest x-ray consistent with possible pneumonia, superimposed on a COPD exacerbation, now clinically improved.  He has minimal bronchospasm presently.  The patient has stable pulmonary nodules which are most likely benign.    Recommend:  1.  Continue DuoNeb, budesonide, formoterol, prednisone, Zithromax and subcutaneous heparin.  2.  Keep oxygen saturation approximately 92 to 95%; home oxygen evaluation prior to discharge.  3.  Incentive spirometry and Acapella treatment.  4.  Repeat low-dose CT scan of the chest to assess pulmonary nodules in 1 year.  5.  Follow-up with his outpatient pulmonologist, Dr. Maynor Villagomez, after discharge.  6.  Overall, the patient appears to be clinically stable for discharge from a pulmonary standpoint.    Trav Velazquez MD

## 2024-09-27 NOTE — SIGNIFICANT EVENT
Patient seen for COPD education.  COPD booklet reviewed and given to patient.  Current breathing medications reviewed and verbalizes good understanding of use.  Reviewed pulmonary rehab with patient and expresses interest. Referral to be made.  Reviewed [facility] and advised that referral would also be made.  Patient agreebale to both.   I advised patient that he will need follow up with pulmonology (Dr. Villagomez) and PCP (Rosa Lees DO) after discharge.  Note to Dr. Bhakta that referral made for [facility] and pulmonary rehab.

## 2024-09-27 NOTE — PROGRESS NOTES
Kalyan Armstrong is a 74 y.o. male on day 3 of admission presenting with Chronic obstructive pulmonary disease, unspecified COPD type (Multi).      Subjective   Seen and examined.   Daughter is at bedside. Eating lunch. On 3 LPM NC.   ZAKI. He does not offer specific complaints.   Chart/labs/meds/notes/imaging/VS reviewed.     Objective        Vitals 24HR  Heart Rate:  [68-96]   Temp:  [36.4 °C (97.5 °F)-36.8 °C (98.2 °F)]   Resp:  [17-19]   BP: (102-110)/(62-73)   SpO2:  [96 %-100 %]     Intake/Output last 3 Shifts:    Intake/Output Summary (Last 24 hours) at 9/27/2024 1351  Last data filed at 9/27/2024 1100  Gross per 24 hour   Intake 2127.5 ml   Output 1600 ml   Net 527.5 ml       Physical Exam  HENT:      Head: Normocephalic.      Mouth/Throat:      Mouth: Mucous membranes are dry.   Cardiovascular:      Rate and Rhythm: Normal rate and regular rhythm.   Pulmonary:      Effort: Pulmonary effort is normal.      Breath sounds: Mildly diminished breath sounds. No rhonchi or wheezing.   Abdominal:      General: Bowel sounds are normal.      Palpations: Abdomen is soft.   Musculoskeletal:         General: Normal range of motion.      Cervical back: Neck supple.   Skin:     General: Skin is warm.   Neurological:      Mental Status: He is alert and oriented to person, place, and time.   Psychiatric:         Mood and Affect: Mood normal.         Behavior: Behavior normal.     Scheduled Medications  amLODIPine, 10 mg, oral, Daily  azithromycin, 250 mg, oral, Daily  budesonide, 0.5 mg, nebulization, BID  cefTRIAXone, 2 g, intravenous, q24h  formoterol, 20 mcg, nebulization, BID  heparin (porcine), 5,000 Units, subcutaneous, q8h GULSHAN  ipratropium-albuteroL, 3 mL, nebulization, TID  oxygen, , inhalation, Continuous - Inhalation  pantoprazole, 40 mg, oral, Daily before breakfast  predniSONE, 40 mg, oral, Daily  rosuvastatin, 40 mg, oral, Daily      Continuous medications       PRN medications: acetaminophen **OR** acetaminophen  **OR** acetaminophen, ipratropium-albuteroL     Relevant Results  Results from last 7 days   Lab Units 09/25/24  0542 09/24/24  0944   WBC AUTO x10*3/uL 7.2 10.7   HEMOGLOBIN g/dL 9.9* 11.7*   HEMATOCRIT % 28.2* 32.4*   PLATELETS AUTO x10*3/uL 241 284   NEUTROS PCT AUTO % 87.3 78.8   LYMPHS PCT AUTO % 7.7 11.1   MONOS PCT AUTO % 4.3 7.5   EOS PCT AUTO % 0.0 1.4     Results from last 7 days   Lab Units 09/27/24  0529 09/26/24  0548 09/25/24  0542   SODIUM mmol/L 136 132* 131*   POTASSIUM mmol/L 3.7 3.5 4.1   CHLORIDE mmol/L 95* 93* 91*   CO2 mmol/L 29 28 28   BUN mg/dL 33* 40* 52*   CREATININE mg/dL 2.61* 2.74* 3.32*   GLUCOSE mg/dL 78 106* 153*   CALCIUM mg/dL 8.1* 8.3* 8.9       US renal complete   Final Result   Unremarkable renal ultrasound.        Signed by: Delvin Wu 9/24/2024 4:27 PM   Dictation workstation:   [id number]      XR chest 1 view   Final Result   1.  hazy airspace opacities in the right lung base, possible   pneumonia.   2. Persistent bilateral lung hyperinflation and bibasilar predominant   coarse interstitial lung markings, correlating with patient's   underlying COPD.        I personally reviewed the images/study and I agree with the findings   as stated by Radiology resident Dr. Rodas.        MACRO:   None        Signed by: Geena Henderson 9/24/2024 10:40 AM   Dictation workstation:   [id number]      Point of Care Ultrasound    (Results Pending)            Assessment/Plan        Kalyan Armstrong is a 74 y.o. male with a past medical history of HFpEF with an LVEF of 50% on echo in 2020, moderate pulmonary hypertension, severe emphysematous changes of the lung treated with prophylactic azithromycin, Trelegy and albuterol , chronic hypoxic respiratory failure requiring 3-4 LPM of supplemental O2, hypertension, chronic hepatitis C who presented with progressive dyspnea and chest discomfort.  Reportedly the patient has had increasing left-sided chest discomfort with associated shortness of  breath x 1.5 weeks.  He reports the associated symptoms of nonproductive cough.  In the ED, he was hemodynamically stable.  Low-grade tachycardia noted.  ECG showed regular MD intervals with no ischemic changes.  Chest plain film showed bilateral lung hyperinflation and coarse interstitial lung markings. These are chronic findings. There was a right lung base opacity concerning for pneumonia.  COVID, flu Legionella and strep were negative. He was covered with Rocephin and azithromycin.  He was noted to be hyponatremic, hypokalemic with acute kidney injury having a creatinine of 3.97 (previously 2.66 on 8/21 and 1 mg as a deciliter on 3/8/2024).  Nephrology is consulted for renal care.     Mr Armstrong was noted to have acute kidney injury that occurred over 1 month ago as of 8/21.  Reportedly he felt well at that time.  He saw his PCP on 8/13 prior to labs being drawn.  Per the note he felt well.  He reports no medication changes.  He denies nausea, emesis, poor by mouth intake and diarrhea.  He does not utilize anti-inflammatories.  After the creatinine mora he continued his lisinopril 20 mg and HCTZ 12.5 mg daily.  Thus it is not surprising that his creatinine mora to 3.97 upon admission. Upon presentation he was hemodynamically stable.  The ACE inhibitor and HCTZ were held.  There was low-level lactate elevation.  The initial etiology of his acute kidney injury is elusive. His hyponatremia is related to the hydrochlorothiazide. His calculated FeNa was 1.3 ~ consistent with intrinsic CATHIE. UP:Cr ratio of 0.72 mg. Unremarkable renal US w/o obstruction. There are no eosinophils in the urine. CK level and complements were normal. His creatinine began to improve with IV volume expansion. His blood pressures are low normal. No current need for further IV fluid. He is to remain of lisinopril-hydrochlorothiazide upon discharge. He will need an RFP on Monday of next week and outpatient follow up with his PCP within 2 weeks.    Blood pressure medication can be further adjusted then.     Principal Problem:    Chronic obstructive pulmonary disease, unspecified COPD type (Multi)    I spent 40 minutes in the professional and overall care of this patient.      Barrington Xiong, DO

## 2024-09-27 NOTE — PROGRESS NOTES
[facility] Hospitalist Progress Note        Between 7AM-7PM please message me via Epic Secure Chat.  After 7PM please page Nocturnist on call.        Assessment/Plan     Suspected COPD exacerbation  Possible PNA  Chronic respiratory failure on 3-4L NC (he is on PO pred 5mg daily and PO azithro 250mg daily at home for COPD through his pulmonologist)  CATHIE on CKD  Hyponatremia  Lactic acidosis  Anemia  HTN  Chronic Hep C    - appreciate pulm and nephrology consults  - IV -> PO steroids, nebz, supplemental O2  - continue CTX for now and his home PO azithro. Will need to follow up w/ his lung doctor. Anticipate sending him out on pred taper and then he can resume his home 5mg daily. May not need further antibiotics other than his home azithro      DVT Prophylaxis:  Heparin subq      Discharge Planning: home once med ready      I personally examined the patient and reviewed chart.  Plan of care was discussed with patient, all questions answered.    Total time spent: At least 38 minutes, providing counseling or in coordination of care. Total time on this day of visit includes record and documentation review before and after visit including documentation and time not explicitly included on EMR time stamp.      Subjective     Kalyan Armstrong is a 74 y.o. male on day 3 of admission presenting with Chronic obstructive pulmonary disease, unspecified COPD type (Multi).    NAEON. Overall stable, SOB slowly improving. Feels he will be ready to go home tomorrow    Review of Systems   Constitutional:  Negative for fever.   Respiratory:  Positive for cough and shortness of breath.    Cardiovascular:  Negative for chest pain.   Gastrointestinal:  Negative for abdominal distention, abdominal pain and vomiting.       Objective     Physical Exam  Constitutional:       General: He is not in acute distress.  Cardiovascular:      Rate and Rhythm: Normal rate and regular rhythm.   Pulmonary:      Effort: Pulmonary effort is normal.      Breath  "sounds: Normal breath sounds.      Comments:   Distant breath sounds  Abdominal:      General: There is no distension.      Palpations: Abdomen is soft.   Neurological:      Mental Status: He is alert. Mental status is at baseline.         Last Recorded Vitals  Blood pressure 102/73, pulse 80, temperature 36.8 °C (98.2 °F), temperature source Temporal, resp. rate 18, height 1.753 m (5' 9\"), weight 59.4 kg (131 lb), SpO2 99%.  Intake/Output last 3 Shifts:  I/O last 3 completed shifts:  In: 3627.5 (61 mL/kg) [P.O.:880; I.V.:2247.5 (37.8 mL/kg); IV Piggyback:500]  Out: 1900 (32 mL/kg) [Urine:1900 (0.9 mL/kg/hr)]  Weight: 59.4 kg     Relevant Results  Results for orders placed or performed during the hospital encounter of 09/24/24 (from the past 24 hour(s))   POCT GLUCOSE   Result Value Ref Range    POCT Glucose 148 (H) 74 - 99 mg/dL   Renal Function Panel   Result Value Ref Range    Glucose 78 74 - 99 mg/dL    Sodium 136 136 - 145 mmol/L    Potassium 3.7 3.5 - 5.3 mmol/L    Chloride 95 (L) 98 - 107 mmol/L    Bicarbonate 29 21 - 32 mmol/L    Anion Gap 16 10 - 20 mmol/L    Urea Nitrogen 33 (H) 6 - 23 mg/dL    Creatinine 2.61 (H) 0.50 - 1.30 mg/dL    eGFR 25 (L) >60 mL/min/1.73m*2    Calcium 8.1 (L) 8.6 - 10.3 mg/dL    Phosphorus 2.4 (L) 2.5 - 4.9 mg/dL    Albumin 3.5 3.4 - 5.0 g/dL   Lavender Top   Result Value Ref Range    Extra Tube Hold for add-ons.        Imaging Results  No results found.     Medications:  amLODIPine, 10 mg, oral, Daily  azithromycin, 250 mg, oral, Daily  budesonide, 0.5 mg, nebulization, BID  cefTRIAXone, 2 g, intravenous, q24h  formoterol, 20 mcg, nebulization, BID  heparin (porcine), 5,000 Units, subcutaneous, q8h GULSHAN  ipratropium-albuteroL, 3 mL, nebulization, TID  oxygen, , inhalation, Continuous - Inhalation  pantoprazole, 40 mg, oral, Daily before breakfast  predniSONE, 40 mg, oral, Daily  rosuvastatin, 40 mg, oral, Daily       PRN medications: acetaminophen **OR** acetaminophen **OR** " acetaminophen, ipratropium-albuteroL       Wan Bhakta MD  [facility]

## 2024-09-28 ENCOUNTER — PHARMACY VISIT (OUTPATIENT)
Dept: PHARMACY | Facility: CLINIC | Age: 74
End: 2024-09-28
Payer: COMMERCIAL

## 2024-09-28 VITALS
HEIGHT: 69 IN | BODY MASS INDEX: 19.4 KG/M2 | RESPIRATION RATE: 23 BRPM | OXYGEN SATURATION: 97 % | TEMPERATURE: 99.1 F | HEART RATE: 113 BPM | WEIGHT: 131 LBS | DIASTOLIC BLOOD PRESSURE: 77 MMHG | SYSTOLIC BLOOD PRESSURE: 114 MMHG

## 2024-09-28 LAB
ALBUMIN SERPL BCP-MCNC: 3.5 G/DL (ref 3.4–5)
ANION GAP SERPL CALC-SCNC: 11 MMOL/L (ref 10–20)
BUN SERPL-MCNC: 26 MG/DL (ref 6–23)
CALCIUM SERPL-MCNC: 8.2 MG/DL (ref 8.6–10.3)
CHLORIDE SERPL-SCNC: 98 MMOL/L (ref 98–107)
CO2 SERPL-SCNC: 31 MMOL/L (ref 21–32)
CREAT SERPL-MCNC: 2.44 MG/DL (ref 0.5–1.3)
EGFRCR SERPLBLD CKD-EPI 2021: 27 ML/MIN/1.73M*2
GLUCOSE SERPL-MCNC: 85 MG/DL (ref 74–99)
HOLD SPECIMEN: NORMAL
HOLD SPECIMEN: NORMAL
PHOSPHATE SERPL-MCNC: 1.9 MG/DL (ref 2.5–4.9)
POTASSIUM SERPL-SCNC: 3.4 MMOL/L (ref 3.5–5.3)
SODIUM SERPL-SCNC: 137 MMOL/L (ref 136–145)

## 2024-09-28 PROCEDURE — 2500000001 HC RX 250 WO HCPCS SELF ADMINISTERED DRUGS (ALT 637 FOR MEDICARE OP): Performed by: STUDENT IN AN ORGANIZED HEALTH CARE EDUCATION/TRAINING PROGRAM

## 2024-09-28 PROCEDURE — RXMED WILLOW AMBULATORY MEDICATION CHARGE

## 2024-09-28 PROCEDURE — 2500000002 HC RX 250 W HCPCS SELF ADMINISTERED DRUGS (ALT 637 FOR MEDICARE OP, ALT 636 FOR OP/ED): Performed by: NURSE PRACTITIONER

## 2024-09-28 PROCEDURE — 2500000004 HC RX 250 GENERAL PHARMACY W/ HCPCS (ALT 636 FOR OP/ED): Performed by: NURSE PRACTITIONER

## 2024-09-28 PROCEDURE — 2500000005 HC RX 250 GENERAL PHARMACY W/O HCPCS: Performed by: STUDENT IN AN ORGANIZED HEALTH CARE EDUCATION/TRAINING PROGRAM

## 2024-09-28 PROCEDURE — 99239 HOSP IP/OBS DSCHRG MGMT >30: CPT | Performed by: STUDENT IN AN ORGANIZED HEALTH CARE EDUCATION/TRAINING PROGRAM

## 2024-09-28 PROCEDURE — 84100 ASSAY OF PHOSPHORUS: CPT | Performed by: INTERNAL MEDICINE

## 2024-09-28 PROCEDURE — 2500000001 HC RX 250 WO HCPCS SELF ADMINISTERED DRUGS (ALT 637 FOR MEDICARE OP): Performed by: NURSE PRACTITIONER

## 2024-09-28 PROCEDURE — 36415 COLL VENOUS BLD VENIPUNCTURE: CPT | Performed by: INTERNAL MEDICINE

## 2024-09-28 PROCEDURE — 94640 AIRWAY INHALATION TREATMENT: CPT

## 2024-09-28 PROCEDURE — 2500000004 HC RX 250 GENERAL PHARMACY W/ HCPCS (ALT 636 FOR OP/ED): Performed by: CLINICAL NURSE SPECIALIST

## 2024-09-28 PROCEDURE — 2500000002 HC RX 250 W HCPCS SELF ADMINISTERED DRUGS (ALT 637 FOR MEDICARE OP, ALT 636 FOR OP/ED): Performed by: STUDENT IN AN ORGANIZED HEALTH CARE EDUCATION/TRAINING PROGRAM

## 2024-09-28 RX ORDER — PREDNISONE 5 MG/1
5 TABLET ORAL DAILY
Status: DISCONTINUED | OUTPATIENT
Start: 2024-10-09 | End: 2024-09-28 | Stop reason: HOSPADM

## 2024-09-28 RX ORDER — PREDNISONE 10 MG/1
10 TABLET ORAL DAILY
Status: DISCONTINUED | OUTPATIENT
Start: 2024-10-06 | End: 2024-09-28 | Stop reason: HOSPADM

## 2024-09-28 RX ORDER — PREDNISONE 10 MG/1
TABLET ORAL
Qty: 90 TABLET | Refills: 0 | Status: SHIPPED | OUTPATIENT
Start: 2024-09-28

## 2024-09-28 RX ORDER — PREDNISONE 20 MG/1
20 TABLET ORAL DAILY
Status: DISCONTINUED | OUTPATIENT
Start: 2024-10-03 | End: 2024-09-28 | Stop reason: HOSPADM

## 2024-09-28 RX ORDER — LISINOPRIL 20 MG/1
20 TABLET ORAL DAILY
Start: 2024-09-28 | End: 2024-10-03 | Stop reason: SINTOL

## 2024-09-28 RX ORDER — POTASSIUM CHLORIDE 1.5 G/1.58G
20 POWDER, FOR SOLUTION ORAL ONCE
Status: COMPLETED | OUTPATIENT
Start: 2024-09-28 | End: 2024-09-28

## 2024-09-28 RX ORDER — HYDROCHLOROTHIAZIDE 12.5 MG/1
12.5 TABLET ORAL DAILY
Start: 2024-09-28 | End: 2024-10-03 | Stop reason: SINTOL

## 2024-09-28 ASSESSMENT — COGNITIVE AND FUNCTIONAL STATUS - GENERAL
HELP NEEDED FOR BATHING: A LITTLE
TURNING FROM BACK TO SIDE WHILE IN FLAT BAD: A LITTLE
PERSONAL GROOMING: A LITTLE
MOVING FROM LYING ON BACK TO SITTING ON SIDE OF FLAT BED WITH BEDRAILS: A LITTLE
MOVING TO AND FROM BED TO CHAIR: A LITTLE
DAILY ACTIVITIY SCORE: 18
MOBILITY SCORE: 18
STANDING UP FROM CHAIR USING ARMS: A LITTLE
EATING MEALS: A LITTLE
TOILETING: A LITTLE
DRESSING REGULAR LOWER BODY CLOTHING: A LITTLE
CLIMB 3 TO 5 STEPS WITH RAILING: A LITTLE
DRESSING REGULAR UPPER BODY CLOTHING: A LITTLE
WALKING IN HOSPITAL ROOM: A LITTLE

## 2024-09-28 ASSESSMENT — ENCOUNTER SYMPTOMS
VOMITING: 0
SHORTNESS OF BREATH: 0
FEVER: 0
ABDOMINAL PAIN: 0
ABDOMINAL DISTENTION: 0

## 2024-09-28 ASSESSMENT — PAIN SCALES - GENERAL: PAINLEVEL_OUTOF10: 0 - NO PAIN

## 2024-09-28 NOTE — NURSING NOTE

## 2024-09-28 NOTE — PROGRESS NOTES
"Kalyan Armstrong is a 74 y.o. male on day 4 of admission presenting with Chronic obstructive pulmonary disease, unspecified COPD type (Multi).    Subjective   Feels \"good\".  Denies shortness of breath or pain, but does admit to an occasional cough productive of a small amount of clear sputum.  On 3 L nasal cannula oxygen which is his baseline.     Objective   Physical Exam  Vitals  Blood pressure 110/74, pulse 101, temperature 36.6 °C (97.9 °F), temperature source Temporal, resp. rate 15, height 1.753 m (5' 9\"), weight 59.4 kg (131 lb), SpO2 99%.  Intake/Output last 3 Shifts:  I/O last 3 completed shifts:  In: 3387.5 (57 mL/kg) [P.O.:2140; I.V.:1247.5 (21 mL/kg)]  Out: 3350 (56.4 mL/kg) [Urine:3350 (1.6 mL/kg/hr)]  Weight: 59.4 kg     General-awake, alert and in no acute distress.  Lungs-clear, without wheezes, rales or rhonchi.  Heart-regular rate and rhythm.  Abdomen-positive bowel sounds.  Extremities-no edema.  Skin-no rashes.    Scheduled medications  amLODIPine, 10 mg, oral, Daily  azithromycin, 250 mg, oral, Daily  budesonide, 0.5 mg, nebulization, BID  cefTRIAXone, 2 g, intravenous, q24h  formoterol, 20 mcg, nebulization, BID  heparin (porcine), 5,000 Units, subcutaneous, q8h GULSHAN  ipratropium-albuteroL, 3 mL, nebulization, TID  oxygen, , inhalation, Continuous - Inhalation  pantoprazole, 40 mg, oral, Daily before breakfast  predniSONE, 40 mg, oral, Daily  rosuvastatin, 40 mg, oral, Daily      Continuous medications     PRN medications  PRN medications: acetaminophen **OR** acetaminophen **OR** acetaminophen, ipratropium-albuteroL     Results for orders placed or performed during the hospital encounter of 09/24/24 (from the past 24 hour(s))   SST TOP   Result Value Ref Range    Extra Tube Hold for add-ons.    Renal Function Panel   Result Value Ref Range    Glucose 85 74 - 99 mg/dL    Sodium 137 136 - 145 mmol/L    Potassium 3.4 (L) 3.5 - 5.3 mmol/L    Chloride 98 98 - 107 mmol/L    Bicarbonate 31 21 - 32 " "mmol/L    Anion Gap 11 10 - 20 mmol/L    Urea Nitrogen 26 (H) 6 - 23 mg/dL    Creatinine 2.44 (H) 0.50 - 1.30 mg/dL    eGFR 27 (L) >60 mL/min/1.73m*2    Calcium 8.2 (L) 8.6 - 10.3 mg/dL    Phosphorus 1.9 (L) 2.5 - 4.9 mg/dL    Albumin 3.5 3.4 - 5.0 g/dL      Assessment:   Patient with severe COPD, chronic hypoxia and chronic hypercapnia, admitted with possible pneumonia, superimposed on a COPD exacerbation, now clinically improved.  There is no bronchospasm.  The patient has stable pulmonary nodules which are most likely benign.    Recommend:  1.  Continue DuoNeb, budesonide, formoterol, Zithromax and subcutaneous heparin; resume home breathing medications upon discharge (albuterol and Trelegy Ellipta).  2.  Begin a prednisone taper to \"off\" over approximately 12 days.  3.  Incentive spirometry and Acapella treatment.  4.  Keep oxygen saturation approximately 92 to 95%; home oxygen evaluation prior to discharge.  5.  Low-dose CT scan of the chest to assess for pulmonary nodules in 1 year.  6.  Follow-up with his outpatient pulmonologist, Dr. Maynor Villagomez, after discharge.  7.  Overall, the patient appears to be clinically stable for discharge from a pulmonary standpoint.    Trav Velazquez MD      "

## 2024-09-28 NOTE — DISCHARGE SUMMARY
[facility] Hospitalist Discharge Summary        Kalyan Mena: 1950MRN: [id number]    ADMIT DATE: 9/24/2024DISCHARGE DATE: 9/28/2024    PRIMARYCARE PHYSICIAN: Rosa Lees DO    VISIT STATUS: Inpatient    CODE STATUS: Full Code    DISCHARGE DIAGNOSES:    Principal Problem:    Chronic obstructive pulmonary disease, unspecified COPD type (Multi)      CONSULTANTS:  Pulm  Nephro    HOSPITAL COURSE:    Suspected COPD exacerbation  Possible PNA  Chronic respiratory failure on 3-4L NC (he is on PO pred 5mg daily and PO azithro 250mg daily at home for COPD through his pulmonologist)  CATHIE on CKD  Hyponatremia  Lactic acidosis  Anemia  HTN  Chronic Hep C     - appreciate pulm and nephrology consults  - ok for DC home on prednisone taper. Pulmonary rehab referral made. He will follow up w/ his PCP (getting BMP in 1 week, holding lisinopril and hydrochlorothiazide for now while Scr recovering, can re-assess need for it after). He will also follow up with his pulmonologist    DAY OF DISCHARGE:  Review of Systems   Constitutional:  Negative for fever.   Respiratory:  Negative for shortness of breath.    Cardiovascular:  Negative for chest pain.   Gastrointestinal:  Negative for abdominal distention, abdominal pain and vomiting.       Patient Vitals for the past 24 hrs:   BP Temp Temp src Pulse Resp SpO2   09/28/24 1130 -- -- -- (!) 113 23 --   09/28/24 1101 114/77 37.3 °C (99.1 °F) Temporal (!) 118 18 97 %   09/28/24 1100 -- -- -- (!) 122 (!) 29 --   09/28/24 1030 -- -- -- (!) 132 20 --   09/28/24 1000 -- -- -- (!) 129 (!) 47 --   09/28/24 0930 -- -- -- 101 15 --   09/28/24 0900 -- -- -- 90 20 --   09/28/24 0830 -- -- -- 100 23 --   09/28/24 0800 -- -- -- 94 20 --   09/28/24 0745 110/74 36.6 °C (97.9 °F) Temporal 80 18 99 %   09/28/24 0735 -- -- -- -- -- 99 %   09/28/24 0730 -- -- -- 84 20 --   09/28/24 0700 -- -- -- 76 20 --   09/28/24 0630 -- -- -- 79 19 --   09/28/24 0600 -- -- -- 73 20 --   09/28/24 0530 -- -- -- 73 17  --   24 0500 -- -- -- 71 18 --   24 0430 -- -- -- 70 17 --   24 0400 -- -- -- 65 17 --   24 0342 122/76 36.7 °C (98 °F) Oral 75 18 99 %   24 0330 -- -- -- 76 19 --   24 0300 -- -- -- 103 -- --   24 0230 -- -- -- (!) 121 -- --   24 0200 -- -- -- 108 -- --   24 2325 111/70 36.5 °C (97.7 °F) Oral 77 18 100 %   24 1939 -- -- -- -- -- 100 %   24 193 104/61 36.7 °C (98.1 °F) Oral 80 18 100 %   24 1629 125/76 37 °C (98.6 °F) Temporal 91 16 99 %   24 1459 118/67 36.7 °C (98.1 °F) Temporal 87 18 99 %       Average, Min, and Max forlast 24 hours Vitals:  TEMPERATURE: Temp  Av.8 °C (98.2 °F)  Min: 36.5 °C (97.7 °F)  Max: 37.3 °C (99.1 °F)    RESPIRATIONS RANGE: Resp  Av.3  Min: 15  Max: 47    PULSE RANGE: Pulse  Av.1  Min: 65  Max: 132    BLOOD PRESSURE RANGE: Systolic (24hrs), Av , Min:104 , Max:125   ; Diastolic (24hrs), Av, Min:61, Max:77      PULSE OXIMETRYRANGE: SpO2  Av.1 %  Min: 97 %  Max: 100 %    I/O last 3 completed shifts:  In: 3387.5 (57 mL/kg) [P.O.:2140; I.V.:1247.5 (21 mL/kg)]  Out: 3350 (56.4 mL/kg) [Urine:3350 (1.6 mL/kg/hr)]  Weight: 59.4 kg     Physical Exam  Constitutional:       General: He is not in acute distress.  Neurological:      Mental Status: He is alert.           Discharge Meds       Your medication list        CHANGE how you take these medications        Instructions Last Dose Given Next Dose Due   hydroCHLOROthiazide 12.5 mg tablet  Commonly known as: Microzide  What changed: additional instructions      Take 1 tablet (12.5 mg) by mouth once daily. HOLD OFF ON TAKING UNTIL YOU SEE YOUR FAMILY DOCTOR       lisinopril 20 mg tablet  What changed: additional instructions      Take 1 tablet (20 mg) by mouth once daily. HOLD OFF ON TAKING UNTIL YOU SEE YOUR FAMILY DOCTOR       predniSONE 10 mg tablet  Commonly known as: Deltasone  What changed:   how much to take  how to take this  when to take  this  additional instructions      Take 4 tablets by mouth once daily for 1 day, then take 3 tablets by mouth once daily for 2 days, then take 2 tablets by mouth once daily for 2 days, then take 1 tablet by mouth once daily for 2 days, then take 0.5 tablets by mouth once daily going forward              CONTINUE taking these medications        Instructions Last Dose Given Next Dose Due   acetaminophen 325 mg tablet  Commonly known as: Tylenol           albuterol 90 mcg/actuation inhaler           albuterol 2.5 mg /3 mL (0.083 %) nebulizer solution           amLODIPine 10 mg tablet  Commonly known as: Norvasc      Take 1 tablet (10 mg) by mouth once daily.       azithromycin 250 mg tablet  Commonly known as: Zithromax           budesonide 0.5 mg/2 mL nebulizer solution  Commonly known as: Pulmicort           cholecalciferol 50,000 unit capsule  Commonly known as: Vitamin D-3      Take 1 capsule (50,000 Units) by mouth 1 (one) time per week.       rosuvastatin 40 mg tablet  Commonly known as: Crestor      Take 1 tablet (40 mg) by mouth once daily.       Trelegy Ellipta 100-62.5-25 mcg blister with device  Generic drug: fluticasone-umeclidin-vilanter      Inhale 1 puff once daily.       triamcinolone 0.1 % cream  Commonly known as: Kenalog      Apply topically 2 times a day. Apply to affected area 1-2 times daily as needed.                 Where to Get Your Medications        These medications were sent to [facility]  [address], [place] [address]      Hours: 8 AM to 6 PM Mon-Fri, 9 AM to 1 PM Saturday Phone: [phone]   predniSONE 10 mg tablet       Information about where to get these medications is not yet available    Ask your nurse or doctor about these medications  hydroCHLOROthiazide 12.5 mg tablet  lisinopril 20 mg tablet           FOLLOW UP TESTING, PENDING RESULTS OR REFERRALS AT FOLLOW UP VISIT:   [X] Yes as outlined above   [ ] No    DISPOSITION: Home    FACILITY NAME:  NA    Follow up with PCP Rosa Lees DO    Outpatient Follow-Up Appointments  Future Appointments   Date Time Provider Department [place]   11/14/2024  1:00 PM Rosa Lees DO [id number] [facility]   1/6/2025  1:40 PM Zorna Infante MD [id number] [facility]       I personally examined the patient and reviewed chart.  Plan of care was discussed with patient and family, all questions answered.    DISCHARGE TIME: > 30 minutes providing counseling or in coordination of care. Total time on this day of visit includes record and documentation review before and after visit including documentation and time not explicitly included on EMR time stamp.    Wan Bhakta MD  [facility]

## 2024-09-30 ENCOUNTER — PATIENT OUTREACH (OUTPATIENT)
Dept: PRIMARY CARE | Facility: CLINIC | Age: 74
End: 2024-09-30
Payer: MEDICARE

## 2024-09-30 ENCOUNTER — TELEPHONE (OUTPATIENT)
Dept: CARDIAC REHAB | Facility: CLINIC | Age: 74
End: 2024-09-30
Payer: MEDICARE

## 2024-09-30 ENCOUNTER — TELEPHONE (OUTPATIENT)
Dept: PRIMARY CARE | Facility: CLINIC | Age: 74
End: 2024-09-30
Payer: MEDICARE

## 2024-09-30 NOTE — PROGRESS NOTES
Discharge facility:  [facility]  Discharge diagnosis:     Suspected COPD exacerbation  Possible PNA  Chronic respiratory failure on 3-4L NC (he is on PO pred 5mg daily and PO azithro 250mg daily at home for COPD through his pulmonologist)  CATHIE on CKD  Hyponatremia  Lactic acidosis  Anemia  HTN  Chronic Hep C    Follow up: BMP in 1 week,     Admission date: 9/24/24  Discharge date: 9/28/24    PCP Appointment Date: No available appointments within 14 days/ message to office   Specialist Appointment Date:   Hospital Encounter and Summary: Linked    See Discharge assessment below for further details    Medications  Medications reviewed with patient/caregiver?: Yes (9/30/2024 10:42 AM)  Is the patient having any side effects they believe may be caused by any medication additions or changes?: No (9/30/2024 10:42 AM)  Does the patient have all medications ordered at discharge?: Yes (9/30/2024 10:42 AM)  Care Management Interventions: Provided patient education (9/30/2024 10:42 AM)  Prescription Comments: [facility] predniSONE 10 mg tablet (9/30/2024 10:42 AM)  Is the patient taking all medications as directed (includes completed medication regime)?: Yes (9/30/2024 10:42 AM)  Medication Comments: Instructed on azithromycin 250 mg tablet  Commonly known as: Zithromax  Take 1 tablet (250 mg) by mouth once daily,predniSONE 10 mg tablet  Commonly known as: Deltasone  Take 4 tablets by mouth once daily for 1 day,  then take 3 tablets by mouth once daily for 2  days, then take 2 tablets by mouth once daily for  2 days, then take 1 tablet by mouth once daily  for 2 days, then take 0.5 tablets by mouth once  daily going forward and  hydroCHLOROthiazide 12.5 mg tablet  Commonly known as: Microzide  Take 1 tablet (12.5 mg) by mouth once daily.  HOLD OFF ON TAKING UNTIL YOU SEE YOUR  FAMILY DOCTOR   and lisinopril 20 mg tablet  Take 1 tablet (20 mg) by mouth once daily. HOLD  OFF ON  TAKING UNTIL YOU SEE YOUR FAMILY  DOCTOR (9/30/2024 10:42 AM)  Follow Up Tasks: -- (BMP in 1 week) (9/30/2024 10:42 AM)    Appointments  Does the patient have a primary care provider?: Yes (9/30/2024 10:42 AM)  Care Management Interventions: Educated patient on importance of making appointment (message to office, no avialable appts) (9/30/2024 10:42 AM)  Has the patient kept scheduled appointments due by today?: Yes (9/30/2024 10:42 AM)  Care Management Interventions: Advised to schedule with specialist (9/30/2024 10:42 AM)  Follow Up Tasks: -- (n/a) (9/30/2024 10:42 AM)    Self Management  What is the home health agency?: [facility] home care (9/30/2024 10:42 AM)  Has home health visited the patient within 72 hours of discharge?: Not applicable (9/30/2024 10:42 AM)  Home Health Interventions: -- (n/a) (9/30/2024 10:42 AM)  What Durable Medical Equipment (DME) was ordered?: n/a (9/30/2024 10:42 AM)  Has all Durable Medical Equipment (DME) been delivered?: -- (n/a) (9/30/2024 10:42 AM)  DME Interventions: -- (n/a) (9/30/2024 10:42 AM)  Care Management Interventions: Notified PCP/provider (9/30/2024 10:42 AM)  Follow Up Tasks: -- (n/a) (9/30/2024 10:42 AM)    Patient Teaching  Does the patient have access to their discharge instructions?: Yes (9/30/2024 10:42 AM)  Care Management Interventions: Reviewed instructions with patient (9/30/2024 10:42 AM)  What is the patient's perception of their health status since discharge?: Improving (9/30/2024 10:42 AM)  Is the patient/caregiver able to teach back the hierarchy of who to call/visit for symptoms/problems? PCP, Specialist, Home Health nurse, Urgent Care, ED, 911: Yes (9/30/2024 10:42 AM)  Patient/Caregiver Education Comments: Instructed on hospital discharge instructions. Instructed on new and changed medications. Instructed if increased shortness of breath or chest pains to call 911. Provided my contact information and encouraged to call with any questions.  (9/30/2024 10:42 AM)    Wrap Up  Is the patient/caregiver familiar with Advance Care Planning?: Yes (9/30/2024 10:42 AM)  Would the patient like more information on Advance Care Planning?: No (9/30/2024 10:42 AM)  Wrap Up Additional Comments: Spoke with patient and his wife. Encuraged to monitor blood pressure and take readings to follow up appt. They are planning for patient to go to pulmonary rehab. No questions at this time. (9/30/2024 10:42 AM)

## 2024-09-30 NOTE — TELEPHONE ENCOUNTER
Spoke with pt about pulmonary rehab referral. Pt would like to think about it. Said he would call back.

## 2024-10-02 ENCOUNTER — APPOINTMENT (OUTPATIENT)
Dept: LAB | Facility: LAB | Age: 74
End: 2024-10-02
Payer: MEDICARE

## 2024-10-02 ENCOUNTER — LAB (OUTPATIENT)
Dept: LAB | Facility: LAB | Age: 74
End: 2024-10-02
Payer: MEDICARE

## 2024-10-02 DIAGNOSIS — N28.9 RENAL INSUFFICIENCY: ICD-10-CM

## 2024-10-02 LAB
ANION GAP SERPL CALC-SCNC: 14 MMOL/L (ref 10–20)
BUN SERPL-MCNC: 38 MG/DL (ref 6–23)
CALCIUM SERPL-MCNC: 9.3 MG/DL (ref 8.6–10.3)
CHLORIDE SERPL-SCNC: 95 MMOL/L (ref 98–107)
CO2 SERPL-SCNC: 30 MMOL/L (ref 21–32)
CREAT SERPL-MCNC: 2.32 MG/DL (ref 0.5–1.3)
EGFRCR SERPLBLD CKD-EPI 2021: 29 ML/MIN/1.73M*2
GLUCOSE SERPL-MCNC: 119 MG/DL (ref 74–99)
POTASSIUM SERPL-SCNC: 4.1 MMOL/L (ref 3.5–5.3)
SODIUM SERPL-SCNC: 135 MMOL/L (ref 136–145)

## 2024-10-02 PROCEDURE — 36415 COLL VENOUS BLD VENIPUNCTURE: CPT

## 2024-10-02 PROCEDURE — 80048 BASIC METABOLIC PNL TOTAL CA: CPT

## 2024-10-03 ENCOUNTER — TELEMEDICINE (OUTPATIENT)
Dept: PRIMARY CARE | Facility: CLINIC | Age: 74
End: 2024-10-03
Payer: MEDICARE

## 2024-10-03 VITALS — SYSTOLIC BLOOD PRESSURE: 105 MMHG | DIASTOLIC BLOOD PRESSURE: 77 MMHG | HEART RATE: 100 BPM

## 2024-10-03 DIAGNOSIS — J44.9 CHRONIC OBSTRUCTIVE PULMONARY DISEASE, UNSPECIFIED COPD TYPE (MULTI): ICD-10-CM

## 2024-10-03 DIAGNOSIS — I10 BENIGN ESSENTIAL HYPERTENSION: Primary | ICD-10-CM

## 2024-10-03 PROCEDURE — 3074F SYST BP LT 130 MM HG: CPT | Performed by: INTERNAL MEDICINE

## 2024-10-03 PROCEDURE — 1159F MED LIST DOCD IN RCRD: CPT | Performed by: INTERNAL MEDICINE

## 2024-10-03 PROCEDURE — 3078F DIAST BP <80 MM HG: CPT | Performed by: INTERNAL MEDICINE

## 2024-10-03 PROCEDURE — 1111F DSCHRG MED/CURRENT MED MERGE: CPT | Performed by: INTERNAL MEDICINE

## 2024-10-03 PROCEDURE — 99495 TRANSJ CARE MGMT MOD F2F 14D: CPT | Performed by: INTERNAL MEDICINE

## 2024-10-03 PROCEDURE — 1160F RVW MEDS BY RX/DR IN RCRD: CPT | Performed by: INTERNAL MEDICINE

## 2024-10-03 PROCEDURE — 1036F TOBACCO NON-USER: CPT | Performed by: INTERNAL MEDICINE

## 2024-10-03 NOTE — ASSESSMENT & PLAN NOTE
Kidney function slowly improving, likely back to baseline  Will cont holding lisinopril and hydrochlorothiazide as BP is well controlled without it at this time  Will reassess at visit in office in Nov

## 2024-10-03 NOTE — PROGRESS NOTES
Subjective   Patient ID: Kalyan Armstrong is a 74 y.o. male who presents via virtual visit for Hospital Follow-up.  An interactive audio and video telecommunication system which permits real time communications between the patient (at the originating site) and provider (at the distant site) was utilized to provide this telehealth service.    Verbal consent was requested and obtained from the patient on the day of encounter.  HPI  Pt is doing well since dc to home.   Will set up pulmonary rehab soon     HTN  -lisinopril and hydrochlorothiazide were stopped in the hospital and at [place] due to increase in Scr. Recheck Scr from yesterday show improvement back to baseline.   Home BP level 105/77      COPD/PNA  -finishing Abx and prednisone     Review of Systems   All other systems reviewed and are negative.    Objective   Physical Exam  Constitutional:       General: He is awake.      Appearance: Normal appearance. He is well-developed.      Comments: On NC O2   Neurological:      Mental Status: He is alert.   Psychiatric:         Mood and Affect: Mood normal.         Behavior: Behavior normal.       Assessment/Plan     Problem List Items Addressed This Visit       Benign essential hypertension - Primary     Kidney function slowly improving, likely back to baseline  Will cont holding lisinopril and hydrochlorothiazide as BP is well controlled without it at this time  Will reassess at visit in office in Nov         Chronic obstructive pulmonary disease, unspecified COPD type (Multi)     Finishing ABX and prednisone taper per pulm at [place]  Will set up pulm rehab

## 2024-10-04 ENCOUNTER — TELEPHONE (OUTPATIENT)
Dept: CARDIAC REHAB | Facility: CLINIC | Age: 74
End: 2024-10-04
Payer: MEDICARE

## 2024-10-04 DIAGNOSIS — J44.9 CHRONIC OBSTRUCTIVE PULMONARY DISEASE, UNSPECIFIED COPD TYPE (MULTI): Primary | ICD-10-CM

## 2024-10-07 ENCOUNTER — PATIENT OUTREACH (OUTPATIENT)
Dept: HOME HEALTH SERVICES | Age: 74
End: 2024-10-07
Payer: MEDICARE

## 2024-10-08 ENCOUNTER — APPOINTMENT (OUTPATIENT)
Dept: PULMONOLOGY | Facility: CLINIC | Age: 74
End: 2024-10-08
Payer: MEDICARE

## 2024-10-09 ENCOUNTER — HOSPITAL ENCOUNTER (OUTPATIENT)
Dept: RADIOLOGY | Facility: CLINIC | Age: 74
Discharge: HOME | End: 2024-10-09
Payer: MEDICARE

## 2024-10-09 ENCOUNTER — OFFICE VISIT (OUTPATIENT)
Dept: PULMONOLOGY | Facility: CLINIC | Age: 74
End: 2024-10-09
Payer: MEDICARE

## 2024-10-09 VITALS
RESPIRATION RATE: 16 BRPM | TEMPERATURE: 98 F | HEART RATE: 95 BPM | SYSTOLIC BLOOD PRESSURE: 118 MMHG | BODY MASS INDEX: 19.35 KG/M2 | DIASTOLIC BLOOD PRESSURE: 68 MMHG | WEIGHT: 131 LBS | OXYGEN SATURATION: 98 %

## 2024-10-09 DIAGNOSIS — J44.9 CHRONIC OBSTRUCTIVE PULMONARY DISEASE, UNSPECIFIED COPD TYPE (MULTI): Primary | ICD-10-CM

## 2024-10-09 DIAGNOSIS — J44.9 CHRONIC OBSTRUCTIVE PULMONARY DISEASE, UNSPECIFIED COPD TYPE (MULTI): ICD-10-CM

## 2024-10-09 PROCEDURE — 1160F RVW MEDS BY RX/DR IN RCRD: CPT | Performed by: INTERNAL MEDICINE

## 2024-10-09 PROCEDURE — 71046 X-RAY EXAM CHEST 2 VIEWS: CPT

## 2024-10-09 PROCEDURE — 3078F DIAST BP <80 MM HG: CPT | Performed by: INTERNAL MEDICINE

## 2024-10-09 PROCEDURE — 3074F SYST BP LT 130 MM HG: CPT | Performed by: INTERNAL MEDICINE

## 2024-10-09 PROCEDURE — 71046 X-RAY EXAM CHEST 2 VIEWS: CPT | Performed by: RADIOLOGY

## 2024-10-09 PROCEDURE — 99214 OFFICE O/P EST MOD 30 MIN: CPT | Performed by: INTERNAL MEDICINE

## 2024-10-09 PROCEDURE — 1036F TOBACCO NON-USER: CPT | Performed by: INTERNAL MEDICINE

## 2024-10-09 PROCEDURE — 1111F DSCHRG MED/CURRENT MED MERGE: CPT | Performed by: INTERNAL MEDICINE

## 2024-10-09 PROCEDURE — 1159F MED LIST DOCD IN RCRD: CPT | Performed by: INTERNAL MEDICINE

## 2024-10-09 ASSESSMENT — ENCOUNTER SYMPTOMS
FACIAL SWELLING: 0
LIGHT-HEADEDNESS: 0
FATIGUE: 0
DIZZINESS: 0
WHEEZING: 0
ABDOMINAL PAIN: 0
ABDOMINAL DISTENTION: 0
FEVER: 0
TREMORS: 0
EYE DISCHARGE: 0
CONSTIPATION: 0
NERVOUS/ANXIOUS: 0
DYSURIA: 0
NAUSEA: 0
JOINT SWELLING: 0
EYE REDNESS: 0
UNEXPECTED WEIGHT CHANGE: 0
PALPITATIONS: 0
RHINORRHEA: 0
COUGH: 1
APNEA: 0
HEADACHES: 0
FREQUENCY: 0
HEMATURIA: 0
BRUISES/BLEEDS EASILY: 0
WEAKNESS: 0
SPEECH DIFFICULTY: 0
ADENOPATHY: 0
DIFFICULTY URINATING: 0
SHORTNESS OF BREATH: 1
SLEEP DISTURBANCE: 0
ARTHRALGIAS: 0
STRIDOR: 0
SINUS PAIN: 0
SINUS PRESSURE: 0
AGITATION: 0
CHOKING: 0
NUMBNESS: 0

## 2024-10-09 NOTE — PROGRESS NOTES
@PULMONARY FOLLOW-UP@       PROBLEM:  COPD pneumonia     ASSESSMENT:  The patient is a 74-year-old with severe COPD with profound emphysema with multiple admissions for COPD flares.  He recently was admitted with some chest pain and also had a COPD flare and was treated for such.  The question of right lower lobe pneumonia was raised and received antibiotics.  Currently his vital signs are stable and his lungs are clear on auscultation with distant breath sounds.  His chest x-ray reveals no active infiltrate    PLAN:  Will continue present treatment and come back in three months       HISTORY OF PRESENT ILLNESS:  The patient is a 74-year-old with COPD who was seen on April 23, 2024.  It was reported that he had stopped smoking around 5 years before and he has a severe reduction of his FEV1. He has been on daily azithromycin to reduce congestion. He was intolerant of Daliresp in the past.  It was noted that he was admitted from March 9 to March 11, 2022 with acute renal failure and a COPD exacerbation. He presented to the emergency room with shortness of breath and hypoxemia and was treated with bronchodilators corticosteroids and positive pressure ventilation. He also has some renal insufficiency which improved during his hospital course. His respiratory status returned to normal baseline. He was discharged on a tapering dose of corticosteroids. He also has hepatitis C unable to tolerate interferon in the past but is being reassessed by Dr. Wyatt. The patient continues on his Trelegy and will has not required any rescue inhaler. When seen on April 5, 2022 he was doing decently and continued on his azithromycin. He was taking the latter as an anti-inflammatory agent. It is noted that he was admitted from June 17 to June 21, 2022 with a COPD exacerbation. Blood gas revealed a pH of 7.29, PCO2 of 58 and a PO2 of 72. He was treated with antibiotics and corticosteroids as well as BiPAP. He had no evidence of a PE.      When seen on July 18, 2022 he recently been discharged from the hospital on Trelegy inhaler and albuterol. He was on oxygen at 3 L/min. He has no fevers or chills or swelling. He has had 4 admissions for his COPD flaring over the last 7 months. He continues on the azithromycin for treatment of his COPD. Currently, he has no coughing congestion or wheezing. I subsequently obtained an arterial blood gas to see if he would qualify for noninvasive pressure ventilation. The arterial blood gas revealed a pH of 7.46, PCO2 of 44 mmHg and a PO2 of 60 mmHg. With that level of CO2 he does not qualify for the NPV.     He subsequently was readmitted to the hospital from August 5 August 8, 2022 with a COPD flare. He was discharged on his Trelegy and Augmentin along with a prednisone taper. It is noted that he is on lisinopril 40 mg a day. His chest x-ray on admission revealed hyperinflation. There are no infiltrates. He was doing after my last visit but subsequently was admitted from September 27 to September 30, 2022 with a COPD exacerbation. There was a question of atypical pneumonia and was treated for such. Some hyperkalemia was noted and he was treated with Kayexalate. I obtained a blood gas several months ago to see if he would qualify for NPV. His CO2 was only 44 mmHg. The CT scan also revealed a new nodular lesion at the right lung base and suggested follow-up in 3 to 6 months.      When seen October 12, 2022 he continued on Trelegy,, daily azithromycin, and uses albuterol as needed. He was intolerant of Daliresp in the past. His daughter statedhe is starting to get increasing congestion again and occasionally has a cough. I elected to place him on some low-dose prednisone at 5 mg a day. Since then, he has had 3 hospitalizations for COPD flaring. His last was from April 29, 2023 to May 2, 2023. His arterial blood gas on March 9, 2023 revealed a pH 7.44 PCO2 of 42 mmHg and a PO2 of 98 mmHg        When seen on May 9 2023,  the patient is down to 20 mg of prednisone being tapered after his hospitalization. He does have an upcoming sleep study to see if he qualifies for BiPAP therapies. He does not qualify in the setting of an elevated CO2. He was to continue on his Trelegy inhaler along with albuterol as needed. He is also using budesonide nebulization twice daily coupled with azithromycin daily. Currently he is having no coughing or congestion or wheezing. When he starts flaring, he develops increasing congestion. It is always white in color.     It was noted that he was admitted from July 5 to July 8, 2023 with a typical flare of COPD. He was treated with corticosteroids and bronchodilators and subsequent discharge on prednisone antibiotics etc. He continued on his Trelegy. He is on budesonide nebulization. He also was continued on oxygen at 3 L/min. His chest x-ray revealed hyperinflation. The patient was discharged on prednisone at 50 mg a day. He is completing his third day today. He has no coughing or congestion. He continues on the oxygen at 3 L/min. He is not producing any phlegm. It should be noted that he was scheduled for sleep study the day he was admitted to the hospital.     He also was admitted to the hospital from November 23 through November 26, 2023 with a COPD flare.  He had no active infiltrates and he was treated for a COPD flare with bronchodilators corticosteroids etc. he was given BiPAP.  It was noted that he was at 4 L/min of oxygen at baseline.  Nodules of his lungs have been noted on CT scan and were to be followed up CT scan in January 20, 2024     On December 19, 2023 it was reported that he was doing much better at the present time with his breathing.  He is using the budesonide twice a day and taking prednisone 5 mg daily along with his Trelegy inhaler.  He is not coughing up any green or yellow.  He has no fevers or chills.  He is utilizing his oxygen at 4 L/min.  He is actually feeling pretty good at  the present time.  He was felt to have category 3D COPD with recent flare on maximal therapies.   The screening CT scan from February 13, 2024 revealed the following    1. There are no suspicious parenchymal lung nodules.  2. Recommend a 1 year low-dose chest CT for surveillance.  3. Severe predominantly basilar emphysematous changes with multifocal  atelectasis. Correlate history of alpha-1 antitrypsin disease.  4.    On April 23, 2024 was reported that the patient was on trelogy inhaler and he rarely uses his albuterol.  He is on oxygen at 3 L/min.  He generally feels compensated at the present time without chest pains or pressures.  He had no wheezing.  No swelling of his legs are noted.  He needs recertification of his oxygen administration.  He does wear 24/7.    It was noted a recent was admitted from September 24 to September 28, 2024 with a COPD flare.  He was treated with bronchodilators corticosteroids etc.  He was noted to have acute kidney injury superimposed on chronic kidney disease with hyponatremia lactic acidosis anemia hypertension with a history of hepatitis C.  For his renal insufficiency his lisinopril and HCTZ were on hold.  He possibly had a pneumonia.  He was discharged on azithromycin along with budesonide nebulization and Trelegy inhaler and albuterol metered-dose inhaler and via nebulizer.    The chest x-ray from September 24, 2024 revealed the following  1.  hazy airspace opacities in the right lung base, possible  pneumonia.  2. Persistent bilateral lung hyperinflation and bibasilar predominant  coarse interstitial lung markings, correlating with patient's  underlying COPD.    The patient feels much better at the present time.  He has no increasing cough or congestion.  He continues on his chronic oxygen therapy at 3 L/min.  The above hospitalization was reviewed.  The oxygenation has been good and he continues on his albuterol along with budesonide nebulization twice daily and  Trelegy.    No Known Allergies         Current Outpatient Medications:     acetaminophen (Tylenol) 325 mg tablet, Take 1 tablet (325 mg) by mouth every 4 hours if needed for moderate pain (4 - 6)., Disp: , Rfl:     albuterol 2.5 mg /3 mL (0.083 %) nebulizer solution, Take by nebulization 4 times a day as needed. 1 vial, Disp: , Rfl:     albuterol 90 mcg/actuation inhaler, Inhale 1 puff every 4 hours if needed for wheezing or shortness of breath. Every 4-6 hrs prn, Disp: , Rfl:     amLODIPine (Norvasc) 10 mg tablet, Take 1 tablet (10 mg) by mouth once daily., Disp: 90 tablet, Rfl: 1    azithromycin (Zithromax) 250 mg tablet, Take 1 tablet (250 mg) by mouth once daily., Disp: , Rfl:     budesonide (Pulmicort) 0.5 mg/2 mL nebulizer solution, Take 2 mL (0.5 mg) by nebulization 2 times a day., Disp: , Rfl:     cholecalciferol (Vitamin D-3) 50,000 unit capsule, Take 1 capsule (50,000 Units) by mouth 1 (one) time per week., Disp: 8 capsule, Rfl: 0    fluticasone-umeclidin-vilanter (Trelegy Ellipta) 100-62.5-25 mcg blister with device, Inhale 1 puff once daily., Disp: 60 each, Rfl: 11    predniSONE (Deltasone) 10 mg tablet, Take 4 tablets by mouth once daily for 1 day, then take 3 tablets by mouth once daily for 2 days, then take 2 tablets by mouth once daily for 2 days, then take 1 tablet by mouth once daily for 2 days, then take 0.5 tablets by mouth once daily going forward, Disp: 90 tablet, Rfl: 0    rosuvastatin (Crestor) 40 mg tablet, Take 1 tablet (40 mg) by mouth once daily., Disp: 90 tablet, Rfl: 1    triamcinolone (Kenalog) 0.1 % cream, Apply topically 2 times a day. Apply to affected area 1-2 times daily as needed., Disp: 453 g, Rfl: 0          Review of Systems   Constitutional:  Negative for fatigue, fever and unexpected weight change.   HENT:  Negative for congestion, facial swelling, nosebleeds, postnasal drip, rhinorrhea, sinus pressure and sinus pain.    Eyes:  Negative for discharge, redness and visual  disturbance.   Respiratory:  Positive for cough and shortness of breath. Negative for apnea, choking, wheezing and stridor.    Cardiovascular:  Negative for chest pain, palpitations and leg swelling.   Gastrointestinal:  Negative for abdominal distention, abdominal pain, constipation and nausea.   Endocrine: Negative for cold intolerance and heat intolerance.   Genitourinary:  Negative for difficulty urinating, dysuria, frequency and hematuria.   Musculoskeletal:  Negative for arthralgias, gait problem and joint swelling.   Allergic/Immunologic: Negative for environmental allergies, food allergies and immunocompromised state.   Neurological:  Negative for dizziness, tremors, syncope, speech difficulty, weakness, light-headedness, numbness and headaches.   Hematological:  Negative for adenopathy. Does not bruise/bleed easily.   Psychiatric/Behavioral:  Negative for agitation, behavioral problems and sleep disturbance. The patient is not nervous/anxious.         Vitals:    10/09/24 1250   BP: 118/68   Pulse: 95   Resp: 16   Temp: 36.7 °C (98 °F)   SpO2: 98%        Physical Exam  Vitals reviewed.   Constitutional:       Comments: In a wheel chair in no distress   HENT:      Head: Normocephalic and atraumatic.   Eyes:      Extraocular Movements: Extraocular movements intact.   Cardiovascular:      Rate and Rhythm: Normal rate and regular rhythm.      Heart sounds: No murmur heard.     No friction rub. No gallop.   Pulmonary:      Effort: Pulmonary effort is normal. No respiratory distress.      Breath sounds: Normal breath sounds. No stridor. No wheezing, rhonchi or rales.      Comments: Clear but distant breath sounds  Chest:      Chest wall: No tenderness.   Abdominal:      General: Abdomen is flat. There is no distension.      Palpations: Abdomen is soft. There is no mass.      Tenderness: There is no abdominal tenderness.   Musculoskeletal:         General: Normal range of motion.      Cervical back: Normal range of  motion.      Right lower leg: No edema.      Left lower leg: No edema.   Skin:     General: Skin is warm and dry.   Neurological:      Mental Status: He is alert and oriented to person, place, and time.   Psychiatric:         Mood and Affect: Mood normal.         Behavior: Behavior normal.

## 2024-10-15 ENCOUNTER — PATIENT OUTREACH (OUTPATIENT)
Dept: PRIMARY CARE | Facility: CLINIC | Age: 74
End: 2024-10-15
Payer: MEDICARE

## 2024-10-15 NOTE — PROGRESS NOTES
Call regarding appt. with PCP on  10/3/24 after hospitalization.  At time of outreach call the patient feels as if their condition has improved. He continues to be off blood pressure medications. He is monitoring his blood pressures at home, reports 121/73.    Reviewed the PCP appointment with the pt and addressed any questions or concerns.

## 2024-10-17 DIAGNOSIS — N17.0 ACUTE KIDNEY INJURY (AKI) WITH ACUTE TUBULAR NECROSIS (ATN) (CMS-HCC): Primary | ICD-10-CM

## 2024-10-24 ENCOUNTER — LAB (OUTPATIENT)
Dept: LAB | Facility: LAB | Age: 74
End: 2024-10-24
Payer: MEDICARE

## 2024-10-24 DIAGNOSIS — N17.0 ACUTE KIDNEY INJURY (AKI) WITH ACUTE TUBULAR NECROSIS (ATN) (CMS-HCC): ICD-10-CM

## 2024-10-24 LAB
ALBUMIN SERPL BCP-MCNC: 3.8 G/DL (ref 3.4–5)
ANION GAP SERPL CALC-SCNC: 14 MMOL/L (ref 10–20)
APPEARANCE UR: CLEAR
BILIRUB UR STRIP.AUTO-MCNC: NEGATIVE MG/DL
BUN SERPL-MCNC: 27 MG/DL (ref 6–23)
CALCIUM SERPL-MCNC: 11 MG/DL (ref 8.6–10.3)
CHLORIDE SERPL-SCNC: 94 MMOL/L (ref 98–107)
CO2 SERPL-SCNC: 32 MMOL/L (ref 21–32)
COLOR UR: YELLOW
CREAT SERPL-MCNC: 4.21 MG/DL (ref 0.5–1.3)
CREAT UR-MCNC: 94.1 MG/DL (ref 20–370)
EGFRCR SERPLBLD CKD-EPI 2021: 14 ML/MIN/1.73M*2
ERYTHROCYTE [DISTWIDTH] IN BLOOD BY AUTOMATED COUNT: 12.7 % (ref 11.5–14.5)
GLUCOSE SERPL-MCNC: 134 MG/DL (ref 74–99)
GLUCOSE UR STRIP.AUTO-MCNC: NORMAL MG/DL
HCT VFR BLD AUTO: 30.5 % (ref 41–52)
HGB BLD-MCNC: 10.3 G/DL (ref 13.5–17.5)
KETONES UR STRIP.AUTO-MCNC: NEGATIVE MG/DL
LEUKOCYTE ESTERASE UR QL STRIP.AUTO: ABNORMAL
MCH RBC QN AUTO: 29.4 PG (ref 26–34)
MCHC RBC AUTO-ENTMCNC: 33.8 G/DL (ref 32–36)
MCV RBC AUTO: 87 FL (ref 80–100)
NITRITE UR QL STRIP.AUTO: NEGATIVE
NRBC BLD-RTO: 0 /100 WBCS (ref 0–0)
PH UR STRIP.AUTO: 7.5 [PH]
PHOSPHATE SERPL-MCNC: 4.4 MG/DL (ref 2.5–4.9)
PLATELET # BLD AUTO: 319 X10*3/UL (ref 150–450)
POTASSIUM SERPL-SCNC: 4.1 MMOL/L (ref 3.5–5.3)
PROT SERPL-MCNC: 7.3 G/DL (ref 6.4–8.2)
PROT UR STRIP.AUTO-MCNC: ABNORMAL MG/DL
PROT UR-ACNC: 60 MG/DL (ref 5–25)
PROT/CREAT UR: 0.64 MG/MG CREAT (ref 0–0.17)
RBC # BLD AUTO: 3.5 X10*6/UL (ref 4.5–5.9)
RBC # UR STRIP.AUTO: ABNORMAL /UL
RBC #/AREA URNS AUTO: ABNORMAL /HPF
SODIUM SERPL-SCNC: 136 MMOL/L (ref 136–145)
SP GR UR STRIP.AUTO: 1.01
URATE SERPL-MCNC: 7.1 MG/DL (ref 4–7.5)
UROBILINOGEN UR STRIP.AUTO-MCNC: NORMAL MG/DL
WBC # BLD AUTO: 6.8 X10*3/UL (ref 4.4–11.3)
WBC #/AREA URNS AUTO: ABNORMAL /HPF

## 2024-10-24 PROCEDURE — 80069 RENAL FUNCTION PANEL: CPT

## 2024-10-24 PROCEDURE — 82570 ASSAY OF URINE CREATININE: CPT

## 2024-10-24 PROCEDURE — 84155 ASSAY OF PROTEIN SERUM: CPT

## 2024-10-24 PROCEDURE — 36415 COLL VENOUS BLD VENIPUNCTURE: CPT

## 2024-10-24 PROCEDURE — 84550 ASSAY OF BLOOD/URIC ACID: CPT

## 2024-10-24 PROCEDURE — 84165 PROTEIN E-PHORESIS SERUM: CPT | Performed by: INTERNAL MEDICINE

## 2024-10-24 PROCEDURE — 81001 URINALYSIS AUTO W/SCOPE: CPT

## 2024-10-24 PROCEDURE — 84165 PROTEIN E-PHORESIS SERUM: CPT

## 2024-10-24 PROCEDURE — 84156 ASSAY OF PROTEIN URINE: CPT

## 2024-10-24 PROCEDURE — 83521 IG LIGHT CHAINS FREE EACH: CPT

## 2024-10-24 PROCEDURE — 85027 COMPLETE CBC AUTOMATED: CPT

## 2024-10-25 LAB
KAPPA LC SERPL-MCNC: 7.73 MG/DL (ref 0.33–1.94)
KAPPA LC/LAMBDA SER: 1.54 {RATIO} (ref 0.26–1.65)
LAMBDA LC SERPL-MCNC: 5.02 MG/DL (ref 0.57–2.63)

## 2024-10-28 LAB
ALBUMIN: 3.5 G/DL (ref 3.4–5)
ALPHA 1 GLOBULIN: 0.5 G/DL (ref 0.2–0.6)
ALPHA 2 GLOBULIN: 1.3 G/DL (ref 0.4–1.1)
BETA GLOBULIN: 0.8 G/DL (ref 0.5–1.2)
GAMMA GLOBULIN: 1.3 G/DL (ref 0.5–1.4)
PATH REVIEW-SERUM PROTEIN ELECTROPHORESIS: ABNORMAL
PROTEIN ELECTROPHORESIS COMMENT: ABNORMAL

## 2024-11-11 ENCOUNTER — TELEPHONE (OUTPATIENT)
Dept: PULMONOLOGY | Facility: CLINIC | Age: 74
End: 2024-11-11
Payer: MEDICARE

## 2024-11-11 DIAGNOSIS — J44.9 CHRONIC OBSTRUCTIVE PULMONARY DISEASE, UNSPECIFIED COPD TYPE (MULTI): Primary | ICD-10-CM

## 2024-11-11 RX ORDER — AZITHROMYCIN 250 MG/1
TABLET, FILM COATED ORAL
Qty: 30 TABLET | Refills: 6 | Status: ON HOLD | OUTPATIENT
Start: 2024-11-11

## 2024-11-11 NOTE — TELEPHONE ENCOUNTER
Wife called asking if you can refill the Azithromycin    If so, please send to the [facility]'s pharmacy on file.

## 2024-11-14 ENCOUNTER — APPOINTMENT (OUTPATIENT)
Dept: PRIMARY CARE | Facility: CLINIC | Age: 74
End: 2024-11-14
Payer: MEDICARE

## 2024-11-14 ENCOUNTER — PATIENT OUTREACH (OUTPATIENT)
Dept: PRIMARY CARE | Facility: CLINIC | Age: 74
End: 2024-11-14

## 2024-11-14 NOTE — PROGRESS NOTES
One month follow up call to patient. Spoke with patient and his wife. They reports he is doing alright. Denies any increased shortness of breath. He has upcoming appt with PCP. They deny any questions or concerns at this time. Pt aware of my availability for non-emergent concerns. Contact info provided to patient

## 2024-11-18 ENCOUNTER — APPOINTMENT (OUTPATIENT)
Dept: PRIMARY CARE | Facility: CLINIC | Age: 74
End: 2024-11-18
Payer: MEDICARE

## 2024-11-18 ENCOUNTER — LAB (OUTPATIENT)
Dept: LAB | Facility: LAB | Age: 74
End: 2024-11-18
Payer: MEDICARE

## 2024-11-18 VITALS
DIASTOLIC BLOOD PRESSURE: 75 MMHG | HEART RATE: 118 BPM | HEIGHT: 69 IN | WEIGHT: 110 LBS | BODY MASS INDEX: 16.29 KG/M2 | SYSTOLIC BLOOD PRESSURE: 124 MMHG

## 2024-11-18 DIAGNOSIS — N18.4 STAGE 4 CHRONIC KIDNEY DISEASE (MULTI): ICD-10-CM

## 2024-11-18 DIAGNOSIS — Z00.00 GENERAL MEDICAL EXAM: Primary | ICD-10-CM

## 2024-11-18 DIAGNOSIS — R63.4 WEIGHT LOSS: ICD-10-CM

## 2024-11-18 DIAGNOSIS — R63.0 DECREASED APPETITE: ICD-10-CM

## 2024-11-18 DIAGNOSIS — S59.901D INJURY OF RIGHT ELBOW, SUBSEQUENT ENCOUNTER: ICD-10-CM

## 2024-11-18 LAB
ALBUMIN SERPL BCP-MCNC: 4.1 G/DL (ref 3.4–5)
ALP SERPL-CCNC: 57 U/L (ref 33–136)
ALT SERPL W P-5'-P-CCNC: 23 U/L (ref 10–52)
ANION GAP SERPL CALC-SCNC: 14 MMOL/L (ref 10–20)
AST SERPL W P-5'-P-CCNC: 31 U/L (ref 9–39)
BILIRUB SERPL-MCNC: 0.5 MG/DL (ref 0–1.2)
BUN SERPL-MCNC: 40 MG/DL (ref 6–23)
CALCIUM SERPL-MCNC: 11.4 MG/DL (ref 8.6–10.6)
CHLORIDE SERPL-SCNC: 94 MMOL/L (ref 98–107)
CO2 SERPL-SCNC: 33 MMOL/L (ref 21–32)
CREAT SERPL-MCNC: 6.02 MG/DL (ref 0.5–1.3)
EGFRCR SERPLBLD CKD-EPI 2021: 9 ML/MIN/1.73M*2
GLUCOSE SERPL-MCNC: 112 MG/DL (ref 74–99)
POTASSIUM SERPL-SCNC: 4.8 MMOL/L (ref 3.5–5.3)
PROT SERPL-MCNC: 8 G/DL (ref 6.4–8.2)
SODIUM SERPL-SCNC: 136 MMOL/L (ref 136–145)

## 2024-11-18 PROCEDURE — 1159F MED LIST DOCD IN RCRD: CPT | Performed by: INTERNAL MEDICINE

## 2024-11-18 PROCEDURE — 3078F DIAST BP <80 MM HG: CPT | Performed by: INTERNAL MEDICINE

## 2024-11-18 PROCEDURE — 80053 COMPREHEN METABOLIC PANEL: CPT

## 2024-11-18 PROCEDURE — 1170F FXNL STATUS ASSESSED: CPT | Performed by: INTERNAL MEDICINE

## 2024-11-18 PROCEDURE — 3074F SYST BP LT 130 MM HG: CPT | Performed by: INTERNAL MEDICINE

## 2024-11-18 PROCEDURE — 1123F ACP DISCUSS/DSCN MKR DOCD: CPT | Performed by: INTERNAL MEDICINE

## 2024-11-18 PROCEDURE — 99214 OFFICE O/P EST MOD 30 MIN: CPT | Performed by: INTERNAL MEDICINE

## 2024-11-18 PROCEDURE — 1158F ADVNC CARE PLAN TLK DOCD: CPT | Performed by: INTERNAL MEDICINE

## 2024-11-18 PROCEDURE — 1160F RVW MEDS BY RX/DR IN RCRD: CPT | Performed by: INTERNAL MEDICINE

## 2024-11-18 PROCEDURE — 3008F BODY MASS INDEX DOCD: CPT | Performed by: INTERNAL MEDICINE

## 2024-11-18 PROCEDURE — 36415 COLL VENOUS BLD VENIPUNCTURE: CPT

## 2024-11-18 PROCEDURE — G0439 PPPS, SUBSEQ VISIT: HCPCS | Performed by: INTERNAL MEDICINE

## 2024-11-18 PROCEDURE — 1157F ADVNC CARE PLAN IN RCRD: CPT | Performed by: INTERNAL MEDICINE

## 2024-11-18 RX ORDER — MIRTAZAPINE 7.5 MG/1
7.5 TABLET, FILM COATED ORAL NIGHTLY
Qty: 30 TABLET | Refills: 2 | Status: ON HOLD | OUTPATIENT
Start: 2024-11-18 | End: 2025-02-16

## 2024-11-18 ASSESSMENT — PATIENT HEALTH QUESTIONNAIRE - PHQ9
2. FEELING DOWN, DEPRESSED OR HOPELESS: NOT AT ALL
2. FEELING DOWN, DEPRESSED OR HOPELESS: NOT AT ALL
SUM OF ALL RESPONSES TO PHQ9 QUESTIONS 1 AND 2: 0
1. LITTLE INTEREST OR PLEASURE IN DOING THINGS: NOT AT ALL
1. LITTLE INTEREST OR PLEASURE IN DOING THINGS: NOT AT ALL
SUM OF ALL RESPONSES TO PHQ9 QUESTIONS 1 AND 2: 0

## 2024-11-18 ASSESSMENT — ACTIVITIES OF DAILY LIVING (ADL)
TAKING_MEDICATION: INDEPENDENT
MANAGING_FINANCES: NEEDS ASSISTANCE
BATHING: INDEPENDENT
GROCERY_SHOPPING: NEEDS ASSISTANCE
DRESSING: INDEPENDENT
DOING_HOUSEWORK: NEEDS ASSISTANCE

## 2024-11-18 ASSESSMENT — ENCOUNTER SYMPTOMS
OCCASIONAL FEELINGS OF UNSTEADINESS: 1
DEPRESSION: 0
LOSS OF SENSATION IN FEET: 0

## 2024-11-18 ASSESSMENT — LIFESTYLE VARIABLES
AUDIT-C TOTAL SCORE: 1
SKIP TO QUESTIONS 9-10: 1
HOW OFTEN DO YOU HAVE A DRINK CONTAINING ALCOHOL: MONTHLY OR LESS
HOW MANY STANDARD DRINKS CONTAINING ALCOHOL DO YOU HAVE ON A TYPICAL DAY: 1 OR 2
HOW OFTEN DO YOU HAVE SIX OR MORE DRINKS ON ONE OCCASION: NEVER

## 2024-11-19 DIAGNOSIS — N17.9 ACUTE KIDNEY INJURY (CMS-HCC): Primary | ICD-10-CM

## 2024-11-20 ENCOUNTER — HOSPITAL ENCOUNTER (INPATIENT)
Facility: HOSPITAL | Age: 74
DRG: 682 | End: 2024-11-20
Attending: GENERAL PRACTICE | Admitting: INTERNAL MEDICINE
Payer: MEDICARE

## 2024-11-20 ENCOUNTER — APPOINTMENT (OUTPATIENT)
Dept: RADIOLOGY | Facility: HOSPITAL | Age: 74
End: 2024-11-20
Payer: MEDICARE

## 2024-11-20 DIAGNOSIS — R63.4 WEIGHT LOSS: ICD-10-CM

## 2024-11-20 DIAGNOSIS — J44.9 CHRONIC OBSTRUCTIVE PULMONARY DISEASE, UNSPECIFIED COPD TYPE (MULTI): ICD-10-CM

## 2024-11-20 DIAGNOSIS — N17.9 ACUTE KIDNEY INJURY (CMS-HCC): Primary | ICD-10-CM

## 2024-11-20 DIAGNOSIS — R63.0 DECREASED APPETITE: ICD-10-CM

## 2024-11-20 LAB
ALBUMIN SERPL BCP-MCNC: 4.2 G/DL (ref 3.4–5)
ALP SERPL-CCNC: 54 U/L (ref 33–136)
ALT SERPL W P-5'-P-CCNC: 25 U/L (ref 10–52)
ANION GAP SERPL CALC-SCNC: 17 MMOL/L (ref 10–20)
APPEARANCE UR: CLEAR
AST SERPL W P-5'-P-CCNC: 29 U/L (ref 9–39)
BASOPHILS # BLD AUTO: 0.05 X10*3/UL (ref 0–0.1)
BASOPHILS NFR BLD AUTO: 0.8 %
BILIRUB SERPL-MCNC: 0.5 MG/DL (ref 0–1.2)
BILIRUB UR STRIP.AUTO-MCNC: NEGATIVE MG/DL
BUN SERPL-MCNC: 47 MG/DL (ref 6–23)
CALCIUM SERPL-MCNC: 11 MG/DL (ref 8.6–10.3)
CHLORIDE SERPL-SCNC: 94 MMOL/L (ref 98–107)
CO2 SERPL-SCNC: 27 MMOL/L (ref 21–32)
COLOR UR: ABNORMAL
CREAT SERPL-MCNC: 6.18 MG/DL (ref 0.5–1.3)
EGFRCR SERPLBLD CKD-EPI 2021: 9 ML/MIN/1.73M*2
EOSINOPHIL # BLD AUTO: 0.12 X10*3/UL (ref 0–0.4)
EOSINOPHIL NFR BLD AUTO: 1.8 %
ERYTHROCYTE [DISTWIDTH] IN BLOOD BY AUTOMATED COUNT: 13.2 % (ref 11.5–14.5)
GLUCOSE SERPL-MCNC: 102 MG/DL (ref 74–99)
GLUCOSE UR STRIP.AUTO-MCNC: NORMAL MG/DL
HCT VFR BLD AUTO: 28.1 % (ref 41–52)
HGB BLD-MCNC: 9.5 G/DL (ref 13.5–17.5)
IMM GRANULOCYTES # BLD AUTO: 0.04 X10*3/UL (ref 0–0.5)
IMM GRANULOCYTES NFR BLD AUTO: 0.6 % (ref 0–0.9)
KETONES UR STRIP.AUTO-MCNC: NEGATIVE MG/DL
LEUKOCYTE ESTERASE UR QL STRIP.AUTO: ABNORMAL
LYMPHOCYTES # BLD AUTO: 1.23 X10*3/UL (ref 0.8–3)
LYMPHOCYTES NFR BLD AUTO: 18.7 %
MCH RBC QN AUTO: 29 PG (ref 26–34)
MCHC RBC AUTO-ENTMCNC: 33.8 G/DL (ref 32–36)
MCV RBC AUTO: 86 FL (ref 80–100)
MONOCYTES # BLD AUTO: 0.65 X10*3/UL (ref 0.05–0.8)
MONOCYTES NFR BLD AUTO: 9.9 %
NEUTROPHILS # BLD AUTO: 4.5 X10*3/UL (ref 1.6–5.5)
NEUTROPHILS NFR BLD AUTO: 68.2 %
NITRITE UR QL STRIP.AUTO: NEGATIVE
NRBC BLD-RTO: 0 /100 WBCS (ref 0–0)
PH UR STRIP.AUTO: 6.5 [PH]
PLATELET # BLD AUTO: 243 X10*3/UL (ref 150–450)
POTASSIUM SERPL-SCNC: 4.2 MMOL/L (ref 3.5–5.3)
PROT SERPL-MCNC: 8.1 G/DL (ref 6.4–8.2)
PROT UR STRIP.AUTO-MCNC: ABNORMAL MG/DL
RBC # BLD AUTO: 3.28 X10*6/UL (ref 4.5–5.9)
RBC # UR STRIP.AUTO: ABNORMAL /UL
RBC #/AREA URNS AUTO: >20 /HPF
SODIUM SERPL-SCNC: 134 MMOL/L (ref 136–145)
SP GR UR STRIP.AUTO: 1.01
UROBILINOGEN UR STRIP.AUTO-MCNC: NORMAL MG/DL
WBC # BLD AUTO: 6.6 X10*3/UL (ref 4.4–11.3)
WBC #/AREA URNS AUTO: ABNORMAL /HPF

## 2024-11-20 PROCEDURE — 81001 URINALYSIS AUTO W/SCOPE: CPT | Performed by: NURSE PRACTITIONER

## 2024-11-20 PROCEDURE — 87086 URINE CULTURE/COLONY COUNT: CPT | Mod: AHULAB | Performed by: NURSE PRACTITIONER

## 2024-11-20 PROCEDURE — 36415 COLL VENOUS BLD VENIPUNCTURE: CPT | Performed by: NURSE PRACTITIONER

## 2024-11-20 PROCEDURE — 85025 COMPLETE CBC W/AUTO DIFF WBC: CPT | Performed by: NURSE PRACTITIONER

## 2024-11-20 PROCEDURE — 74176 CT ABD & PELVIS W/O CONTRAST: CPT

## 2024-11-20 PROCEDURE — 74176 CT ABD & PELVIS W/O CONTRAST: CPT | Performed by: RADIOLOGY

## 2024-11-20 PROCEDURE — 99285 EMERGENCY DEPT VISIT HI MDM: CPT | Mod: 25

## 2024-11-20 PROCEDURE — 80053 COMPREHEN METABOLIC PANEL: CPT | Performed by: NURSE PRACTITIONER

## 2024-11-20 ASSESSMENT — PAIN - FUNCTIONAL ASSESSMENT: PAIN_FUNCTIONAL_ASSESSMENT: 0-10

## 2024-11-20 ASSESSMENT — PAIN SCALES - GENERAL
PAINLEVEL_OUTOF10: 0 - NO PAIN
PAINLEVEL_OUTOF10: 0 - NO PAIN

## 2024-11-20 NOTE — ED TRIAGE NOTES
TRIAGE NOTE   I saw the patient as the Clinician in Triage and performed a brief history and physical exam, established acuity, and ordered appropriate tests to develop basic plan of care. Patient will be seen by an [facility], resident and/or physician who will independently evaluate the patient. Please see subsequent provider notes for further details and disposition.     Brief HPI: In brief, Kalyan Armstrong is a 74 y.o. male that presents for abnormal labs.  Sent here by nephrology for worsening renal function.  Patient has no complaints at this time.  States he is urinating without difficulty..     Focused Physical exam:   Alert and oriented  Abdomen soft and nontender    Plan/MDM:   Urinalysis, CBC, CMP    Please see subsequent provider note for further details and disposition

## 2024-11-20 NOTE — ED TRIAGE NOTES
Pt was sent by nephrologist for kidney issues. Denies pain. Denies urinary symptoms. Pt states his doctor wanted him admitted.

## 2024-11-21 PROBLEM — N17.9 ACUTE KIDNEY INJURY (CMS-HCC): Status: ACTIVE | Noted: 2024-11-21

## 2024-11-21 LAB
ANION GAP SERPL CALC-SCNC: 11 MMOL/L (ref 10–20)
BASOPHILS # BLD AUTO: 0.1 X10*3/UL (ref 0–0.1)
BASOPHILS NFR BLD AUTO: 1.5 %
BUN SERPL-MCNC: 42 MG/DL (ref 6–23)
CALCIUM SERPL-MCNC: 9.1 MG/DL (ref 8.6–10.3)
CHLORIDE SERPL-SCNC: 102 MMOL/L (ref 98–107)
CK SERPL-CCNC: 40 U/L (ref 0–325)
CO2 SERPL-SCNC: 28 MMOL/L (ref 21–32)
CREAT SERPL-MCNC: 5.45 MG/DL (ref 0.5–1.3)
EGFRCR SERPLBLD CKD-EPI 2021: 10 ML/MIN/1.73M*2
EOSINOPHIL # BLD AUTO: 0.43 X10*3/UL (ref 0–0.4)
EOSINOPHIL NFR BLD AUTO: 6.3 %
ERYTHROCYTE [DISTWIDTH] IN BLOOD BY AUTOMATED COUNT: 13.4 % (ref 11.5–14.5)
FERRITIN SERPL-MCNC: 583 NG/ML (ref 20–300)
GLUCOSE SERPL-MCNC: 69 MG/DL (ref 74–99)
HBV SURFACE AB SER-ACNC: 25.8 MIU/ML
HBV SURFACE AG SERPL QL IA: NONREACTIVE
HCT VFR BLD AUTO: 26.1 % (ref 41–52)
HGB BLD-MCNC: 8.7 G/DL (ref 13.5–17.5)
HGB RETIC QN: 32 PG (ref 28–38)
IMM GRANULOCYTES # BLD AUTO: 0.04 X10*3/UL (ref 0–0.5)
IMM GRANULOCYTES NFR BLD AUTO: 0.6 % (ref 0–0.9)
IMMATURE RETIC FRACTION: 8.4 %
IRON SATN MFR SERPL: 25 % (ref 25–45)
IRON SERPL-MCNC: 59 UG/DL (ref 35–150)
LDH SERPL L TO P-CCNC: 230 U/L (ref 84–246)
LYMPHOCYTES # BLD AUTO: 0.95 X10*3/UL (ref 0.8–3)
LYMPHOCYTES NFR BLD AUTO: 14 %
MAGNESIUM SERPL-MCNC: 2.21 MG/DL (ref 1.6–2.4)
MCH RBC QN AUTO: 28.8 PG (ref 26–34)
MCHC RBC AUTO-ENTMCNC: 33.3 G/DL (ref 32–36)
MCV RBC AUTO: 86 FL (ref 80–100)
MONOCYTES # BLD AUTO: 0.66 X10*3/UL (ref 0.05–0.8)
MONOCYTES NFR BLD AUTO: 9.7 %
NEUTROPHILS # BLD AUTO: 4.61 X10*3/UL (ref 1.6–5.5)
NEUTROPHILS NFR BLD AUTO: 67.9 %
NRBC BLD-RTO: 0 /100 WBCS (ref 0–0)
PLATELET # BLD AUTO: 236 X10*3/UL (ref 150–450)
POTASSIUM SERPL-SCNC: 3.6 MMOL/L (ref 3.5–5.3)
RBC # BLD AUTO: 3.02 X10*6/UL (ref 4.5–5.9)
RETICS #: 0.04 X10*6/UL (ref 0.02–0.11)
RETICS/RBC NFR AUTO: 1.2 % (ref 0.5–2)
SODIUM SERPL-SCNC: 137 MMOL/L (ref 136–145)
TIBC SERPL-MCNC: 239 UG/DL (ref 240–445)
UIBC SERPL-MCNC: 180 UG/DL (ref 110–370)
WBC # BLD AUTO: 6.8 X10*3/UL (ref 4.4–11.3)

## 2024-11-21 PROCEDURE — 87340 HEPATITIS B SURFACE AG IA: CPT | Mod: AHULAB | Performed by: INTERNAL MEDICINE

## 2024-11-21 PROCEDURE — 1200000002 HC GENERAL ROOM WITH TELEMETRY DAILY

## 2024-11-21 PROCEDURE — 86706 HEP B SURFACE ANTIBODY: CPT | Mod: AHULAB | Performed by: INTERNAL MEDICINE

## 2024-11-21 PROCEDURE — 82550 ASSAY OF CK (CPK): CPT | Performed by: INTERNAL MEDICINE

## 2024-11-21 PROCEDURE — 2500000001 HC RX 250 WO HCPCS SELF ADMINISTERED DRUGS (ALT 637 FOR MEDICARE OP): Performed by: HOSPITALIST

## 2024-11-21 PROCEDURE — 83735 ASSAY OF MAGNESIUM: CPT | Performed by: HOSPITALIST

## 2024-11-21 PROCEDURE — 36415 COLL VENOUS BLD VENIPUNCTURE: CPT | Performed by: INTERNAL MEDICINE

## 2024-11-21 PROCEDURE — 94640 AIRWAY INHALATION TREATMENT: CPT

## 2024-11-21 PROCEDURE — 85025 COMPLETE CBC W/AUTO DIFF WBC: CPT | Performed by: HOSPITALIST

## 2024-11-21 PROCEDURE — 36415 COLL VENOUS BLD VENIPUNCTURE: CPT | Performed by: HOSPITALIST

## 2024-11-21 PROCEDURE — 83615 LACTATE (LD) (LDH) ENZYME: CPT | Performed by: INTERNAL MEDICINE

## 2024-11-21 PROCEDURE — 83540 ASSAY OF IRON: CPT | Performed by: INTERNAL MEDICINE

## 2024-11-21 PROCEDURE — 80048 BASIC METABOLIC PNL TOTAL CA: CPT | Performed by: HOSPITALIST

## 2024-11-21 PROCEDURE — 94760 N-INVAS EAR/PLS OXIMETRY 1: CPT

## 2024-11-21 PROCEDURE — 96365 THER/PROPH/DIAG IV INF INIT: CPT | Mod: 59

## 2024-11-21 PROCEDURE — 2500000002 HC RX 250 W HCPCS SELF ADMINISTERED DRUGS (ALT 637 FOR MEDICARE OP, ALT 636 FOR OP/ED)

## 2024-11-21 PROCEDURE — 82728 ASSAY OF FERRITIN: CPT | Performed by: INTERNAL MEDICINE

## 2024-11-21 PROCEDURE — 2500000004 HC RX 250 GENERAL PHARMACY W/ HCPCS (ALT 636 FOR OP/ED): Performed by: HOSPITALIST

## 2024-11-21 PROCEDURE — 2500000002 HC RX 250 W HCPCS SELF ADMINISTERED DRUGS (ALT 637 FOR MEDICARE OP, ALT 636 FOR OP/ED): Performed by: HOSPITALIST

## 2024-11-21 PROCEDURE — 99222 1ST HOSP IP/OBS MODERATE 55: CPT

## 2024-11-21 PROCEDURE — 2500000004 HC RX 250 GENERAL PHARMACY W/ HCPCS (ALT 636 FOR OP/ED): Performed by: INTERNAL MEDICINE

## 2024-11-21 PROCEDURE — 85045 AUTOMATED RETICULOCYTE COUNT: CPT | Performed by: INTERNAL MEDICINE

## 2024-11-21 PROCEDURE — 96372 THER/PROPH/DIAG INJ SC/IM: CPT | Performed by: HOSPITALIST

## 2024-11-21 RX ORDER — ACETAMINOPHEN 325 MG/1
650 TABLET ORAL EVERY 4 HOURS PRN
Status: DISCONTINUED | OUTPATIENT
Start: 2024-11-21 | End: 2024-11-26 | Stop reason: HOSPADM

## 2024-11-21 RX ORDER — SODIUM CHLORIDE 9 MG/ML
100 INJECTION, SOLUTION INTRAVENOUS CONTINUOUS
Status: ACTIVE | OUTPATIENT
Start: 2024-11-21 | End: 2024-11-22

## 2024-11-21 RX ORDER — ALBUTEROL SULFATE 0.83 MG/ML
2.5 SOLUTION RESPIRATORY (INHALATION) EVERY 4 HOURS PRN
Status: DISCONTINUED | OUTPATIENT
Start: 2024-11-21 | End: 2024-11-24

## 2024-11-21 RX ORDER — HEPARIN SODIUM 5000 [USP'U]/ML
5000 INJECTION, SOLUTION INTRAVENOUS; SUBCUTANEOUS EVERY 8 HOURS SCHEDULED
Status: DISCONTINUED | OUTPATIENT
Start: 2024-11-21 | End: 2024-11-26 | Stop reason: HOSPADM

## 2024-11-21 RX ORDER — AMLODIPINE BESYLATE 10 MG/1
10 TABLET ORAL DAILY
Status: DISCONTINUED | OUTPATIENT
Start: 2024-11-21 | End: 2024-11-26 | Stop reason: HOSPADM

## 2024-11-21 RX ORDER — POLYETHYLENE GLYCOL 3350 17 G/17G
17 POWDER, FOR SOLUTION ORAL DAILY
Status: DISCONTINUED | OUTPATIENT
Start: 2024-11-21 | End: 2024-11-25

## 2024-11-21 RX ORDER — MIRTAZAPINE 15 MG/1
7.5 TABLET, FILM COATED ORAL NIGHTLY
Status: DISCONTINUED | OUTPATIENT
Start: 2024-11-21 | End: 2024-11-26 | Stop reason: HOSPADM

## 2024-11-21 RX ORDER — BUDESONIDE 0.25 MG/2ML
0.25 INHALANT ORAL
Status: DISCONTINUED | OUTPATIENT
Start: 2024-11-21 | End: 2024-11-24

## 2024-11-21 RX ORDER — CEFTRIAXONE 1 G/50ML
1 INJECTION, SOLUTION INTRAVENOUS EVERY 24 HOURS
Status: DISCONTINUED | OUTPATIENT
Start: 2024-11-21 | End: 2024-11-22

## 2024-11-21 RX ORDER — IPRATROPIUM BROMIDE AND ALBUTEROL SULFATE 2.5; .5 MG/3ML; MG/3ML
3 SOLUTION RESPIRATORY (INHALATION)
Status: DISCONTINUED | OUTPATIENT
Start: 2024-11-21 | End: 2024-11-24

## 2024-11-21 RX ORDER — ONDANSETRON HYDROCHLORIDE 2 MG/ML
4 INJECTION, SOLUTION INTRAVENOUS EVERY 8 HOURS PRN
Status: DISCONTINUED | OUTPATIENT
Start: 2024-11-21 | End: 2024-11-26 | Stop reason: HOSPADM

## 2024-11-21 RX ORDER — ROSUVASTATIN CALCIUM 20 MG/1
40 TABLET, COATED ORAL DAILY
Status: DISCONTINUED | OUTPATIENT
Start: 2024-11-21 | End: 2024-11-26 | Stop reason: HOSPADM

## 2024-11-21 RX ORDER — ONDANSETRON 4 MG/1
4 TABLET, ORALLY DISINTEGRATING ORAL EVERY 8 HOURS PRN
Status: DISCONTINUED | OUTPATIENT
Start: 2024-11-21 | End: 2024-11-26 | Stop reason: HOSPADM

## 2024-11-21 SDOH — ECONOMIC STABILITY: HOUSING INSECURITY: IN THE PAST 12 MONTHS, HOW MANY TIMES HAVE YOU MOVED WHERE YOU WERE LIVING?: 1

## 2024-11-21 SDOH — ECONOMIC STABILITY: FOOD INSECURITY: WITHIN THE PAST 12 MONTHS, THE FOOD YOU BOUGHT JUST DIDN'T LAST AND YOU DIDN'T HAVE MONEY TO GET MORE.: NEVER TRUE

## 2024-11-21 SDOH — ECONOMIC STABILITY: HOUSING INSECURITY: IN THE LAST 12 MONTHS, WAS THERE A TIME WHEN YOU WERE NOT ABLE TO PAY THE MORTGAGE OR RENT ON TIME?: NO

## 2024-11-21 SDOH — ECONOMIC STABILITY: FOOD INSECURITY: WITHIN THE PAST 12 MONTHS, YOU WORRIED THAT YOUR FOOD WOULD RUN OUT BEFORE YOU GOT THE MONEY TO BUY MORE.: NEVER TRUE

## 2024-11-21 SDOH — SOCIAL STABILITY: SOCIAL INSECURITY: DOES ANYONE TRY TO KEEP YOU FROM HAVING/CONTACTING OTHER FRIENDS OR DOING THINGS OUTSIDE YOUR HOME?: NO

## 2024-11-21 SDOH — SOCIAL STABILITY: SOCIAL INSECURITY: DO YOU FEEL UNSAFE GOING BACK TO THE PLACE WHERE YOU ARE LIVING?: NO

## 2024-11-21 SDOH — ECONOMIC STABILITY: TRANSPORTATION INSECURITY: IN THE PAST 12 MONTHS, HAS LACK OF TRANSPORTATION KEPT YOU FROM MEDICAL APPOINTMENTS OR FROM GETTING MEDICATIONS?: NO

## 2024-11-21 SDOH — ECONOMIC STABILITY: HOUSING INSECURITY: AT ANY TIME IN THE PAST 12 MONTHS, WERE YOU HOMELESS OR LIVING IN A SHELTER (INCLUDING NOW)?: NO

## 2024-11-21 SDOH — SOCIAL STABILITY: SOCIAL INSECURITY: WITHIN THE LAST YEAR, HAVE YOU BEEN AFRAID OF YOUR PARTNER OR EX-PARTNER?: NO

## 2024-11-21 SDOH — SOCIAL STABILITY: SOCIAL INSECURITY: WITHIN THE LAST YEAR, HAVE YOU BEEN HUMILIATED OR EMOTIONALLY ABUSED IN OTHER WAYS BY YOUR PARTNER OR EX-PARTNER?: NO

## 2024-11-21 SDOH — SOCIAL STABILITY: SOCIAL INSECURITY: ABUSE: ADULT

## 2024-11-21 SDOH — ECONOMIC STABILITY: INCOME INSECURITY: IN THE PAST 12 MONTHS HAS THE ELECTRIC, GAS, OIL, OR WATER COMPANY THREATENED TO SHUT OFF SERVICES IN YOUR HOME?: NO

## 2024-11-21 SDOH — SOCIAL STABILITY: SOCIAL INSECURITY
WITHIN THE LAST YEAR, HAVE YOU BEEN RAPED OR FORCED TO HAVE ANY KIND OF SEXUAL ACTIVITY BY YOUR PARTNER OR EX-PARTNER?: NO

## 2024-11-21 SDOH — SOCIAL STABILITY: SOCIAL INSECURITY: HAVE YOU HAD THOUGHTS OF HARMING ANYONE ELSE?: NO

## 2024-11-21 SDOH — SOCIAL STABILITY: SOCIAL INSECURITY: HAVE YOU HAD ANY THOUGHTS OF HARMING ANYONE ELSE?: NO

## 2024-11-21 SDOH — SOCIAL STABILITY: SOCIAL INSECURITY: WERE YOU ABLE TO COMPLETE ALL THE BEHAVIORAL HEALTH SCREENINGS?: YES

## 2024-11-21 SDOH — SOCIAL STABILITY: SOCIAL INSECURITY: HAS ANYONE EVER THREATENED TO HURT YOUR FAMILY OR YOUR PETS?: NO

## 2024-11-21 SDOH — ECONOMIC STABILITY: FOOD INSECURITY: HOW HARD IS IT FOR YOU TO PAY FOR THE VERY BASICS LIKE FOOD, HOUSING, MEDICAL CARE, AND HEATING?: NOT HARD AT ALL

## 2024-11-21 SDOH — SOCIAL STABILITY: SOCIAL INSECURITY: DO YOU FEEL ANYONE HAS EXPLOITED OR TAKEN ADVANTAGE OF YOU FINANCIALLY OR OF YOUR PERSONAL PROPERTY?: NO

## 2024-11-21 SDOH — SOCIAL STABILITY: SOCIAL INSECURITY: ARE YOU OR HAVE YOU BEEN THREATENED OR ABUSED PHYSICALLY, EMOTIONALLY, OR SEXUALLY BY ANYONE?: NO

## 2024-11-21 SDOH — SOCIAL STABILITY: SOCIAL INSECURITY: ARE THERE ANY APPARENT SIGNS OF INJURIES/BEHAVIORS THAT COULD BE RELATED TO ABUSE/NEGLECT?: NO

## 2024-11-21 ASSESSMENT — COGNITIVE AND FUNCTIONAL STATUS - GENERAL
CLIMB 3 TO 5 STEPS WITH RAILING: A LOT
MOBILITY SCORE: 21
WALKING IN HOSPITAL ROOM: A LITTLE
CLIMB 3 TO 5 STEPS WITH RAILING: A LOT
WALKING IN HOSPITAL ROOM: A LITTLE
DAILY ACTIVITIY SCORE: 24
PATIENT BASELINE BEDBOUND: NO
MOBILITY SCORE: 21
DAILY ACTIVITIY SCORE: 24

## 2024-11-21 ASSESSMENT — LIFESTYLE VARIABLES
AUDIT-C TOTAL SCORE: 0
HOW MANY STANDARD DRINKS CONTAINING ALCOHOL DO YOU HAVE ON A TYPICAL DAY: PATIENT DOES NOT DRINK
HOW OFTEN DO YOU HAVE 6 OR MORE DRINKS ON ONE OCCASION: NEVER
AUDIT-C TOTAL SCORE: 0
SUBSTANCE_ABUSE_PAST_12_MONTHS: NO
PRESCIPTION_ABUSE_PAST_12_MONTHS: NO
HOW OFTEN DO YOU HAVE A DRINK CONTAINING ALCOHOL: NEVER
SKIP TO QUESTIONS 9-10: 1

## 2024-11-21 ASSESSMENT — ENCOUNTER SYMPTOMS
FEVER: 0
DIZZINESS: 0
CONSTIPATION: 0
LIGHT-HEADEDNESS: 0
DIFFICULTY URINATING: 0
DIARRHEA: 0
FATIGUE: 0
CHILLS: 0
APPETITE CHANGE: 0
SHORTNESS OF BREATH: 1
SORE THROAT: 0
RHINORRHEA: 0
DYSURIA: 0
PALPITATIONS: 0
VOMITING: 0
NAUSEA: 0
HEADACHES: 0

## 2024-11-21 ASSESSMENT — PATIENT HEALTH QUESTIONNAIRE - PHQ9
SUM OF ALL RESPONSES TO PHQ9 QUESTIONS 1 & 2: 0
1. LITTLE INTEREST OR PLEASURE IN DOING THINGS: NOT AT ALL
2. FEELING DOWN, DEPRESSED OR HOPELESS: NOT AT ALL

## 2024-11-21 ASSESSMENT — ACTIVITIES OF DAILY LIVING (ADL)
JUDGMENT_ADEQUATE_SAFELY_COMPLETE_DAILY_ACTIVITIES: YES
HEARING - LEFT EAR: FUNCTIONAL
DRESSING YOURSELF: INDEPENDENT
LACK_OF_TRANSPORTATION: NO
TOILETING: INDEPENDENT
WALKS IN HOME: INDEPENDENT
LACK_OF_TRANSPORTATION: NO
ASSISTIVE_DEVICE: WALKER
HEARING - RIGHT EAR: FUNCTIONAL
PATIENT'S MEMORY ADEQUATE TO SAFELY COMPLETE DAILY ACTIVITIES?: YES
ADEQUATE_TO_COMPLETE_ADL: YES
GROOMING: INDEPENDENT
BATHING: INDEPENDENT
FEEDING YOURSELF: INDEPENDENT

## 2024-11-21 ASSESSMENT — PAIN SCALES - GENERAL
PAINLEVEL_OUTOF10: 0 - NO PAIN
PAINLEVEL_OUTOF10: 0 - NO PAIN

## 2024-11-21 ASSESSMENT — PAIN SCALES - WONG BAKER: WONGBAKER_NUMERICALRESPONSE: NO HURT

## 2024-11-21 ASSESSMENT — PAIN - FUNCTIONAL ASSESSMENT: PAIN_FUNCTIONAL_ASSESSMENT: 0-10

## 2024-11-21 NOTE — PROGRESS NOTES
11/21/24 0711   [facility] Disability Status   Are you deaf or do you have serious difficulty hearing? N   Are you blind or do you have serious difficulty seeing, even when wearing glasses? N   Because of a physical, mental, or emotional condition, do you have serious difficulty concentrating, remembering, or making decisions? (5 years old or older) N   Do you have serious difficulty walking or climbing stairs? N   Do you have serious difficulty dressing or bathing? N   Because of a physical, mental, or emotional condition, do you have serious difficulty doing errands alone such as visiting the doctor? N

## 2024-11-21 NOTE — ED NOTES
Report attempted at this time, floor  request that this nurse to call back in 15 to 20 minutes due to receiving nurse being busy. ED charge made aware.     Bobbi Chavez RN  11/21/24 0708

## 2024-11-21 NOTE — PROGRESS NOTES
Pharmacy Medication History     Additional concerns with the patient's PTA list.      confirmed medications with wife     The following updates were made to the Prior to Admission medication list:     Source of Information:     Medications ADDED:   N/a  Medications CHANGED:  N/a  Medications REMOVED:   N/a  Medications NOT TAKING:   N/a    Allergy reviewed : Yes    Meds 2 Beds : No    Comments: confirmed medications with wife     The list below reflectives the updated PTA list. Please review each medication in order reconciliation for additional clarification and justification.    Prior to Admission Medications   Prescriptions Last Dose Informant   acetaminophen (Tylenol) 325 mg tablet Unknown Spouse/Significant Other   Sig: Take 1 tablet (325 mg) by mouth every 4 hours if needed for moderate pain (4 - 6).   albuterol 2.5 mg /3 mL (0.083 %) nebulizer solution Unknown Spouse/Significant Other   Sig: Take by nebulization 4 times a day as needed. 1 vial   albuterol 90 mcg/actuation inhaler Unknown Spouse/Significant Other   Sig: Inhale 1 puff every 4 hours if needed for wheezing or shortness of breath. Every 4-6 hrs prn   amLODIPine (Norvasc) 10 mg tablet Unknown Spouse/Significant Other   Sig: Take 1 tablet (10 mg) by mouth once daily.   azithromycin (Zithromax) 250 mg tablet Unknown Spouse/Significant Other   Sig: One PO every day   Patient taking differently: Take 2 tablets (500 mg) by mouth once daily. One PO every day   budesonide (Pulmicort) 0.5 mg/2 mL nebulizer solution Unknown Spouse/Significant Other   Sig: Take 2 mL (0.5 mg) by nebulization 2 times a day.   fluticasone-umeclidin-vilanter (Trelegy Ellipta) 100-62.5-25 mcg blister with device Unknown Spouse/Significant Other   Sig: Inhale 1 puff once daily.   mirtazapine (Remeron) 7.5 mg tablet  Spouse/Significant Other   Sig: Take 1 tablet (7.5 mg) by mouth once daily at bedtime.   predniSONE (Deltasone) 10 mg tablet Unknown Spouse/Significant Other   Sig:  Take 4 tablets by mouth once daily for 1 day, then take 3 tablets by mouth once daily for 2 days, then take 2 tablets by mouth once daily for 2 days, then take 1 tablet by mouth once daily for 2 days, then take 0.5 tablets by mouth once daily going forward   Patient taking differently: Take 0.5 tablets (5 mg) by mouth once daily. Take 4 tablets by mouth once daily for 1 day, then take 3 tablets by mouth once daily for 2 days, then take 2 tablets by mouth once daily for 2 days, then take 1 tablet by mouth once daily for 2 days, then take 0.5 tablets by mouth once daily going forward   rosuvastatin (Crestor) 40 mg tablet Unknown Spouse/Significant Other   Sig: Take 1 tablet (40 mg) by mouth once daily.   triamcinolone (Kenalog) 0.1 % cream Unknown Spouse/Significant Other   Sig: Apply topically 2 times a day. Apply to affected area 1-2 times daily as needed.      Facility-Administered Medications: None       The list below reflectives the updated allergy list. Please review each documented allergy for additional clarification and justification.    No Known Allergies       11/21/24 at 11:37 AM - Arabella Conner

## 2024-11-21 NOTE — ED PROVIDER NOTES
HPI   Chief Complaint   Patient presents with    Abnormal labs       HPI: 74-year-old male with a history of chronic kidney disease referred in by his nephrologist, Dr. Xiong, for acute kidney injury.  It was found that his creatinine increased significantly over the past several days.  The patient is denying abdominal pain, difficulty urinating, hematuria and flank pain.      Limitations to history: None  Independent Historians: Patient  External Records Reviewed: [facility], outpatient notes, inpatient notes  ------------------------------------------------------------------------------------------------------------------------------------------  ROS: a ten point review of systems was performed and was negative except as per HPI.  ------------------------------------------------------------------------------------------------------------------------------------------  PMH / PSH: as per HPI, otherwise reviewed in EMR  MEDS: as per HPI, otherwise reviewed in EMR  ALLERGIES: as per HPI, otherwise reviewed in EMR  SocH:  as per HPI, otherwise reviewed in EMR  FH:  as per HPI, otherwise reviewed in EMR  ------------------------------------------------------------------------------------------------------------------------------------------  Physical Exam:  VS: As documented in the triage note and EMR flowsheet from this visit was reviewed  General: Well appearing. No acute distress.   Eyes:  Extraocular movements grossly intact. No scleral icterus. No discharge  HEENT:  Normocephalic.  Atraumatic  Neck: Moves neck freely. No gross masses  CV: Regular rhythm. No murmurs, rubs or gallops   Resp: Clear to auscultation bilaterally. No respiratory distress.    GI: Soft, no masses, nontender. No rebound tenderness or guarding  MSK: Symmetric muscle bulk. No deformities. No lower extremity edema.    Skin: Warm, dry, intact.   Neuro: No focal deficits.  A&O x3.   Psych: Appropriate for  situation  ------------------------------------------------------------------------------------------------------------------------------------------  Hospital Course / Medical Decision Making:  Independent Interpretations: CT abdomen/pelvis  EKG as interpreted by me: N/A    MDM: 74-year-old male with a history of chronic kidney disease presents at the recommendation of his nephrologist for acute kidney injury.  He does have an acute kidney injury with a creatinine above 6.  He says he is urinating without difficulty.  Urinalysis is negative for UTI.  He does have some blood in his urine.  CT of the abdomen and pelvis shows no ureteral stone.  The patient was admitted to the medicine service for further evaluation    Discussion of Management with Other Providers:   I discussed the patient/results with: Emergency medicine team    Final diagnosis and disposition as below.    Results for orders placed or performed during the hospital encounter of 11/20/24  -CBC and Auto Differential:   Collection Time: 11/20/24 10:00 PM       Result                      Value             Ref Range           WBC                         6.6               4.4 - 11.3 x*       nRBC                        0.0               0.0 - 0.0 /1*       RBC                         3.28 (L)          4.50 - 5.90 *       Hemoglobin                  9.5 (L)           13.5 - 17.5 *       Hematocrit                  28.1 (L)          41.0 - 52.0 %       MCV                         86                80 - 100 fL         MCH                         29.0              26.0 - 34.0 *       MCHC                        33.8              32.0 - 36.0 *       RDW                         13.2              11.5 - 14.5 %       Platelets                   243               150 - 450 x1*       Neutrophils %               68.2              40.0 - 80.0 %       Immature Granulocytes *     0.6               0.0 - 0.9 %         Lymphocytes %               18.7              13.0 -  44.0 %       Monocytes %                 9.9               2.0 - 10.0 %        Eosinophils %               1.8               0.0 - 6.0 %         Basophils %                 0.8               0.0 - 2.0 %         Neutrophils Absolute        4.50              1.60 - 5.50 *       Immature Granulocytes *     0.04              0.00 - 0.50 *       Lymphocytes Absolute        1.23              0.80 - 3.00 *       Monocytes Absolute          0.65              0.05 - 0.80 *       Eosinophils Absolute        0.12              0.00 - 0.40 *       Basophils Absolute          0.05              0.00 - 0.10 *  -Comprehensive metabolic panel:   Collection Time: 11/20/24 10:00 PM       Result                      Value             Ref Range           Glucose                     102 (H)           74 - 99 mg/dL       Sodium                      134 (L)           136 - 145 mm*       Potassium                   4.2               3.5 - 5.3 mm*       Chloride                    94 (L)            98 - 107 mmo*       Bicarbonate                 27                21 - 32 mmol*       Anion Gap                   17                10 - 20 mmol*       Urea Nitrogen               47 (H)            6 - 23 mg/dL        Creatinine                  6.18 (H)          0.50 - 1.30 *       eGFR                        9 (L)             >60 mL/min/1*       Calcium                     11.0 (H)          8.6 - 10.3 m*       Albumin                     4.2               3.4 - 5.0 g/*       Alkaline Phosphatase        54                33 - 136 U/L        Total Protein               8.1               6.4 - 8.2 g/*       AST                         29                9 - 39 U/L          Bilirubin, Total            0.5               0.0 - 1.2 mg*       ALT                         25                10 - 52 U/L    -Urinalysis with Reflex Culture and Microscopic:   Collection Time: 11/20/24 10:19 PM       Result                      Value             Ref Range            Color, Urine                Light-Yellow      Light-Yellow*       Appearance, Urine           Clear             Clear               Specific Gravity, Urine     1.010             1.005 - 1.035       pH, Urine                   6.5               5.0, 5.5, 6.*       Protein, Urine              50 (1+) (A)       NEGATIVE, 10*       Glucose, Urine              Normal            Normal mg/dL        Blood, Urine                0.06 (1+) (A)     NEGATIVE            Ketones, Urine              NEGATIVE          NEGATIVE mg/*       Bilirubin, Urine            NEGATIVE          NEGATIVE            Urobilinogen, Urine         Normal            Normal mg/dL        Nitrite, Urine              NEGATIVE          NEGATIVE            Leukocyte Esterase, Ur*     75 Cecilia/µL (A)     NEGATIVE       -Microscopic Only, Urine:   Collection Time: 11/20/24 10:19 PM       Result                      Value             Ref Range           WBC, Urine                  6-10 (A)          1-5, NONE /H*       RBC, Urine                  >20 (A)           NONE, 1-2, 3*  CT abdomen pelvis wo IV contrast   Final Result                   Patient History   Past Medical History:   Diagnosis Date    CATHIE (acute kidney injury) (CMS-HCC) 05/03/2023    Cataract     COPD (chronic obstructive pulmonary disease) (Multi)     Cough, unspecified 06/20/2014    Cough    Emphysema of lung (Multi)     Encounter for immunization 01/19/2015    Need for influenza vaccination    Encounter for screening for malignant neoplasm of prostate     Encounter for screening for malignant neoplasm of prostate    Other conditions influencing health status 05/20/2013    Digital Blood Vessel Rupture    Personal history of colonic polyps     History of colon polyps    Personal history of other specified conditions 05/20/2013    History of shortness of breath    Personal history of other specified conditions 06/20/2014    History of shortness of breath     Past Surgical History:   Procedure  Laterality Date    APPENDECTOMY  2013    Appendectomy    COLONOSCOPY  2013    Complete Colonoscopy    EYE SURGERY       Family History   Problem Relation Name Age of Onset    Dementia Mother      Diabetes Sister       Social History     Tobacco Use    Smoking status: Former     Current packs/day: 0.00     Types: Cigarettes     Start date:      Quit date:      Years since quittin.9     Passive exposure: Past    Smokeless tobacco: Never   Substance Use Topics    Alcohol use: Yes     Comment: 1 cup of scotch a week    Drug use: Never     Comment: Former drug user, has not used any in 50 years       Physical Exam   ED Triage Vitals [24 1546]   Temperature Heart Rate Respirations BP   37.1 °C (98.7 °F) 91 18 105/74      Pulse Ox Temp Source Heart Rate Source Patient Position   98 % Temporal Monitor Sitting      BP Location FiO2 (%)     Left arm --       Physical Exam      ED Course & MDM   Diagnoses as of 24 0230   Acute kidney injury (CMS-HCC)                 No data recorded                                 Medical Decision Making      Procedure  Procedures     Nicholas Olsen,   [id number]

## 2024-11-21 NOTE — ASSESSMENT & PLAN NOTE
Kalyan Armstrong is a 74 y.o. male with PMHx of HFmrEF, pulmonary HTN, COPD, severe emphysema, hepatitis C, CKD IV, who came to [facility] ED at the request of his physician for CATHIE. Pt has no complaints at this time. States he is chronically SOB due to COPD. Denies fevers, chills, trouble eating/drinking, pain, difficulty ambulating, N/V/D/C.   ED course: VSS   Labs significant for Cr. 6.18, BUN 47, EGFR 9, Ca 11, hemoglobin 9.5, hematocrit 28.1  CT abd/pel: N hydronephrosis or obstructing ureteral calculi. No acute findings. Cholelithiasis, chronic diverticulosis     PLAN  - CT abd/pel as above   - Concerns for volume depletion   - Nephrology following - plans for IV iron, erythropoietin, IVF   - Pt sees Dr. Xiong OP   - Follow [facility]

## 2024-11-21 NOTE — CONSULTS
Reason For Consult  Acute kidney injury    History Of Present Illness  Kalyan Armstrong is a 74 y.o. male with a history of heart failure with mildly reduced EF, EF is slightly low at 50%.  He has pulmonary hypertension, COPD with severe emphysema, he is on prophylactic azithromycin, requires 3-4 L of oxygen at home.  He has hepatitis C status post oral treatment with negative viral load by PCR, has longstanding hypertension.  He was here recently with progressive shortness of breath, he follows closely with Dr. Xiong.  At that time there was a right lung base opacity concerning for pneumonia but he was negative for Legionella, COVID.  He received ceftriaxone and azithromycin, he had hypokalemia and hypernatremia with CATHIE.  He has had an appendectomy, cataract procedures.    He comes in again now with acute kidney injury at the request of Dr. Xiong.  He has been losing weight, concerns of volume depletion and poor appetite.  Recent colonoscopy without significant findings.  He is set up for an EGD in January.    On social history he was a tobacco abuser until 2006, drinks scotch weekly.  No other drug use.    On family history mother with dementia, sister with diabetes, no chronic kidney disease.  He has lost 37 pounds per the weights at Dr. Xiong office, he says that it is inexplicable.  He tells me that he has a good appetite, that he has been eating.  He denies fevers, chills, chest pain, nausea, vomiting, or abdominal pain.  Avoids anti-inflammatories.  All other review of systems are negative.     Past Medical History  He has a past medical history of CATHIE (acute kidney injury) (CMS-HCC) (05/03/2023), Cataract, COPD (chronic obstructive pulmonary disease) (Multi), Cough, unspecified (06/20/2014), Emphysema of lung (Multi), Encounter for immunization (01/19/2015), Encounter for screening for malignant neoplasm of prostate, Other conditions influencing health status (05/20/2013), Personal history of colonic  polyps, Personal history of other specified conditions (05/20/2013), and Personal history of other specified conditions (06/20/2014).    Surgical History  He has a past surgical history that includes Colonoscopy (02/21/2013); Appendectomy (02/21/2013); and Eye surgery.    Allergies  Patient has no known allergies.      Current Facility-Administered Medications:     acetaminophen (Tylenol) tablet 650 mg, 650 mg, oral, q4h PRN, Sabrina Hathaway MD    albuterol 2.5 mg /3 mL (0.083 %) nebulizer solution 2.5 mg, 2.5 mg, nebulization, q4h PRN, Sabrina Hathaway MD    amLODIPine (Norvasc) tablet 10 mg, 10 mg, oral, Daily, Sabrina Hathaway MD, 10 mg at 11/21/24 0832    budesonide (Pulmicort) 0.25 mg/2 mL nebulizer solution 0.25 mg, 0.25 mg, nebulization, BID **AND** ipratropium-albuteroL (Duo-Neb) 0.5-2.5 mg/3 mL nebulizer solution 3 mL, 3 mL, nebulization, 4x daily, Bernadette Ferreira, [place]    cefTRIAXone (Rocephin) 1 g in dextrose (iso) IV 50 mL, 1 g, intravenous, q24h, Sabrina Hathaway MD, Stopped at 11/21/24 0815    heparin (porcine) injection 5,000 Units, 5,000 Units, subcutaneous, q8h GULSHAN, Sabrina Hathaway MD, 5,000 Units at 11/21/24 0725    mirtazapine (Remeron) tablet 7.5 mg, 7.5 mg, oral, Nightly, Sabrina Hathaway MD    ondansetron ODT (Zofran-ODT) disintegrating tablet 4 mg, 4 mg, oral, q8h PRN **OR** ondansetron (Zofran) injection 4 mg, 4 mg, intravenous, q8h PRN, Sabrina Hathaway MD    polyethylene glycol (Glycolax, Miralax) packet 17 g, 17 g, oral, Daily, Sabrina Hathaway MD, 17 g at 11/21/24 0832    rosuvastatin (Crestor) tablet 40 mg, 40 mg, oral, Daily, Sabrina Hathaway MD, 40 mg at 11/21/24 0832    sodium chloride 0.9% infusion, 100 mL/hr, intravenous, Continuous, Sabrina Hathaway MD, Last Rate: 100 mL/hr at 11/21/24 0726, 100 mL/hr at 11/21/24 0726    Current Outpatient Medications:     acetaminophen (Tylenol) 325 mg tablet, Take 1 tablet (325 mg) by mouth every 4 hours if needed for moderate pain (4 - 6)., Disp: , Rfl:     albuterol  2.5 mg /3 mL (0.083 %) nebulizer solution, Take by nebulization 4 times a day as needed. 1 vial, Disp: , Rfl:     albuterol 90 mcg/actuation inhaler, Inhale 1 puff every 4 hours if needed for wheezing or shortness of breath. Every 4-6 hrs prn, Disp: , Rfl:     amLODIPine (Norvasc) 10 mg tablet, Take 1 tablet (10 mg) by mouth once daily., Disp: 90 tablet, Rfl: 1    azithromycin (Zithromax) 250 mg tablet, One PO every day (Patient taking differently: Take 2 tablets (500 mg) by mouth once daily. One PO every day), Disp: 30 tablet, Rfl: 6    budesonide (Pulmicort) 0.5 mg/2 mL nebulizer solution, Take 2 mL (0.5 mg) by nebulization 2 times a day., Disp: , Rfl:     cholecalciferol (Vitamin D-3) 50,000 unit capsule, Take 1 capsule (50,000 Units) by mouth 1 (one) time per week. (Patient not taking: Reported on 11/21/2024), Disp: 8 capsule, Rfl: 0    fluticasone-umeclidin-vilanter (Trelegy Ellipta) 100-62.5-25 mcg blister with device, Inhale 1 puff once daily., Disp: 60 each, Rfl: 11    mirtazapine (Remeron) 7.5 mg tablet, Take 1 tablet (7.5 mg) by mouth once daily at bedtime., Disp: 30 tablet, Rfl: 2    predniSONE (Deltasone) 10 mg tablet, Take 4 tablets by mouth once daily for 1 day, then take 3 tablets by mouth once daily for 2 days, then take 2 tablets by mouth once daily for 2 days, then take 1 tablet by mouth once daily for 2 days, then take 0.5 tablets by mouth once daily going forward (Patient taking differently: Take 0.5 tablets (5 mg) by mouth once daily. Take 4 tablets by mouth once daily for 1 day, then take 3 tablets by mouth once daily for 2 days, then take 2 tablets by mouth once daily for 2 days, then take 1 tablet by mouth once daily for 2 days, then take 0.5 tablets by mouth once daily going forward), Disp: 90 tablet, Rfl: 0    rosuvastatin (Crestor) 40 mg tablet, Take 1 tablet (40 mg) by mouth once daily., Disp: 90 tablet, Rfl: 1    triamcinolone (Kenalog) 0.1 % cream, Apply topically 2 times a day. Apply  "to affected area 1-2 times daily as needed., Disp: 453 g, Rfl: 0     Medications Discontinued During This Encounter   Medication Reason    fluticasone-umeclidin-vilanter (TRELEGY-ELLIPTA) 100-62.5-25 mcg 100-62.5-25 mcg/puff inhaler 1 puff          Social History  He reports that he quit smoking about 18 years ago. His smoking use included cigarettes. He started smoking about 33 years ago. He has been exposed to tobacco smoke. He has never used smokeless tobacco. He reports current alcohol use. He reports that he does not use drugs.    Family History  Family History   Problem Relation Name Age of Onset    Dementia Mother      Diabetes Sister          Physical Exam  Constitutional:       Appearance: Normal appearance.   HENT:      Mouth/Throat:      Mouth: Mucous membranes are moist.   Eyes:      Extraocular Movements: Extraocular movements intact.      Pupils: Pupils are equal, round, and reactive to light.   Cardiovascular:      Rate and Rhythm: Regular rhythm.      Heart sounds: S1 normal and S2 normal.   Pulmonary:      Breath sounds: Normal breath sounds.   Abdominal:      Comments: Soft, NT/ND, no masses, normal bowel sounds   Genitourinary:     Comments: No zapata  Musculoskeletal:      Right lower leg: No edema.      Left lower leg: No edema.   Right elbow erythema  Skin:     General: Skin is warm and dry.   Neurological:      General: No focal deficit present.      Mental Status: She is alert and oriented to person, place, and time.   Psychiatric:         Behavior: Behavior normal.      Last Recorded Vitals  Blood pressure 149/85, pulse 89, temperature 36.4 °C (97.5 °F), temperature source Temporal, resp. rate 17, height 1.753 m (5' 9\"), weight 49.9 kg (110 lb), SpO2 96%.    Last 24 hour lab Results  Results for orders placed or performed during the hospital encounter of 11/20/24 (from the past 24 hours)   CBC and Auto Differential   Result Value Ref Range    WBC 6.6 4.4 - 11.3 x10*3/uL    nRBC 0.0 0.0 - 0.0 " /100 WBCs    RBC 3.28 (L) 4.50 - 5.90 x10*6/uL    Hemoglobin 9.5 (L) 13.5 - 17.5 g/dL    Hematocrit 28.1 (L) 41.0 - 52.0 %    MCV 86 80 - 100 fL    MCH 29.0 26.0 - 34.0 pg    MCHC 33.8 32.0 - 36.0 g/dL    RDW 13.2 11.5 - 14.5 %    Platelets 243 150 - 450 x10*3/uL    Neutrophils % 68.2 40.0 - 80.0 %    Immature Granulocytes %, Automated 0.6 0.0 - 0.9 %    Lymphocytes % 18.7 13.0 - 44.0 %    Monocytes % 9.9 2.0 - 10.0 %    Eosinophils % 1.8 0.0 - 6.0 %    Basophils % 0.8 0.0 - 2.0 %    Neutrophils Absolute 4.50 1.60 - 5.50 x10*3/uL    Immature Granulocytes Absolute, Automated 0.04 0.00 - 0.50 x10*3/uL    Lymphocytes Absolute 1.23 0.80 - 3.00 x10*3/uL    Monocytes Absolute 0.65 0.05 - 0.80 x10*3/uL    Eosinophils Absolute 0.12 0.00 - 0.40 x10*3/uL    Basophils Absolute 0.05 0.00 - 0.10 x10*3/uL   Comprehensive metabolic panel   Result Value Ref Range    Glucose 102 (H) 74 - 99 mg/dL    Sodium 134 (L) 136 - 145 mmol/L    Potassium 4.2 3.5 - 5.3 mmol/L    Chloride 94 (L) 98 - 107 mmol/L    Bicarbonate 27 21 - 32 mmol/L    Anion Gap 17 10 - 20 mmol/L    Urea Nitrogen 47 (H) 6 - 23 mg/dL    Creatinine 6.18 (H) 0.50 - 1.30 mg/dL    eGFR 9 (L) >60 mL/min/1.73m*2    Calcium 11.0 (H) 8.6 - 10.3 mg/dL    Albumin 4.2 3.4 - 5.0 g/dL    Alkaline Phosphatase 54 33 - 136 U/L    Total Protein 8.1 6.4 - 8.2 g/dL    AST 29 9 - 39 U/L    Bilirubin, Total 0.5 0.0 - 1.2 mg/dL    ALT 25 10 - 52 U/L   Urinalysis with Reflex Culture and Microscopic   Result Value Ref Range    Color, Urine Light-Yellow Light-Yellow, Yellow, Dark-Yellow    Appearance, Urine Clear Clear    Specific Gravity, Urine 1.010 1.005 - 1.035    pH, Urine 6.5 5.0, 5.5, 6.0, 6.5, 7.0, 7.5, 8.0    Protein, Urine 50 (1+) (A) NEGATIVE, 10 (TRACE), 20 (TRACE) mg/dL    Glucose, Urine Normal Normal mg/dL    Blood, Urine 0.06 (1+) (A) NEGATIVE    Ketones, Urine NEGATIVE NEGATIVE mg/dL    Bilirubin, Urine NEGATIVE NEGATIVE    Urobilinogen, Urine Normal Normal mg/dL    Nitrite,  Urine NEGATIVE NEGATIVE    Leukocyte Esterase, Urine 75 Cecilia/µL (A) NEGATIVE   Microscopic Only, Urine   Result Value Ref Range    WBC, Urine 6-10 (A) 1-5, NONE /HPF    RBC, Urine >20 (A) NONE, 1-2, 3-5 /HPF   Basic metabolic panel   Result Value Ref Range    Glucose 69 (L) 74 - 99 mg/dL    Sodium 137 136 - 145 mmol/L    Potassium 3.6 3.5 - 5.3 mmol/L    Chloride 102 98 - 107 mmol/L    Bicarbonate 28 21 - 32 mmol/L    Anion Gap 11 10 - 20 mmol/L    Urea Nitrogen 42 (H) 6 - 23 mg/dL    Creatinine 5.45 (H) 0.50 - 1.30 mg/dL    eGFR 10 (L) >60 mL/min/1.73m*2    Calcium 9.1 8.6 - 10.3 mg/dL   CBC and Auto Differential   Result Value Ref Range    WBC 6.8 4.4 - 11.3 x10*3/uL    nRBC 0.0 0.0 - 0.0 /100 WBCs    RBC 3.02 (L) 4.50 - 5.90 x10*6/uL    Hemoglobin 8.7 (L) 13.5 - 17.5 g/dL    Hematocrit 26.1 (L) 41.0 - 52.0 %    MCV 86 80 - 100 fL    MCH 28.8 26.0 - 34.0 pg    MCHC 33.3 32.0 - 36.0 g/dL    RDW 13.4 11.5 - 14.5 %    Platelets 236 150 - 450 x10*3/uL    Neutrophils % 67.9 40.0 - 80.0 %    Immature Granulocytes %, Automated 0.6 0.0 - 0.9 %    Lymphocytes % 14.0 13.0 - 44.0 %    Monocytes % 9.7 2.0 - 10.0 %    Eosinophils % 6.3 0.0 - 6.0 %    Basophils % 1.5 0.0 - 2.0 %    Neutrophils Absolute 4.61 1.60 - 5.50 x10*3/uL    Immature Granulocytes Absolute, Automated 0.04 0.00 - 0.50 x10*3/uL    Lymphocytes Absolute 0.95 0.80 - 3.00 x10*3/uL    Monocytes Absolute 0.66 0.05 - 0.80 x10*3/uL    Eosinophils Absolute 0.43 (H) 0.00 - 0.40 x10*3/uL    Basophils Absolute 0.10 0.00 - 0.10 x10*3/uL   Magnesium   Result Value Ref Range    Magnesium 2.21 1.60 - 2.40 mg/dL   Creatine Kinase   Result Value Ref Range    Creatine Kinase 40 0 - 325 U/L   Lactate Dehydrogenase   Result Value Ref Range     84 - 246 U/L   Reticulocytes   Result Value Ref Range    Retic % 1.2 0.5 - 2.0 %    Retic Absolute 0.036 0.017 - 0.110 x10*6/uL    Reticulocyte Hemoglobin 32 28 - 38 pg    Immature Retic fraction 8.4 <=16.0 %   Iron and TIBC   Result  Value Ref Range    Iron 59 35 - 150 ug/dL    UIBC 180 110 - 370 ug/dL    TIBC 239 (L) 240 - 445 ug/dL    % Saturation 25 25 - 45 %   Ferritin   Result Value Ref Range    Ferritin 583 (H) 20 - 300 ng/mL     *Note: Due to a large number of results and/or encounters for the requested time period, some results have not been displayed. A complete set of results can be found in Results Review.        Imaging results   CT abdomen pelvis wo IV contrast    Result Date: 11/21/2024  Interpreted By:  Tavia Cifuentes, STUDY: CT ABDOMEN PELVIS WO IV CONTRAST;  11/20/2024 11:32 pm   INDICATION: Signs/Symptoms:Worsening renal function, hematuria, rule out ureteral stone.   COMPARISON: Renal ultrasound 09/24/2024. CT lung screening 02/13/2024. CT angiogram chest 12/15/2022. Liver ultrasound 06/13/2022.   ACCESSION NUMBER(S): [id number]   ORDERING CLINICIAN: CLARISA GARCIA   TECHNIQUE: CT of the abdomen and pelvis was performed with no contrast administration. Coronal and sagittal reformats were obtained.   FINDINGS: LOWER CHEST: Redemonstration of severe bilateral emphysematous changes.   ABDOMEN:   LIVER: Unremarkable unenhanced appearance.   BILE DUCTS: No obvious dilation.   GALLBLADDER: There is cholelithiasis.   PANCREAS: No peripancreatic inflammatory changes.   SPLEEN: Unremarkable unenhanced appearance.   ADRENAL GLANDS: Unremarkable.   KIDNEYS AND URETERS: No hydronephrosis or hydroureter.  No obstructing ureteral calculi.   BOWEL: No bowel obstruction. There is colonic diverticulosis with no CT evidence for acute diverticulitis.   VESSELS: Atherosclerotic calcifications at the abdominal aorta. No abdominal aortic aneurysm.   PELVIS: The urinary bladder is partially distended. The prostate measures 4.9 cm in transverse diameter.   PERITONEUM/RETROPERITONEUM/LYMPH NODES: No free fluid or free air.  No retroperitoneal hemorrhage. Calcified lymph nodes are noted at the periportal region.   ABDOMINAL WALL: The abdominal wall  soft tissues are within normal limits.   BONE AND SOFT TISSUE: Degenerative changes at the spine.   IMPRESSION 1.  No hydronephrosis or obstructing ureteral calculi. No acute findings on unenhanced CT. 2. Cholelithiasis. 3. Colonic diverticulosis.       MACRO: None.   Signed by: Tavia Cifuentes 11/21/2024 12:25 AM Dictation workstation:   [id number]        Assessment/Plan   Kalyan Armstrong is a 74 y.o. male with a history of heart failure with mildly reduced EF, EF is slightly low at 50%.  He has pulmonary hypertension, COPD with severe emphysema, he is on prophylactic azithromycin, requires 3-4 L of oxygen at home.  He has hepatitis C status post oral treatment with negative viral load by PCR, has longstanding hypertension.  He was here recently with progressive shortness of breath, he follows closely with Dr. Xiong.  At that time there was a right lung base opacity concerning for pneumonia but he was negative for Legionella, COVID.  He received ceftriaxone and azithromycin, he had hypokalemia and hypernatremia with CATHIE.  He has had an appendectomy, cataract procedures.    He comes in again now with acute kidney injury at the request of Dr. Xiong.  He has been losing weight, concerns of volume depletion and poor appetite.  Recent colonoscopy without significant findings.  He is set up for an EGD in January.    Mr. Armstrong may be volume depleted yet again.  His blood pressures were not particularly low.  I cannot explain why he has been losing this weight.  No evidence to suggest urinary retention.  No evidence of infection, he did have blood and leukocytes in the urine which is not new.  We will need to make certain that he has had a cystoscopy at some point.  He will get intravenous iron, erythropoietin, I will continue IV fluids overnight.  Potassium could drop, I am worried that he is somewhat malnourished.    55 minutes in the care of Mr. Armstrong.    Jaya Wynn MD

## 2024-11-21 NOTE — H&P
History Of Present Illness  Kalyan Armstrong is a 74 y.o. male with PMHx of HFmrEF, pulmonary HTN, COPD, severe emphysema, hepatitis C, CKD IV, who came to [facility] ED at the request of his physician for CATHIE. Pt has no complaints at this time. States he is chronically SOB due to COPD. Denies fevers, chills, trouble eating/drinking, pain, difficulty ambulating, N/V/D/C.   ED course: VSS   Labs significant for Cr. 6.18, BUN 47, EGFR 9, Ca 11, hemoglobin 9.5, hematocrit 28.1  CT abd/pel: N hydronephrosis or obstructing ureteral calculi. No acute findings. Cholelithiasis, chronic diverticulosis     Past Medical History  He has a past medical history of CATHIE (acute kidney injury) (CMS-HCC) (05/03/2023), Cataract, COPD (chronic obstructive pulmonary disease) (Multi), Cough, unspecified (06/20/2014), Emphysema of lung (Multi), Encounter for immunization (01/19/2015), Encounter for screening for malignant neoplasm of prostate, Other conditions influencing health status (05/20/2013), Personal history of colonic polyps, Personal history of other specified conditions (05/20/2013), and Personal history of other specified conditions (06/20/2014).    Surgical History  He has a past surgical history that includes Colonoscopy (02/21/2013); Appendectomy (02/21/2013); and Eye surgery.     Social History  He reports that he quit smoking about 18 years ago. His smoking use included cigarettes. He started smoking about 33 years ago. He has been exposed to tobacco smoke. He has never used smokeless tobacco. He reports current alcohol use. He reports that he does not use drugs.    Family History  Family History   Problem Relation Name Age of Onset    Dementia Mother      Diabetes Sister          Allergies  Patient has no known allergies.    Review of Systems   Constitutional:  Negative for appetite change, chills, fatigue and fever.   HENT:  Negative for congestion, rhinorrhea and sore throat.    Respiratory:  Positive for shortness of breath  (chronic due to COPD).    Cardiovascular:  Negative for chest pain, palpitations and leg swelling.   Gastrointestinal:  Negative for constipation, diarrhea, nausea and vomiting.   Genitourinary:  Negative for difficulty urinating and dysuria.   Neurological:  Negative for dizziness, light-headedness and headaches.        Physical Exam  Constitutional:       General: He is not in acute distress.     Appearance: Normal appearance. He is not ill-appearing or toxic-appearing.   HENT:      Head: Normocephalic and atraumatic.   Cardiovascular:      Rate and Rhythm: Normal rate and regular rhythm.      Pulses: Normal pulses.      Heart sounds: Normal heart sounds. No murmur heard.     No friction rub. No gallop.   Pulmonary:      Breath sounds: No wheezing, rhonchi or rales.      Comments: Diminished bilaterally   Abdominal:      General: There is no distension.      Palpations: There is no mass.      Tenderness: There is no abdominal tenderness.   Musculoskeletal:      Right lower leg: No edema.      Left lower leg: No edema.   Skin:     General: Skin is warm and dry.   Neurological:      General: No focal deficit present.      Mental Status: He is alert and oriented to person, place, and time.   Psychiatric:         Mood and Affect: Mood normal.         Behavior: Behavior normal.         Thought Content: Thought content normal.          Last Recorded Vitals  /76 (BP Location: Right arm)   Pulse 95   Temp 36.4 °C (97.6 °F) (Temporal)   Resp 17   Wt 49.9 kg (110 lb)   SpO2 98%     Relevant Results  Scheduled medications  amLODIPine, 10 mg, oral, Daily  budesonide, 0.25 mg, nebulization, BID   And  ipratropium-albuteroL, 3 mL, nebulization, 4x daily  cefTRIAXone, 1 g, intravenous, q24h  [START ON 11/22/2024] epoetin jamil or biosimilar, 6,000 Units, subcutaneous, Once per day on Monday Wednesday Friday  heparin (porcine), 5,000 Units, subcutaneous, q8h GULSHAN  mirtazapine, 7.5 mg, oral, Nightly  polyethylene glycol,  17 g, oral, Daily  rosuvastatin, 40 mg, oral, Daily      Continuous medications  sodium chloride 0.9%, 100 mL/hr, Last Rate: 100 mL/hr (11/21/24 1630)      PRN medications  PRN medications: acetaminophen, albuterol, ondansetron ODT **OR** ondansetron  Results for orders placed or performed during the hospital encounter of 11/20/24 (from the past 24 hours)   CBC and Auto Differential   Result Value Ref Range    WBC 6.6 4.4 - 11.3 x10*3/uL    nRBC 0.0 0.0 - 0.0 /100 WBCs    RBC 3.28 (L) 4.50 - 5.90 x10*6/uL    Hemoglobin 9.5 (L) 13.5 - 17.5 g/dL    Hematocrit 28.1 (L) 41.0 - 52.0 %    MCV 86 80 - 100 fL    MCH 29.0 26.0 - 34.0 pg    MCHC 33.8 32.0 - 36.0 g/dL    RDW 13.2 11.5 - 14.5 %    Platelets 243 150 - 450 x10*3/uL    Neutrophils % 68.2 40.0 - 80.0 %    Immature Granulocytes %, Automated 0.6 0.0 - 0.9 %    Lymphocytes % 18.7 13.0 - 44.0 %    Monocytes % 9.9 2.0 - 10.0 %    Eosinophils % 1.8 0.0 - 6.0 %    Basophils % 0.8 0.0 - 2.0 %    Neutrophils Absolute 4.50 1.60 - 5.50 x10*3/uL    Immature Granulocytes Absolute, Automated 0.04 0.00 - 0.50 x10*3/uL    Lymphocytes Absolute 1.23 0.80 - 3.00 x10*3/uL    Monocytes Absolute 0.65 0.05 - 0.80 x10*3/uL    Eosinophils Absolute 0.12 0.00 - 0.40 x10*3/uL    Basophils Absolute 0.05 0.00 - 0.10 x10*3/uL   Comprehensive metabolic panel   Result Value Ref Range    Glucose 102 (H) 74 - 99 mg/dL    Sodium 134 (L) 136 - 145 mmol/L    Potassium 4.2 3.5 - 5.3 mmol/L    Chloride 94 (L) 98 - 107 mmol/L    Bicarbonate 27 21 - 32 mmol/L    Anion Gap 17 10 - 20 mmol/L    Urea Nitrogen 47 (H) 6 - 23 mg/dL    Creatinine 6.18 (H) 0.50 - 1.30 mg/dL    eGFR 9 (L) >60 mL/min/1.73m*2    Calcium 11.0 (H) 8.6 - 10.3 mg/dL    Albumin 4.2 3.4 - 5.0 g/dL    Alkaline Phosphatase 54 33 - 136 U/L    Total Protein 8.1 6.4 - 8.2 g/dL    AST 29 9 - 39 U/L    Bilirubin, Total 0.5 0.0 - 1.2 mg/dL    ALT 25 10 - 52 U/L   Urinalysis with Reflex Culture and Microscopic   Result Value Ref Range    Color,  Urine Light-Yellow Light-Yellow, Yellow, Dark-Yellow    Appearance, Urine Clear Clear    Specific Gravity, Urine 1.010 1.005 - 1.035    pH, Urine 6.5 5.0, 5.5, 6.0, 6.5, 7.0, 7.5, 8.0    Protein, Urine 50 (1+) (A) NEGATIVE, 10 (TRACE), 20 (TRACE) mg/dL    Glucose, Urine Normal Normal mg/dL    Blood, Urine 0.06 (1+) (A) NEGATIVE    Ketones, Urine NEGATIVE NEGATIVE mg/dL    Bilirubin, Urine NEGATIVE NEGATIVE    Urobilinogen, Urine Normal Normal mg/dL    Nitrite, Urine NEGATIVE NEGATIVE    Leukocyte Esterase, Urine 75 Cecilia/µL (A) NEGATIVE   Microscopic Only, Urine   Result Value Ref Range    WBC, Urine 6-10 (A) 1-5, NONE /HPF    RBC, Urine >20 (A) NONE, 1-2, 3-5 /HPF   Basic metabolic panel   Result Value Ref Range    Glucose 69 (L) 74 - 99 mg/dL    Sodium 137 136 - 145 mmol/L    Potassium 3.6 3.5 - 5.3 mmol/L    Chloride 102 98 - 107 mmol/L    Bicarbonate 28 21 - 32 mmol/L    Anion Gap 11 10 - 20 mmol/L    Urea Nitrogen 42 (H) 6 - 23 mg/dL    Creatinine 5.45 (H) 0.50 - 1.30 mg/dL    eGFR 10 (L) >60 mL/min/1.73m*2    Calcium 9.1 8.6 - 10.3 mg/dL   CBC and Auto Differential   Result Value Ref Range    WBC 6.8 4.4 - 11.3 x10*3/uL    nRBC 0.0 0.0 - 0.0 /100 WBCs    RBC 3.02 (L) 4.50 - 5.90 x10*6/uL    Hemoglobin 8.7 (L) 13.5 - 17.5 g/dL    Hematocrit 26.1 (L) 41.0 - 52.0 %    MCV 86 80 - 100 fL    MCH 28.8 26.0 - 34.0 pg    MCHC 33.3 32.0 - 36.0 g/dL    RDW 13.4 11.5 - 14.5 %    Platelets 236 150 - 450 x10*3/uL    Neutrophils % 67.9 40.0 - 80.0 %    Immature Granulocytes %, Automated 0.6 0.0 - 0.9 %    Lymphocytes % 14.0 13.0 - 44.0 %    Monocytes % 9.7 2.0 - 10.0 %    Eosinophils % 6.3 0.0 - 6.0 %    Basophils % 1.5 0.0 - 2.0 %    Neutrophils Absolute 4.61 1.60 - 5.50 x10*3/uL    Immature Granulocytes Absolute, Automated 0.04 0.00 - 0.50 x10*3/uL    Lymphocytes Absolute 0.95 0.80 - 3.00 x10*3/uL    Monocytes Absolute 0.66 0.05 - 0.80 x10*3/uL    Eosinophils Absolute 0.43 (H) 0.00 - 0.40 x10*3/uL    Basophils Absolute 0.10  0.00 - 0.10 x10*3/uL   Magnesium   Result Value Ref Range    Magnesium 2.21 1.60 - 2.40 mg/dL   Creatine Kinase   Result Value Ref Range    Creatine Kinase 40 0 - 325 U/L   Lactate Dehydrogenase   Result Value Ref Range     84 - 246 U/L   Reticulocytes   Result Value Ref Range    Retic % 1.2 0.5 - 2.0 %    Retic Absolute 0.036 0.017 - 0.110 x10*6/uL    Reticulocyte Hemoglobin 32 28 - 38 pg    Immature Retic fraction 8.4 <=16.0 %   Iron and TIBC   Result Value Ref Range    Iron 59 35 - 150 ug/dL    UIBC 180 110 - 370 ug/dL    TIBC 239 (L) 240 - 445 ug/dL    % Saturation 25 25 - 45 %   Ferritin   Result Value Ref Range    Ferritin 583 (H) 20 - 300 ng/mL     *Note: Due to a large number of results and/or encounters for the requested time period, some results have not been displayed. A complete set of results can be found in Results Review.     CT abdomen pelvis wo IV contrast    Result Date: 11/21/2024  Interpreted By:  Tavia Cifuentes, STUDY: CT ABDOMEN PELVIS WO IV CONTRAST;  11/20/2024 11:32 pm   INDICATION: Signs/Symptoms:Worsening renal function, hematuria, rule out ureteral stone.   COMPARISON: Renal ultrasound 09/24/2024. CT lung screening 02/13/2024. CT angiogram chest 12/15/2022. Liver ultrasound 06/13/2022.   ACCESSION NUMBER(S): [id number]   ORDERING CLINICIAN: CLARISA GARCIA   TECHNIQUE: CT of the abdomen and pelvis was performed with no contrast administration. Coronal and sagittal reformats were obtained.   FINDINGS: LOWER CHEST: Redemonstration of severe bilateral emphysematous changes.   ABDOMEN:   LIVER: Unremarkable unenhanced appearance.   BILE DUCTS: No obvious dilation.   GALLBLADDER: There is cholelithiasis.   PANCREAS: No peripancreatic inflammatory changes.   SPLEEN: Unremarkable unenhanced appearance.   ADRENAL GLANDS: Unremarkable.   KIDNEYS AND URETERS: No hydronephrosis or hydroureter.  No obstructing ureteral calculi.   BOWEL: No bowel obstruction. There is colonic diverticulosis  with no CT evidence for acute diverticulitis.   VESSELS: Atherosclerotic calcifications at the abdominal aorta. No abdominal aortic aneurysm.   PELVIS: The urinary bladder is partially distended. The prostate measures 4.9 cm in transverse diameter.   PERITONEUM/RETROPERITONEUM/LYMPH NODES: No free fluid or free air.  No retroperitoneal hemorrhage. Calcified lymph nodes are noted at the periportal region.   ABDOMINAL WALL: The abdominal wall soft tissues are within normal limits.   BONE AND SOFT TISSUE: Degenerative changes at the spine.   IMPRESSION 1.  No hydronephrosis or obstructing ureteral calculi. No acute findings on unenhanced CT. 2. Cholelithiasis. 3. Colonic diverticulosis.       MACRO: None.   Signed by: Tavia Cifuentes 11/21/2024 12:25 AM Dictation workstation:   [id number]            Assessment/Plan   Assessment & Plan  Acute kidney injury (CMS-HCC)  Kalyan Armstrong is a 74 y.o. male with PMHx of HFmrEF, pulmonary HTN, COPD, severe emphysema, hepatitis C, CKD IV, who came to [facility] ED at the request of his physician for CATHIE. Pt has no complaints at this time. States he is chronically SOB due to COPD. Denies fevers, chills, trouble eating/drinking, pain, difficulty ambulating, N/V/D/C.   ED course: VSS   Labs significant for Cr. 6.18, BUN 47, EGFR 9, Ca 11, hemoglobin 9.5, hematocrit 28.1  CT abd/pel: N hydronephrosis or obstructing ureteral calculi. No acute findings. Cholelithiasis, chronic diverticulosis     PLAN  - CT abd/pel as above   - Concerns for volume depletion   - Nephrology following - plans for IV iron, erythropoietin, IVF   - Pt sees Dr. Xiong OP   - Follow RFP    COPD  - Continue home meds   - Oxygen saturation stable on home O2     Other comorbidities as above  - Continue medications as ordered and adjust based on clinical course      VTE / GI prophylaxis   - Subcutaneous heparin, bowel regimen in place      Discharge planning  - Home when med ready      Discussed with Dr. Tovar and  the interdisciplinary team      Leslye Lee PA-C

## 2024-11-21 NOTE — PROGRESS NOTES
Transitional Care Coordination Progress Note:  Plan per Medical/Surgical team: treatment of CATHIE with IV ATB, IV fluids, renal consult   Status: Observation?  Payor source: medicare A/B, [employer]  Discharge disposition: Home with wife  Has home oxygen already @ 3 liters  Potential Barriers: renal 47/6.18  ADOD: 11/23/2024  BAILEY Velasquez RN, BSN Transitional Care Coordinator ED# [phone]      11/21/24 0712   Discharge Planning   Living Arrangements Spouse/significant other   Support Systems Spouse/significant other   Assistance Needed renal work up   Type of Residence Private residence   Number of Stairs to Enter Residence 5   Number of Stairs Within Residence 14   Home or Post Acute Services None   Expected Discharge Disposition Home   Does the patient need discharge transport arranged? No   Financial Resource Strain   How hard is it for you to pay for the very basics like food, housing, medical care, and heating? Not hard   Housing Stability   In the last 12 months, was there a time when you were not able to pay the mortgage or rent on time? N   In the past 12 months, how many times have you moved where you were living? 1   At any time in the past 12 months, were you homeless or living in a shelter (including now)? N   Transportation Needs   In the past 12 months, has lack of transportation kept you from medical appointments or from getting medications? no   In the past 12 months, has lack of transportation kept you from meetings, work, or from getting things needed for daily living? No

## 2024-11-22 LAB
ALBUMIN SERPL BCP-MCNC: 3.4 G/DL (ref 3.4–5)
ANION GAP SERPL CALC-SCNC: 12 MMOL/L (ref 10–20)
BACTERIA UR CULT: NO GROWTH
BUN SERPL-MCNC: 37 MG/DL (ref 6–23)
CALCIUM SERPL-MCNC: 9.2 MG/DL (ref 8.6–10.3)
CHLORIDE SERPL-SCNC: 101 MMOL/L (ref 98–107)
CO2 SERPL-SCNC: 28 MMOL/L (ref 21–32)
CREAT SERPL-MCNC: 5.64 MG/DL (ref 0.5–1.3)
EGFRCR SERPLBLD CKD-EPI 2021: 10 ML/MIN/1.73M*2
ERYTHROCYTE [DISTWIDTH] IN BLOOD BY AUTOMATED COUNT: 13.3 % (ref 11.5–14.5)
GLUCOSE SERPL-MCNC: 101 MG/DL (ref 74–99)
HCT VFR BLD AUTO: 24.9 % (ref 41–52)
HGB BLD-MCNC: 8.2 G/DL (ref 13.5–17.5)
MCH RBC QN AUTO: 29.5 PG (ref 26–34)
MCHC RBC AUTO-ENTMCNC: 32.9 G/DL (ref 32–36)
MCV RBC AUTO: 90 FL (ref 80–100)
NRBC BLD-RTO: 0 /100 WBCS (ref 0–0)
PHOSPHATE SERPL-MCNC: 3.2 MG/DL (ref 2.5–4.9)
PLATELET # BLD AUTO: 200 X10*3/UL (ref 150–450)
POTASSIUM SERPL-SCNC: 3.7 MMOL/L (ref 3.5–5.3)
RBC # BLD AUTO: 2.78 X10*6/UL (ref 4.5–5.9)
SODIUM SERPL-SCNC: 137 MMOL/L (ref 136–145)
WBC # BLD AUTO: 6.1 X10*3/UL (ref 4.4–11.3)

## 2024-11-22 PROCEDURE — 36415 COLL VENOUS BLD VENIPUNCTURE: CPT | Performed by: INTERNAL MEDICINE

## 2024-11-22 PROCEDURE — 1200000002 HC GENERAL ROOM WITH TELEMETRY DAILY

## 2024-11-22 PROCEDURE — 2500000001 HC RX 250 WO HCPCS SELF ADMINISTERED DRUGS (ALT 637 FOR MEDICARE OP): Performed by: HOSPITALIST

## 2024-11-22 PROCEDURE — 85027 COMPLETE CBC AUTOMATED: CPT | Performed by: INTERNAL MEDICINE

## 2024-11-22 PROCEDURE — 94640 AIRWAY INHALATION TREATMENT: CPT

## 2024-11-22 PROCEDURE — 80069 RENAL FUNCTION PANEL: CPT | Performed by: INTERNAL MEDICINE

## 2024-11-22 PROCEDURE — 2500000002 HC RX 250 W HCPCS SELF ADMINISTERED DRUGS (ALT 637 FOR MEDICARE OP, ALT 636 FOR OP/ED): Performed by: HOSPITALIST

## 2024-11-22 PROCEDURE — 2500000002 HC RX 250 W HCPCS SELF ADMINISTERED DRUGS (ALT 637 FOR MEDICARE OP, ALT 636 FOR OP/ED): Performed by: INTERNAL MEDICINE

## 2024-11-22 PROCEDURE — 2500000005 HC RX 250 GENERAL PHARMACY W/O HCPCS: Performed by: INTERNAL MEDICINE

## 2024-11-22 PROCEDURE — 2500000002 HC RX 250 W HCPCS SELF ADMINISTERED DRUGS (ALT 637 FOR MEDICARE OP, ALT 636 FOR OP/ED)

## 2024-11-22 PROCEDURE — 6350000001 HC RX 635 EPOETIN >10,000 UNITS: Performed by: INTERNAL MEDICINE

## 2024-11-22 PROCEDURE — 99233 SBSQ HOSP IP/OBS HIGH 50: CPT | Performed by: INTERNAL MEDICINE

## 2024-11-22 PROCEDURE — 2500000004 HC RX 250 GENERAL PHARMACY W/ HCPCS (ALT 636 FOR OP/ED): Performed by: HOSPITALIST

## 2024-11-22 PROCEDURE — 2500000004 HC RX 250 GENERAL PHARMACY W/ HCPCS (ALT 636 FOR OP/ED): Performed by: INTERNAL MEDICINE

## 2024-11-22 RX ORDER — PREDNISONE 5 MG/1
5 TABLET ORAL DAILY
Status: DISCONTINUED | OUTPATIENT
Start: 2024-11-22 | End: 2024-11-26 | Stop reason: HOSPADM

## 2024-11-22 RX ORDER — AZITHROMYCIN 500 MG/1
250 TABLET, FILM COATED ORAL DAILY
Status: DISCONTINUED | OUTPATIENT
Start: 2024-11-22 | End: 2024-11-26 | Stop reason: HOSPADM

## 2024-11-22 NOTE — CARE PLAN
The patient's goals for the shift include      The clinical goals for the shift include patient will be free of falls this shift      Problem: Pain - Adult  Goal: Verbalizes/displays adequate comfort level or baseline comfort level  Outcome: Progressing     Problem: Safety - Adult  Goal: Free from fall injury  Outcome: Progressing     Problem: Discharge Planning  Goal: Discharge to home or other facility with appropriate resources  Outcome: Progressing     Problem: Chronic Conditions and Co-morbidities  Goal: Patient's chronic conditions and co-morbidity symptoms are monitored and maintained or improved  Outcome: Progressing     Problem: Fall/Injury  Goal: Not fall by end of shift  Outcome: Progressing  Goal: Be free from injury by end of the shift  Outcome: Progressing  Goal: Verbalize understanding of personal risk factors for fall in the hospital  Outcome: Progressing  Goal: Verbalize understanding of risk factor reduction measures to prevent injury from fall in the home  Outcome: Progressing  Goal: Use assistive devices by end of the shift  Outcome: Progressing  Goal: Pace activities to prevent fatigue by end of the shift  Outcome: Progressing

## 2024-11-22 NOTE — CONSULTS
Nutrition Assessment Note  Nutrition Assessment      Reason for Assessment  Reason for Assessment: Admission nursing screening    Kalyan Armstrong is a 74 y.o. year old male patient with Acute kidney injury (CMS-HCC) [N17.9]     referred for MST-3 wt loss and poor appetite     Chart reviewed and pt visited.  Past Medical History:   Diagnosis Date    CATHIE (acute kidney injury) (CMS-HCC) 05/03/2023    Cataract     COPD (chronic obstructive pulmonary disease) (Multi)     Cough, unspecified 06/20/2014    Cough    Emphysema of lung (Multi)     Encounter for immunization 01/19/2015    Need for influenza vaccination    Encounter for screening for malignant neoplasm of prostate     Encounter for screening for malignant neoplasm of prostate    Other conditions influencing health status 05/20/2013    Digital Blood Vessel Rupture    Personal history of colonic polyps     History of colon polyps    Personal history of other specified conditions 05/20/2013    History of shortness of breath    Personal history of other specified conditions 06/20/2014    History of shortness of breath     Per chart review:  -Admitted from PCP after having abnormal labs (worsening kidney fx)  -Weight losses, concerns for poor po intake  -Volume depleted    Per pt/family:  -No food allergies  -Drinks water with meals to help chew/swallow; no current concerns for choking.  -Edentulous  -20# loss since October  -Agreeable to nutritional supplements    Scheduled medications  amLODIPine, 10 mg, oral, Daily  azithromycin, 250 mg, oral, Daily  budesonide, 0.25 mg, nebulization, BID   And  ipratropium-albuteroL, 3 mL, nebulization, 4x daily  epoetin jamil or biosimilar, 6,000 Units, subcutaneous, Once per day on Monday Wednesday Friday  heparin (porcine), 5,000 Units, subcutaneous, q8h GULSHAN  mirtazapine, 7.5 mg, oral, Nightly  oxygen, , inhalation, Continuous - Inhalation  polyethylene glycol, 17 g, oral, Daily  predniSONE, 5 mg, oral, Daily  rosuvastatin, 40  mg, oral, Daily      Continuous medications     PRN medications  PRN medications: acetaminophen, albuterol, ondansetron ODT **OR** ondansetron     Latest Reference Range & Units 11/21/24 07:20 11/22/24 06:41   GLUCOSE 74 - 99 mg/dL 69 (L) 101 (H)   SODIUM 136 - 145 mmol/L 137 137   POTASSIUM 3.5 - 5.3 mmol/L 3.6 3.7   CHLORIDE 98 - 107 mmol/L 102 101   Bicarbonate 21 - 32 mmol/L 28 28   Anion Gap 10 - 20 mmol/L 11 12   Blood Urea Nitrogen 6 - 23 mg/dL 42 (H) 37 (H)   Creatinine 0.50 - 1.30 mg/dL 5.45 (H) 5.64 (H)   EGFR >60 mL/min/1.73m*2 10 (L) 10 (L)   Calcium 8.6 - 10.3 mg/dL 9.1 9.2   PHOSPHORUS 2.5 - 4.9 mg/dL  3.2   Albumin 3.4 - 5.0 g/dL  3.4   Creatine Kinase 0 - 325 U/L 40    FERRITIN 20 - 300 ng/mL 583 (H)    IRON 35 - 150 ug/dL 59    MAGNESIUM 1.60 - 2.40 mg/dL 2.21    LDH 84 - 246 U/L 230    TIBC 240 - 445 ug/dL 239 (L)    UIBC 110 - 370 ug/dL 180    % Saturation 25 - 45 % 25    (L): Data is abnormally low  (H): Data is abnormally high    Dietary Orders (From admission, onward)       Start     Ordered    11/22/24 1359  Oral nutritional supplements  Until discontinued        Question Answer Comment   Deliver with Breakfast    Deliver with Dinner    Select supplement: Ensure Clear        11/22/24 1358    11/21/24 1507  May Participate in Room Service  ( ROOM SERVICE MAY PARTICIPATE)  Once        Question:  .  Answer:  Yes    11/21/24 1506    11/21/24 0639  Adult diet 2-3 grams sodium, Renal; Potassium Restricted 2 gm (50mEq); 2 - 3 grams Sodium  Diet effective now        Question Answer Comment   Diet type 2-3 grams sodium    Diet type Renal    Potassium restriction: Potassium Restricted 2 gm (50mEq)    Sodium restriction: 2 - 3 grams Sodium        11/21/24 0638                  History:  Food and Nutrient History  Energy Intake: Fair 50-75 %  Food and Nutrient History: Pt states he eats 3-4 meals daily and snacks- good appetite.  Vitamin/Herbal Supplement Use: Just started using Boost- did not tolerate.  "Going to try nutritional supplements that dairy free.    Anthropometrics:  Height: 175.3 cm (5' 9.02\")  Weight: 49.9 kg (110 lb 0.2 oz)  BMI (Calculated): 16.24    Weight Change: 0.01    Wt Readings from Last 12 Encounters:   11/22/24 49.9 kg (110 lb 0.2 oz)   11/18/24 49.9 kg (110 lb)   10/09/24 59.4 kg (131 lb)   09/24/24 59.4 kg (131 lb)   08/13/24 55.3 kg (122 lb)   07/08/24 57.8 kg (127 lb 8 oz)   05/09/24 58.5 kg (129 lb)   04/23/24 59.4 kg (131 lb)   03/08/24 59 kg (130 lb)   01/29/24 59 kg (130 lb)   11/23/23 72.6 kg (160 lb)   11/20/23 61.7 kg (136 lb)       Weight Change  Significant Weight Loss: Yes  Interpretation of Weight Loss: >10% in 6 months       IBW/kg (Dietitian Calculated): 72.7 kg  Percent of IBW: 69 %       Energy Needs:  Estimated Energy Needs  Total Energy Estimated Needs (kCal): 1750 kCal  Total Estimated Energy Need per Day (kCal/kg): 2000 kCal/kg  Method for Estimating Needs: 35-40kcal/kg    Estimated Protein Needs  Total Protein Estimated Needs (g): 45 g  Total Protein Estimated Needs (g/kg): 60 g/kg  Method for Estimating Needs: 0.6-0.8g/kg (IBW)    Estimated Fluid Needs  Method for Estimating Needs: 1mL/kcal or MD recommendations       Nutrition Focused Physical Findings:  Subcutaneous Fat Loss  Orbital Fat Pads: Severe (dark circles, hollowing and loose skin)  Buccal Fat Pads: Severe (hollow, sunken and narrow face)    Muscle Wasting  Temporalis: Severe (hollowed scooping depression)  Pectoralis (Clavicular Region): Severe (protruding prominent clavicle)  Deltoid/Trapezius: Severe (squared shoulders, acromion process prominent)    Edema  Edema: none       Physical Findings (Nutrition Deficiency/Toxicity)  Skin: Negative       Nutrition Diagnosis   Malnutrition Diagnosis  Patient has Malnutrition Diagnosis: Yes  Diagnosis Status: New  Malnutrition Diagnosis: Severe malnutrition related to chronic disease or condition  As Evidenced by: greater than 10% weight loss in 6 months, severe " fat loss and severe muscle loss.  Additional Assessment Information: Suspected energy intake less than EEN    Patient has Nutrition Diagnosis: Yes  Nutrition Diagnosis 1: Underweight  Diagnosis Status (1): New  Related to (1): BMI  As Evidenced by (1): BMI 16.24       Nutrition Interventions/Recommendations      Food and/or Nutrient Delivery Interventions  Meals and Snacks: General healthful diet, Mineral-modified diet     Medical Food Supplement: Commercial beverage  Goal: Ensure clear nutritional supplements BID      Nutrition Monitoring and Evaluation   Food and Nutrient Related History  Energy Intake: Estimated energy intake  Criteria: >75% of EEN via po    Fluid Intake: Estimated fluid intake    Amount of Food: Estimated amout of food, Medical food intake  Criteria: >75% of meals and nutritional supplements    Anthropometrics: Body Composition/Growth/Weight History  Weight: Measured weight, Weight change    Weight Change: Weight change percentage, Weight gain, Weight loss    Body Mass: Body mass index (BMI)       Biochemical Data, Medical Tests and Procedures  Electrolyte and Renal Panel: Other (Comment), BUN, Creatinine  Criteria: As clinically indicated       Glucose/Endocrine Profile: Glucose, casual  Criteria: As clinically indicated    Nutritional Anemia Profile: Other (Comment), Ferritin, serum  Criteria: As clinically indicated    Vitamin Profile: Other (Comment)  Criteria: As clinically indicated    Nutrition Focused Physical Findings  Adipose: Loss of subcutaneous fat    Muscles: Muscle atrophy       Other: overall appearance and I/Os       Follow Up  Time Spent (min): 60 minutes  Last Date of Nutrition Visit: 11/22/24  Nutrition Follow-Up Needed?: Dietitian to reassess per policy  Follow up Comment: CAROL Lucio

## 2024-11-22 NOTE — CARE PLAN
The patient's goals for the shift include  rest    The clinical goals for the shift include patient will be free of falls this shift    Problem: Pain - Adult  Goal: Verbalizes/displays adequate comfort level or baseline comfort level  Outcome: Progressing     Problem: Safety - Adult  Goal: Free from fall injury  Outcome: Progressing     Problem: Discharge Planning  Goal: Discharge to home or other facility with appropriate resources  Outcome: Progressing     Problem: Chronic Conditions and Co-morbidities  Goal: Patient's chronic conditions and co-morbidity symptoms are monitored and maintained or improved  Outcome: Progressing     Problem: Fall/Injury  Goal: Not fall by end of shift  Outcome: Progressing

## 2024-11-22 NOTE — PROGRESS NOTES
11/22/24 1457   Discharge Planning   Home or Post Acute Services None   Expected Discharge Disposition Home     Patient on baseline 3L O2.   Receiving IV fluids.  Nephrology following.  Plan remains for patient to return home upon discharge.  No home going needs identified at this time.  Will continue to follow for discharge planning needs.

## 2024-11-22 NOTE — PROGRESS NOTES
Kalyan Armstrong is a 74 y.o. male on day 1 of admission presenting with Acute kidney injury (CMS-HCC).      Subjective   Overall doing better no new complaints       Objective        Vitals 24HR  Heart Rate:  [76-98]   Temp:  [36.4 °C (97.6 °F)-37.6 °C (99.6 °F)]   Resp:  [17-18]   BP: (116-139)/(67-77)   SpO2:  [95 %-99 %]     Intake/Output last 3 Shifts:    Intake/Output Summary (Last 24 hours) at 11/22/2024 1131  Last data filed at 11/22/2024 1100  Gross per 24 hour   Intake 906.67 ml   Output 1300 ml   Net -393.33 ml       Physical Exam  General Appearance: Awake and alert in no acute distress  Head and ENT: Normocephalic/atraumatic/supple neck/no JVD  Lungs: CTA  Heart; RRR  Abdomen: Soft no organomegaly no tenderness  Extremities: No edema  Neurologic: Physiologic                Relevant Results     Results from last 7 days   Lab Units 11/22/24  0642 11/21/24  0720 11/20/24  2200   WBC AUTO x10*3/uL 6.1 6.8 6.6   HEMOGLOBIN g/dL 8.2* 8.7* 9.5*   HEMATOCRIT % 24.9* 26.1* 28.1*   PLATELETS AUTO x10*3/uL 200 236 243      Results from last 7 days   Lab Units 11/22/24  0641 11/21/24  0720 11/20/24  2200   SODIUM mmol/L 137 137 134*   POTASSIUM mmol/L 3.7 3.6 4.2   CHLORIDE mmol/L 101 102 94*   CO2 mmol/L 28 28 27   BUN mg/dL 37* 42* 47*   CREATININE mg/dL 5.64* 5.45* 6.18*   GLUCOSE mg/dL 101* 69* 102*   CALCIUM mg/dL 9.2 9.1 11.0*        Current Facility-Administered Medications:     acetaminophen (Tylenol) tablet 650 mg, 650 mg, oral, q4h PRN, Sabrina Hathaway MD    albuterol 2.5 mg /3 mL (0.083 %) nebulizer solution 2.5 mg, 2.5 mg, nebulization, q4h PRN, Sabrina Hathaway MD    amLODIPine (Norvasc) tablet 10 mg, 10 mg, oral, Daily, Sabrina Hathaway MD, 10 mg at 11/22/24 0855    azithromycin (Zithromax) tablet 250 mg, 250 mg, oral, Daily, Twyla Cerrato MD, 250 mg at 11/22/24 1109    budesonide (Pulmicort) 0.25 mg/2 mL nebulizer solution 0.25 mg, 0.25 mg, nebulization, BID, 0.25 mg at 11/22/24 0780 **AND**  ipratropium-albuteroL (Duo-Neb) 0.5-2.5 mg/3 mL nebulizer solution 3 mL, 3 mL, nebulization, 4x daily, Bernadette Ferreira PharmD, 3 mL at 11/22/24 0752    epoetin jamil-epbx (Retacrit) injection 6,000 Units, 6,000 Units, subcutaneous, Once per day on Monday Wednesday Friday, Jaya Wynn MD    heparin (porcine) injection 5,000 Units, 5,000 Units, subcutaneous, q8h GULSHAN, Sabrina Hathaway MD, 5,000 Units at 11/22/24 0551    mirtazapine (Remeron) tablet 7.5 mg, 7.5 mg, oral, Nightly, Sabrina Hathaway MD, 7.5 mg at 11/21/24 2047    ondansetron ODT (Zofran-ODT) disintegrating tablet 4 mg, 4 mg, oral, q8h PRN **OR** ondansetron (Zofran) injection 4 mg, 4 mg, intravenous, q8h PRN, Sabrina Hathaway MD    oxygen (O2) therapy, , inhalation, Continuous - Inhalation, Twyla Cerrato MD, 3 L/min at 11/22/24 0757    polyethylene glycol (Glycolax, Miralax) packet 17 g, 17 g, oral, Daily, Sabrina Hathaway MD, 17 g at 11/21/24 0832    predniSONE (Deltasone) tablet 5 mg, 5 mg, oral, Daily, Twyla Cerrato MD, 5 mg at 11/22/24 1109    rosuvastatin (Crestor) tablet 40 mg, 40 mg, oral, Daily, Sabrina Hathaway MD, 40 mg at 11/22/24 0855           Assessment/Plan   1.  CATHIE on top of CKD stage IV  BUN/creatinine= 37/5.64 today  Follows with Dr. Xiong  2.  COPD/emphysema with chronic hypoxic respiratory failure, patient on baseline 3 L of oxygen with continuous inhaler   3.  Hepatitis C posttreatment with negative viral load by PCR  4.  Hypertension, will continue current management    Avoid nephrotoxic medications, contrast dye, continue all meds and antibiotics as being given    Principal Problem:    Acute kidney injury (CMS-HCC)              I spent 35 minutes in the professional and overall care of this patient.      Ezra Tillman MD

## 2024-11-22 NOTE — PROGRESS NOTES
"Kalyan Armstrong is a 74 y.o. male on day 1 of admission presenting with Acute kidney injury (CMS-HCC).    Subjective   Creat 5.64 at baseline oxygen bp stable, nephrology on board       Objective     Physical Exam  Vitals reviewed.   Constitutional:       Appearance: Normal appearance.   HENT:      Head: Normocephalic.      Right Ear: Tympanic membrane normal.      Nose: Nose normal.      Mouth/Throat:      Mouth: Mucous membranes are moist.   Cardiovascular:      Rate and Rhythm: Normal rate and regular rhythm.   Pulmonary:      Effort: Pulmonary effort is normal.   Abdominal:      General: Abdomen is flat. Bowel sounds are normal.      Palpations: Abdomen is soft.   Skin:     Capillary Refill: Capillary refill takes less than 2 seconds.   Neurological:      General: No focal deficit present.      Mental Status: He is alert.         Last Recorded Vitals  Blood pressure 116/71, pulse 83, temperature 37 °C (98.6 °F), temperature source Temporal, resp. rate 18, height 1.753 m (5' 9\"), weight 49.9 kg (110 lb), SpO2 98%.  Intake/Output last 3 Shifts:  I/O last 3 completed shifts:  In: 906.7 (18.2 mL/kg) [I.V.:906.7 (18.2 mL/kg)]  Out: 800 (16 mL/kg) [Urine:800 (0.4 mL/kg/hr)]  Weight: 49.9 kg     Relevant Results  Results for orders placed or performed during the hospital encounter of 11/20/24 (from the past 24 hours)   Hepatitis B surface antigen   Result Value Ref Range    Hepatitis B Surface AG Nonreactive Nonreactive   Hepatitis B surface antibody   Result Value Ref Range    Hepatitis B Surface AB 25.8 (H) <10.0 mIU/mL   Renal Function Panel   Result Value Ref Range    Glucose 101 (H) 74 - 99 mg/dL    Sodium 137 136 - 145 mmol/L    Potassium 3.7 3.5 - 5.3 mmol/L    Chloride 101 98 - 107 mmol/L    Bicarbonate 28 21 - 32 mmol/L    Anion Gap 12 10 - 20 mmol/L    Urea Nitrogen 37 (H) 6 - 23 mg/dL    Creatinine 5.64 (H) 0.50 - 1.30 mg/dL    eGFR 10 (L) >60 mL/min/1.73m*2    Calcium 9.2 8.6 - 10.3 mg/dL    Phosphorus 3.2 " 2.5 - 4.9 mg/dL    Albumin 3.4 3.4 - 5.0 g/dL   CBC   Result Value Ref Range    WBC 6.1 4.4 - 11.3 x10*3/uL    nRBC 0.0 0.0 - 0.0 /100 WBCs    RBC 2.78 (L) 4.50 - 5.90 x10*6/uL    Hemoglobin 8.2 (L) 13.5 - 17.5 g/dL    Hematocrit 24.9 (L) 41.0 - 52.0 %    MCV 90 80 - 100 fL    MCH 29.5 26.0 - 34.0 pg    MCHC 32.9 32.0 - 36.0 g/dL    RDW 13.3 11.5 - 14.5 %    Platelets 200 150 - 450 x10*3/uL     *Note: Due to a large number of results and/or encounters for the requested time period, some results have not been displayed. A complete set of results can be found in Results Review.       Imaging Results  Ct abd/pelvis:  IMPRESSION  1.  No hydronephrosis or obstructing ureteral calculi. No acute  findings on unenhanced CT.  2. Cholelithiasis.  3. Colonic diverticulosis.      Medications:  amLODIPine, 10 mg, oral, Daily  budesonide, 0.25 mg, nebulization, BID   And  ipratropium-albuteroL, 3 mL, nebulization, 4x daily  cefTRIAXone, 1 g, intravenous, q24h  epoetin jamil or biosimilar, 6,000 Units, subcutaneous, Once per day on Monday Wednesday Friday  heparin (porcine), 5,000 Units, subcutaneous, q8h GULSHAN  mirtazapine, 7.5 mg, oral, Nightly  oxygen, , inhalation, Continuous - Inhalation  polyethylene glycol, 17 g, oral, Daily  rosuvastatin, 40 mg, oral, Daily       PRN medications: acetaminophen, albuterol, ondansetron ODT **OR** ondansetron     Assessment/Plan     CATHIE on CKDIII  - baseline creatinine around 2.6  -holding nephrotoxins on ivfs, nephrology on board    2.  COPD with chronic hypoxic respiratory failure  -Patient is on baseline 3 L of oxygen continue inhalers      3. HLD  -on rosuvastatin    DVT Prophylaxis:  Heparin subq          Twyla Cerrato MD  [facility]

## 2024-11-23 LAB
ANION GAP SERPL CALC-SCNC: 12 MMOL/L (ref 10–20)
BUN SERPL-MCNC: 34 MG/DL (ref 6–23)
CALCIUM SERPL-MCNC: 9.9 MG/DL (ref 8.6–10.3)
CHLORIDE SERPL-SCNC: 101 MMOL/L (ref 98–107)
CO2 SERPL-SCNC: 29 MMOL/L (ref 21–32)
CREAT SERPL-MCNC: 5.54 MG/DL (ref 0.5–1.3)
EGFRCR SERPLBLD CKD-EPI 2021: 10 ML/MIN/1.73M*2
GLUCOSE SERPL-MCNC: 80 MG/DL (ref 74–99)
POTASSIUM SERPL-SCNC: 3.8 MMOL/L (ref 3.5–5.3)
SODIUM SERPL-SCNC: 138 MMOL/L (ref 136–145)

## 2024-11-23 PROCEDURE — 2500000002 HC RX 250 W HCPCS SELF ADMINISTERED DRUGS (ALT 637 FOR MEDICARE OP, ALT 636 FOR OP/ED): Performed by: INTERNAL MEDICINE

## 2024-11-23 PROCEDURE — 2500000005 HC RX 250 GENERAL PHARMACY W/O HCPCS: Performed by: INTERNAL MEDICINE

## 2024-11-23 PROCEDURE — 2500000001 HC RX 250 WO HCPCS SELF ADMINISTERED DRUGS (ALT 637 FOR MEDICARE OP): Performed by: HOSPITALIST

## 2024-11-23 PROCEDURE — 2500000002 HC RX 250 W HCPCS SELF ADMINISTERED DRUGS (ALT 637 FOR MEDICARE OP, ALT 636 FOR OP/ED): Performed by: HOSPITALIST

## 2024-11-23 PROCEDURE — 1200000002 HC GENERAL ROOM WITH TELEMETRY DAILY

## 2024-11-23 PROCEDURE — 2500000002 HC RX 250 W HCPCS SELF ADMINISTERED DRUGS (ALT 637 FOR MEDICARE OP, ALT 636 FOR OP/ED)

## 2024-11-23 PROCEDURE — 94760 N-INVAS EAR/PLS OXIMETRY 1: CPT

## 2024-11-23 PROCEDURE — 2500000004 HC RX 250 GENERAL PHARMACY W/ HCPCS (ALT 636 FOR OP/ED): Performed by: INTERNAL MEDICINE

## 2024-11-23 PROCEDURE — 82374 ASSAY BLOOD CARBON DIOXIDE: CPT | Performed by: INTERNAL MEDICINE

## 2024-11-23 PROCEDURE — 99232 SBSQ HOSP IP/OBS MODERATE 35: CPT | Performed by: INTERNAL MEDICINE

## 2024-11-23 PROCEDURE — 94640 AIRWAY INHALATION TREATMENT: CPT

## 2024-11-23 PROCEDURE — 36415 COLL VENOUS BLD VENIPUNCTURE: CPT | Performed by: INTERNAL MEDICINE

## 2024-11-23 PROCEDURE — 2500000004 HC RX 250 GENERAL PHARMACY W/ HCPCS (ALT 636 FOR OP/ED): Performed by: HOSPITALIST

## 2024-11-23 ASSESSMENT — COGNITIVE AND FUNCTIONAL STATUS - GENERAL
MOBILITY SCORE: 22
MOBILITY SCORE: 22
DAILY ACTIVITIY SCORE: 24
CLIMB 3 TO 5 STEPS WITH RAILING: A LITTLE
CLIMB 3 TO 5 STEPS WITH RAILING: A LITTLE
CLIMB 3 TO 5 STEPS WITH RAILING: A LOT
WALKING IN HOSPITAL ROOM: A LITTLE
DAILY ACTIVITIY SCORE: 24
MOBILITY SCORE: 22
DAILY ACTIVITIY SCORE: 24
MOBILITY SCORE: 22
CLIMB 3 TO 5 STEPS WITH RAILING: A LITTLE
DAILY ACTIVITIY SCORE: 24
MOBILITY SCORE: 21
WALKING IN HOSPITAL ROOM: A LITTLE
CLIMB 3 TO 5 STEPS WITH RAILING: A LITTLE
DAILY ACTIVITIY SCORE: 24

## 2024-11-23 ASSESSMENT — PAIN - FUNCTIONAL ASSESSMENT: PAIN_FUNCTIONAL_ASSESSMENT: 0-10

## 2024-11-23 ASSESSMENT — PAIN SCALES - GENERAL
PAINLEVEL_OUTOF10: 0 - NO PAIN

## 2024-11-23 NOTE — PROGRESS NOTES
"Kalyan Armstrong is a 74 y.o. male on day 2 of admission presenting with Acute kidney injury (CMS-HCC).    Subjective   Pending creatnine this am no fever or chills hoping to move upstairs today       Objective     Physical Exam  Vitals reviewed.   Constitutional:       Appearance: Normal appearance.   HENT:      Head: Normocephalic.      Right Ear: Tympanic membrane normal.      Nose: Nose normal.      Mouth/Throat:      Mouth: Mucous membranes are moist.   Cardiovascular:      Rate and Rhythm: Normal rate and regular rhythm.   Pulmonary:      Effort: Pulmonary effort is normal.   Abdominal:      General: Abdomen is flat. Bowel sounds are normal.      Palpations: Abdomen is soft.   Skin:     Capillary Refill: Capillary refill takes less than 2 seconds.   Neurological:      General: No focal deficit present.      Mental Status: He is alert.         Last Recorded Vitals  Blood pressure 99/75, pulse 93, temperature 36.3 °C (97.3 °F), temperature source Temporal, resp. rate 18, height 1.753 m (5' 9.02\"), weight 49.9 kg (110 lb 0.2 oz), SpO2 97%.  Intake/Output last 3 Shifts:  I/O last 3 completed shifts:  In: - (0 mL/kg)   Out: 1300 (26.1 mL/kg) [Urine:1300 (0.7 mL/kg/hr)]  Weight: 49.9 kg     Relevant Results  No results found. However, due to the size of the patient record, not all encounters were searched. Please check Results Review for a complete set of results.      Imaging Results  Ct abd/pelvis:  IMPRESSION  1.  No hydronephrosis or obstructing ureteral calculi. No acute  findings on unenhanced CT.  2. Cholelithiasis.  3. Colonic diverticulosis.      Medications:  amLODIPine, 10 mg, oral, Daily  azithromycin, 250 mg, oral, Daily  budesonide, 0.25 mg, nebulization, BID   And  ipratropium-albuteroL, 3 mL, nebulization, 4x daily  epoetin jamil or biosimilar, 6,000 Units, subcutaneous, Once per day on Monday Wednesday Friday  heparin (porcine), 5,000 Units, subcutaneous, q8h GULSHAN  mirtazapine, 7.5 mg, oral, " Nightly  oxygen, , inhalation, Continuous - Inhalation  polyethylene glycol, 17 g, oral, Daily  predniSONE, 5 mg, oral, Daily  rosuvastatin, 40 mg, oral, Daily       PRN medications: acetaminophen, albuterol, ondansetron ODT **OR** ondansetron     Assessment/Plan     CATHIE on CKDIII  - baseline creatinine around 2.6  -holding nephrotoxins on ivfs, nephrology on board  -awaiting repeat labs    2.  COPD with chronic hypoxic respiratory failure  -Patient is on baseline 3 L of oxygen continue inhalers      3. HLD  -on rosuvastatin    DVT Prophylaxis:  Heparin subq          Twyla Cerrato MD  [facility]

## 2024-11-23 NOTE — CONSULTS
Reason For Consult  Acute kidney injury    History Of Present Illness  Kalyan Armstrong is a 74 y.o. male with a history of heart failure with mildly reduced EF, EF is slightly low at 50%.  He has pulmonary hypertension, COPD with severe emphysema, he is on prophylactic azithromycin, requires 3-4 L of oxygen at home.  He has hepatitis C status post oral treatment with negative viral load by PCR, has longstanding hypertension.  He was here recently with progressive shortness of breath, he follows closely with Dr. Xiong.  At that time there was a right lung base opacity concerning for pneumonia but he was negative for Legionella, COVID.  He received ceftriaxone and azithromycin, he had hypokalemia and hypernatremia with CATHIE.  He has had an appendectomy, cataract procedures.    He comes in again now with acute kidney injury at the request of Dr. Xiong.  He has been losing weight, concerns of volume depletion and poor appetite.  Recent colonoscopy without significant findings.  He is set up for an EGD in January.    On social history he was a tobacco abuser until 2006, drinks scotch weekly.  No other drug use.    On family history mother with dementia, sister with diabetes, no chronic kidney disease.  He has lost 37 pounds per the weights at Dr. Xiong office, he says that it is inexplicable.  He tells me that he has a good appetite, that he has been eating.  He denies fevers, chills, chest pain, nausea, vomiting, or abdominal pain.  Avoids anti-inflammatories.  All other review of systems are negative.    Intake is very poor, no overnight events.     Past Medical History  He has a past medical history of CATHIE (acute kidney injury) (CMS-HCC) (05/03/2023), Cataract, COPD (chronic obstructive pulmonary disease) (Multi), Cough, unspecified (06/20/2014), Emphysema of lung (Multi), Encounter for immunization (01/19/2015), Encounter for screening for malignant neoplasm of prostate, Other conditions influencing health status  (05/20/2013), Personal history of colonic polyps, Personal history of other specified conditions (05/20/2013), and Personal history of other specified conditions (06/20/2014).    Surgical History  He has a past surgical history that includes Colonoscopy (02/21/2013); Appendectomy (02/21/2013); and Eye surgery.    Allergies  Patient has no known allergies.      Current Facility-Administered Medications:     acetaminophen (Tylenol) tablet 650 mg, 650 mg, oral, q4h PRN, Sabrina Hathaway MD    albuterol 2.5 mg /3 mL (0.083 %) nebulizer solution 2.5 mg, 2.5 mg, nebulization, q4h PRN, Sabrina Hathaway MD    amLODIPine (Norvasc) tablet 10 mg, 10 mg, oral, Daily, Sabrina Hathaway MD, 10 mg at 11/23/24 0816    azithromycin (Zithromax) tablet 250 mg, 250 mg, oral, Daily, Twyla Cerrato MD, 250 mg at 11/23/24 0816    budesonide (Pulmicort) 0.25 mg/2 mL nebulizer solution 0.25 mg, 0.25 mg, nebulization, BID, 0.25 mg at 11/23/24 0755 **AND** ipratropium-albuteroL (Duo-Neb) 0.5-2.5 mg/3 mL nebulizer solution 3 mL, 3 mL, nebulization, 4x daily, Bernadette Ferreira PharmD, 3 mL at 11/23/24 0755    epoetin jamil-epbx (Retacrit) injection 6,000 Units, 6,000 Units, subcutaneous, Once per day on Monday Wednesday Friday, Jaya Wynn MD, 6,000 Units at 11/22/24 2023    heparin (porcine) injection 5,000 Units, 5,000 Units, subcutaneous, q8h GULSHAN, Sabrina Hathaway MD, 5,000 Units at 11/23/24 0644    iron sucrose (Venofer) injection 200 mg, 200 mg, intravenous, Once, Jaya Wynn MD    lactated Ringer's bolus 1,500 mL, 1,500 mL, intravenous, Once, Jaya Wynn MD    mirtazapine (Remeron) tablet 7.5 mg, 7.5 mg, oral, Nightly, Sabrina Hathaway MD, 7.5 mg at 11/22/24 2023    ondansetron ODT (Zofran-ODT) disintegrating tablet 4 mg, 4 mg, oral, q8h PRN **OR** ondansetron (Zofran) injection 4 mg, 4 mg, intravenous, q8h PRN, Sabrina Hahtaway MD    oxygen (O2) therapy, , inhalation, Continuous - Inhalation, Twyla Cerrato MD, 3 L/min at 11/23/24 9579     polyethylene glycol (Glycolax, Miralax) packet 17 g, 17 g, oral, Daily, Sabrina Hathaway MD, 17 g at 11/23/24 0816    predniSONE (Deltasone) tablet 5 mg, 5 mg, oral, Daily, Twyla Cerrato MD, 5 mg at 11/23/24 0816    rosuvastatin (Crestor) tablet 40 mg, 40 mg, oral, Daily, Sabrina Hathaway MD, 40 mg at 11/23/24 0816     Medications Discontinued During This Encounter   Medication Reason    fluticasone-umeclidin-vilanter (TRELEGY-ELLIPTA) 100-62.5-25 mcg 100-62.5-25 mcg/puff inhaler 1 puff     cefTRIAXone (Rocephin) 1 g in dextrose (iso) IV 50 mL          Social History  He reports that he quit smoking about 18 years ago. His smoking use included cigarettes. He started smoking about 33 years ago. He has been exposed to tobacco smoke. He has never used smokeless tobacco. He reports current alcohol use. He reports that he does not use drugs.    Family History  Family History   Problem Relation Name Age of Onset    Dementia Mother      Diabetes Sister          Physical Exam  Constitutional:       Appearance: Normal appearance.   HENT:      Mouth/Throat:      Mouth: Mucous membranes are moist.   Eyes:      Extraocular Movements: Extraocular movements intact.      Pupils: Pupils are equal, round, and reactive to light.   Cardiovascular:      Rate and Rhythm: Regular rhythm.      Heart sounds: S1 normal and S2 normal.   Pulmonary:      Breath sounds: Normal breath sounds.   Abdominal:      Comments: Soft, NT/ND, no masses, normal bowel sounds   Genitourinary:     Comments: No zapata  Musculoskeletal:      Right lower leg: No edema.      Left lower leg: No edema.   Right elbow erythema  Skin:     General: Skin is warm and dry.   Neurological:      General: No focal deficit present.      Mental Status: She is alert and oriented to person, place, and time.   Psychiatric:         Behavior: Behavior normal.      Last Recorded Vitals  Blood pressure 99/75, pulse 93, temperature 36.3 °C (97.3 °F), temperature source Temporal, resp.  "rate 18, height 1.753 m (5' 9.02\"), weight 49.9 kg (110 lb 0.2 oz), SpO2 97%.    Last 24 hour lab Results  Results for orders placed or performed during the hospital encounter of 11/20/24 (from the past 24 hours)   Basic metabolic panel   Result Value Ref Range    Glucose 80 74 - 99 mg/dL    Sodium 138 136 - 145 mmol/L    Potassium 3.8 3.5 - 5.3 mmol/L    Chloride 101 98 - 107 mmol/L    Bicarbonate 29 21 - 32 mmol/L    Anion Gap 12 10 - 20 mmol/L    Urea Nitrogen 34 (H) 6 - 23 mg/dL    Creatinine 5.54 (H) 0.50 - 1.30 mg/dL    eGFR 10 (L) >60 mL/min/1.73m*2    Calcium 9.9 8.6 - 10.3 mg/dL        Imaging results   CT abdomen pelvis wo IV contrast    Result Date: 11/21/2024  Interpreted By:  Tavia Cifuentes, STUDY: CT ABDOMEN PELVIS WO IV CONTRAST;  11/20/2024 11:32 pm   INDICATION: Signs/Symptoms:Worsening renal function, hematuria, rule out ureteral stone.   COMPARISON: Renal ultrasound 09/24/2024. CT lung screening 02/13/2024. CT angiogram chest 12/15/2022. Liver ultrasound 06/13/2022.   ACCESSION NUMBER(S): [id number]   ORDERING CLINICIAN: CLARISA GARCIA   TECHNIQUE: CT of the abdomen and pelvis was performed with no contrast administration. Coronal and sagittal reformats were obtained.   FINDINGS: LOWER CHEST: Redemonstration of severe bilateral emphysematous changes.   ABDOMEN:   LIVER: Unremarkable unenhanced appearance.   BILE DUCTS: No obvious dilation.   GALLBLADDER: There is cholelithiasis.   PANCREAS: No peripancreatic inflammatory changes.   SPLEEN: Unremarkable unenhanced appearance.   ADRENAL GLANDS: Unremarkable.   KIDNEYS AND URETERS: No hydronephrosis or hydroureter.  No obstructing ureteral calculi.   BOWEL: No bowel obstruction. There is colonic diverticulosis with no CT evidence for acute diverticulitis.   VESSELS: Atherosclerotic calcifications at the abdominal aorta. No abdominal aortic aneurysm.   PELVIS: The urinary bladder is partially distended. The prostate measures 4.9 cm in transverse " diameter.   PERITONEUM/RETROPERITONEUM/LYMPH NODES: No free fluid or free air.  No retroperitoneal hemorrhage. Calcified lymph nodes are noted at the periportal region.   ABDOMINAL WALL: The abdominal wall soft tissues are within normal limits.   BONE AND SOFT TISSUE: Degenerative changes at the spine.   IMPRESSION 1.  No hydronephrosis or obstructing ureteral calculi. No acute findings on unenhanced CT. 2. Cholelithiasis. 3. Colonic diverticulosis.       MACRO: None.   Signed by: Tavia Cifuentes 11/21/2024 12:25 AM Dictation workstation:   [id number]        Assessment/Plan   Kalyan Armstrong is a 74 y.o. male with a history of heart failure with mildly reduced EF, EF is slightly low at 50%.  He has pulmonary hypertension, COPD with severe emphysema, he is on prophylactic azithromycin, requires 3-4 L of oxygen at home.  He has hepatitis C status post oral treatment with negative viral load by PCR, has longstanding hypertension.  He was here recently with progressive shortness of breath, he follows closely with Dr. Xiong.  At that time there was a right lung base opacity concerning for pneumonia but he was negative for Legionella, COVID.  He received ceftriaxone and azithromycin, he had hypokalemia and hypernatremia with CATHIE.  He has had an appendectomy, cataract procedures.    He comes in again now with acute kidney injury at the request of Dr. Xiong.  He has been losing weight, concerns of volume depletion and poor appetite.  Recent colonoscopy without significant findings.  He is set up for an EGD in January.    Mr. Armstrong has lost significant weight, is still not eating, does have mild uremic symptoms.  I spoke with his wife who was at the bedside.  I will give him back lactated Ringer's today, we will try to order a dietary supplement, unknown-dairy boost.  But I do not anticipate the creatinine will get significantly better.  He has loss of lean body mass, he may need renal replacement therapy very soon.   Intravenous iron again today.        50 minutes in the care of Mr. Armstrong.    Jaya Wynn MD

## 2024-11-23 NOTE — CARE PLAN
The patient's goals for the shift include  remain free from s/sx of SOB    The clinical goals for the shift include Pt will remain free from s/s of SOB or difficulty breathing throughout shift.      Problem: Discharge Planning  Goal: Discharge to home or other facility with appropriate resources  Outcome: Progressing     Problem: Chronic Conditions and Co-morbidities  Goal: Patient's chronic conditions and co-morbidity symptoms are monitored and maintained or improved  Outcome: Progressing     Problem: Pain - Adult  Goal: Verbalizes/displays adequate comfort level or baseline comfort level  Outcome: Progressing     Problem: Fall/Injury  Goal: Verbalize understanding of personal risk factors for fall in the hospital  Outcome: Progressing  Goal: Verbalize understanding of risk factor reduction measures to prevent injury from fall in the home  Outcome: Progressing  Goal: Use assistive devices by end of the shift  Outcome: Progressing  Goal: Pace activities to prevent fatigue by end of the shift  Outcome: Progressing

## 2024-11-23 NOTE — CARE PLAN
The clinical goals for the shift include Pt will remain free from s/s of SOB or difficulty breathing throughout shift.    Problem: Pain - Adult  Goal: Verbalizes/displays adequate comfort level or baseline comfort level  Outcome: Progressing     Problem: Safety - Adult  Goal: Free from fall injury  Outcome: Met     Problem: Fall/Injury  Goal: Not fall by end of shift  Outcome: Met  Goal: Be free from injury by end of the shift  Outcome: Met

## 2024-11-24 VITALS
HEIGHT: 69 IN | HEART RATE: 84 BPM | RESPIRATION RATE: 16 BRPM | DIASTOLIC BLOOD PRESSURE: 75 MMHG | SYSTOLIC BLOOD PRESSURE: 118 MMHG | BODY MASS INDEX: 16.29 KG/M2 | TEMPERATURE: 97.6 F | WEIGHT: 110.01 LBS | OXYGEN SATURATION: 100 %

## 2024-11-24 LAB
ALBUMIN SERPL BCP-MCNC: 3.6 G/DL (ref 3.4–5)
ANION GAP SERPL CALC-SCNC: 13 MMOL/L (ref 10–20)
BASOPHILS # BLD AUTO: 0.1 X10*3/UL (ref 0–0.1)
BASOPHILS NFR BLD AUTO: 1.2 %
BUN SERPL-MCNC: 28 MG/DL (ref 6–23)
CALCIUM SERPL-MCNC: 9.6 MG/DL (ref 8.6–10.3)
CHLORIDE SERPL-SCNC: 103 MMOL/L (ref 98–107)
CO2 SERPL-SCNC: 29 MMOL/L (ref 21–32)
CREAT SERPL-MCNC: 5.11 MG/DL (ref 0.5–1.3)
EGFRCR SERPLBLD CKD-EPI 2021: 11 ML/MIN/1.73M*2
EOSINOPHIL # BLD AUTO: 0.62 X10*3/UL (ref 0–0.4)
EOSINOPHIL NFR BLD AUTO: 7.5 %
ERYTHROCYTE [DISTWIDTH] IN BLOOD BY AUTOMATED COUNT: 13.6 % (ref 11.5–14.5)
GLUCOSE SERPL-MCNC: 84 MG/DL (ref 74–99)
HCT VFR BLD AUTO: 26.7 % (ref 41–52)
HGB BLD-MCNC: 8.5 G/DL (ref 13.5–17.5)
IMM GRANULOCYTES # BLD AUTO: 0.12 X10*3/UL (ref 0–0.5)
IMM GRANULOCYTES NFR BLD AUTO: 1.4 % (ref 0–0.9)
LYMPHOCYTES # BLD AUTO: 0.78 X10*3/UL (ref 0.8–3)
LYMPHOCYTES NFR BLD AUTO: 9.4 %
MCH RBC QN AUTO: 28.3 PG (ref 26–34)
MCHC RBC AUTO-ENTMCNC: 31.8 G/DL (ref 32–36)
MCV RBC AUTO: 89 FL (ref 80–100)
MONOCYTES # BLD AUTO: 0.59 X10*3/UL (ref 0.05–0.8)
MONOCYTES NFR BLD AUTO: 7.1 %
NEUTROPHILS # BLD AUTO: 6.1 X10*3/UL (ref 1.6–5.5)
NEUTROPHILS NFR BLD AUTO: 73.4 %
NRBC BLD-RTO: 0 /100 WBCS (ref 0–0)
PHOSPHATE SERPL-MCNC: 4.1 MG/DL (ref 2.5–4.9)
PLATELET # BLD AUTO: 206 X10*3/UL (ref 150–450)
POTASSIUM SERPL-SCNC: 3.9 MMOL/L (ref 3.5–5.3)
RBC # BLD AUTO: 3 X10*6/UL (ref 4.5–5.9)
SODIUM SERPL-SCNC: 141 MMOL/L (ref 136–145)
WBC # BLD AUTO: 8.3 X10*3/UL (ref 4.4–11.3)

## 2024-11-24 PROCEDURE — 2500000001 HC RX 250 WO HCPCS SELF ADMINISTERED DRUGS (ALT 637 FOR MEDICARE OP): Performed by: INTERNAL MEDICINE

## 2024-11-24 PROCEDURE — 2500000002 HC RX 250 W HCPCS SELF ADMINISTERED DRUGS (ALT 637 FOR MEDICARE OP, ALT 636 FOR OP/ED)

## 2024-11-24 PROCEDURE — 94640 AIRWAY INHALATION TREATMENT: CPT

## 2024-11-24 PROCEDURE — 2500000004 HC RX 250 GENERAL PHARMACY W/ HCPCS (ALT 636 FOR OP/ED): Performed by: INTERNAL MEDICINE

## 2024-11-24 PROCEDURE — 2500000004 HC RX 250 GENERAL PHARMACY W/ HCPCS (ALT 636 FOR OP/ED): Performed by: HOSPITALIST

## 2024-11-24 PROCEDURE — 85025 COMPLETE CBC W/AUTO DIFF WBC: CPT | Performed by: INTERNAL MEDICINE

## 2024-11-24 PROCEDURE — 2500000005 HC RX 250 GENERAL PHARMACY W/O HCPCS: Performed by: INTERNAL MEDICINE

## 2024-11-24 PROCEDURE — 80069 RENAL FUNCTION PANEL: CPT | Performed by: INTERNAL MEDICINE

## 2024-11-24 PROCEDURE — 2500000002 HC RX 250 W HCPCS SELF ADMINISTERED DRUGS (ALT 637 FOR MEDICARE OP, ALT 636 FOR OP/ED): Performed by: HOSPITALIST

## 2024-11-24 PROCEDURE — 1200000002 HC GENERAL ROOM WITH TELEMETRY DAILY

## 2024-11-24 PROCEDURE — 36415 COLL VENOUS BLD VENIPUNCTURE: CPT | Performed by: INTERNAL MEDICINE

## 2024-11-24 PROCEDURE — 99233 SBSQ HOSP IP/OBS HIGH 50: CPT | Performed by: INTERNAL MEDICINE

## 2024-11-24 PROCEDURE — 2500000002 HC RX 250 W HCPCS SELF ADMINISTERED DRUGS (ALT 637 FOR MEDICARE OP, ALT 636 FOR OP/ED): Performed by: INTERNAL MEDICINE

## 2024-11-24 PROCEDURE — 2500000001 HC RX 250 WO HCPCS SELF ADMINISTERED DRUGS (ALT 637 FOR MEDICARE OP): Performed by: HOSPITALIST

## 2024-11-24 RX ORDER — IPRATROPIUM BROMIDE AND ALBUTEROL SULFATE 2.5; .5 MG/3ML; MG/3ML
3 SOLUTION RESPIRATORY (INHALATION)
Status: DISCONTINUED | OUTPATIENT
Start: 2024-11-24 | End: 2024-11-24

## 2024-11-24 RX ORDER — BUDESONIDE 0.25 MG/2ML
0.25 INHALANT ORAL
Status: DISCONTINUED | OUTPATIENT
Start: 2024-11-24 | End: 2024-11-24

## 2024-11-24 RX ORDER — IPRATROPIUM BROMIDE AND ALBUTEROL SULFATE 2.5; .5 MG/3ML; MG/3ML
3 SOLUTION RESPIRATORY (INHALATION)
Status: DISCONTINUED | OUTPATIENT
Start: 2024-11-24 | End: 2024-11-26 | Stop reason: HOSPADM

## 2024-11-24 RX ORDER — ALBUTEROL SULFATE 0.83 MG/ML
2.5 SOLUTION RESPIRATORY (INHALATION) EVERY 2 HOUR PRN
Status: DISCONTINUED | OUTPATIENT
Start: 2024-11-24 | End: 2024-11-26 | Stop reason: HOSPADM

## 2024-11-24 RX ORDER — BUDESONIDE 0.25 MG/2ML
0.25 INHALANT ORAL
Status: DISCONTINUED | OUTPATIENT
Start: 2024-11-24 | End: 2024-11-26 | Stop reason: HOSPADM

## 2024-11-24 RX ORDER — POTASSIUM CHLORIDE 1.5 G/1.58G
20 POWDER, FOR SOLUTION ORAL 3 TIMES DAILY
Status: DISCONTINUED | OUTPATIENT
Start: 2024-11-24 | End: 2024-11-26 | Stop reason: HOSPADM

## 2024-11-24 ASSESSMENT — COGNITIVE AND FUNCTIONAL STATUS - GENERAL
DAILY ACTIVITIY SCORE: 24
WALKING IN HOSPITAL ROOM: A LITTLE
DAILY ACTIVITIY SCORE: 24
WALKING IN HOSPITAL ROOM: A LITTLE
MOBILITY SCORE: 22
CLIMB 3 TO 5 STEPS WITH RAILING: A LITTLE
DAILY ACTIVITIY SCORE: 24
MOBILITY SCORE: 22
CLIMB 3 TO 5 STEPS WITH RAILING: A LITTLE
DAILY ACTIVITIY SCORE: 24
WALKING IN HOSPITAL ROOM: A LITTLE
CLIMB 3 TO 5 STEPS WITH RAILING: A LITTLE
DAILY ACTIVITIY SCORE: 24
CLIMB 3 TO 5 STEPS WITH RAILING: A LITTLE
MOBILITY SCORE: 22
CLIMB 3 TO 5 STEPS WITH RAILING: A LITTLE
DAILY ACTIVITIY SCORE: 24
MOBILITY SCORE: 22
WALKING IN HOSPITAL ROOM: A LITTLE
WALKING IN HOSPITAL ROOM: A LITTLE
CLIMB 3 TO 5 STEPS WITH RAILING: A LITTLE
MOBILITY SCORE: 22
MOBILITY SCORE: 22
WALKING IN HOSPITAL ROOM: A LITTLE

## 2024-11-24 ASSESSMENT — PAIN SCALES - GENERAL: PAINLEVEL_OUTOF10: 0 - NO PAIN

## 2024-11-24 ASSESSMENT — PAIN - FUNCTIONAL ASSESSMENT: PAIN_FUNCTIONAL_ASSESSMENT: 0-10

## 2024-11-24 NOTE — PROGRESS NOTES
Kalyan Armstrong is a 74 y.o. male on day 3 of admission presenting with Acute kidney injury (CMS-HCC).      Subjective   Doing better no new complaints       Objective        Vitals 24HR  Heart Rate:  []   Temp:  [36.3 °C (97.3 °F)-37.5 °C (99.5 °F)]   Resp:  [14-17]   BP: (120-135)/(71-79)   SpO2:  [96 %-100 %]     Intake/Output last 3 Shifts:    Intake/Output Summary (Last 24 hours) at 11/24/2024 1144  Last data filed at 11/23/2024 2100  Gross per 24 hour   Intake 523.33 ml   Output 510 ml   Net 13.33 ml       Physical Exam      General Appearance: Awake and alert in no acute distress  Head and ENT: Normocephalic/atraumatic/supple neck/no JVD  Lungs: CTA  Heart; RRR  Abdomen: Soft no organomegaly no tenderness  Extremities: No edema  Neurologic: Physiologic           Relevant Results     Results from last 7 days   Lab Units 11/24/24  0642 11/22/24  0642 11/21/24  0720   WBC AUTO x10*3/uL 8.3 6.1 6.8   HEMOGLOBIN g/dL 8.5* 8.2* 8.7*   HEMATOCRIT % 26.7* 24.9* 26.1*   PLATELETS AUTO x10*3/uL 206 200 236      Results from last 7 days   Lab Units 11/24/24  0642 11/23/24  0916 11/22/24  0641   SODIUM mmol/L 141 138 137   POTASSIUM mmol/L 3.9 3.8 3.7   CHLORIDE mmol/L 103 101 101   CO2 mmol/L 29 29 28   BUN mg/dL 28* 34* 37*   CREATININE mg/dL 5.11* 5.54* 5.64*   GLUCOSE mg/dL 84 80 101*   CALCIUM mg/dL 9.6 9.9 9.2        Current Facility-Administered Medications:     acetaminophen (Tylenol) tablet 650 mg, 650 mg, oral, q4h PRN, Sabrina Hathaway MD    albuterol 2.5 mg /3 mL (0.083 %) nebulizer solution 2.5 mg, 2.5 mg, nebulization, q4h PRN, Sabrina Hathaway MD    [Held by provider] amLODIPine (Norvasc) tablet 10 mg, 10 mg, oral, Daily, Sabrina Hathaway MD, 10 mg at 11/23/24 0816    azithromycin (Zithromax) tablet 250 mg, 250 mg, oral, Daily, Twyla Cerrato MD, 250 mg at 11/24/24 0841    budesonide (Pulmicort) 0.25 mg/2 mL nebulizer solution 0.25 mg, 0.25 mg, nebulization, BID, 0.25 mg at 11/24/24 0751 **AND**  ipratropium-albuteroL (Duo-Neb) 0.5-2.5 mg/3 mL nebulizer solution 3 mL, 3 mL, nebulization, 4x daily, Bernadette Ferreira PharmD, 3 mL at 11/24/24 0751    epoetin jamil-epbx (Retacrit) injection 6,000 Units, 6,000 Units, subcutaneous, Once per day on Monday Wednesday Friday, Jaya Wynn MD, 6,000 Units at 11/22/24 2023    heparin (porcine) injection 5,000 Units, 5,000 Units, subcutaneous, q8h GULSHAN, Sabrina Hathaway MD, 5,000 Units at 11/24/24 0609    mirtazapine (Remeron) tablet 7.5 mg, 7.5 mg, oral, Nightly, Sabrina Hathaway MD, 7.5 mg at 11/23/24 2100    ondansetron ODT (Zofran-ODT) disintegrating tablet 4 mg, 4 mg, oral, q8h PRN **OR** ondansetron (Zofran) injection 4 mg, 4 mg, intravenous, q8h PRN, Sabrina Hathaway MD    oxygen (O2) therapy, , inhalation, Continuous - Inhalation, Twyla Cerrato MD, 3 L/min at 11/24/24 0752    polyethylene glycol (Glycolax, Miralax) packet 17 g, 17 g, oral, Daily, Sabrina Hathaway MD, 17 g at 11/24/24 0841    potassium chloride (Klor-Con) packet 20 mEq, 20 mEq, oral, TID, Ezra Tillman MD, 20 mEq at 11/24/24 1113    predniSONE (Deltasone) tablet 5 mg, 5 mg, oral, Daily, Twyla Cerrato MD, 5 mg at 11/24/24 0841    rosuvastatin (Crestor) tablet 40 mg, 40 mg, oral, Daily, Sabrina Hathaway MD, 40 mg at 11/24/24 0841           Assessment/Plan      1.  CATHIE on top of CKD stage IV gradually resolving  BUN/creatinine= 58/5.11 today  Follows with Dr. Xiong  2.  COPD/emphysema with chronic hypoxic respiratory failure, patient on baseline 3 L of oxygen with continuous inhaler   3.  Hepatitis C posttreatment with negative viral load by PCR  4.  Hypertension, will continue current management     Avoid nephrotoxic medications, contrast dye, continue all meds and antibiotics as being given           I spent 35 minutes in the professional and overall care of this patient.      Ezra Tillman MD

## 2024-11-24 NOTE — PROGRESS NOTES
"Kalyan Armstrong is a 74 y.o. male on day 3 of admission presenting with Acute kidney injury (CMS-HCC).    Subjective   Creatinine improved down to 5.11, appreciated nephrology input from yesterday patient may be nearing renal replacement therapy in the near future.       Objective     Physical Exam  Vitals reviewed.   Constitutional:       Appearance: Normal appearance.   HENT:      Head: Normocephalic.      Right Ear: Tympanic membrane normal.      Nose: Nose normal.      Mouth/Throat:      Mouth: Mucous membranes are moist.   Cardiovascular:      Rate and Rhythm: Normal rate and regular rhythm.   Pulmonary:      Effort: Pulmonary effort is normal.   Abdominal:      General: Abdomen is flat. Bowel sounds are normal.      Palpations: Abdomen is soft.   Skin:     Capillary Refill: Capillary refill takes less than 2 seconds.   Neurological:      General: No focal deficit present.      Mental Status: He is alert.         Last Recorded Vitals  Blood pressure 135/79, pulse 89, temperature 37.5 °C (99.5 °F), temperature source Temporal, resp. rate 15, height 1.753 m (5' 9.02\"), weight 49.9 kg (110 lb 0.2 oz), SpO2 97%.  Intake/Output last 3 Shifts:  I/O last 3 completed shifts:  In: 523.3 (10.5 mL/kg) [IV Piggyback:523.3]  Out: 862 (17.3 mL/kg) [Urine:862 (0.5 mL/kg/hr)]  Weight: 49.9 kg     Relevant Results  Results for orders placed or performed during the hospital encounter of 11/20/24 (from the past 24 hours)   CBC and Auto Differential   Result Value Ref Range    WBC 8.3 4.4 - 11.3 x10*3/uL    nRBC 0.0 0.0 - 0.0 /100 WBCs    RBC 3.00 (L) 4.50 - 5.90 x10*6/uL    Hemoglobin 8.5 (L) 13.5 - 17.5 g/dL    Hematocrit 26.7 (L) 41.0 - 52.0 %    MCV 89 80 - 100 fL    MCH 28.3 26.0 - 34.0 pg    MCHC 31.8 (L) 32.0 - 36.0 g/dL    RDW 13.6 11.5 - 14.5 %    Platelets 206 150 - 450 x10*3/uL    Neutrophils % 73.4 40.0 - 80.0 %    Immature Granulocytes %, Automated 1.4 (H) 0.0 - 0.9 %    Lymphocytes % 9.4 13.0 - 44.0 %    Monocytes % " 7.1 2.0 - 10.0 %    Eosinophils % 7.5 0.0 - 6.0 %    Basophils % 1.2 0.0 - 2.0 %    Neutrophils Absolute 6.10 (H) 1.60 - 5.50 x10*3/uL    Immature Granulocytes Absolute, Automated 0.12 0.00 - 0.50 x10*3/uL    Lymphocytes Absolute 0.78 (L) 0.80 - 3.00 x10*3/uL    Monocytes Absolute 0.59 0.05 - 0.80 x10*3/uL    Eosinophils Absolute 0.62 (H) 0.00 - 0.40 x10*3/uL    Basophils Absolute 0.10 0.00 - 0.10 x10*3/uL   Renal Function Panel   Result Value Ref Range    Glucose 84 74 - 99 mg/dL    Sodium 141 136 - 145 mmol/L    Potassium 3.9 3.5 - 5.3 mmol/L    Chloride 103 98 - 107 mmol/L    Bicarbonate 29 21 - 32 mmol/L    Anion Gap 13 10 - 20 mmol/L    Urea Nitrogen 28 (H) 6 - 23 mg/dL    Creatinine 5.11 (H) 0.50 - 1.30 mg/dL    eGFR 11 (L) >60 mL/min/1.73m*2    Calcium 9.6 8.6 - 10.3 mg/dL    Phosphorus 4.1 2.5 - 4.9 mg/dL    Albumin 3.6 3.4 - 5.0 g/dL     *Note: Due to a large number of results and/or encounters for the requested time period, some results have not been displayed. A complete set of results can be found in Results Review.         Imaging Results  Ct abd/pelvis:  IMPRESSION  1.  No hydronephrosis or obstructing ureteral calculi. No acute  findings on unenhanced CT.  2. Cholelithiasis.  3. Colonic diverticulosis.      Medications:  [Held by provider] amLODIPine, 10 mg, oral, Daily  azithromycin, 250 mg, oral, Daily  budesonide, 0.25 mg, nebulization, BID   And  ipratropium-albuteroL, 3 mL, nebulization, 4x daily  epoetin jamil or biosimilar, 6,000 Units, subcutaneous, Once per day on Monday Wednesday Friday  heparin (porcine), 5,000 Units, subcutaneous, q8h GULSHAN  mirtazapine, 7.5 mg, oral, Nightly  oxygen, , inhalation, Continuous - Inhalation  polyethylene glycol, 17 g, oral, Daily  potassium chloride, 20 mEq, oral, TID  predniSONE, 5 mg, oral, Daily  rosuvastatin, 40 mg, oral, Daily       PRN medications: acetaminophen, albuterol, ondansetron ODT **OR** ondansetron     Assessment/Plan     CATHIE on CKDIII  -  baseline creatinine around 2.6  -holding nephrotoxins on ivfs, nephrology on board  -creatn 5.1,improved may be nearing RRT    2.  COPD with chronic hypoxic respiratory failure  -Patient is on baseline 3 L of oxygen continue inhalers      3. HLD  -on rosuvastatin    DVT Prophylaxis:  Heparin subq          Twyla Cerrato MD  [facility]

## 2024-11-24 NOTE — CARE PLAN
The patient's goals for the shift include      The clinical goals for the shift include Patient will remain safe during the shift    Over the shift, the patient did make progress toward the following goals.     Problem: Fall/Injury  Goal: Verbalize understanding of personal risk factors for fall in the hospital  Outcome: Progressing  Goal: Verbalize understanding of risk factor reduction measures to prevent injury from fall in the home  Outcome: Progressing  Goal: Use assistive devices by end of the shift  Outcome: Progressing  Goal: Pace activities to prevent fatigue by end of the shift  Outcome: Progressing

## 2024-11-25 ENCOUNTER — APPOINTMENT (OUTPATIENT)
Dept: ORTHOPEDIC SURGERY | Facility: HOSPITAL | Age: 74
End: 2024-11-25
Payer: MEDICARE

## 2024-11-25 DIAGNOSIS — M25.521 RIGHT ELBOW PAIN: Primary | ICD-10-CM

## 2024-11-25 LAB
ANION GAP SERPL CALC-SCNC: 12 MMOL/L (ref 10–20)
BASOPHILS # BLD AUTO: 0.1 X10*3/UL (ref 0–0.1)
BASOPHILS NFR BLD AUTO: 1.4 %
BUN SERPL-MCNC: 26 MG/DL (ref 6–23)
CALCIUM SERPL-MCNC: 9.6 MG/DL (ref 8.6–10.3)
CHLORIDE SERPL-SCNC: 105 MMOL/L (ref 98–107)
CO2 SERPL-SCNC: 27 MMOL/L (ref 21–32)
CREAT SERPL-MCNC: 5.41 MG/DL (ref 0.5–1.3)
EGFRCR SERPLBLD CKD-EPI 2021: 10 ML/MIN/1.73M*2
EOSINOPHIL # BLD AUTO: 0.46 X10*3/UL (ref 0–0.4)
EOSINOPHIL NFR BLD AUTO: 6.2 %
ERYTHROCYTE [DISTWIDTH] IN BLOOD BY AUTOMATED COUNT: 14 % (ref 11.5–14.5)
GLUCOSE SERPL-MCNC: 115 MG/DL (ref 74–99)
HCT VFR BLD AUTO: 25.5 % (ref 41–52)
HGB BLD-MCNC: 8.3 G/DL (ref 13.5–17.5)
IMM GRANULOCYTES # BLD AUTO: 0.1 X10*3/UL (ref 0–0.5)
IMM GRANULOCYTES NFR BLD AUTO: 1.4 % (ref 0–0.9)
LYMPHOCYTES # BLD AUTO: 0.85 X10*3/UL (ref 0.8–3)
LYMPHOCYTES NFR BLD AUTO: 11.5 %
MCH RBC QN AUTO: 29.7 PG (ref 26–34)
MCHC RBC AUTO-ENTMCNC: 32.5 G/DL (ref 32–36)
MCV RBC AUTO: 91 FL (ref 80–100)
MONOCYTES # BLD AUTO: 0.59 X10*3/UL (ref 0.05–0.8)
MONOCYTES NFR BLD AUTO: 8 %
NEUTROPHILS # BLD AUTO: 5.28 X10*3/UL (ref 1.6–5.5)
NEUTROPHILS NFR BLD AUTO: 71.5 %
NRBC BLD-RTO: 0 /100 WBCS (ref 0–0)
PLATELET # BLD AUTO: 207 X10*3/UL (ref 150–450)
POTASSIUM SERPL-SCNC: 4.1 MMOL/L (ref 3.5–5.3)
RBC # BLD AUTO: 2.79 X10*6/UL (ref 4.5–5.9)
SODIUM SERPL-SCNC: 140 MMOL/L (ref 136–145)
WBC # BLD AUTO: 7.4 X10*3/UL (ref 4.4–11.3)

## 2024-11-25 PROCEDURE — 2500000004 HC RX 250 GENERAL PHARMACY W/ HCPCS (ALT 636 FOR OP/ED): Performed by: INTERNAL MEDICINE

## 2024-11-25 PROCEDURE — 2500000001 HC RX 250 WO HCPCS SELF ADMINISTERED DRUGS (ALT 637 FOR MEDICARE OP): Performed by: INTERNAL MEDICINE

## 2024-11-25 PROCEDURE — 1100000001 HC PRIVATE ROOM DAILY

## 2024-11-25 PROCEDURE — 36415 COLL VENOUS BLD VENIPUNCTURE: CPT | Performed by: INTERNAL MEDICINE

## 2024-11-25 PROCEDURE — 2500000001 HC RX 250 WO HCPCS SELF ADMINISTERED DRUGS (ALT 637 FOR MEDICARE OP): Performed by: HOSPITALIST

## 2024-11-25 PROCEDURE — 6350000001 HC RX 635 EPOETIN >10,000 UNITS: Performed by: INTERNAL MEDICINE

## 2024-11-25 PROCEDURE — 94640 AIRWAY INHALATION TREATMENT: CPT

## 2024-11-25 PROCEDURE — 2500000002 HC RX 250 W HCPCS SELF ADMINISTERED DRUGS (ALT 637 FOR MEDICARE OP, ALT 636 FOR OP/ED): Performed by: HOSPITALIST

## 2024-11-25 PROCEDURE — 99232 SBSQ HOSP IP/OBS MODERATE 35: CPT | Performed by: HOSPITALIST

## 2024-11-25 PROCEDURE — 85025 COMPLETE CBC W/AUTO DIFF WBC: CPT | Performed by: INTERNAL MEDICINE

## 2024-11-25 PROCEDURE — 80048 BASIC METABOLIC PNL TOTAL CA: CPT | Performed by: INTERNAL MEDICINE

## 2024-11-25 PROCEDURE — 2500000002 HC RX 250 W HCPCS SELF ADMINISTERED DRUGS (ALT 637 FOR MEDICARE OP, ALT 636 FOR OP/ED): Performed by: INTERNAL MEDICINE

## 2024-11-25 PROCEDURE — 2500000004 HC RX 250 GENERAL PHARMACY W/ HCPCS (ALT 636 FOR OP/ED): Performed by: HOSPITALIST

## 2024-11-25 PROCEDURE — 86036 ANCA SCREEN EACH ANTIBODY: CPT | Performed by: INTERNAL MEDICINE

## 2024-11-25 PROCEDURE — 2500000005 HC RX 250 GENERAL PHARMACY W/O HCPCS: Performed by: HOSPITALIST

## 2024-11-25 ASSESSMENT — COGNITIVE AND FUNCTIONAL STATUS - GENERAL
TOILETING: A LITTLE
WALKING IN HOSPITAL ROOM: A LITTLE
DAILY ACTIVITIY SCORE: 24
DRESSING REGULAR LOWER BODY CLOTHING: A LITTLE
DAILY ACTIVITIY SCORE: 22
MOBILITY SCORE: 22
MOBILITY SCORE: 21
DAILY ACTIVITIY SCORE: 22
WALKING IN HOSPITAL ROOM: A LITTLE
CLIMB 3 TO 5 STEPS WITH RAILING: A LOT
CLIMB 3 TO 5 STEPS WITH RAILING: A LOT
MOBILITY SCORE: 21
WALKING IN HOSPITAL ROOM: A LITTLE
CLIMB 3 TO 5 STEPS WITH RAILING: A LOT
TOILETING: A LITTLE
MOBILITY SCORE: 21
CLIMB 3 TO 5 STEPS WITH RAILING: A LITTLE
WALKING IN HOSPITAL ROOM: A LITTLE
WALKING IN HOSPITAL ROOM: A LITTLE
MOBILITY SCORE: 21
DAILY ACTIVITIY SCORE: 24
CLIMB 3 TO 5 STEPS WITH RAILING: A LOT
DRESSING REGULAR LOWER BODY CLOTHING: A LITTLE
DAILY ACTIVITIY SCORE: 24

## 2024-11-25 ASSESSMENT — PAIN SCALES - GENERAL
PAINLEVEL_OUTOF10: 0 - NO PAIN

## 2024-11-25 NOTE — NURSING NOTE
Patient was order potassium chloride packet 20 mEq ordered three times daily. The potassium last potassium are as followed 11/23/24 (K 3.8) and 11/24/24 (K 3.9). The hospitalist ( Dr. Sabrina Hathaway ) was notified at 2116. The doctor said not to give the potassium so the medication was not given.

## 2024-11-25 NOTE — PROGRESS NOTES
11/25/24 1313   Discharge Planning   Who is requesting discharge planning? Provider   Home or Post Acute Services None   Expected Discharge Disposition Home   Does the patient need discharge transport arranged? No   Intensity of Service   Intensity of Service 0-30 min     11/25/24 1313  Current plan home no needs.  Per renal, patient may need dialysis.  Currently on O2@3L which he wears at home.  [facility] will continue to follow for needs.  Sonia Laurent RN [facility]

## 2024-11-25 NOTE — DOCUMENTATION CLARIFICATION NOTE
"    PATIENT:               DASHA MEDEROS  ACCT #:                  [id number]  MRN:                       [id number]  :                       1950  ADMIT DATE:       2024 9:22 PM  DISCH DATE:  RESPONDING PROVIDER #:        [id number]          PROVIDER RESPONSE TEXT:    I agree with dietician diagnosis of severe malnutrition on 2024    CDI QUERY TEXT:    Clarification    Question: Please further clarify this patient nutritional status as    When answering this query, please exercise your independent professional judgment. The fact that a question is being asked, does not imply that any particular answer is desired or expected.    The patient's clinical indicators include:  73 y/o male admitted since  with CATHIE. Clinical validation is requested for nutrition diagnosis without provider documentation.     Nutrition note, Erica Blanco RDN, LD:  \"Severe malnutrition related to chronic disease or condition As Evidenced by: greater than 10% weight loss in 6 months, severe fat loss and severe muscle loss. Additional Assessment Information: Suspected energy intake less than EEN\"    Clinical Indicators: BMI 16.24, weight loss, severe orbital/buccal fat loss, severe temporalis/pectoralis/deltoid/trapezius muscle wasting    Treatment: Nutrition consult, Ensure clear BID, intake/lab/weight monitoring    Risk Factors: severe emphysema, CHF  Options provided:  -- I agree with dietician diagnosis of severe malnutrition on 2024  -- Other - I will add my own diagnosis  -- Refer to Clinical Documentation Reviewer    Query created by: Yady Serrano on 2024 11:48 AM      Electronically signed by:  ANDREW HANSON DO 2024 3:03 PM          "

## 2024-11-25 NOTE — PROGRESS NOTES
Kalyan Armstrong is a 74 y.o. male who was referred to the Clinical Pharmacy Team to complete a virtual Transitions of Care encounter for discharge medication optimization. The patient was referred for their COPD.    Attending: Jaskaran Garrett DO    PCP: Rosa Lees    _______________________________________________________________________  PHARMACY ASSESSMENT    Home Pharmacy: [facility]  Meds to beds? yes    Affordability/Accessibility: Needs help with affording Trelegy Ellipta  Adherence/Organization: None  Adverse Effects: None    No issues reported in regards to affordability, accessibility, adherence, organization, and adverse effects  _______________________________________________________________________  ASTHMA/COPD ASSESSMENT    CURRENT PHARMACOTHERAPY  Trelegy Elipta, 1 puff daily  Budesonide Nebulizer BID  Prednisone 10 mg taper    SECONDARY PREVENTION  - Influenza: Overdue  - Pneumococcal: 01/29/2021  - RSV: Overdue  - COVID: Overdue    EXACERBATION HISTORY  - When was your last hospitalization for an exacerbation? 10/9/2024  - When was the last time you were treated with antibiotics and/or steroids? Currently using Prednisone 10 mg taper    SMOKING CESSATION  Patient is not currently using tobacco products  - Quit Date: 18 years ago  - Years Smoking: N/A    PATIENT EDUCATION/GOALS  - Introduced post-discharge pharmacy team follow up and benefits of calls  - Answered all patient questions and concerns to best of my ability  _______________________________________________________________________  RECOMMENDATIONS/PLAN  Continue all medications per medical team  Please send prescriptions to [facility] pharmacy for assistance on insurance prior authorization and copay. Prescriptions will be delivered to the patient's bedside prior to discharge with the Meds to Beds program.   Continuity of care will be provided by PCP and clinical pharmacy team      Thank you for allowing to take part in the care of this  patient.    Jun Leonard, PharmD  PGY1 Pharmacy Resident  [phone]  [email or web address]    Verbal consent to manage patient's drug therapy was obtained from the patient. They were informed they may decline to participate or withdraw from participation in pharmacy services at any time.

## 2024-11-25 NOTE — CARE PLAN
The patient's goals for the shift include      The clinical goals for the shift include pt safety to be maintained    Problem: Pain - Adult  Goal: Verbalizes/displays adequate comfort level or baseline comfort level  11/25/2024 1050 by Alejandra Graff RN  Outcome: Progressing  11/25/2024 1050 by Alejandra Graff RN  Outcome: Progressing     Problem: Discharge Planning  Goal: Discharge to home or other facility with appropriate resources  11/25/2024 1050 by Alejandra Graff RN  Outcome: Progressing  11/25/2024 1050 by Alejandra Graff RN  Outcome: Progressing     Problem: Chronic Conditions and Co-morbidities  Goal: Patient's chronic conditions and co-morbidity symptoms are monitored and maintained or improved  11/25/2024 1050 by Alejandra Graff RN  Outcome: Progressing  11/25/2024 1050 by Alejandra Graff RN  Outcome: Progressing     Problem: Fall/Injury  Goal: Verbalize understanding of personal risk factors for fall in the hospital  11/25/2024 1050 by Alejandra Graff RN  Outcome: Progressing  11/25/2024 1050 by Alejandra Graff RN  Outcome: Progressing  Goal: Verbalize understanding of risk factor reduction measures to prevent injury from fall in the home  11/25/2024 1050 by Alejandra Graff RN  Outcome: Progressing  11/25/2024 1050 by Alejandra Graff RN  Outcome: Progressing  Goal: Use assistive devices by end of the shift  11/25/2024 1050 by Alejandra Graff RN  Outcome: Progressing  11/25/2024 1050 by Alejandra Graff RN  Outcome: Progressing  Goal: Pace activities to prevent fatigue by end of the shift  11/25/2024 1050 by Alejandra Graff RN  Outcome: Progressing  11/25/2024 1050 by Alejandra Graff RN  Outcome: Progressing

## 2024-11-25 NOTE — CARE PLAN
The patient's goals for the shift include      The clinical goals for the shift include remain free from SOB during the shift    Over the shift, the patient did make progress toward the following goals.     Problem: Pain - Adult  Goal: Verbalizes/displays adequate comfort level or baseline comfort level  Outcome: Progressing     Problem: Fall/Injury  Goal: Verbalize understanding of personal risk factors for fall in the hospital  Outcome: Progressing  Goal: Verbalize understanding of risk factor reduction measures to prevent injury from fall in the home  Outcome: Progressing  Goal: Use assistive devices by end of the shift  Outcome: Progressing  Goal: Pace activities to prevent fatigue by end of the shift  Outcome: Progressing

## 2024-11-25 NOTE — PROGRESS NOTES
Kalyan Armstrong is a 74 y.o. male on day 4 of admission presenting with Acute kidney injury (CMS-HCC).      Subjective   Seen and examined.   Still not eating. ZAKI. Blood pressures are normotensive today.  He does not offer specific complaints.   Chart/labs/meds/notes/imaging/VS reviewed.       Objective          Vitals 24HR  Heart Rate:  []   Temp:  [36.4 °C (97.6 °F)-37 °C (98.6 °F)]   Resp:  [16-18]   BP: (114-146)/(72-87)   SpO2:  [97 %-100 %]     Intake/Output last 3 Shifts:    Intake/Output Summary (Last 24 hours) at 11/25/2024 1734  Last data filed at 11/25/2024 1151  Gross per 24 hour   Intake --   Output 350 ml   Net -350 ml       Physical Exam  HENT:      Head: Normocephalic.      Mouth/Throat:      Mouth: Mucous membranes are dry.      Frail elderly male, cachectic on exam  Cardiovascular:      Rate and Rhythm: Normal rate and regular rhythm.   Pulmonary:      Effort: Pulmonary effort is normal.      Breath sounds: Mildly diminished breath sounds. No rhonchi or wheezing.   Abdominal:      General: Bowel sounds are normal.      Palpations: Abdomen is soft.   Musculoskeletal:         General: Normal range of motion.      Cervical back: Neck supple.   Skin:     General: Skin is warm.   Neurological:      Mental Status: He is alert and oriented to person, place, and time.   Psychiatric:         Mood and Affect: Mood normal.         Behavior: Behavior normal.     Scheduled Medications  [Held by provider] amLODIPine, 10 mg, oral, Daily  azithromycin, 250 mg, oral, Daily  budesonide, 0.25 mg, nebulization, BID   And  ipratropium-albuteroL, 3 mL, nebulization, 4x daily  epoetin jamil or biosimilar, 6,000 Units, subcutaneous, Once per day on Monday Wednesday Friday  heparin (porcine), 5,000 Units, subcutaneous, q8h GULSHAN  mirtazapine, 7.5 mg, oral, Nightly  [Held by provider] potassium chloride, 20 mEq, oral, TID  predniSONE, 5 mg, oral, Daily  rosuvastatin, 40 mg, oral, Daily      Continuous medications        PRN medications: acetaminophen, albuterol, ondansetron ODT **OR** ondansetron, oxygen     Relevant Results  Results from last 7 days   Lab Units 11/25/24  0543 11/24/24  0642 11/22/24  0642 11/21/24  0720   WBC AUTO x10*3/uL 7.4 8.3 6.1 6.8   HEMOGLOBIN g/dL 8.3* 8.5* 8.2* 8.7*   HEMATOCRIT % 25.5* 26.7* 24.9* 26.1*   PLATELETS AUTO x10*3/uL 207 206 200 236   NEUTROS PCT AUTO % 71.5 73.4  --  67.9   LYMPHS PCT AUTO % 11.5 9.4  --  14.0   MONOS PCT AUTO % 8.0 7.1  --  9.7   EOS PCT AUTO % 6.2 7.5  --  6.3     Results from last 7 days   Lab Units 11/25/24  0543 11/24/24  0642 11/23/24  0916   SODIUM mmol/L 140 141 138   POTASSIUM mmol/L 4.1 3.9 3.8   CHLORIDE mmol/L 105 103 101   CO2 mmol/L 27 29 29   BUN mg/dL 26* 28* 34*   CREATININE mg/dL 5.41* 5.11* 5.54*   GLUCOSE mg/dL 115* 84 80   CALCIUM mg/dL 9.6 9.6 9.9       CT abdomen pelvis wo IV contrast   Final Result               Assessment/Plan      Kalyan Armstrong is a 74 y.o. male with a past medical history of HFpEF with an LVEF of 50% on echo in 2020, moderate pulmonary hypertension, severe emphysematous changes of the lung treated with prophylactic azithromycin, Trelegy and albuterol , chronic hypoxic respiratory failure requiring 3-4 LPM of supplemental O2, hypertension, chronic hepatitis C with negative viral loads who has a recent history of acute kidney injury initially noted back in August 21st.  He was admitted to [facility] with worsening kidney function in September having a creatinine of 3.97 mg/a deciliter.  This was felt to be related to volume depletion in the setting of ACE inhibition and hydrochlorothiazide use.  He was treated for right lung base pneumonia.  His creatinine improved to 2.3 mg/deciliter by 10/2 off the thiazide diuretic and ACE inhibitor.  He was seen outpatient.  Repeat labs were obtained.  He was noted to have worsening renal function with a creatinine back up to 4.2 mg/a deciliter.  He continues to have significant weight loss  totaling approximately 30 pounds in a short period of time.  CT scans have been unrevealing.  Recent colonoscopy is noted.  He has planned outpatient for EGD but this will not happen until next year.  With IV fluid, there has been only marginal creatinine improvement.  He has a strong family history with a sister and nephews on dialysis.  We will plan for genetic testing outpatient.  In the meantime, I will repeat his complements, check an ANGÉLICA and ANCA for completeness albeit there is low suspicion for a glomerulonephritis.  He is high risk to progress to requiring dialysis.  Given his frailty he not tolerate conventional dialysis.  We discussed peritoneal dialysis with he and his wife and they may be leaning in that direction.  There is no acute indication for renal replacement therapy at that time.  When he is stable for discharge, we will need to set up outpatient with home health care and weekly labs.  Trend his RFP.  Will follow.        Principal Problem:    Acute kidney injury (CMS-HCC)         I spent 45 minutes in the professional and overall care of this patient.      Barrington Xiong, DO

## 2024-11-26 ENCOUNTER — HOME HEALTH ADMISSION (OUTPATIENT)
Dept: HOME HEALTH SERVICES | Facility: HOME HEALTH | Age: 74
End: 2024-11-26
Payer: MEDICARE

## 2024-11-26 ENCOUNTER — DOCUMENTATION (OUTPATIENT)
Dept: HOME HEALTH SERVICES | Facility: HOME HEALTH | Age: 74
End: 2024-11-26
Payer: MEDICARE

## 2024-11-26 VITALS
WEIGHT: 110.01 LBS | HEART RATE: 100 BPM | HEIGHT: 69 IN | BODY MASS INDEX: 16.29 KG/M2 | DIASTOLIC BLOOD PRESSURE: 72 MMHG | SYSTOLIC BLOOD PRESSURE: 115 MMHG | RESPIRATION RATE: 18 BRPM | OXYGEN SATURATION: 98 % | TEMPERATURE: 98.1 F

## 2024-11-26 LAB
ANION GAP SERPL CALC-SCNC: 11 MMOL/L (ref 10–20)
BASOPHILS # BLD AUTO: 0.11 X10*3/UL (ref 0–0.1)
BASOPHILS NFR BLD AUTO: 1.6 %
BUN SERPL-MCNC: 28 MG/DL (ref 6–23)
C3 SERPL-MCNC: 115 MG/DL (ref 87–200)
C4 SERPL-MCNC: 65 MG/DL (ref 10–50)
CALCIUM SERPL-MCNC: 9.9 MG/DL (ref 8.6–10.3)
CHLORIDE SERPL-SCNC: 104 MMOL/L (ref 98–107)
CO2 SERPL-SCNC: 27 MMOL/L (ref 21–32)
CREAT SERPL-MCNC: 5.57 MG/DL (ref 0.5–1.3)
EGFRCR SERPLBLD CKD-EPI 2021: 10 ML/MIN/1.73M*2
EOSINOPHIL # BLD AUTO: 0.45 X10*3/UL (ref 0–0.4)
EOSINOPHIL NFR BLD AUTO: 6.4 %
ERYTHROCYTE [DISTWIDTH] IN BLOOD BY AUTOMATED COUNT: 13.8 % (ref 11.5–14.5)
FOLATE SERPL-MCNC: >24 NG/ML
GLUCOSE SERPL-MCNC: 88 MG/DL (ref 74–99)
HCT VFR BLD AUTO: 24.6 % (ref 41–52)
HGB BLD-MCNC: 8 G/DL (ref 13.5–17.5)
IMM GRANULOCYTES # BLD AUTO: 0.08 X10*3/UL (ref 0–0.5)
IMM GRANULOCYTES NFR BLD AUTO: 1.1 % (ref 0–0.9)
LYMPHOCYTES # BLD AUTO: 1.12 X10*3/UL (ref 0.8–3)
LYMPHOCYTES NFR BLD AUTO: 16 %
MCH RBC QN AUTO: 28.9 PG (ref 26–34)
MCHC RBC AUTO-ENTMCNC: 32.5 G/DL (ref 32–36)
MCV RBC AUTO: 89 FL (ref 80–100)
MONOCYTES # BLD AUTO: 0.52 X10*3/UL (ref 0.05–0.8)
MONOCYTES NFR BLD AUTO: 7.4 %
NEUTROPHILS # BLD AUTO: 4.73 X10*3/UL (ref 1.6–5.5)
NEUTROPHILS NFR BLD AUTO: 67.5 %
NRBC BLD-RTO: 0 /100 WBCS (ref 0–0)
PLATELET # BLD AUTO: 190 X10*3/UL (ref 150–450)
POTASSIUM SERPL-SCNC: 4.4 MMOL/L (ref 3.5–5.3)
RBC # BLD AUTO: 2.77 X10*6/UL (ref 4.5–5.9)
SODIUM SERPL-SCNC: 138 MMOL/L (ref 136–145)
VIT B12 SERPL-MCNC: 526 PG/ML (ref 211–911)
WBC # BLD AUTO: 7 X10*3/UL (ref 4.4–11.3)

## 2024-11-26 PROCEDURE — 36415 COLL VENOUS BLD VENIPUNCTURE: CPT | Performed by: HOSPITALIST

## 2024-11-26 PROCEDURE — 2500000002 HC RX 250 W HCPCS SELF ADMINISTERED DRUGS (ALT 637 FOR MEDICARE OP, ALT 636 FOR OP/ED): Performed by: HOSPITALIST

## 2024-11-26 PROCEDURE — 86038 ANTINUCLEAR ANTIBODIES: CPT | Mod: AHULAB | Performed by: INTERNAL MEDICINE

## 2024-11-26 PROCEDURE — 80048 BASIC METABOLIC PNL TOTAL CA: CPT | Performed by: HOSPITALIST

## 2024-11-26 PROCEDURE — 99239 HOSP IP/OBS DSCHRG MGMT >30: CPT | Performed by: HOSPITALIST

## 2024-11-26 PROCEDURE — 86235 NUCLEAR ANTIGEN ANTIBODY: CPT | Performed by: INTERNAL MEDICINE

## 2024-11-26 PROCEDURE — 85025 COMPLETE CBC W/AUTO DIFF WBC: CPT | Performed by: HOSPITALIST

## 2024-11-26 PROCEDURE — 2500000001 HC RX 250 WO HCPCS SELF ADMINISTERED DRUGS (ALT 637 FOR MEDICARE OP): Performed by: INTERNAL MEDICINE

## 2024-11-26 PROCEDURE — 2500000004 HC RX 250 GENERAL PHARMACY W/ HCPCS (ALT 636 FOR OP/ED): Performed by: INTERNAL MEDICINE

## 2024-11-26 PROCEDURE — 2500000002 HC RX 250 W HCPCS SELF ADMINISTERED DRUGS (ALT 637 FOR MEDICARE OP, ALT 636 FOR OP/ED): Performed by: INTERNAL MEDICINE

## 2024-11-26 PROCEDURE — 86160 COMPLEMENT ANTIGEN: CPT | Mod: AHULAB | Performed by: INTERNAL MEDICINE

## 2024-11-26 PROCEDURE — 2500000005 HC RX 250 GENERAL PHARMACY W/O HCPCS: Performed by: HOSPITALIST

## 2024-11-26 PROCEDURE — 82746 ASSAY OF FOLIC ACID SERUM: CPT | Mod: AHULAB | Performed by: HOSPITALIST

## 2024-11-26 PROCEDURE — 94640 AIRWAY INHALATION TREATMENT: CPT

## 2024-11-26 PROCEDURE — 2500000004 HC RX 250 GENERAL PHARMACY W/ HCPCS (ALT 636 FOR OP/ED): Performed by: HOSPITALIST

## 2024-11-26 PROCEDURE — 82607 VITAMIN B-12: CPT | Mod: AHULAB | Performed by: HOSPITALIST

## 2024-11-26 PROCEDURE — 83516 IMMUNOASSAY NONANTIBODY: CPT | Performed by: INTERNAL MEDICINE

## 2024-11-26 RX ORDER — MIRTAZAPINE 7.5 MG/1
7.5 TABLET, FILM COATED ORAL NIGHTLY
Qty: 30 TABLET | Refills: 0 | Status: SHIPPED | OUTPATIENT
Start: 2024-11-26 | End: 2024-12-26

## 2024-11-26 RX ORDER — PREDNISONE 5 MG/1
5 TABLET ORAL DAILY
Start: 2024-11-27 | End: 2024-12-27

## 2024-11-26 ASSESSMENT — COGNITIVE AND FUNCTIONAL STATUS - GENERAL
TOILETING: A LITTLE
CLIMB 3 TO 5 STEPS WITH RAILING: A LOT
WALKING IN HOSPITAL ROOM: A LITTLE
DAILY ACTIVITIY SCORE: 22
MOBILITY SCORE: 21
DRESSING REGULAR LOWER BODY CLOTHING: A LITTLE

## 2024-11-26 ASSESSMENT — PAIN SCALES - GENERAL: PAINLEVEL_OUTOF10: 0 - NO PAIN

## 2024-11-26 NOTE — PROGRESS NOTES
Between 7AM-7PM please message me via Epic Secure Chat.  After 7PM please page Nocturnist on call.    [facility] Hospitalist Progress Note      Kalyan Armstrong    :  1950(74 y.o.)    MRN:  [id number]  Date: 24     Assessment and Plan:     CATHIE on CKD3  - Nephrology consulted; Complements, ANGÉLICA, ANCA pending.   - Will need OP genetic testing  - Currently no acute indication for RRT but at high risk of requiring. Patient and wife leaning towards PD. Plan for weekly labs as OP and close follow up with nephrology.    Unintentional weight loss  - CT AP unrevealing but was non-contrasted study in setting of CATHIE on CKD  - continue low dose remeron for appetite stimulation  - PCP has already referred to OP GI for further evaluation    COPD with chronic hypoxic respiratory failure  - not in acute exacerbation; on 3L NC at baseline  - continue nebs in place of home inhalers; continue daily azithromycin and prednisone    HLD  - continue statin    HTN  - norvasc held; BP stable off meds      Malnutrition Diagnosis Status: New  Malnutrition Diagnosis: Severe malnutrition related to chronic disease or condition  As Evidenced by: greater than 10% weight loss in 6 months, severe fat loss and severe muscle loss.  I agree with the dietitian's malnutrition diagnosis.        DVT Prophylaxis: subcutaneous Heparin    Disposition:  possible dc home tomorrow    Electronically signed by Jaskaran Garrett DO on 24 at 7:52 PM     Subjective:      Interval History:   Vitals and chart notes from overnight reviewed.   No acute issues overnight.   Patient seen and evaluated at bedside.   PO intake remains poor.    Review of Systems:   Other than patient's chronic conditions and those complaints in the history above, the rest of the 10 systems review were done and were negative.     Current medications:  Scheduled Meds:[Held by provider] amLODIPine, 10 mg, oral, Daily  azithromycin, 250 mg, oral, Daily  budesonide, 0.25  mg, nebulization, BID   And  ipratropium-albuteroL, 3 mL, nebulization, 4x daily  epoetin jamil or biosimilar, 6,000 Units, subcutaneous, Once per day on Monday Wednesday Friday  heparin (porcine), 5,000 Units, subcutaneous, q8h GULSHAN  mirtazapine, 7.5 mg, oral, Nightly  [Held by provider] potassium chloride, 20 mEq, oral, TID  predniSONE, 5 mg, oral, Daily  rosuvastatin, 40 mg, oral, Daily      Continuous Infusions:   PRN Meds:PRN medications: acetaminophen, albuterol, ondansetron ODT **OR** ondansetron, oxygen      Objective:     Heart Rate:  []   Temp:  [36.4 °C (97.6 °F)-37 °C (98.6 °F)]   Resp:  [16-18]   BP: (114-146)/(72-87)   SpO2:  [97 %-100 %]     Oxygen Dose: *3 L/min    Physical Exam  Vitals and nursing note reviewed.   Constitutional:       Appearance: He is ill-appearing (chronically).   HENT:      Mouth/Throat:      Mouth: Mucous membranes are moist.      Pharynx: Oropharynx is clear.   Cardiovascular:      Rate and Rhythm: Normal rate and regular rhythm.   Pulmonary:      Effort: Pulmonary effort is normal.   Abdominal:      General: There is no distension.      Palpations: Abdomen is soft.      Tenderness: There is no abdominal tenderness.   Musculoskeletal:      Right lower leg: No edema.      Left lower leg: No edema.   Neurological:      Mental Status: He is alert and oriented to person, place, and time.         Labs:   Lab Results   Component Value Date     11/25/2024    K 4.1 11/25/2024     11/25/2024    CO2 27 11/25/2024    BUN 26 (H) 11/25/2024    CREATININE 5.41 (H) 11/25/2024    GLUCOSE 115 (H) 11/25/2024    CALCIUM 9.6 11/25/2024    PROT 8.1 11/20/2024    BILITOT 0.5 11/20/2024    ALKPHOS 54 11/20/2024    AST 29 11/20/2024    ALT 25 11/20/2024       Lab Results   Component Value Date    WBC 7.4 11/25/2024    HGB 8.3 (L) 11/25/2024    HCT 25.5 (L) 11/25/2024    MCV 91 11/25/2024     11/25/2024

## 2024-11-26 NOTE — CARE PLAN
The patient's goals for the shift include      The clinical goals for the shift include hds, safety

## 2024-11-26 NOTE — HH CARE COORDINATION
Home Care received a Referral for Nursing and Physical Therapy. We have processed the referral for a Start of Care on 11/28-11/30.     If you have any questions or concerns, please feel free to contact us at [phone]. Follow the prompts, enter your five digit zip code, and you will be directed to your care team on CENTL 2.

## 2024-11-26 NOTE — CARE PLAN
The patient's goals for the shift include      The clinical goals for the shift include hds, safety      Problem: Pain - Adult  Goal: Verbalizes/displays adequate comfort level or baseline comfort level  Outcome: Progressing     Problem: Discharge Planning  Goal: Discharge to home or other facility with appropriate resources  Outcome: Progressing     Problem: Chronic Conditions and Co-morbidities  Goal: Patient's chronic conditions and co-morbidity symptoms are monitored and maintained or improved  Outcome: Progressing     Problem: Fall/Injury  Goal: Verbalize understanding of personal risk factors for fall in the hospital  Outcome: Progressing  Goal: Verbalize understanding of risk factor reduction measures to prevent injury from fall in the home  Outcome: Progressing  Goal: Use assistive devices by end of the shift  Outcome: Progressing  Goal: Pace activities to prevent fatigue by end of the shift  Outcome: Progressing  Goal: Not fall by end of shift  Outcome: Progressing  Goal: Be free from injury by end of the shift  Outcome: Progressing

## 2024-11-26 NOTE — PROGRESS NOTES
11/26/24 1342   Discharge Planning   Who is requesting discharge planning? Provider   Home or Post Acute Services In home services   Type of Home Care Services Home PT;Home nursing visits   Expected Discharge Disposition Home H   Does the patient need discharge transport arranged? No   Patient Choice   Provider Choice list and CMS website (https://medicare.gov/care-compare#search) for post-acute Quality and Resource Measure Data were provided and reviewed with: Family;Patient   Patient / Family choosing to utilize agency / facility established prior to hospitalization No   Intensity of Service   Intensity of Service 0-30 min     11/26/24 1343  Spoke with patient and family at bedside.  Patient agreeable to HHC.  Explained HHC options and expectations.  They would like to use [facility].  HHC will provide PT and SN for weekly blood draws.  Per KAYLA Angulo today.  Patient denies any additional HHC, DME, or discharge needs.  Sonia Laurent RN T[facility]

## 2024-11-26 NOTE — PROGRESS NOTES
Kalyan Armstrong is a 74 y.o. male on day 5 of admission presenting with Acute kidney injury (CMS-HCC).      Subjective   Seen and examined.   Appetite remains quite poor.   Blood pressures remain normotensive.  He does not offer specific complaints.   Chart/labs/meds/notes/imaging/VS reviewed.       Objective          Vitals 24HR  Heart Rate:  [69-97]   Temp:  [36.4 °C (97.6 °F)-37.1 °C (98.7 °F)]   Resp:  [16-17]   BP: (122-165)/(78-87)   SpO2:  [96 %-100 %]     Intake/Output last 3 Shifts:    Intake/Output Summary (Last 24 hours) at 11/26/2024 1415  Last data filed at 11/26/2024 1104  Gross per 24 hour   Intake 360 ml   Output 500 ml   Net -140 ml       Physical Exam  HENT:      Head: Normocephalic.      Mouth/Throat:      Mouth: Mucous membranes are dry.      Frail elderly male, cachectic on exam  Cardiovascular:      Rate and Rhythm: Normal rate and regular rhythm.   Pulmonary:      Effort: Pulmonary effort is normal.      Breath sounds: Mildly diminished breath sounds. No rhonchi or wheezing.   Abdominal:      General: Bowel sounds are normal.      Palpations: Abdomen is soft.   Musculoskeletal:         General: Normal range of motion.      Cervical back: Neck supple.   Skin:     General: Skin is warm.   Neurological:      Mental Status: He is alert and oriented to person, place, and time.   Psychiatric:         Mood and Affect: Mood normal.         Behavior: Behavior normal.     Scheduled Medications  [Held by provider] amLODIPine, 10 mg, oral, Daily  azithromycin, 250 mg, oral, Daily  budesonide, 0.25 mg, nebulization, BID   And  ipratropium-albuteroL, 3 mL, nebulization, 4x daily  epoetin jamil or biosimilar, 6,000 Units, subcutaneous, Once per day on Monday Wednesday Friday  heparin (porcine), 5,000 Units, subcutaneous, q8h GULSHAN  mirtazapine, 7.5 mg, oral, Nightly  [Held by provider] potassium chloride, 20 mEq, oral, TID  predniSONE, 5 mg, oral, Daily  rosuvastatin, 40 mg, oral, Daily      Continuous  medications       PRN medications: acetaminophen, albuterol, ondansetron ODT **OR** ondansetron, oxygen     Relevant Results  Results from last 7 days   Lab Units 11/26/24  0552 11/25/24  0543 11/24/24  0642   WBC AUTO x10*3/uL 7.0 7.4 8.3   HEMOGLOBIN g/dL 8.0* 8.3* 8.5*   HEMATOCRIT % 24.6* 25.5* 26.7*   PLATELETS AUTO x10*3/uL 190 207 206   NEUTROS PCT AUTO % 67.5 71.5 73.4   LYMPHS PCT AUTO % 16.0 11.5 9.4   MONOS PCT AUTO % 7.4 8.0 7.1   EOS PCT AUTO % 6.4 6.2 7.5     Results from last 7 days   Lab Units 11/26/24 0552 11/25/24  0543 11/24/24  0642   SODIUM mmol/L 138 140 141   POTASSIUM mmol/L 4.4 4.1 3.9   CHLORIDE mmol/L 104 105 103   CO2 mmol/L 27 27 29   BUN mg/dL 28* 26* 28*   CREATININE mg/dL 5.57* 5.41* 5.11*   GLUCOSE mg/dL 88 115* 84   CALCIUM mg/dL 9.9 9.6 9.6       CT abdomen pelvis wo IV contrast   Final Result               Assessment/Plan      Kalyan Armstrong is a 74 y.o. male with a past medical history of HFpEF with an LVEF of 50% on echo in 2020, moderate pulmonary hypertension, severe emphysematous changes of the lung treated with prophylactic azithromycin, Trelegy and albuterol , chronic hypoxic respiratory failure requiring 3-4 LPM of supplemental O2, hypertension, chronic hepatitis C with negative viral loads who has a recent history of acute kidney injury initially noted back in August 21st.  He was admitted to [facility] with worsening kidney function in September having a creatinine of 3.97 mg/a deciliter.  This was felt to be related to volume depletion in the setting of ACE inhibition and hydrochlorothiazide use.  He was treated for right lung base pneumonia.  His creatinine improved to 2.3 mg/deciliter by 10/2 off the thiazide diuretic and ACE inhibitor.  He was seen outpatient.  Repeat labs were obtained.  He was noted to have worsening renal function with a creatinine back up to 4.2 mg/a deciliter.  He continues to have significant weight loss totaling approximately 30 pounds in a short  period of time.  CT scans have been unrevealing.  Recent colonoscopy is noted.  He was planned outpatient for EGD but this will not happen until next year.  With IV fluid, there has been only marginal creatinine improvement.  He has a strong family history with a sister and nephews on dialysis.  We will plan for genetic testing outpatient.  In the meantime, I did order repeat complements, ANGÉLICA and ANCA for completeness albeit there is low suspicion for a glomerulonephritis.  He is high risk to progress to requiring dialysis.  Given his frailty he may not tolerate conventional dialysis.  We discussed peritoneal dialysis with he and his wife.   We will set them up with dialysis education next week. We will also look at setting up Epo outpatient.   There is no acute indication for renal replacement therapy at that time. There are no renal barriers to discharge.  He will be set up with home health care and weekly labs faxed to my office [phone].  If he continues to have poor by mouth intake and weight loss, he is high risk for re-admission. Trend his RFP.  Will follow.    Principal Problem:    Acute kidney injury (CMS-HCC)       I spent 45 minutes in the professional and overall care of this patient.      Barrington Xiong, DO

## 2024-11-27 ENCOUNTER — PATIENT OUTREACH (OUTPATIENT)
Dept: PRIMARY CARE | Facility: CLINIC | Age: 74
End: 2024-11-27
Payer: MEDICARE

## 2024-11-27 LAB
ANA PATTERN: ABNORMAL
ANA SER QL HEP2 SUBST: POSITIVE
ANA TITR SER IF: ABNORMAL {TITER}
CENTROMERE B AB SER-ACNC: <0.2 AI
CHROMATIN AB SERPL-ACNC: <0.2 AI
DSDNA AB SER-ACNC: <1 IU/ML
ENA JO1 AB SER QL IA: <0.2 AI
ENA RNP AB SER IA-ACNC: <0.2 AI
ENA SCL70 AB SER QL IA: <0.2 AI
ENA SM AB SER IA-ACNC: <0.2 AI
ENA SM+RNP AB SER QL IA: <0.2 AI
ENA SS-A AB SER IA-ACNC: 0.2 AI
ENA SS-B AB SER IA-ACNC: <0.2 AI
RIBOSOMAL P AB SER-ACNC: <0.2 AI

## 2024-11-27 NOTE — PROGRESS NOTES
Discharge facility: [facility]  Discharge diagnosis: Acute kidney injury   Admission date: 11/21/24  Discharge date: 11/26/24    PCP Appointment Date: No available appointments within 14 days/ message to office   Specialist Appointment Date: 12/9/24 ortho, 12/19/24 opth, 1/6/25 cardio, 1/31/25 GI  Hospital Encounter and Summary: Linked    See Discharge assessment below for further details    Medications  Medications reviewed with patient/caregiver?: Yes (Patient's wife) (11/27/2024 10:11 AM)  Is the patient having any side effects they believe may be caused by any medication additions or changes?: No (11/27/2024 10:11 AM)  Does the patient have all medications ordered at discharge?: Yes (11/27/2024 10:11 AM)  Care Management Interventions: Provided patient education (11/27/2024 10:11 AM)  Prescription Comments: No new medications (11/27/2024 10:11 AM)  Is the patient taking all medications as directed (includes completed medication regime)?: Yes (11/27/2024 10:11 AM)  Care Management Interventions: Provided patient education (11/27/2024 10:11 AM)  Medication Comments: Discussed the following regarding medications: Wife reports patient has been taking predniSONE 5 mg tablet  Commonly known as: Deltasone  Start taking on: November 27, 2024  Take 1 tablet (5 mg) by mouth once daily, but pill were 10 mg and he was cutting them in half, mirtazapine 7.5 mg tablet  Commonly known as: Remeron  Take 1 tablet (7.5 mg) by mouth once daily at  bedtime, was prescribed prior to hospitalization but patient never had a chance to actually start it until last evening.STOP taking:  amLODIPine 10 mg tablet (Norvasc), wife reports he is done taking cholecalciferol 50,000 unit capsule (Vitamin D-3. Wife reports it was a temporary medication (11/27/2024 10:11 AM)  Follow Up Tasks: -- (n/a) (11/27/2024 10:11 AM)    Appointments  Does the patient have a primary care provider?: Yes (11/27/2024 10:11 AM)  Care Management  Interventions: Educated patient on importance of making appointment (message to office) (11/27/2024 10:11 AM)  Has the patient kept scheduled appointments due by today?: Yes (11/27/2024 10:11 AM)  Care Management Interventions: Advised patient to keep appointment; Educated on importance of keeping appointment (11/27/2024 10:11 AM)  Follow Up Tasks: -- (Follow up labs per Dr Xiong, Education for diaylsis? Patient and wife were scheduling for education) (11/27/2024 10:11 AM)    Self Management  What is the home health agency?:  Home Care (11/27/2024 10:11 AM)  Has home health visited the patient within 72 hours of discharge?: Call prior to 72 hours (11/27/2024 10:11 AM)  Home Health Interventions: -- (n/a) (11/27/2024 10:11 AM)  What Durable Medical Equipment (DME) was ordered?: n/a (11/27/2024 10:11 AM)  Has all Durable Medical Equipment (DME) been delivered?: -- (n/a) (11/27/2024 10:11 AM)  DME Interventions: -- (n/a) (11/27/2024 10:11 AM)  Care Management Interventions: Notified PCP/provider (11/27/2024 10:11 AM)  Follow Up Tasks: -- (n/a) (11/27/2024 10:11 AM)    Patient Teaching  Does the patient have access to their discharge instructions?: Yes (11/27/2024 10:11 AM)  Care Management Interventions: Reviewed instructions with patient (11/27/2024 10:11 AM)  What is the patient's perception of their health status since discharge?: Improving (11/27/2024 10:11 AM)  Is the patient/caregiver able to teach back the hierarchy of who to call/visit for symptoms/problems? PCP, Specialist, Home Health nurse, Urgent Care, ED, 911: Yes (11/27/2024 10:11 AM)  Patient/Caregiver Education Comments: Instructed on hospital discharge instructions. Instructed on new and changed medications. Instructed if increased shortness of breath or chest pains to call 911. Provided my contact information and encouraged to call with any questions (11/27/2024 10:11 AM)    Wrap Up  Wrap Up Additional Comments: Spoke with pateint's wife. She  reports patient is doing alright. He has all his medications and they are knowledgable of how he is to take them. They are expecting home care services to begin next week. They are hoping home care nurse will obtain needed bloodwork at her visit. Wife reports she is planning to schedule follow up appt with Dr Xiong for next week. They are also planning to schedule for education on dialysis. She reports he continues to have poor appetite. he is eating some but not very much. She did start mirtazapine last evening. She denies any further questions at this time. (11/27/2024 10:11 AM)

## 2024-11-28 LAB
ANCA AB PATTERN SER IF-IMP: NORMAL
ANCA IGG TITR SER IF: NORMAL {TITER}
BM IGG SER IF-ACNC: 0 AU/ML (ref 0–19)
MYELOPEROXIDASE AB SER-ACNC: 0 AU/ML (ref 0–19)
PROTEINASE3 AB SER-ACNC: 0 AU/ML (ref 0–19)

## 2024-11-28 NOTE — DISCHARGE SUMMARY
Discharge Diagnosis  CATHIE on CKD3  Unintentional weight loss  COPD with chronic respiratory failure  HLD  HTN    Issues Requiring Follow-Up  - op follow up with nephrology  - C to check weekly BMP and fax results to Dr. Xiong  - op follow up with GI; may need upper and lower given unclear etiology for weight loss    Discharge Meds     Medication List      CHANGE how you take these medications     predniSONE 5 mg tablet; Commonly known as: Deltasone; Take 1 tablet (5   mg) by mouth once daily.; What changed: medication strength, how much to   take, how to take this, when to take this, additional instructions     CONTINUE taking these medications     acetaminophen 325 mg tablet; Commonly known as: Tylenol   * albuterol 90 mcg/actuation inhaler   * albuterol 2.5 mg /3 mL (0.083 %) nebulizer solution   azithromycin 250 mg tablet; Commonly known as: Zithromax; One PO every   day   budesonide 0.5 mg/2 mL nebulizer solution; Commonly known as: Pulmicort   mirtazapine 7.5 mg tablet; Commonly known as: Remeron; Take 1 tablet   (7.5 mg) by mouth once daily at bedtime.   rosuvastatin 40 mg tablet; Commonly known as: Crestor; Take 1 tablet (40   mg) by mouth once daily.   Trelegy Ellipta 100-62.5-25 mcg blister with device; Generic drug:   fluticasone-umeclidin-vilanter; Inhale 1 puff once daily.   triamcinolone 0.1 % cream; Commonly known as: Kenalog; Apply topically 2   times a day. Apply to affected area 1-2 times daily as needed.  * This list has 2 medication(s) that are the same as other medications   prescribed for you. Read the directions carefully, and ask your doctor or   other care provider to review them with you.     STOP taking these medications     amLODIPine 10 mg tablet; Commonly known as: Norvasc     ASK your doctor about these medications     cholecalciferol 50,000 unit capsule; Commonly known as: Vitamin D-3;   Take 1 capsule (50,000 Units) by mouth 1 (one) time per week.       Test Results Pending At  Discharge  Pending Labs       Order Current Status    ANCA-Associated Vasculitis Profile (ANCA,MPO,PR3) In process    Glomerular Basement Membrane Antibody In process            Hospital Course    CATHIE on CKD3  - Nephrology consulted; Complements, ANGÉLICA, ANCA pending.   - Will need OP genetic testing  - Currently no acute indication for RRT but at high risk of requiring. Patient and wife leaning towards PD. Plan for weekly labs as OP and close follow up with nephrology.     Unintentional weight loss  - CT AP unrevealing but was non-contrasted study in setting of CATHIE on CKD  - continue low dose remeron for appetite stimulation  - PCP has already referred to OP GI for further evaluation     COPD with chronic hypoxic respiratory failure  - not in acute exacerbation; on 3L NC at baseline  - continue nebs in place of home inhalers; continue daily azithromycin and prednisone     HLD  - continue statin     HTN  - norvasc held; BP stable off meds    Pertinent Physical Exam At Time of Discharge  Physical Exam  Vitals and nursing note reviewed.   Constitutional:       Appearance: He is ill-appearing (chronically).   HENT:      Mouth/Throat:      Mouth: Mucous membranes are moist.      Pharynx: Oropharynx is clear.   Cardiovascular:      Rate and Rhythm: Normal rate and regular rhythm.   Pulmonary:      Effort: Pulmonary effort is normal.      Breath sounds: No wheezing.   Abdominal:      Palpations: Abdomen is soft.   Neurological:      Mental Status: He is alert and oriented to person, place, and time.         Outpatient Follow-Up  Future Appointments   Date Time Provider Department Center   11/30/2024 To Be Determined Cynthia Amato RN [facility]   12/2/2024 To Be Determined Allison Thomas PT [facility]   12/9/2024  1:45 PM David Donis DO [id number] [place]   12/16/2024  9:20 AM Rosa Lees DO [id number] [place]   1/6/2025  1:40 PM Zoran Infante MD [id number] [place]   1/31/2025  9:30 AM Dorys Herndon MD [id number]  [place]   2/17/2025  1:40 PM Rosa Lees DO [id number] [place]     DISCHARGE TIME: > 30 minutes    Deep DO Gerry

## 2024-11-30 ENCOUNTER — HOME CARE VISIT (OUTPATIENT)
Dept: HOME HEALTH SERVICES | Facility: HOME HEALTH | Age: 74
End: 2024-11-30
Payer: MEDICARE

## 2024-11-30 ENCOUNTER — LAB REQUISITION (OUTPATIENT)
Dept: LAB | Facility: HOSPITAL | Age: 74
End: 2024-11-30
Payer: MEDICARE

## 2024-11-30 VITALS
DIASTOLIC BLOOD PRESSURE: 82 MMHG | HEART RATE: 100 BPM | SYSTOLIC BLOOD PRESSURE: 114 MMHG | OXYGEN SATURATION: 99 % | RESPIRATION RATE: 16 BRPM | TEMPERATURE: 97.1 F

## 2024-11-30 DIAGNOSIS — N17.9 ACUTE KIDNEY FAILURE, UNSPECIFIED (CMS-HCC): ICD-10-CM

## 2024-11-30 LAB
ANION GAP SERPL CALC-SCNC: 14 MMOL/L (ref 10–20)
BUN SERPL-MCNC: 37 MG/DL (ref 6–23)
CALCIUM SERPL-MCNC: 10.7 MG/DL (ref 8.6–10.3)
CHLORIDE SERPL-SCNC: 99 MMOL/L (ref 98–107)
CO2 SERPL-SCNC: 31 MMOL/L (ref 21–32)
CREAT SERPL-MCNC: 6.28 MG/DL (ref 0.5–1.3)
EGFRCR SERPLBLD CKD-EPI 2021: 9 ML/MIN/1.73M*2
GLUCOSE SERPL-MCNC: 97 MG/DL (ref 74–99)
POTASSIUM SERPL-SCNC: 5.1 MMOL/L (ref 3.5–5.3)
SODIUM SERPL-SCNC: 139 MMOL/L (ref 136–145)

## 2024-11-30 PROCEDURE — 80048 BASIC METABOLIC PNL TOTAL CA: CPT

## 2024-11-30 PROCEDURE — G0299 HHS/HOSPICE OF RN EA 15 MIN: HCPCS | Mod: HHH

## 2024-11-30 PROCEDURE — 169592 NO-PAY CLAIM PROCEDURE

## 2024-11-30 SDOH — ECONOMIC STABILITY: HOUSING INSECURITY: EVIDENCE OF SMOKING MATERIAL: 0

## 2024-11-30 SDOH — HEALTH STABILITY: MENTAL HEALTH: SMOKING IN HOME: 0

## 2024-11-30 ASSESSMENT — ENCOUNTER SYMPTOMS
HYPERTENSION: 1
PERSON REPORTING PAIN: PATIENT
PAIN: 1
LOWEST PAIN SEVERITY IN PAST 24 HOURS: 2/10
APPETITE LEVEL: POOR
FATIGUES EASILY: 1
PAIN LOCATION - PAIN QUALITY: ACHING
PAIN LOCATION - PAIN SEVERITY: 5/10
PAIN LOCATION: GENERALIZED
SHORTNESS OF BREATH: 1
DYSPNEA ACTIVITY LEVEL: AFTER AMBULATING 10 - 20 FT
PAIN LOCATION - PAIN FREQUENCY: INTERMITTENT
PAIN SEVERITY GOAL: 0/10
PAIN LOCATION - PAIN DURATION: LESS THAN ONE HOUR
PAIN LOCATION - EXACERBATING FACTORS: INCREASE ACTIVITY
PAIN LOCATION - RELIEVING FACTORS: REST/MEDS
DYSPNEA ON EXERTION: 1
MUSCLE WEAKNESS: 1
LAST BOWEL MOVEMENT: 67173
HIGHEST PAIN SEVERITY IN PAST 24 HOURS: 5/10

## 2024-11-30 ASSESSMENT — ACTIVITIES OF DAILY LIVING (ADL)
OASIS_M1830: 03
ENTERING_EXITING_HOME: NEEDS ASSISTANCE
AMBULATION ASSISTANCE: STAND BY ASSIST
CURRENT_FUNCTION: STAND BY ASSIST

## 2024-12-02 ENCOUNTER — HOME CARE VISIT (OUTPATIENT)
Dept: HOME HEALTH SERVICES | Facility: HOME HEALTH | Age: 74
End: 2024-12-02
Payer: MEDICARE

## 2024-12-02 PROCEDURE — G0151 HHCP-SERV OF PT,EA 15 MIN: HCPCS | Mod: HHH

## 2024-12-02 ASSESSMENT — ENCOUNTER SYMPTOMS
DENIES PAIN: 1
PERSON REPORTING PAIN: PATIENT
MUSCLE WEAKNESS: 1

## 2024-12-03 ENCOUNTER — TELEPHONE (OUTPATIENT)
Dept: PRIMARY CARE | Facility: CLINIC | Age: 74
End: 2024-12-03
Payer: MEDICARE

## 2024-12-04 ENCOUNTER — HOME CARE VISIT (OUTPATIENT)
Dept: HOME HEALTH SERVICES | Facility: HOME HEALTH | Age: 74
End: 2024-12-04
Payer: MEDICARE

## 2024-12-04 PROCEDURE — G0157 HHC PT ASSISTANT EA 15: HCPCS | Mod: CQ,HHH

## 2024-12-05 ENCOUNTER — LAB REQUISITION (OUTPATIENT)
Dept: LAB | Facility: HOSPITAL | Age: 74
End: 2024-12-05
Payer: MEDICARE

## 2024-12-05 ENCOUNTER — HOME CARE VISIT (OUTPATIENT)
Dept: HOME HEALTH SERVICES | Facility: HOME HEALTH | Age: 74
End: 2024-12-05
Payer: MEDICARE

## 2024-12-05 VITALS
RESPIRATION RATE: 20 BRPM | DIASTOLIC BLOOD PRESSURE: 68 MMHG | HEART RATE: 102 BPM | BODY MASS INDEX: 15.23 KG/M2 | WEIGHT: 103.19 LBS | TEMPERATURE: 98 F | SYSTOLIC BLOOD PRESSURE: 110 MMHG | OXYGEN SATURATION: 98 %

## 2024-12-05 DIAGNOSIS — Z51.89 ENCOUNTER FOR OTHER SPECIFIED AFTERCARE: ICD-10-CM

## 2024-12-05 LAB
ANION GAP SERPL CALC-SCNC: 16 MMOL/L (ref 10–20)
BUN SERPL-MCNC: 40 MG/DL (ref 6–23)
CALCIUM SERPL-MCNC: 10.3 MG/DL (ref 8.6–10.3)
CHLORIDE SERPL-SCNC: 97 MMOL/L (ref 98–107)
CO2 SERPL-SCNC: 29 MMOL/L (ref 21–32)
CREAT SERPL-MCNC: 6.66 MG/DL (ref 0.5–1.3)
EGFRCR SERPLBLD CKD-EPI 2021: 8 ML/MIN/1.73M*2
GLUCOSE SERPL-MCNC: 135 MG/DL (ref 74–99)
POTASSIUM SERPL-SCNC: 4.8 MMOL/L (ref 3.5–5.3)
SODIUM SERPL-SCNC: 137 MMOL/L (ref 136–145)

## 2024-12-05 PROCEDURE — G0299 HHS/HOSPICE OF RN EA 15 MIN: HCPCS | Mod: HHH

## 2024-12-05 PROCEDURE — 80048 BASIC METABOLIC PNL TOTAL CA: CPT

## 2024-12-05 SDOH — ECONOMIC STABILITY: GENERAL

## 2024-12-05 ASSESSMENT — ENCOUNTER SYMPTOMS
DYSPNEA ON EXERTION: 1
LAST BOWEL MOVEMENT: 67178
DENIES PAIN: 1
PERSON REPORTING PAIN: PATIENT
APPETITE LEVEL: POOR
FATIGUES EASILY: 1
SHORTNESS OF BREATH: 1
DYSPNEA ACTIVITY LEVEL: AFTER AMBULATING 10 - 20 FT

## 2024-12-05 ASSESSMENT — ACTIVITIES OF DAILY LIVING (ADL): MONEY MANAGEMENT (EXPENSES/BILLS): NEEDS ASSISTANCE

## 2024-12-07 DIAGNOSIS — E55.9 VITAMIN D DEFICIENCY: ICD-10-CM

## 2024-12-09 ENCOUNTER — OFFICE VISIT (OUTPATIENT)
Dept: ORTHOPEDIC SURGERY | Facility: HOSPITAL | Age: 74
End: 2024-12-09
Payer: MEDICARE

## 2024-12-09 ENCOUNTER — HOSPITAL ENCOUNTER (OUTPATIENT)
Dept: RADIOLOGY | Facility: HOSPITAL | Age: 74
Discharge: HOME | End: 2024-12-09
Payer: MEDICARE

## 2024-12-09 DIAGNOSIS — M25.521 RIGHT ELBOW PAIN: ICD-10-CM

## 2024-12-09 DIAGNOSIS — M25.521 RIGHT ELBOW PAIN: Primary | ICD-10-CM

## 2024-12-09 PROCEDURE — 73080 X-RAY EXAM OF ELBOW: CPT | Mod: RIGHT SIDE | Performed by: RADIOLOGY

## 2024-12-09 PROCEDURE — 73080 X-RAY EXAM OF ELBOW: CPT | Mod: RT

## 2024-12-09 PROCEDURE — 1157F ADVNC CARE PLAN IN RCRD: CPT | Performed by: STUDENT IN AN ORGANIZED HEALTH CARE EDUCATION/TRAINING PROGRAM

## 2024-12-09 PROCEDURE — 1159F MED LIST DOCD IN RCRD: CPT | Performed by: STUDENT IN AN ORGANIZED HEALTH CARE EDUCATION/TRAINING PROGRAM

## 2024-12-09 PROCEDURE — 1111F DSCHRG MED/CURRENT MED MERGE: CPT | Performed by: STUDENT IN AN ORGANIZED HEALTH CARE EDUCATION/TRAINING PROGRAM

## 2024-12-09 RX ORDER — ROSUVASTATIN CALCIUM 40 MG/1
TABLET, COATED ORAL
Qty: 90 TABLET | Refills: 1 | Status: SHIPPED | OUTPATIENT
Start: 2024-12-09

## 2024-12-09 ASSESSMENT — PAIN - FUNCTIONAL ASSESSMENT: PAIN_FUNCTIONAL_ASSESSMENT: NO/DENIES PAIN

## 2024-12-10 ENCOUNTER — HOME CARE VISIT (OUTPATIENT)
Dept: HOME HEALTH SERVICES | Facility: HOME HEALTH | Age: 74
End: 2024-12-10
Payer: MEDICARE

## 2024-12-10 PROCEDURE — G0157 HHC PT ASSISTANT EA 15: HCPCS | Mod: CQ,HHH

## 2024-12-11 ENCOUNTER — PATIENT OUTREACH (OUTPATIENT)
Dept: PRIMARY CARE | Facility: CLINIC | Age: 74
End: 2024-12-11
Payer: MEDICARE

## 2024-12-11 NOTE — PROGRESS NOTES
Follow up call after hospitalization.  At time of outreach call the patient feels as if their condition has  improved some. Patient reports he is doing well. They have appt tomorrow for education regarding dialysis. She reports patient will not be starting dialysis until after he is seen by GI. He continues to receive home care. PCP appt on 12-16-24  Addressed any questions or concerns.

## 2024-12-12 ENCOUNTER — HOME CARE VISIT (OUTPATIENT)
Dept: HOME HEALTH SERVICES | Facility: HOME HEALTH | Age: 74
End: 2024-12-12
Payer: MEDICARE

## 2024-12-12 ENCOUNTER — LAB REQUISITION (OUTPATIENT)
Dept: LAB | Facility: HOSPITAL | Age: 74
End: 2024-12-12
Payer: MEDICARE

## 2024-12-12 VITALS
BODY MASS INDEX: 15.11 KG/M2 | TEMPERATURE: 98.4 F | OXYGEN SATURATION: 97 % | RESPIRATION RATE: 18 BRPM | SYSTOLIC BLOOD PRESSURE: 122 MMHG | WEIGHT: 102.38 LBS | HEART RATE: 92 BPM | DIASTOLIC BLOOD PRESSURE: 80 MMHG

## 2024-12-12 DIAGNOSIS — N17.9 ACUTE KIDNEY FAILURE, UNSPECIFIED (CMS-HCC): ICD-10-CM

## 2024-12-12 LAB
ANION GAP SERPL CALC-SCNC: 13 MMOL/L (ref 10–20)
BUN SERPL-MCNC: 43 MG/DL (ref 6–23)
CALCIUM SERPL-MCNC: 11.4 MG/DL (ref 8.6–10.3)
CHLORIDE SERPL-SCNC: 99 MMOL/L (ref 98–107)
CO2 SERPL-SCNC: 29 MMOL/L (ref 21–32)
CREAT SERPL-MCNC: 6.2 MG/DL (ref 0.5–1.3)
EGFRCR SERPLBLD CKD-EPI 2021: 9 ML/MIN/1.73M*2
GLUCOSE SERPL-MCNC: 110 MG/DL (ref 74–99)
POTASSIUM SERPL-SCNC: 4.6 MMOL/L (ref 3.5–5.3)
SODIUM SERPL-SCNC: 136 MMOL/L (ref 136–145)

## 2024-12-12 PROCEDURE — G0299 HHS/HOSPICE OF RN EA 15 MIN: HCPCS | Mod: HHH

## 2024-12-12 PROCEDURE — 80048 BASIC METABOLIC PNL TOTAL CA: CPT

## 2024-12-12 SDOH — ECONOMIC STABILITY: GENERAL

## 2024-12-12 ASSESSMENT — ACTIVITIES OF DAILY LIVING (ADL): MONEY MANAGEMENT (EXPENSES/BILLS): NEEDS ASSISTANCE

## 2024-12-12 ASSESSMENT — ENCOUNTER SYMPTOMS
LAST BOWEL MOVEMENT: 67184
PERSON REPORTING PAIN: PATIENT
FATIGUES EASILY: 1
DENIES PAIN: 1
SHORTNESS OF BREATH: 1
DYSPNEA ACTIVITY LEVEL: AFTER AMBULATING 10 - 20 FT
DYSPNEA ON EXERTION: 1
APPETITE LEVEL: GOOD

## 2024-12-16 ENCOUNTER — APPOINTMENT (OUTPATIENT)
Dept: PRIMARY CARE | Facility: CLINIC | Age: 74
End: 2024-12-16
Payer: MEDICARE

## 2024-12-16 DIAGNOSIS — R63.4 WEIGHT LOSS: Primary | ICD-10-CM

## 2024-12-16 DIAGNOSIS — E43 UNSPECIFIED SEVERE PROTEIN-CALORIE MALNUTRITION (MULTI): ICD-10-CM

## 2024-12-16 DIAGNOSIS — E55.9 VITAMIN D DEFICIENCY: ICD-10-CM

## 2024-12-16 PROCEDURE — 1157F ADVNC CARE PLAN IN RCRD: CPT | Performed by: INTERNAL MEDICINE

## 2024-12-16 PROCEDURE — 99214 OFFICE O/P EST MOD 30 MIN: CPT | Performed by: INTERNAL MEDICINE

## 2024-12-16 PROCEDURE — 1159F MED LIST DOCD IN RCRD: CPT | Performed by: INTERNAL MEDICINE

## 2024-12-16 PROCEDURE — 1160F RVW MEDS BY RX/DR IN RCRD: CPT | Performed by: INTERNAL MEDICINE

## 2024-12-16 PROCEDURE — 1111F DSCHRG MED/CURRENT MED MERGE: CPT | Performed by: INTERNAL MEDICINE

## 2024-12-16 RX ORDER — ROSUVASTATIN CALCIUM 40 MG/1
40 TABLET, COATED ORAL DAILY
Qty: 90 TABLET | Refills: 1 | Status: ON HOLD | OUTPATIENT
Start: 2024-12-16

## 2024-12-16 ASSESSMENT — ENCOUNTER SYMPTOMS
APPETITE CHANGE: 1
UNEXPECTED WEIGHT CHANGE: 1

## 2024-12-16 NOTE — PROGRESS NOTES
Subjective   Patient ID: Kalyan Armstrong is a 74 y.o. male who presents via virtual visit for Follow-up and Hospital Follow-up.  An interactive audio and video telecommunication system which permits real time communications between the patient (at the originating site) and provider (at the distant site) was utilized to provide this telehealth service.    Verbal consent was requested and obtained from the patient on the day of encounter.  HPI  Was in hospital for CATHIE  Dropping weight and not eating well,   Has been having home health come out  PT ended last week  SN will end this week    Review of Systems   Constitutional:  Positive for appetite change and unexpected weight change.   All other systems reviewed and are negative.    Assessment/Plan     Problem List Items Addressed This Visit       Vitamin D deficiency    Relevant Medications    rosuvastatin (Crestor) 40 mg tablet    Weight loss - Primary    Unspecified severe protein-calorie malnutrition (Multi)     Did not like how the mirtazapine made him feel  Nephrology following along-recommending an EGD to rule out any other causes of his decreased appetite and rapid weight loss

## 2024-12-16 NOTE — ASSESSMENT & PLAN NOTE
Did not like how the mirtazapine made him feel  Nephrology following along-recommending an EGD to rule out any other causes of his decreased appetite and rapid weight loss

## 2024-12-18 ENCOUNTER — LAB REQUISITION (OUTPATIENT)
Dept: LAB | Facility: HOSPITAL | Age: 74
End: 2024-12-18
Payer: MEDICARE

## 2024-12-18 ENCOUNTER — HOME CARE VISIT (OUTPATIENT)
Dept: HOME HEALTH SERVICES | Facility: HOME HEALTH | Age: 74
End: 2024-12-18
Payer: MEDICARE

## 2024-12-18 VITALS
RESPIRATION RATE: 18 BRPM | DIASTOLIC BLOOD PRESSURE: 85 MMHG | BODY MASS INDEX: 14.48 KG/M2 | WEIGHT: 98.13 LBS | HEART RATE: 116 BPM | TEMPERATURE: 98.8 F | OXYGEN SATURATION: 98 % | SYSTOLIC BLOOD PRESSURE: 128 MMHG

## 2024-12-18 DIAGNOSIS — N17.9 ACUTE KIDNEY FAILURE, UNSPECIFIED (CMS-HCC): ICD-10-CM

## 2024-12-18 LAB
ANION GAP SERPL CALC-SCNC: 14 MMOL/L (ref 10–20)
BUN SERPL-MCNC: 51 MG/DL (ref 6–23)
CALCIUM SERPL-MCNC: 11.5 MG/DL (ref 8.6–10.3)
CHLORIDE SERPL-SCNC: 98 MMOL/L (ref 98–107)
CO2 SERPL-SCNC: 29 MMOL/L (ref 21–32)
CREAT SERPL-MCNC: 7.2 MG/DL (ref 0.5–1.3)
EGFRCR SERPLBLD CKD-EPI 2021: 7 ML/MIN/1.73M*2
GLUCOSE SERPL-MCNC: 92 MG/DL (ref 74–99)
POTASSIUM SERPL-SCNC: 4.7 MMOL/L (ref 3.5–5.3)
SODIUM SERPL-SCNC: 136 MMOL/L (ref 136–145)

## 2024-12-18 PROCEDURE — G0299 HHS/HOSPICE OF RN EA 15 MIN: HCPCS | Mod: HHH

## 2024-12-18 PROCEDURE — 80048 BASIC METABOLIC PNL TOTAL CA: CPT

## 2024-12-18 SDOH — ECONOMIC STABILITY: HOUSING INSECURITY: EVIDENCE OF SMOKING MATERIAL: 0

## 2024-12-18 SDOH — HEALTH STABILITY: MENTAL HEALTH: SMOKING IN HOME: 0

## 2024-12-18 ASSESSMENT — ENCOUNTER SYMPTOMS
DENIES PAIN: 1
PERSON REPORTING PAIN: PATIENT

## 2024-12-18 ASSESSMENT — ACTIVITIES OF DAILY LIVING (ADL)
HOME_HEALTH_OASIS: 01
OASIS_M1830: 02

## 2024-12-19 ENCOUNTER — APPOINTMENT (OUTPATIENT)
Dept: RADIOLOGY | Facility: HOSPITAL | Age: 74
DRG: 673 | End: 2024-12-19
Payer: MEDICARE

## 2024-12-19 ENCOUNTER — APPOINTMENT (OUTPATIENT)
Dept: CARDIOLOGY | Facility: HOSPITAL | Age: 74
DRG: 673 | End: 2024-12-19
Payer: MEDICARE

## 2024-12-19 ENCOUNTER — HOSPITAL ENCOUNTER (INPATIENT)
Facility: HOSPITAL | Age: 74
DRG: 673 | End: 2024-12-19
Attending: GENERAL PRACTICE | Admitting: INTERNAL MEDICINE
Payer: MEDICARE

## 2024-12-19 ENCOUNTER — TELEPHONE (OUTPATIENT)
Dept: PRIMARY CARE | Facility: CLINIC | Age: 74
End: 2024-12-19

## 2024-12-19 DIAGNOSIS — R63.4 UNINTENTIONAL WEIGHT LOSS: ICD-10-CM

## 2024-12-19 DIAGNOSIS — R10.84 GENERALIZED ABDOMINAL PAIN: ICD-10-CM

## 2024-12-19 DIAGNOSIS — R79.89 ELEVATED LFTS: ICD-10-CM

## 2024-12-19 DIAGNOSIS — R63.0 ANOREXIA: ICD-10-CM

## 2024-12-19 DIAGNOSIS — R10.9 ABDOMINAL PAIN: Primary | ICD-10-CM

## 2024-12-19 DIAGNOSIS — Z74.09 MOBILITY IMPAIRED: ICD-10-CM

## 2024-12-19 DIAGNOSIS — R62.7 FAILURE TO THRIVE IN ADULT: ICD-10-CM

## 2024-12-19 DIAGNOSIS — J44.9 CHRONIC OBSTRUCTIVE PULMONARY DISEASE, UNSPECIFIED COPD TYPE (MULTI): ICD-10-CM

## 2024-12-19 PROBLEM — R36.1 HEMOSPERMIA: Status: ACTIVE | Noted: 2024-12-19

## 2024-12-19 PROBLEM — N18.9 CKD (CHRONIC KIDNEY DISEASE): Status: ACTIVE | Noted: 2024-12-19

## 2024-12-19 PROBLEM — N17.9 ACUTE ON CHRONIC RENAL FAILURE (CMS-HCC): Status: ACTIVE | Noted: 2024-12-19

## 2024-12-19 PROBLEM — R29.6 MULTIPLE FALLS: Status: ACTIVE | Noted: 2024-12-19

## 2024-12-19 PROBLEM — N18.9 ACUTE ON CHRONIC RENAL FAILURE (CMS-HCC): Status: ACTIVE | Noted: 2024-12-19

## 2024-12-19 LAB
ALBUMIN SERPL BCP-MCNC: 3.4 G/DL (ref 3.4–5)
ALP SERPL-CCNC: 50 U/L (ref 33–136)
ALT SERPL W P-5'-P-CCNC: 21 U/L (ref 10–52)
ANION GAP SERPL CALC-SCNC: 14 MMOL/L (ref 10–20)
AST SERPL W P-5'-P-CCNC: 30 U/L (ref 9–39)
BASOPHILS # BLD AUTO: 0.08 X10*3/UL (ref 0–0.1)
BASOPHILS NFR BLD AUTO: 1 %
BILIRUB SERPL-MCNC: 0.4 MG/DL (ref 0–1.2)
BUN SERPL-MCNC: 52 MG/DL (ref 6–23)
CALCIUM SERPL-MCNC: 11.5 MG/DL (ref 8.6–10.3)
CARDIAC TROPONIN I PNL SERPL HS: 19 NG/L (ref 0–20)
CARDIAC TROPONIN I PNL SERPL HS: 20 NG/L (ref 0–20)
CHLORIDE SERPL-SCNC: 99 MMOL/L (ref 98–107)
CO2 SERPL-SCNC: 26 MMOL/L (ref 21–32)
CREAT SERPL-MCNC: 7.28 MG/DL (ref 0.5–1.3)
EGFRCR SERPLBLD CKD-EPI 2021: 7 ML/MIN/1.73M*2
EOSINOPHIL # BLD AUTO: 0.06 X10*3/UL (ref 0–0.4)
EOSINOPHIL NFR BLD AUTO: 0.7 %
ERYTHROCYTE [DISTWIDTH] IN BLOOD BY AUTOMATED COUNT: 14.4 % (ref 11.5–14.5)
GLUCOSE SERPL-MCNC: 107 MG/DL (ref 74–99)
HCT VFR BLD AUTO: 33.5 % (ref 41–52)
HGB BLD-MCNC: 10.5 G/DL (ref 13.5–17.5)
IMM GRANULOCYTES # BLD AUTO: 0.05 X10*3/UL (ref 0–0.5)
IMM GRANULOCYTES NFR BLD AUTO: 0.6 % (ref 0–0.9)
LIPASE SERPL-CCNC: 45 U/L (ref 9–82)
LYMPHOCYTES # BLD AUTO: 0.81 X10*3/UL (ref 0.8–3)
LYMPHOCYTES NFR BLD AUTO: 9.9 %
MCH RBC QN AUTO: 29.3 PG (ref 26–34)
MCHC RBC AUTO-ENTMCNC: 31.3 G/DL (ref 32–36)
MCV RBC AUTO: 94 FL (ref 80–100)
MONOCYTES # BLD AUTO: 0.31 X10*3/UL (ref 0.05–0.8)
MONOCYTES NFR BLD AUTO: 3.8 %
NEUTROPHILS # BLD AUTO: 6.87 X10*3/UL (ref 1.6–5.5)
NEUTROPHILS NFR BLD AUTO: 84 %
NRBC BLD-RTO: 0 /100 WBCS (ref 0–0)
PHOSPHATE SERPL-MCNC: 3.8 MG/DL (ref 2.5–4.9)
PLATELET # BLD AUTO: 168 X10*3/UL (ref 150–450)
POTASSIUM SERPL-SCNC: 5 MMOL/L (ref 3.5–5.3)
PROT SERPL-MCNC: 7.3 G/DL (ref 6.4–8.2)
RBC # BLD AUTO: 3.58 X10*6/UL (ref 4.5–5.9)
SODIUM SERPL-SCNC: 134 MMOL/L (ref 136–145)
TSH SERPL-ACNC: 1.53 MIU/L (ref 0.44–3.98)
WBC # BLD AUTO: 8.2 X10*3/UL (ref 4.4–11.3)

## 2024-12-19 PROCEDURE — 85025 COMPLETE CBC W/AUTO DIFF WBC: CPT | Performed by: GENERAL PRACTICE

## 2024-12-19 PROCEDURE — 71250 CT THORAX DX C-: CPT

## 2024-12-19 PROCEDURE — 1100000001 HC PRIVATE ROOM DAILY

## 2024-12-19 PROCEDURE — 84075 ASSAY ALKALINE PHOSPHATASE: CPT | Performed by: GENERAL PRACTICE

## 2024-12-19 PROCEDURE — 84443 ASSAY THYROID STIM HORMONE: CPT | Performed by: INTERNAL MEDICINE

## 2024-12-19 PROCEDURE — 74176 CT ABD & PELVIS W/O CONTRAST: CPT | Mod: FOREIGN READ | Performed by: RADIOLOGY

## 2024-12-19 PROCEDURE — 71250 CT THORAX DX C-: CPT | Mod: FOREIGN READ | Performed by: RADIOLOGY

## 2024-12-19 PROCEDURE — 99223 1ST HOSP IP/OBS HIGH 75: CPT | Performed by: PHYSICIAN ASSISTANT

## 2024-12-19 PROCEDURE — 84484 ASSAY OF TROPONIN QUANT: CPT | Performed by: GENERAL PRACTICE

## 2024-12-19 PROCEDURE — 84100 ASSAY OF PHOSPHORUS: CPT | Performed by: INTERNAL MEDICINE

## 2024-12-19 PROCEDURE — 93005 ELECTROCARDIOGRAM TRACING: CPT

## 2024-12-19 PROCEDURE — 36415 COLL VENOUS BLD VENIPUNCTURE: CPT | Performed by: INTERNAL MEDICINE

## 2024-12-19 PROCEDURE — 83690 ASSAY OF LIPASE: CPT | Performed by: GENERAL PRACTICE

## 2024-12-19 PROCEDURE — 36415 COLL VENOUS BLD VENIPUNCTURE: CPT | Performed by: GENERAL PRACTICE

## 2024-12-19 PROCEDURE — 84484 ASSAY OF TROPONIN QUANT: CPT | Performed by: INTERNAL MEDICINE

## 2024-12-19 PROCEDURE — 2500000005 HC RX 250 GENERAL PHARMACY W/O HCPCS: Performed by: PHYSICIAN ASSISTANT

## 2024-12-19 PROCEDURE — 99285 EMERGENCY DEPT VISIT HI MDM: CPT | Mod: 25 | Performed by: GENERAL PRACTICE

## 2024-12-19 PROCEDURE — 2500000004 HC RX 250 GENERAL PHARMACY W/ HCPCS (ALT 636 FOR OP/ED): Performed by: PHYSICIAN ASSISTANT

## 2024-12-19 RX ORDER — ALBUTEROL SULFATE 0.83 MG/ML
2.5 SOLUTION RESPIRATORY (INHALATION) EVERY 2 HOUR PRN
Status: DISCONTINUED | OUTPATIENT
Start: 2024-12-19 | End: 2024-12-30 | Stop reason: HOSPADM

## 2024-12-19 RX ORDER — BUDESONIDE 0.25 MG/2ML
0.25 INHALANT ORAL
Status: DISCONTINUED | OUTPATIENT
Start: 2024-12-19 | End: 2024-12-30 | Stop reason: HOSPADM

## 2024-12-19 RX ORDER — ONDANSETRON 4 MG/1
4 TABLET, ORALLY DISINTEGRATING ORAL EVERY 8 HOURS PRN
Status: DISCONTINUED | OUTPATIENT
Start: 2024-12-19 | End: 2024-12-30 | Stop reason: HOSPADM

## 2024-12-19 RX ORDER — ONDANSETRON HYDROCHLORIDE 2 MG/ML
4 INJECTION, SOLUTION INTRAVENOUS EVERY 8 HOURS PRN
Status: DISCONTINUED | OUTPATIENT
Start: 2024-12-19 | End: 2024-12-30 | Stop reason: HOSPADM

## 2024-12-19 RX ORDER — ALBUTEROL SULFATE 0.83 MG/ML
2.5 SOLUTION RESPIRATORY (INHALATION) 4 TIMES DAILY PRN
Status: DISCONTINUED | OUTPATIENT
Start: 2024-12-19 | End: 2024-12-19

## 2024-12-19 RX ORDER — AMLODIPINE BESYLATE 10 MG/1
10 TABLET ORAL DAILY
Status: ON HOLD | COMMUNITY
End: 2024-12-30

## 2024-12-19 RX ORDER — PREDNISONE 5 MG/1
5 TABLET ORAL DAILY
Status: DISCONTINUED | OUTPATIENT
Start: 2024-12-20 | End: 2024-12-30 | Stop reason: HOSPADM

## 2024-12-19 RX ORDER — ACETAMINOPHEN 325 MG/1
650 TABLET ORAL EVERY 4 HOURS PRN
Status: DISCONTINUED | OUTPATIENT
Start: 2024-12-19 | End: 2024-12-30 | Stop reason: HOSPADM

## 2024-12-19 RX ORDER — BUDESONIDE 0.5 MG/2ML
0.5 INHALANT ORAL 2 TIMES DAILY PRN
Status: DISCONTINUED | OUTPATIENT
Start: 2024-12-19 | End: 2024-12-19

## 2024-12-19 RX ORDER — TRIAMCINOLONE ACETONIDE 1 MG/G
1 OINTMENT TOPICAL 2 TIMES DAILY PRN
Status: DISCONTINUED | OUTPATIENT
Start: 2024-12-19 | End: 2024-12-30 | Stop reason: HOSPADM

## 2024-12-19 RX ORDER — IPRATROPIUM BROMIDE AND ALBUTEROL SULFATE 2.5; .5 MG/3ML; MG/3ML
3 SOLUTION RESPIRATORY (INHALATION)
Status: DISCONTINUED | OUTPATIENT
Start: 2024-12-20 | End: 2024-12-25

## 2024-12-19 RX ORDER — POLYETHYLENE GLYCOL 3350 17 G/17G
17 POWDER, FOR SOLUTION ORAL DAILY
Status: DISCONTINUED | OUTPATIENT
Start: 2024-12-19 | End: 2024-12-30 | Stop reason: HOSPADM

## 2024-12-19 RX ORDER — IPRATROPIUM BROMIDE AND ALBUTEROL SULFATE 2.5; .5 MG/3ML; MG/3ML
3 SOLUTION RESPIRATORY (INHALATION)
Status: DISCONTINUED | OUTPATIENT
Start: 2024-12-19 | End: 2024-12-19

## 2024-12-19 RX ORDER — DEXTROSE, SODIUM CHLORIDE, SODIUM LACTATE, POTASSIUM CHLORIDE, AND CALCIUM CHLORIDE 5; .6; .31; .03; .02 G/100ML; G/100ML; G/100ML; G/100ML; G/100ML
100 INJECTION, SOLUTION INTRAVENOUS CONTINUOUS
Status: DISCONTINUED | OUTPATIENT
Start: 2024-12-19 | End: 2024-12-20

## 2024-12-19 RX ORDER — PANTOPRAZOLE SODIUM 40 MG/10ML
40 INJECTION, POWDER, LYOPHILIZED, FOR SOLUTION INTRAVENOUS
Status: DISCONTINUED | OUTPATIENT
Start: 2024-12-20 | End: 2024-12-21

## 2024-12-19 RX ORDER — ACETAMINOPHEN 160 MG/5ML
650 SOLUTION ORAL EVERY 4 HOURS PRN
Status: DISCONTINUED | OUTPATIENT
Start: 2024-12-19 | End: 2024-12-30 | Stop reason: HOSPADM

## 2024-12-19 RX ORDER — AMLODIPINE BESYLATE 10 MG/1
10 TABLET ORAL DAILY
Status: DISCONTINUED | OUTPATIENT
Start: 2024-12-20 | End: 2024-12-30 | Stop reason: HOSPADM

## 2024-12-19 RX ORDER — ROSUVASTATIN CALCIUM 10 MG/1
10 TABLET, COATED ORAL DAILY
Status: DISCONTINUED | OUTPATIENT
Start: 2024-12-20 | End: 2024-12-30 | Stop reason: HOSPADM

## 2024-12-19 RX ORDER — ACETAMINOPHEN 650 MG/1
650 SUPPOSITORY RECTAL EVERY 4 HOURS PRN
Status: DISCONTINUED | OUTPATIENT
Start: 2024-12-19 | End: 2024-12-30 | Stop reason: HOSPADM

## 2024-12-19 RX ORDER — TALC
3 POWDER (GRAM) TOPICAL NIGHTLY PRN
Status: DISCONTINUED | OUTPATIENT
Start: 2024-12-19 | End: 2024-12-30 | Stop reason: HOSPADM

## 2024-12-19 RX ORDER — HEPARIN SODIUM 5000 [USP'U]/ML
5000 INJECTION, SOLUTION INTRAVENOUS; SUBCUTANEOUS EVERY 8 HOURS SCHEDULED
Status: DISCONTINUED | OUTPATIENT
Start: 2024-12-19 | End: 2024-12-30 | Stop reason: HOSPADM

## 2024-12-19 RX ORDER — FLUTICASONE FUROATE AND VILANTEROL 100; 25 UG/1; UG/1
1 POWDER RESPIRATORY (INHALATION)
Status: DISCONTINUED | OUTPATIENT
Start: 2024-12-19 | End: 2024-12-19

## 2024-12-19 RX ORDER — AZITHROMYCIN 250 MG/1
250 TABLET, FILM COATED ORAL DAILY
Status: DISCONTINUED | OUTPATIENT
Start: 2024-12-20 | End: 2024-12-30 | Stop reason: HOSPADM

## 2024-12-19 RX ADMIN — HEPARIN SODIUM 5000 UNITS: 5000 INJECTION, SOLUTION INTRAVENOUS; SUBCUTANEOUS at 21:51

## 2024-12-19 RX ADMIN — SODIUM CHLORIDE 500 ML: 9 INJECTION, SOLUTION INTRAVENOUS at 21:51

## 2024-12-19 RX ADMIN — Medication 3 L/MIN: at 18:50

## 2024-12-19 RX ADMIN — Medication 3 L/MIN: at 20:35

## 2024-12-19 RX ADMIN — SODIUM CHLORIDE, SODIUM LACTATE, POTASSIUM CHLORIDE, CALCIUM CHLORIDE AND DEXTROSE MONOHYDRATE 100 ML/HR: 5; 600; 310; 30; 20 INJECTION, SOLUTION INTRAVENOUS at 18:49

## 2024-12-19 SDOH — SOCIAL STABILITY: SOCIAL INSECURITY: DO YOU FEEL UNSAFE GOING BACK TO THE PLACE WHERE YOU ARE LIVING?: NO

## 2024-12-19 SDOH — ECONOMIC STABILITY: HOUSING INSECURITY: AT ANY TIME IN THE PAST 12 MONTHS, WERE YOU HOMELESS OR LIVING IN A SHELTER (INCLUDING NOW)?: NO

## 2024-12-19 SDOH — SOCIAL STABILITY: SOCIAL INSECURITY: WITHIN THE LAST YEAR, HAVE YOU BEEN HUMILIATED OR EMOTIONALLY ABUSED IN OTHER WAYS BY YOUR PARTNER OR EX-PARTNER?: NO

## 2024-12-19 SDOH — ECONOMIC STABILITY: FOOD INSECURITY: WITHIN THE PAST 12 MONTHS, THE FOOD YOU BOUGHT JUST DIDN'T LAST AND YOU DIDN'T HAVE MONEY TO GET MORE.: NEVER TRUE

## 2024-12-19 SDOH — SOCIAL STABILITY: SOCIAL INSECURITY: ARE YOU OR HAVE YOU BEEN THREATENED OR ABUSED PHYSICALLY, EMOTIONALLY, OR SEXUALLY BY ANYONE?: NO

## 2024-12-19 SDOH — SOCIAL STABILITY: SOCIAL INSECURITY: HAS ANYONE EVER THREATENED TO HURT YOUR FAMILY OR YOUR PETS?: NO

## 2024-12-19 SDOH — HEALTH STABILITY: PHYSICAL HEALTH
HOW OFTEN DO YOU NEED TO HAVE SOMEONE HELP YOU WHEN YOU READ INSTRUCTIONS, PAMPHLETS, OR OTHER WRITTEN MATERIAL FROM YOUR DOCTOR OR PHARMACY?: SOMETIMES

## 2024-12-19 SDOH — SOCIAL STABILITY: SOCIAL INSECURITY: ABUSE: ADULT

## 2024-12-19 SDOH — ECONOMIC STABILITY: FOOD INSECURITY: WITHIN THE PAST 12 MONTHS, YOU WORRIED THAT YOUR FOOD WOULD RUN OUT BEFORE YOU GOT THE MONEY TO BUY MORE.: NEVER TRUE

## 2024-12-19 SDOH — SOCIAL STABILITY: SOCIAL INSECURITY: HAVE YOU HAD THOUGHTS OF HARMING ANYONE ELSE?: YES

## 2024-12-19 SDOH — SOCIAL STABILITY: SOCIAL INSECURITY: HAVE YOU HAD ANY THOUGHTS OF HARMING ANYONE ELSE?: NO

## 2024-12-19 SDOH — SOCIAL STABILITY: SOCIAL INSECURITY: WERE YOU ABLE TO COMPLETE ALL THE BEHAVIORAL HEALTH SCREENINGS?: YES

## 2024-12-19 SDOH — HEALTH STABILITY: PHYSICAL HEALTH: ON AVERAGE, HOW MANY DAYS PER WEEK DO YOU ENGAGE IN MODERATE TO STRENUOUS EXERCISE (LIKE A BRISK WALK)?: 7 DAYS

## 2024-12-19 SDOH — ECONOMIC STABILITY: HOUSING INSECURITY: IN THE LAST 12 MONTHS, WAS THERE A TIME WHEN YOU WERE NOT ABLE TO PAY THE MORTGAGE OR RENT ON TIME?: NO

## 2024-12-19 SDOH — ECONOMIC STABILITY: INCOME INSECURITY: IN THE PAST 12 MONTHS HAS THE ELECTRIC, GAS, OIL, OR WATER COMPANY THREATENED TO SHUT OFF SERVICES IN YOUR HOME?: NO

## 2024-12-19 SDOH — SOCIAL STABILITY: SOCIAL INSECURITY: DO YOU FEEL ANYONE HAS EXPLOITED OR TAKEN ADVANTAGE OF YOU FINANCIALLY OR OF YOUR PERSONAL PROPERTY?: NO

## 2024-12-19 SDOH — HEALTH STABILITY: PHYSICAL HEALTH: ON AVERAGE, HOW MANY MINUTES DO YOU ENGAGE IN EXERCISE AT THIS LEVEL?: 10 MIN

## 2024-12-19 SDOH — SOCIAL STABILITY: SOCIAL INSECURITY: DOES ANYONE TRY TO KEEP YOU FROM HAVING/CONTACTING OTHER FRIENDS OR DOING THINGS OUTSIDE YOUR HOME?: NO

## 2024-12-19 SDOH — ECONOMIC STABILITY: FOOD INSECURITY: HOW HARD IS IT FOR YOU TO PAY FOR THE VERY BASICS LIKE FOOD, HOUSING, MEDICAL CARE, AND HEATING?: NOT HARD AT ALL

## 2024-12-19 SDOH — SOCIAL STABILITY: SOCIAL INSECURITY: ARE THERE ANY APPARENT SIGNS OF INJURIES/BEHAVIORS THAT COULD BE RELATED TO ABUSE/NEGLECT?: NO

## 2024-12-19 SDOH — ECONOMIC STABILITY: TRANSPORTATION INSECURITY: IN THE PAST 12 MONTHS, HAS LACK OF TRANSPORTATION KEPT YOU FROM MEDICAL APPOINTMENTS OR FROM GETTING MEDICATIONS?: NO

## 2024-12-19 ASSESSMENT — ACTIVITIES OF DAILY LIVING (ADL)
JUDGMENT_ADEQUATE_SAFELY_COMPLETE_DAILY_ACTIVITIES: YES
PATIENT'S MEMORY ADEQUATE TO SAFELY COMPLETE DAILY ACTIVITIES?: YES
TOILETING: INDEPENDENT
HEARING - LEFT EAR: FUNCTIONAL
WALKS IN HOME: NEEDS ASSISTANCE
DRESSING YOURSELF: INDEPENDENT
FEEDING YOURSELF: INDEPENDENT
GROOMING: INDEPENDENT
HEARING - RIGHT EAR: FUNCTIONAL
ADEQUATE_TO_COMPLETE_ADL: YES
LACK_OF_TRANSPORTATION: NO
BATHING: INDEPENDENT

## 2024-12-19 ASSESSMENT — COGNITIVE AND FUNCTIONAL STATUS - GENERAL
WALKING IN HOSPITAL ROOM: A LITTLE
TURNING FROM BACK TO SIDE WHILE IN FLAT BAD: A LOT
EATING MEALS: A LITTLE
PERSONAL GROOMING: A LITTLE
DRESSING REGULAR UPPER BODY CLOTHING: A LITTLE
TOILETING: A LITTLE
MOBILITY SCORE: 12
MOVING FROM LYING ON BACK TO SITTING ON SIDE OF FLAT BED WITH BEDRAILS: A LOT
MOVING TO AND FROM BED TO CHAIR: A LOT
DRESSING REGULAR LOWER BODY CLOTHING: A LOT
PATIENT BASELINE BEDBOUND: NO
DRESSING REGULAR LOWER BODY CLOTHING: A LOT
MOVING FROM LYING ON BACK TO SITTING ON SIDE OF FLAT BED WITH BEDRAILS: A LITTLE
STANDING UP FROM CHAIR USING ARMS: A LOT
PERSONAL GROOMING: A LITTLE
STANDING UP FROM CHAIR USING ARMS: A LITTLE
DRESSING REGULAR UPPER BODY CLOTHING: A LITTLE
CLIMB 3 TO 5 STEPS WITH RAILING: A LOT
CLIMB 3 TO 5 STEPS WITH RAILING: A LOT
TURNING FROM BACK TO SIDE WHILE IN FLAT BAD: A LITTLE
MOVING TO AND FROM BED TO CHAIR: A LITTLE
DAILY ACTIVITIY SCORE: 18
HELP NEEDED FOR BATHING: A LITTLE
TOILETING: A LITTLE
WALKING IN HOSPITAL ROOM: A LOT
DAILY ACTIVITIY SCORE: 17
MOBILITY SCORE: 17
HELP NEEDED FOR BATHING: A LITTLE

## 2024-12-19 ASSESSMENT — PAIN DESCRIPTION - DESCRIPTORS
DESCRIPTORS: ACHING
DESCRIPTORS: ACHING

## 2024-12-19 ASSESSMENT — PAIN SCALES - GENERAL
PAINLEVEL_OUTOF10: 7
PAINLEVEL_OUTOF10: 0 - NO PAIN
PAINLEVEL_OUTOF10: 0 - NO PAIN
PAINLEVEL_OUTOF10: 5 - MODERATE PAIN

## 2024-12-19 ASSESSMENT — LIFESTYLE VARIABLES
HOW OFTEN DO YOU HAVE 6 OR MORE DRINKS ON ONE OCCASION: NEVER
AUDIT-C TOTAL SCORE: 0
SUBSTANCE_ABUSE_PAST_12_MONTHS: NO
PRESCIPTION_ABUSE_PAST_12_MONTHS: NO
AUDIT-C TOTAL SCORE: 0
HOW MANY STANDARD DRINKS CONTAINING ALCOHOL DO YOU HAVE ON A TYPICAL DAY: PATIENT DOES NOT DRINK
SKIP TO QUESTIONS 9-10: 1
HOW OFTEN DO YOU HAVE A DRINK CONTAINING ALCOHOL: NEVER

## 2024-12-19 ASSESSMENT — PAIN DESCRIPTION - LOCATION: LOCATION: ABDOMEN

## 2024-12-19 ASSESSMENT — PAIN SCALES - WONG BAKER: WONGBAKER_NUMERICALRESPONSE: NO HURT

## 2024-12-19 ASSESSMENT — PAIN - FUNCTIONAL ASSESSMENT
PAIN_FUNCTIONAL_ASSESSMENT: 0-10
PAIN_FUNCTIONAL_ASSESSMENT: 0-10

## 2024-12-19 ASSESSMENT — PAIN DESCRIPTION - PAIN TYPE: TYPE: ACUTE PAIN

## 2024-12-19 NOTE — PROGRESS NOTES
For full history, physical exam, and prior hospital course, please see previous ED provider note. This is in addition to the primary record.    Patient received in sign out from prior provider team pending hospital admission.  Patient was accepted by the hospitalist service for admission for GI and nephrology workup.    Deidra Novak MD  EM/IM/Peds    This note was dictated by speech recognition. Minor errors in transcription may be present.

## 2024-12-19 NOTE — PROGRESS NOTES
Pharmacy Medication History     Source of Information: Per daughter bedside    Additional concerns with the patient's PTA list.   N/a  The following updates were made to the Prior to Admission medication list:     Medications ADDED:   Amlodipine   Medications CHANGED:  Budesonide is PRN   Triamcinolone is PRN   Medications REMOVED:   N/a  Medications NOT TAKING:   Vitamin D3 weekly     Allergy reviewed : Yes    Meds 2 Beds : Yes    Outpatient pharmacy confirmed and updated in chart : Yes    [facility] name: [facility]     The list below reflectives the updated PTA list. Please review each medication in order reconciliation for additional clarification and justification.    Prior to Admission Medications   Prescriptions Last Dose Informant   acetaminophen (Tylenol) 325 mg tablet  Spouse/Significant Other   Sig: Take 1 tablet (325 mg) by mouth every 4 hours if needed for moderate pain (4 - 6).   albuterol 2.5 mg /3 mL (0.083 %) nebulizer solution  Spouse/Significant Other   Sig: Take 3 mL (2.5 mg) by nebulization 4 times a day as needed for wheezing or shortness of breath. 1 vial   albuterol 90 mcg/actuation inhaler  Spouse/Significant Other   Sig: Inhale 1 puff every 4 hours if needed for wheezing or shortness of breath. Every 4-6 hrs prn   amLODIPine (Norvasc) 10 mg tablet 12/19/2024    Sig: Take 1 tablet (10 mg) by mouth once daily.   azithromycin (Zithromax) 250 mg tablet 12/19/2024 Spouse/Significant Other   Sig: One PO every day   Patient taking differently: Take 1 tablet (250 mg) by mouth once daily.   budesonide (Pulmicort) 0.5 mg/2 mL nebulizer solution  Spouse/Significant Other   Sig: Take 2 mL (0.5 mg) by nebulization 2 times a day as needed (wheezing).   cholecalciferol (Vitamin D-3) 50,000 unit capsule  Spouse/Significant Other   Sig: Take 1 capsule (50,000 Units) by mouth 1 (one) time per week.   Patient not taking: Reported on 11/21/2024   fluticasone-umeclidin-vilanter (Trelegy Ellipta)  100-62.5-25 mcg blister with device 12/19/2024 Spouse/Significant Other   Sig: Inhale 1 puff once daily.   multivitamin (Multiple Vitamins) tablet 12/19/2024    Sig: Take 1 tablet by mouth once daily.   predniSONE (Deltasone) 5 mg tablet 12/19/2024    Sig: Take 1 tablet (5 mg) by mouth once daily.   rosuvastatin (Crestor) 40 mg tablet 12/19/2024    Sig: Take 1 tablet (40 mg) by mouth once daily.   triamcinolone (Kenalog) 0.1 % cream     Sig: Apply topically 2 times a day. Apply to affected area 1-2 times daily as needed.   Patient taking differently: Apply 1 Application topically 2 times a day as needed for irritation. Apply to affected area 1-2 times daily as needed.      Facility-Administered Medications: None       The list below reflectives the updated allergy list. Please review each documented allergy for additional clarification and justification.    No Known Allergies       12/19/24 at 3:37 PM - Yady Barragan

## 2024-12-19 NOTE — H&P
History Of Present Illness  Kalyan Armstrong is a 74 y.o. male presenting with abdominal and chest pressure for 1 month, increased in the past 2 weeks, failure to thrive, anorexia, acute on chronic kidney failure, severe malnutrition, unintentional weight loss.  Patient with a history of COPD on 3 L at all times, chronic renal failure significantly worse in the past month, chronic respiratory failure, anemia, unintentional weight loss, hypertension, severe protein calorie malnutrition, atherosclerosis of the aorta, hepatitis C, falls.  Patient complains of pressure in the abdomen and chest for a month or more, worse in the past 2 weeks.  It is constant.  He has had a 10 to 12 pound weight loss in the past month per his wife.  Patient states he has no appetite.  He feels full.  He can only eat a couple of bites at a time.  He has chronic shortness of breath that is unchanged with a dry cough.  Patient had a normal bowel movement this morning and denies diarrhea or constipation.  Patient was hospitalized from November 20 through the 26 for acute on chronic renal failure, unintentional weight loss, COPD etc. he has to follow-up with nephrology and he has a GI appointment at the end of January.  Wife brought him in today because he cannot get into GI until the end of January and she is concerned that he is not eating and wants to know if they can scope him to figure out the cause of his inability to eat.  He has had chronic abdominal pain which is worse.  Patient has used a walker for years.  He was told to use protein drinks but they cause diarrhea so he will not drink them anymore.    Past medical history: COPD on 3 L of O2, chronic respiratory failure, chronic kidney disease, hypertension, anemia, weight loss, severe protein calorie malnutrition, atherosclerosis of the aorta, hepatitis C, falls, anorexia.    Medications: Amlodipine, budesonide, triamcinolone cream as needed, albuterol by nebulizer and MDI as needed,  patient's been on azithromycin 250 mg daily but they do not know why.  He has a Trelegy inhaler, 5 mg of prednisone daily, and Crestor 40 mg.    Allergies: No known drug allergies    Social history: Denies tobacco or alcohol use, CODE STATUS patient is DNR ml    ED course: Comprehensive metabolic panel showed glucose of 107, sodium low at 134, BUN 52, creatinine 7.28, GFR 7, calcium is high at 11.5.  The calcium is consistent with previous studies.    (Patient's creatinine was 7.20 yesterday.  But was 6.20 on December 12.  Creatinine was 2.32 on October 2 and 4 0.21 on October 24, on  November 18 patient was 6.02.)  The rest of the CMP was within normal limits.  Lipase was normal at 45  Troponin #1 was 19, I just ordered a second troponin which is pending.  TSH 1.53  CBC showed normal white count, slight left shift, stable anemia, and normal platelets.  CT of the chest abdomen pelvis without contrast showed no acute abnormalities.  Urinalysis ordered and pending.  EKG showed normal sinus rhythm at a heart rate of 99, essentially unchanged from previous study  Patient is admitted to the hospitalist service    Past Medical History:   Diagnosis Date    CATHIE (acute kidney injury) (CMS-HCC) 05/03/2023    Cataract     COPD (chronic obstructive pulmonary disease) (Multi)     Cough, unspecified 06/20/2014    Cough    Emphysema of lung (Multi)     Encounter for immunization 01/19/2015    Need for influenza vaccination    Encounter for screening for malignant neoplasm of prostate     Encounter for screening for malignant neoplasm of prostate    Other conditions influencing health status 05/20/2013    Digital Blood Vessel Rupture    Personal history of colonic polyps     History of colon polyps    Personal history of other specified conditions 05/20/2013    History of shortness of breath    Personal history of other specified conditions 06/20/2014    History of shortness of breath     Past Surgical History:   Procedure  Laterality Date    APPENDECTOMY  2013    Appendectomy    COLONOSCOPY  2013    Complete Colonoscopy    EYE SURGERY       Social History     Tobacco Use    Smoking status: Former     Current packs/day: 0.00     Types: Cigarettes     Start date:      Quit date:      Years since quittin.9     Passive exposure: Past    Smokeless tobacco: Never   Substance Use Topics    Alcohol use: Yes     Comment: 1 cup of scotch a week    Drug use: Never     Comment: Former drug user, has not used any in 50 years        Family History  Family History   Problem Relation Name Age of Onset    Dementia Mother      Diabetes Sister          Allergies  Patient has no known allergies.    Review of Systems  Patient denies , nausea, vomiting, fever, chills, diarrhea, constipation,  vision changes, rashes, dysuria, paresthesias, vertigo, headache, cold symptoms, or any other complaints at this time. A complete review of systems was done, and is as stated in the history of present illness, is otherwise negative or not pertinent to the complaint.    Physical Exam  Physical exam: Vital signs and nurses notes were reviewed.    General: Cachectic male in no acute distress. Alert and oriented  x 4.     Head: atraumatic and normocephalic    Eyes: Pupils equal round reactive to light, EOMs are intact, conjunctivae is not injected.    Oropharynx:  no trismus or drooling, tongue is slightly dry    Ears:  normal external exam, no swelling or erythema,     Nasal: normal external exam,     Neck: Supple, full range of motion,      Cardiac: Regular rate and rhythm. No murmurs noted.     Pulmonary: Lungs clear bilaterally with good aeration. No adventitious breath sounds. No wheezes rales or rhonchi. No accessory muscle use no retraction noted.    Abdomen: Soft, diffusely tender to palpation.  No guarding, rigidity, or distention. Normoactive bowel sounds. No pulsatile masses, no bruits.     Extremities:  Full range of motion.  No  pitting edema    Skin: No rash seen. Skin is warm and dry     Neuro: Patient is alert and oriented x4. Speech is clear. There is no asymmetry with facial grimaces, and no tongue deviation. Patient moves all extremities independently. Sensation is intact. No obvious neuro deficits are noted.     Last Recorded Vitals  /84   Pulse 73   Temp 36.4 °C (97.6 °F)   Resp 16   Wt 45.4 kg (100 lb)   SpO2 99%     Relevant Results  Scheduled medications  [Held by provider] amLODIPine, 10 mg, oral, Daily  [Held by provider] azithromycin, 250 mg, oral, Daily  heparin (porcine), 5,000 Units, subcutaneous, q8h GULSHAN  [START ON 12/20/2024] pantoprazole, 40 mg, intravenous, Daily before breakfast  polyethylene glycol, 17 g, oral, Daily  [START ON 12/20/2024] predniSONE, 5 mg, oral, Daily  [START ON 12/20/2024] rosuvastatin, 40 mg, oral, Daily      Continuous medications  dextrose 5 % and lactated Ringer's, 100 mL/hr      PRN medications  PRN medications: acetaminophen **OR** acetaminophen **OR** acetaminophen, albuterol, budesonide, melatonin, ondansetron ODT **OR** ondansetron, triamcinolone    Results for orders placed or performed during the hospital encounter of 12/19/24 (from the past 24 hours)   CBC and Auto Differential   Result Value Ref Range    WBC 8.2 4.4 - 11.3 x10*3/uL    nRBC 0.0 0.0 - 0.0 /100 WBCs    RBC 3.58 (L) 4.50 - 5.90 x10*6/uL    Hemoglobin 10.5 (L) 13.5 - 17.5 g/dL    Hematocrit 33.5 (L) 41.0 - 52.0 %    MCV 94 80 - 100 fL    MCH 29.3 26.0 - 34.0 pg    MCHC 31.3 (L) 32.0 - 36.0 g/dL    RDW 14.4 11.5 - 14.5 %    Platelets 168 150 - 450 x10*3/uL    Neutrophils % 84.0 40.0 - 80.0 %    Immature Granulocytes %, Automated 0.6 0.0 - 0.9 %    Lymphocytes % 9.9 13.0 - 44.0 %    Monocytes % 3.8 2.0 - 10.0 %    Eosinophils % 0.7 0.0 - 6.0 %    Basophils % 1.0 0.0 - 2.0 %    Neutrophils Absolute 6.87 (H) 1.60 - 5.50 x10*3/uL    Immature Granulocytes Absolute, Automated 0.05 0.00 - 0.50 x10*3/uL    Lymphocytes  Absolute 0.81 0.80 - 3.00 x10*3/uL    Monocytes Absolute 0.31 0.05 - 0.80 x10*3/uL    Eosinophils Absolute 0.06 0.00 - 0.40 x10*3/uL    Basophils Absolute 0.08 0.00 - 0.10 x10*3/uL   Comprehensive metabolic panel   Result Value Ref Range    Glucose 107 (H) 74 - 99 mg/dL    Sodium 134 (L) 136 - 145 mmol/L    Potassium 5.0 3.5 - 5.3 mmol/L    Chloride 99 98 - 107 mmol/L    Bicarbonate 26 21 - 32 mmol/L    Anion Gap 14 10 - 20 mmol/L    Urea Nitrogen 52 (H) 6 - 23 mg/dL    Creatinine 7.28 (H) 0.50 - 1.30 mg/dL    eGFR 7 (L) >60 mL/min/1.73m*2    Calcium 11.5 (H) 8.6 - 10.3 mg/dL    Albumin 3.4 3.4 - 5.0 g/dL    Alkaline Phosphatase 50 33 - 136 U/L    Total Protein 7.3 6.4 - 8.2 g/dL    AST 30 9 - 39 U/L    Bilirubin, Total 0.4 0.0 - 1.2 mg/dL    ALT 21 10 - 52 U/L   Lipase   Result Value Ref Range    Lipase 45 9 - 82 U/L   Troponin I, High Sensitivity   Result Value Ref Range    Troponin I, High Sensitivity 19 0 - 20 ng/L   TSH   Result Value Ref Range    Thyroid Stimulating Hormone 1.53 0.44 - 3.98 mIU/L   Phosphorus   Result Value Ref Range    Phosphorus 3.8 2.5 - 4.9 mg/dL     *Note: Due to a large number of results and/or encounters for the requested time period, some results have not been displayed. A complete set of results can be found in Results Review.     CT chest abdomen pelvis wo IV contrast   Final Result   No acute cardiopulmonary abnormality. Pulmonary emphysema.   Cholelithiasis. Diverticulosis coli.   Signed by Drake Hernandez MD             Assessment/Plan   Assessment & Plan  Generalized abdominal pain    Unspecified severe protein-calorie malnutrition (Multi)    Unintentional weight loss    Anorexia    Failure to thrive in adult    Acute on chronic renal failure (CMS-HCC)      Plan:  GI consult for abdominal pain, unintentional weight loss, anorexia, constantly feeling full, severe malnutrition and failure to thrive.  Nephrology consult for acute on chronic renal failure, etc.  Dietary consult for  malnutrition and weight loss as well as anorexia  PT, OT, and social work consults ordered  Palliative care consult for failure to thrive, patient is DNR ml  Heparin prophylactically  Pantoprazole IV daily  MiraLAX daily  Bladder scan pending may need Orr if there is urinary retention  D5 with LR at 100 an hour  Telemetry  Holding the patient's amlodipine for soft blood pressure and holding azithromycin as it is unclear why he is on it  Continuing his daily prednisone, Crestor and Trelegy  Crestor dose reduced from 40 mg to 10 mg daily due to the degree of renal failure as recommended by pharmacy.  Albuterol as needed, budesonide as needed  Tylenol, Zofran, melatonin all as needed  Renal diet  Stat troponin ordered and pending  Urinalysis pending  Daily CBC and BMP  Cortisol a.m. draw  Monitor intake and output  Findings, orders, plan discussed with Dr. Giordano    Addendum: By about 2000 hrs. patient had only urinated about 25 cc.  Bladder scan showed 150 cc of urine in the bladder.  Discussed with Dr. Hathaway and added a 500 cc normal saline bolus.  Patient is to have a repeat bladder scan in about 3 hours if he is not urinating yet.  If the bladder scans show more than 250 cc then he would likely need a Orr catheter.       Rachel Cooley PA-C

## 2024-12-19 NOTE — ED PROVIDER NOTES
HPI   Chief Complaint   Patient presents with    Failure To Thrive       HPI: 74-year-old male with a history of COPD on 3 L around-the-clock and chronic kidney disease presents for diffuse abdominal pain.  He states that his pain has been intermittent for the past month and his family wanted the patient to come to the ED for evaluation for possible failure to thrive.  He cites no provocative or palliative factors and reports that his pain occasionally radiates into his chest.  His shortness of breath is at his baseline and he denies fever, chills, dysuria, diarrhea and rectal bleeding      Limitations to history: None  Independent Historians: Patient  External Records Reviewed: HIE, outpatient notes, inpatient notes  ------------------------------------------------------------------------------------------------------------------------------------------  ROS: a ten point review of systems was performed and was negative except as per HPI.  ------------------------------------------------------------------------------------------------------------------------------------------  PMH / PSH: as per HPI, otherwise reviewed in EMR  MEDS: as per HPI, otherwise reviewed in EMR  ALLERGIES: as per HPI, otherwise reviewed in EMR  SocH:  as per HPI, otherwise reviewed in EMR  FH:  as per HPI, otherwise reviewed in EMR  ------------------------------------------------------------------------------------------------------------------------------------------  Physical Exam:  VS: As documented in the triage note and EMR flowsheet from this visit was reviewed  General: Fatigued appearing. No acute distress.   Eyes:  Extraocular movements grossly intact. No scleral icterus. No discharge  HEENT:  Normocephalic.  Atraumatic  Neck: Moves neck freely. No gross masses  CV: Regular rhythm. No murmurs, rubs or gallops   Resp: Clear to auscultation bilaterally. No respiratory distress.    GI: Soft, no masses.  Diffuse, moderate abdominal  tenderness with no rebound tenderness or guarding  MSK: Symmetric muscle bulk. No deformities. No lower extremity edema.    Skin: Warm, dry, intact.   Neuro: No focal deficits.  A&O x3.   Psych: Appropriate for situation  ------------------------------------------------------------------------------------------------------------------------------------------  Hospital Course / Medical Decision Making:  Independent Interpretations: N/A  EKG as interpreted by me: Normal sinus rhythm at 99 bpm with a normal axis, no bundle branch block no signs of acute ischemia    MDM: 74-year-old male with a history of COPD on 3 L around-the-clock and chronic kidney disease presents for diffuse abdominal pain.  In the ED he is afebrile and in no distress.  No leukocytosis.  No major electrolyte abnormalities.  Renal function is around his baseline.  Family is concerned because the patient has been unintentionally losing weight and barely eating.  He will require PT/OT evaluation and possible SNF placement.  Patient was signed out to Dr. Novak pending imaging and admission.    Discussion of Management with Other Providers:   I discussed the patient/results with: Emergency medicine team    Final diagnosis and disposition as below.    Labs Reviewed  CBC WITH AUTO DIFFERENTIAL - Abnormal     WBC                           8.2                    nRBC                          0.0                    RBC                           3.58 (*)               Hemoglobin                    10.5 (*)               Hematocrit                    33.5 (*)               MCV                           94                     MCH                           29.3                   MCHC                          31.3 (*)               RDW                           14.4                   Platelets                     168                    Neutrophils %                 84.0                   Immature Granulocytes %, Automated   0.6                    Lymphocytes %                  9.9                    Monocytes %                   3.8                    Eosinophils %                 0.7                    Basophils %                   1.0                    Neutrophils Absolute          6.87 (*)               Immature Granulocytes Absolute, Au*   0.05                   Lymphocytes Absolute          0.81                   Monocytes Absolute            0.31                   Eosinophils Absolute          0.06                   Basophils Absolute            0.08                COMPREHENSIVE METABOLIC PANEL - Abnormal     Glucose                       107 (*)                Sodium                        134 (*)                Potassium                     5.0                    Chloride                      99                     Bicarbonate                   26                     Anion Gap                     14                     Urea Nitrogen                 52 (*)                 Creatinine                    7.28 (*)               eGFR                          7 (*)                  Calcium                       11.5 (*)               Albumin                       3.4                    Alkaline Phosphatase          50                     Total Protein                 7.3                    AST                           30                     Bilirubin, Total              0.4                    ALT                           21                  URINALYSIS WITH REFLEX CULTURE AND MICROSCOPIC - Abnormal     Color, Urine                                         Appearance, Urine             Clear                  Specific Gravity, Urine       1.007                  pH, Urine                     7.0                    Protein, Urine                20 (TRACE)                Glucose, Urine                Normal                 Blood, Urine                  0.2 (2+) (*)               Ketones, Urine                NEGATIVE                Bilirubin, Urine              NEGATIVE                 Urobilinogen, Urine           Normal                 Nitrite, Urine                NEGATIVE                Leukocyte Esterase, Urine     75 Cecilia/µL (*)                        Patient History   Past Medical History:   Diagnosis Date    CATHIE (acute kidney injury) (CMS-HCC) 2023    Cataract     COPD (chronic obstructive pulmonary disease) (Multi)     Cough, unspecified 2014    Cough    Emphysema of lung (Multi)     Encounter for immunization 2015    Need for influenza vaccination    Encounter for screening for malignant neoplasm of prostate     Encounter for screening for malignant neoplasm of prostate    Other conditions influencing health status 2013    Digital Blood Vessel Rupture    Personal history of colonic polyps     History of colon polyps    Personal history of other specified conditions 2013    History of shortness of breath    Personal history of other specified conditions 2014    History of shortness of breath     Past Surgical History:   Procedure Laterality Date    APPENDECTOMY  2013    Appendectomy    COLONOSCOPY  2013    Complete Colonoscopy    EYE SURGERY       Family History   Problem Relation Name Age of Onset    Dementia Mother      Diabetes Sister       Social History     Tobacco Use    Smoking status: Former     Current packs/day: 0.00     Types: Cigarettes     Start date:      Quit date:      Years since quittin.9     Passive exposure: Past    Smokeless tobacco: Never   Substance Use Topics    Alcohol use: Yes     Comment: 1 cup of scotch a week    Drug use: Never     Comment: Former drug user, has not used any in 50 years       Physical Exam   ED Triage Vitals [24 1325]   Temperature Heart Rate Respirations BP   36.4 °C (97.6 °F) 94 16 102/84      Pulse Ox Temp src Heart Rate Source Patient Position   98 % -- -- --      BP Location FiO2 (%)     -- --       Physical Exam      ED Course & MDM   Diagnoses as of 24 5808    Abdominal pain                 No data recorded                                 Medical Decision Making      Procedure  Procedures     Nicholas Olsen, DO  12/20/24 3863

## 2024-12-19 NOTE — ED TRIAGE NOTES
Pt to ed via ems with c/o failure to thrive. Pt states he has not been able to eat and feels weaker than normal. Endorses generalized abd pain with urinary frequency and non radiating cp. EKG obtained.

## 2024-12-20 ENCOUNTER — ANESTHESIA EVENT (OUTPATIENT)
Dept: GASTROENTEROLOGY | Facility: HOSPITAL | Age: 74
End: 2024-12-20
Payer: MEDICARE

## 2024-12-20 ENCOUNTER — APPOINTMENT (OUTPATIENT)
Dept: GASTROENTEROLOGY | Facility: HOSPITAL | Age: 74
DRG: 673 | End: 2024-12-20
Payer: MEDICARE

## 2024-12-20 ENCOUNTER — ANESTHESIA (OUTPATIENT)
Dept: GASTROENTEROLOGY | Facility: HOSPITAL | Age: 74
End: 2024-12-20
Payer: MEDICARE

## 2024-12-20 LAB
AFP SERPL-MCNC: <4 NG/ML (ref 0–9)
ANION GAP SERPL CALC-SCNC: 10 MMOL/L (ref 10–20)
APPEARANCE UR: CLEAR
ATRIAL RATE: 99 BPM
BILIRUB UR STRIP.AUTO-MCNC: NEGATIVE MG/DL
BUN SERPL-MCNC: 50 MG/DL (ref 6–23)
CALCIUM SERPL-MCNC: 10.7 MG/DL (ref 8.6–10.3)
CHLORIDE SERPL-SCNC: 99 MMOL/L (ref 98–107)
CO2 SERPL-SCNC: 31 MMOL/L (ref 21–32)
COLOR UR: ABNORMAL
CORTIS AM PEAK SERPL-MSCNC: 18.7 UG/DL (ref 5–20)
CREAT SERPL-MCNC: 6.82 MG/DL (ref 0.5–1.3)
EGFRCR SERPLBLD CKD-EPI 2021: 8 ML/MIN/1.73M*2
ERYTHROCYTE [DISTWIDTH] IN BLOOD BY AUTOMATED COUNT: 14.4 % (ref 11.5–14.5)
GLUCOSE SERPL-MCNC: 100 MG/DL (ref 74–99)
GLUCOSE UR STRIP.AUTO-MCNC: NORMAL MG/DL
HCT VFR BLD AUTO: 29.3 % (ref 41–52)
HGB BLD-MCNC: 9.9 G/DL (ref 13.5–17.5)
KETONES UR STRIP.AUTO-MCNC: NEGATIVE MG/DL
LEUKOCYTE ESTERASE UR QL STRIP.AUTO: ABNORMAL
MCH RBC QN AUTO: 29.1 PG (ref 26–34)
MCHC RBC AUTO-ENTMCNC: 33.8 G/DL (ref 32–36)
MCV RBC AUTO: 86 FL (ref 80–100)
NITRITE UR QL STRIP.AUTO: NEGATIVE
NRBC BLD-RTO: 0 /100 WBCS (ref 0–0)
P AXIS: 84 DEGREES
P OFFSET: 206 MS
P ONSET: 156 MS
PH UR STRIP.AUTO: 7 [PH]
PLATELET # BLD AUTO: 189 X10*3/UL (ref 150–450)
POTASSIUM SERPL-SCNC: 4.4 MMOL/L (ref 3.5–5.3)
PR INTERVAL: 140 MS
PROT UR STRIP.AUTO-MCNC: ABNORMAL MG/DL
Q ONSET: 226 MS
QRS COUNT: 16 BEATS
QRS DURATION: 74 MS
QT INTERVAL: 306 MS
QTC CALCULATION(BAZETT): 392 MS
QTC FREDERICIA: 361 MS
R AXIS: 89 DEGREES
RBC # BLD AUTO: 3.4 X10*6/UL (ref 4.5–5.9)
RBC # UR STRIP.AUTO: ABNORMAL /UL
RBC #/AREA URNS AUTO: ABNORMAL /HPF
SODIUM SERPL-SCNC: 136 MMOL/L (ref 136–145)
SP GR UR STRIP.AUTO: 1.01
T AXIS: 83 DEGREES
T OFFSET: 379 MS
UROBILINOGEN UR STRIP.AUTO-MCNC: NORMAL MG/DL
VENTRICULAR RATE: 99 BPM
WBC # BLD AUTO: 7.8 X10*3/UL (ref 4.4–11.3)
WBC #/AREA URNS AUTO: ABNORMAL /HPF

## 2024-12-20 PROCEDURE — 2500000002 HC RX 250 W HCPCS SELF ADMINISTERED DRUGS (ALT 637 FOR MEDICARE OP, ALT 636 FOR OP/ED)

## 2024-12-20 PROCEDURE — 94640 AIRWAY INHALATION TREATMENT: CPT

## 2024-12-20 PROCEDURE — 3700000001 HC GENERAL ANESTHESIA TIME - INITIAL BASE CHARGE

## 2024-12-20 PROCEDURE — 82533 TOTAL CORTISOL: CPT | Mod: AHULAB | Performed by: PHYSICIAN ASSISTANT

## 2024-12-20 PROCEDURE — 6350000001 HC RX 635 EPOETIN >10,000 UNITS: Mod: JZ | Performed by: INTERNAL MEDICINE

## 2024-12-20 PROCEDURE — 97165 OT EVAL LOW COMPLEX 30 MIN: CPT | Mod: GO

## 2024-12-20 PROCEDURE — 43239 EGD BIOPSY SINGLE/MULTIPLE: CPT | Performed by: INTERNAL MEDICINE

## 2024-12-20 PROCEDURE — 2500000002 HC RX 250 W HCPCS SELF ADMINISTERED DRUGS (ALT 637 FOR MEDICARE OP, ALT 636 FOR OP/ED): Performed by: INTERNAL MEDICINE

## 2024-12-20 PROCEDURE — 2500000005 HC RX 250 GENERAL PHARMACY W/O HCPCS: Performed by: PHYSICIAN ASSISTANT

## 2024-12-20 PROCEDURE — 99223 1ST HOSP IP/OBS HIGH 75: CPT | Performed by: NURSE PRACTITIONER

## 2024-12-20 PROCEDURE — 2500000004 HC RX 250 GENERAL PHARMACY W/ HCPCS (ALT 636 FOR OP/ED): Performed by: ANESTHESIOLOGIST ASSISTANT

## 2024-12-20 PROCEDURE — 2500000004 HC RX 250 GENERAL PHARMACY W/ HCPCS (ALT 636 FOR OP/ED): Performed by: NURSE PRACTITIONER

## 2024-12-20 PROCEDURE — 0DB98ZX EXCISION OF DUODENUM, VIA NATURAL OR ARTIFICIAL OPENING ENDOSCOPIC, DIAGNOSTIC: ICD-10-PCS | Performed by: INTERNAL MEDICINE

## 2024-12-20 PROCEDURE — 99223 1ST HOSP IP/OBS HIGH 75: CPT | Performed by: INTERNAL MEDICINE

## 2024-12-20 PROCEDURE — 7100000002 HC RECOVERY ROOM TIME - EACH INCREMENTAL 1 MINUTE

## 2024-12-20 PROCEDURE — 88305 TISSUE EXAM BY PATHOLOGIST: CPT | Mod: TC,AHULAB | Performed by: INTERNAL MEDICINE

## 2024-12-20 PROCEDURE — 85027 COMPLETE CBC AUTOMATED: CPT | Performed by: PHYSICIAN ASSISTANT

## 2024-12-20 PROCEDURE — 87086 URINE CULTURE/COLONY COUNT: CPT | Mod: AHULAB | Performed by: GENERAL PRACTICE

## 2024-12-20 PROCEDURE — 0DB68ZX EXCISION OF STOMACH, VIA NATURAL OR ARTIFICIAL OPENING ENDOSCOPIC, DIAGNOSTIC: ICD-10-PCS | Performed by: INTERNAL MEDICINE

## 2024-12-20 PROCEDURE — 3700000002 HC GENERAL ANESTHESIA TIME - EACH INCREMENTAL 1 MINUTE

## 2024-12-20 PROCEDURE — 88305 TISSUE EXAM BY PATHOLOGIST: CPT | Performed by: PATHOLOGY

## 2024-12-20 PROCEDURE — 1100000001 HC PRIVATE ROOM DAILY

## 2024-12-20 PROCEDURE — 80048 BASIC METABOLIC PNL TOTAL CA: CPT | Performed by: PHYSICIAN ASSISTANT

## 2024-12-20 PROCEDURE — 2500000004 HC RX 250 GENERAL PHARMACY W/ HCPCS (ALT 636 FOR OP/ED): Performed by: PHYSICIAN ASSISTANT

## 2024-12-20 PROCEDURE — 87900 PHENOTYPE INFECT AGENT DRUG: CPT | Performed by: INTERNAL MEDICINE

## 2024-12-20 PROCEDURE — 2500000004 HC RX 250 GENERAL PHARMACY W/ HCPCS (ALT 636 FOR OP/ED): Performed by: STUDENT IN AN ORGANIZED HEALTH CARE EDUCATION/TRAINING PROGRAM

## 2024-12-20 PROCEDURE — 7100000001 HC RECOVERY ROOM TIME - INITIAL BASE CHARGE

## 2024-12-20 PROCEDURE — 2500000002 HC RX 250 W HCPCS SELF ADMINISTERED DRUGS (ALT 637 FOR MEDICARE OP, ALT 636 FOR OP/ED): Performed by: PHYSICIAN ASSISTANT

## 2024-12-20 PROCEDURE — 99232 SBSQ HOSP IP/OBS MODERATE 35: CPT | Performed by: STUDENT IN AN ORGANIZED HEALTH CARE EDUCATION/TRAINING PROGRAM

## 2024-12-20 PROCEDURE — 94664 DEMO&/EVAL PT USE INHALER: CPT

## 2024-12-20 PROCEDURE — 36415 COLL VENOUS BLD VENIPUNCTURE: CPT | Performed by: PHYSICIAN ASSISTANT

## 2024-12-20 PROCEDURE — 97161 PT EVAL LOW COMPLEX 20 MIN: CPT | Mod: GP

## 2024-12-20 PROCEDURE — 82105 ALPHA-FETOPROTEIN SERUM: CPT | Mod: AHULAB | Performed by: INTERNAL MEDICINE

## 2024-12-20 PROCEDURE — 87521 HEPATITIS C PROBE&RVRS TRNSC: CPT | Mod: AHULAB | Performed by: INTERNAL MEDICINE

## 2024-12-20 PROCEDURE — 0DB78ZX EXCISION OF STOMACH, PYLORUS, VIA NATURAL OR ARTIFICIAL OPENING ENDOSCOPIC, DIAGNOSTIC: ICD-10-PCS | Performed by: INTERNAL MEDICINE

## 2024-12-20 PROCEDURE — 51701 INSERT BLADDER CATHETER: CPT

## 2024-12-20 PROCEDURE — 2500000002 HC RX 250 W HCPCS SELF ADMINISTERED DRUGS (ALT 637 FOR MEDICARE OP, ALT 636 FOR OP/ED): Performed by: STUDENT IN AN ORGANIZED HEALTH CARE EDUCATION/TRAINING PROGRAM

## 2024-12-20 PROCEDURE — 2500000004 HC RX 250 GENERAL PHARMACY W/ HCPCS (ALT 636 FOR OP/ED): Performed by: INTERNAL MEDICINE

## 2024-12-20 PROCEDURE — 81001 URINALYSIS AUTO W/SCOPE: CPT | Performed by: GENERAL PRACTICE

## 2024-12-20 RX ORDER — SODIUM CHLORIDE, SODIUM LACTATE, POTASSIUM CHLORIDE, CALCIUM CHLORIDE 600; 310; 30; 20 MG/100ML; MG/100ML; MG/100ML; MG/100ML
100 INJECTION, SOLUTION INTRAVENOUS CONTINUOUS
Status: DISCONTINUED | OUTPATIENT
Start: 2024-12-20 | End: 2024-12-21

## 2024-12-20 RX ORDER — HYDROMORPHONE HYDROCHLORIDE 0.2 MG/ML
0.2 INJECTION INTRAMUSCULAR; INTRAVENOUS; SUBCUTANEOUS
Status: DISCONTINUED | OUTPATIENT
Start: 2024-12-20 | End: 2024-12-21

## 2024-12-20 RX ORDER — PROPOFOL 10 MG/ML
INJECTION, EMULSION INTRAVENOUS AS NEEDED
Status: DISCONTINUED | OUTPATIENT
Start: 2024-12-20 | End: 2024-12-20

## 2024-12-20 RX ADMIN — HEPARIN SODIUM 5000 UNITS: 5000 INJECTION, SOLUTION INTRAVENOUS; SUBCUTANEOUS at 15:28

## 2024-12-20 RX ADMIN — IPRATROPIUM BROMIDE AND ALBUTEROL SULFATE 3 ML: 2.5; .5 SOLUTION RESPIRATORY (INHALATION) at 07:05

## 2024-12-20 RX ADMIN — IPRATROPIUM BROMIDE AND ALBUTEROL SULFATE 3 ML: 2.5; .5 SOLUTION RESPIRATORY (INHALATION) at 20:35

## 2024-12-20 RX ADMIN — Medication 2 L/MIN: at 07:05

## 2024-12-20 RX ADMIN — EPOETIN ALFA-EPBX 10000 UNITS: 10000 INJECTION, SOLUTION INTRAVENOUS; SUBCUTANEOUS at 15:28

## 2024-12-20 RX ADMIN — HEPARIN SODIUM 5000 UNITS: 5000 INJECTION, SOLUTION INTRAVENOUS; SUBCUTANEOUS at 20:30

## 2024-12-20 RX ADMIN — ROSUVASTATIN 10 MG: 10 TABLET, FILM COATED ORAL at 08:56

## 2024-12-20 RX ADMIN — SODIUM CHLORIDE, SODIUM LACTATE, POTASSIUM CHLORIDE, CALCIUM CHLORIDE AND DEXTROSE MONOHYDRATE 100 ML/HR: 5; 600; 310; 30; 20 INJECTION, SOLUTION INTRAVENOUS at 06:31

## 2024-12-20 RX ADMIN — SODIUM CHLORIDE, SODIUM LACTATE, POTASSIUM CHLORIDE, AND CALCIUM CHLORIDE 100 ML/HR: 600; 310; 30; 20 INJECTION, SOLUTION INTRAVENOUS at 17:19

## 2024-12-20 RX ADMIN — PREDNISONE 5 MG: 5 TABLET ORAL at 08:56

## 2024-12-20 RX ADMIN — BUDESONIDE 0.25 MG: 0.25 INHALANT RESPIRATORY (INHALATION) at 20:35

## 2024-12-20 RX ADMIN — PANTOPRAZOLE SODIUM 40 MG: 40 INJECTION, POWDER, FOR SOLUTION INTRAVENOUS at 06:31

## 2024-12-20 RX ADMIN — IRON SUCROSE 200 MG: 20 INJECTION, SOLUTION INTRAVENOUS at 15:27

## 2024-12-20 RX ADMIN — Medication 5 L/MIN: at 12:38

## 2024-12-20 RX ADMIN — BUDESONIDE 0.25 MG: 0.25 INHALANT RESPIRATORY (INHALATION) at 07:05

## 2024-12-20 RX ADMIN — AZITHROMYCIN 250 MG: 250 TABLET, FILM COATED ORAL at 13:34

## 2024-12-20 RX ADMIN — HYDROMORPHONE HYDROCHLORIDE 0.2 MG: 0.2 INJECTION, SOLUTION INTRAMUSCULAR; INTRAVENOUS; SUBCUTANEOUS at 10:34

## 2024-12-20 RX ADMIN — Medication 2 L/MIN: at 20:38

## 2024-12-20 SDOH — ECONOMIC STABILITY: HOUSING INSECURITY: IN THE LAST 12 MONTHS, WAS THERE A TIME WHEN YOU WERE NOT ABLE TO PAY THE MORTGAGE OR RENT ON TIME?: NO

## 2024-12-20 SDOH — SOCIAL STABILITY: SOCIAL INSECURITY: ARE YOU MARRIED, WIDOWED, DIVORCED, SEPARATED, NEVER MARRIED, OR LIVING WITH A PARTNER?: MARRIED

## 2024-12-20 SDOH — ECONOMIC STABILITY: HOUSING INSECURITY: AT ANY TIME IN THE PAST 12 MONTHS, WERE YOU HOMELESS OR LIVING IN A SHELTER (INCLUDING NOW)?: NO

## 2024-12-20 SDOH — ECONOMIC STABILITY: FOOD INSECURITY: HOW HARD IS IT FOR YOU TO PAY FOR THE VERY BASICS LIKE FOOD, HOUSING, MEDICAL CARE, AND HEATING?: NOT VERY HARD

## 2024-12-20 SDOH — HEALTH STABILITY: MENTAL HEALTH: CURRENT SMOKER: 0

## 2024-12-20 SDOH — ECONOMIC STABILITY: TRANSPORTATION INSECURITY: IN THE PAST 12 MONTHS, HAS LACK OF TRANSPORTATION KEPT YOU FROM MEDICAL APPOINTMENTS OR FROM GETTING MEDICATIONS?: NO

## 2024-12-20 ASSESSMENT — ACTIVITIES OF DAILY LIVING (ADL)
ADL_ASSISTANCE: INDEPENDENT
LACK_OF_TRANSPORTATION: NO
BATHING_ASSISTANCE: MODERATE
ADL_ASSISTANCE: INDEPENDENT

## 2024-12-20 ASSESSMENT — COGNITIVE AND FUNCTIONAL STATUS - GENERAL
EATING MEALS: A LITTLE
HELP NEEDED FOR BATHING: A LOT
TOILETING: TOTAL
STANDING UP FROM CHAIR USING ARMS: A LITTLE
DRESSING REGULAR LOWER BODY CLOTHING: A LOT
MOBILITY SCORE: 16
WALKING IN HOSPITAL ROOM: A LOT
TOILETING: A LITTLE
DRESSING REGULAR LOWER BODY CLOTHING: A LOT
CLIMB 3 TO 5 STEPS WITH RAILING: TOTAL
DRESSING REGULAR UPPER BODY CLOTHING: A LITTLE
PERSONAL GROOMING: A LITTLE
DAILY ACTIVITIY SCORE: 16
TURNING FROM BACK TO SIDE WHILE IN FLAT BAD: A LITTLE
STANDING UP FROM CHAIR USING ARMS: A LOT
MOBILITY SCORE: 12
CLIMB 3 TO 5 STEPS WITH RAILING: A LOT
MOVING TO AND FROM BED TO CHAIR: A LITTLE
MOVING TO AND FROM BED TO CHAIR: A LOT
MOVING FROM LYING ON BACK TO SITTING ON SIDE OF FLAT BED WITH BEDRAILS: A LOT
TURNING FROM BACK TO SIDE WHILE IN FLAT BAD: A LOT
DRESSING REGULAR UPPER BODY CLOTHING: A LITTLE
WALKING IN HOSPITAL ROOM: A LITTLE
MOVING FROM LYING ON BACK TO SITTING ON SIDE OF FLAT BED WITH BEDRAILS: A LITTLE
DAILY ACTIVITIY SCORE: 17
HELP NEEDED FOR BATHING: A LITTLE

## 2024-12-20 ASSESSMENT — PAIN SCALES - GENERAL
PAIN_LEVEL: 0
PAINLEVEL_OUTOF10: 0 - NO PAIN
PAINLEVEL_OUTOF10: 7
PAINLEVEL_OUTOF10: 0 - NO PAIN
PAINLEVEL_OUTOF10: 7

## 2024-12-20 ASSESSMENT — ENCOUNTER SYMPTOMS
NEUROLOGICAL NEGATIVE: 1
CARDIOVASCULAR NEGATIVE: 1
SORE THROAT: 0
APPETITE CHANGE: 1
VOMITING: 0
DIFFICULTY URINATING: 0
COUGH: 1
ABDOMINAL DISTENTION: 0
ABDOMINAL PAIN: 0
PALPITATIONS: 0
PSYCHIATRIC NEGATIVE: 1
HEMATOLOGIC/LYMPHATIC NEGATIVE: 1
CHEST TIGHTNESS: 1
TROUBLE SWALLOWING: 0
ABDOMINAL PAIN: 1
FEVER: 0
RECTAL PAIN: 0
MUSCULOSKELETAL NEGATIVE: 1
ALLERGIC/IMMUNOLOGIC NEGATIVE: 1
UNEXPECTED WEIGHT CHANGE: 1
DIARRHEA: 0
EYES NEGATIVE: 1
CONSTIPATION: 0
SHORTNESS OF BREATH: 1
NAUSEA: 0
ENDOCRINE NEGATIVE: 1
GASTROINTESTINAL NEGATIVE: 1

## 2024-12-20 ASSESSMENT — PAIN - FUNCTIONAL ASSESSMENT
PAIN_FUNCTIONAL_ASSESSMENT: 0-10

## 2024-12-20 ASSESSMENT — PAIN SCALES - WONG BAKER: WONGBAKER_NUMERICALRESPONSE: HURTS EVEN MORE

## 2024-12-20 NOTE — CONSULTS
Inpatient consult to Palliative Care  Consult performed by: Darleen Wyatt, BEATRIZ-CNP  Consult ordered by: Rachel Cooley PA-C          Reason For Consult  Reason for Consult: communication / medical decision making     History Of Present Illness  Kalyan Armstrong is a 74 y.o. male with past medical history of HGpEF, mod pulm HTN, COPD with chronic hypoxic respiratory failure on home 02, anemia, severe calorie protein malnutrition and weight loss, atherosclerosis, Hep C, and falls.  He presented to our ED 12/19/24 with reports of ongoing weight loss, ABD pressure and chest x one month.  Patient has chronic ABD pain which has been getting worse.   He was to have a GI outpatient appt end of Jan.  He was hospitalized last month for acute on chronic renal failure and also had workup for weight loss, but imaging was unrevealing.  He has continued to lose weight.  His weight in Nov 2023 was 136#, in Oct 2024 it was 131#, on this admission it is 100#.  Additionally he has CATHIE on CKD.  His baseline creatinine seems variable from 2-4 but on this admission it was 7.2    Nephrology and GI have been consulted.  He is already DNRCC.  Palliative medicine consulted for goals of care     Symptoms (0 - 10, Best to Worst)  Labelle Symptom Assessment System  0-10 (Numeric) Pain Score: 0 - No pain    BM in last 48 hours? yes       Personal/Social History:  lives in [place] with wife Nallely   He reports that he quit smoking about 18 years ago. His smoking use included cigarettes. He started smoking about 33 years ago. He has been exposed to tobacco smoke. He has never used smokeless tobacco. He reports current alcohol use. He reports that he does not use drugs.    Functional Status            Past Medical History  He has a past medical history of CATHIE (acute kidney injury) (CMS-HCC) (05/03/2023), Cataract, COPD (chronic obstructive pulmonary disease) (Multi), Cough, unspecified (06/20/2014), Emphysema of lung (Multi), Encounter  for immunization (01/19/2015), Encounter for screening for malignant neoplasm of prostate, Other conditions influencing health status (05/20/2013), Personal history of colonic polyps, Personal history of other specified conditions (05/20/2013), and Personal history of other specified conditions (06/20/2014).    Surgical History  He has a past surgical history that includes Colonoscopy (02/21/2013); Appendectomy (02/21/2013); and Eye surgery.     Family History  Family History   Problem Relation Name Age of Onset    Dementia Mother      Diabetes Sister       Allergies  Patient has no known allergies.    Review of Systems   Constitutional:  Positive for appetite change and unexpected weight change.        Cites no appetite at all.  Denies pain or discomfort with eating, denies oral pain.  Is moving bowels regularly.  Did not feel mirtazapine was helpful    HENT: Negative.  Negative for sore throat and trouble swallowing.    Eyes: Negative.    Respiratory:  Positive for cough and shortness of breath.         Feels like he has generalized chest/thoracic pressure.  Has dry cough   Cardiovascular:  Negative for palpitations and leg swelling.        NSR per monitor   Gastrointestinal:  Positive for abdominal pain. Negative for constipation, diarrhea, nausea, rectal pain and vomiting.        Reports generalized sharp pains throughout abdomen that radiate towards back.  Worse with movement, coughs, etc.  Pain comes and goes but is fairly constant  Has been moving bowels every 1-2 days.  Stools have been looking very dark, no ni blood.  Consistency varies.   Endocrine: Negative.    Genitourinary:  Negative for difficulty urinating.   Musculoskeletal: Negative.    Skin: Negative.    Allergic/Immunologic: Negative.    Neurological: Negative.    Hematological: Negative.    Psychiatric/Behavioral: Negative.          Physical Exam  Constitutional:       Appearance: He is cachectic. He is ill-appearing.      Comments: Appears  "older than stated age   HENT:      Head: Atraumatic.      Comments: Temporal wasting     Nose: Nose normal.      Mouth/Throat:      Mouth: Mucous membranes are moist.      Pharynx: Oropharynx is clear.   Eyes:      Conjunctiva/sclera: Conjunctivae normal.      Pupils: Pupils are equal, round, and reactive to light.   Cardiovascular:      Rate and Rhythm: Normal rate and regular rhythm.      Pulses: Normal pulses.      Heart sounds: Normal heart sounds.   Pulmonary:      Effort: Pulmonary effort is normal.      Comments: Very diminished, dry cough  Abdominal:      General: Abdomen is flat. Bowel sounds are normal.      Tenderness: There is abdominal tenderness.      Comments: Scaphoid, firm   Genitourinary:     Comments: Purewick with clear yellow urine  Musculoskeletal:         General: No swelling. Normal range of motion.      Cervical back: Normal range of motion.   Skin:     General: Skin is warm and dry.   Neurological:      Mental Status: He is alert and oriented to person, place, and time.   Psychiatric:         Mood and Affect: Mood normal.         Behavior: Behavior normal.         Last Recorded Vitals  Blood pressure 107/79, pulse 79, temperature 36.1 °C (97 °F), temperature source Temporal, resp. rate 20, height 1.753 m (5' 9\"), weight 45.4 kg (100 lb), SpO2 100%.    Relevant Results  Electrocardiogram, 12-lead PRN ACS symptoms    Result Date: 12/20/2024  Normal sinus rhythm Low voltage QRS Septal infarct (cited on or before 24-SEP-2024) Abnormal ECG When compared with ECG of 24-SEP-2024 09:18, No significant change was found    CT chest abdomen pelvis wo IV contrast    Result Date: 12/19/2024  STUDY: CT Chest, Abdomen, and Pelvis without IV Contrast; 12/19/2024, 1450 INDICATION: Generalized abdominal pain and tenderness that radiates into chest. COMPARISON: XR chest 4/29/2023, CTA chest 12/15/2022, 6/17/2022. ACCESSION NUMBER(S): [id number] ORDERING CLINICIAN: CLARISA GARCIA TECHNIQUE: CT of the chest, " abdomen, and pelvis was performed.  Contiguous axial images were obtained at 3 mm slice thickness through the chest, abdomen, and pelvis.  Coronal and sagittal reconstructions at 3 mm slice thickness were performed.  No intravenous contrast was administered.  FINDINGS: Please note that the evaluation of vessels, lymph nodes and organs is limited without intravenous contrast. CHEST: MEDIASTINUM: The heart is normal in size without pericardial effusion.  Coronary artery calcifications are not identified.  LUNGS/PLEURA: There is no pleural effusion or pneumothorax.  Prominent emphysematous changes of the lungs again noted involving predominantly the lower lobes.  Stable fibronodular scarring within both lungs.  No evidence of a lung mass or suspicious pulmonary nodule. LYMPH NODES: Thoracic lymph nodes are not enlarged. ABDOMEN:  LIVER: No hepatomegaly.  Smooth surface contour.  Normal attenuation.  BILE DUCTS: No intrahepatic or extrahepatic biliary ductal dilatation.  GALLBLADDER: Small calcified stones are present within the gallbladder. STOMACH: No abnormalities identified.  PANCREAS: No masses or ductal dilatation.  SPLEEN: No splenomegaly or focal splenic lesion.  ADRENAL GLANDS: No thickening or nodules.  KIDNEYS AND URETERS: Kidneys are normal in size and location.  No renal or ureteral calculi.  PELVIS:  BLADDER: No abnormalities identified.  REPRODUCTIVE ORGANS: Prostate gland is mildly enlarged.  BOWEL: Diverticulosis of the colon noted without evidence of acute diverticulitis.  VESSELS: No abnormalities identified.  Abdominal aorta is normal in caliber.  PERITONEUM/RETROPERITONEUM/LYMPH NODES: No free fluid.  No pneumoperitoneum. No lymphadenopathy.  ABDOMINAL WALL: No abnormalities identified. SOFT TISSUES: No abnormalities identified.  BONES: No acute fracture or aggressive osseous lesion.    No acute cardiopulmonary abnormality. Pulmonary emphysema. Cholelithiasis. Diverticulosis coli. Signed by Drake DOLL  MD David    XR elbow right 3+ views    Result Date: 12/10/2024  Interpreted By:  Juan Luis Mario, STUDY: XR ELBOW RIGHT 3+ VIEWS; ;  12/9/2024 2:10 pm   INDICATION: Signs/Symptoms:right elbow pain.   ,M25.521 Pain in right elbow   COMPARISON: None.   ACCESSION NUMBER(S): [id number]   ORDERING CLINICIAN: GAIL JEFFRIESUCLER   FINDINGS: Right elbow, five views   There is no fracture. There is no dislocation. There are no degenerative changes. There is no lytic or sclerotic lesion. There is no soft tissue abnormality seen. There is no effusion       Normal radiographs of the right elbow     MACRO: None   Signed by: Juan Luis Mario 12/10/2024 7:20 PM Dictation workstation:   [id number]    CT abdomen pelvis wo IV contrast    Result Date: 11/21/2024  Interpreted By:  Tavia Cifuentes, STUDY: CT ABDOMEN PELVIS WO IV CONTRAST;  11/20/2024 11:32 pm   INDICATION: Signs/Symptoms:Worsening renal function, hematuria, rule out ureteral stone.   COMPARISON: Renal ultrasound 09/24/2024. CT lung screening 02/13/2024. CT angiogram chest 12/15/2022. Liver ultrasound 06/13/2022.   ACCESSION NUMBER(S): [id number]   ORDERING CLINICIAN: CLARISA GARCIA   TECHNIQUE: CT of the abdomen and pelvis was performed with no contrast administration. Coronal and sagittal reformats were obtained.   FINDINGS: LOWER CHEST: Redemonstration of severe bilateral emphysematous changes.   ABDOMEN:   LIVER: Unremarkable unenhanced appearance.   BILE DUCTS: No obvious dilation.   GALLBLADDER: There is cholelithiasis.   PANCREAS: No peripancreatic inflammatory changes.   SPLEEN: Unremarkable unenhanced appearance.   ADRENAL GLANDS: Unremarkable.   KIDNEYS AND URETERS: No hydronephrosis or hydroureter.  No obstructing ureteral calculi.   BOWEL: No bowel obstruction. There is colonic diverticulosis with no CT evidence for acute diverticulitis.   VESSELS: Atherosclerotic calcifications at the abdominal aorta. No abdominal aortic aneurysm.   PELVIS: The urinary  bladder is partially distended. The prostate measures 4.9 cm in transverse diameter.   PERITONEUM/RETROPERITONEUM/LYMPH NODES: No free fluid or free air.  No retroperitoneal hemorrhage. Calcified lymph nodes are noted at the periportal region.   ABDOMINAL WALL: The abdominal wall soft tissues are within normal limits.   BONE AND SOFT TISSUE: Degenerative changes at the spine.   IMPRESSION 1.  No hydronephrosis or obstructing ureteral calculi. No acute findings on unenhanced CT. 2. Cholelithiasis. 3. Colonic diverticulosis.       MACRO: None.   Signed by: Tavia Cifuentes 11/21/2024 12:25 AM Dictation workstation:   [id number]      Results for orders placed or performed during the hospital encounter of 12/19/24 (from the past 24 hours)   Electrocardiogram, 12-lead PRN ACS symptoms   Result Value Ref Range    Ventricular Rate 99 BPM    Atrial Rate 99 BPM    AK Interval 140 ms    QRS Duration 74 ms    QT Interval 306 ms    QTC Calculation(Bazett) 392 ms    P Axis 84 degrees    R Axis 89 degrees    T Axis 83 degrees    QRS Count 16 beats    Q Onset 226 ms    P Onset 156 ms    P Offset 206 ms    T Offset 379 ms    QTC Fredericia 361 ms   CBC and Auto Differential   Result Value Ref Range    WBC 8.2 4.4 - 11.3 x10*3/uL    nRBC 0.0 0.0 - 0.0 /100 WBCs    RBC 3.58 (L) 4.50 - 5.90 x10*6/uL    Hemoglobin 10.5 (L) 13.5 - 17.5 g/dL    Hematocrit 33.5 (L) 41.0 - 52.0 %    MCV 94 80 - 100 fL    MCH 29.3 26.0 - 34.0 pg    MCHC 31.3 (L) 32.0 - 36.0 g/dL    RDW 14.4 11.5 - 14.5 %    Platelets 168 150 - 450 x10*3/uL    Neutrophils % 84.0 40.0 - 80.0 %    Immature Granulocytes %, Automated 0.6 0.0 - 0.9 %    Lymphocytes % 9.9 13.0 - 44.0 %    Monocytes % 3.8 2.0 - 10.0 %    Eosinophils % 0.7 0.0 - 6.0 %    Basophils % 1.0 0.0 - 2.0 %    Neutrophils Absolute 6.87 (H) 1.60 - 5.50 x10*3/uL    Immature Granulocytes Absolute, Automated 0.05 0.00 - 0.50 x10*3/uL    Lymphocytes Absolute 0.81 0.80 - 3.00 x10*3/uL    Monocytes Absolute 0.31  0.05 - 0.80 x10*3/uL    Eosinophils Absolute 0.06 0.00 - 0.40 x10*3/uL    Basophils Absolute 0.08 0.00 - 0.10 x10*3/uL   Comprehensive metabolic panel   Result Value Ref Range    Glucose 107 (H) 74 - 99 mg/dL    Sodium 134 (L) 136 - 145 mmol/L    Potassium 5.0 3.5 - 5.3 mmol/L    Chloride 99 98 - 107 mmol/L    Bicarbonate 26 21 - 32 mmol/L    Anion Gap 14 10 - 20 mmol/L    Urea Nitrogen 52 (H) 6 - 23 mg/dL    Creatinine 7.28 (H) 0.50 - 1.30 mg/dL    eGFR 7 (L) >60 mL/min/1.73m*2    Calcium 11.5 (H) 8.6 - 10.3 mg/dL    Albumin 3.4 3.4 - 5.0 g/dL    Alkaline Phosphatase 50 33 - 136 U/L    Total Protein 7.3 6.4 - 8.2 g/dL    AST 30 9 - 39 U/L    Bilirubin, Total 0.4 0.0 - 1.2 mg/dL    ALT 21 10 - 52 U/L   Lipase   Result Value Ref Range    Lipase 45 9 - 82 U/L   Troponin I, High Sensitivity   Result Value Ref Range    Troponin I, High Sensitivity 19 0 - 20 ng/L   TSH   Result Value Ref Range    Thyroid Stimulating Hormone 1.53 0.44 - 3.98 mIU/L   Phosphorus   Result Value Ref Range    Phosphorus 3.8 2.5 - 4.9 mg/dL   Troponin I, High Sensitivity   Result Value Ref Range    Troponin I, High Sensitivity 20 0 - 20 ng/L   Urinalysis with Reflex Culture and Microscopic   Result Value Ref Range    Color, Urine Light-Yellow Light-Yellow, Yellow, Dark-Yellow    Appearance, Urine Clear Clear    Specific Gravity, Urine 1.007 1.005 - 1.035    pH, Urine 7.0 5.0, 5.5, 6.0, 6.5, 7.0, 7.5, 8.0    Protein, Urine 20 (TRACE) NEGATIVE, 10 (TRACE), 20 (TRACE) mg/dL    Glucose, Urine Normal Normal mg/dL    Blood, Urine 0.2 (2+) (A) NEGATIVE    Ketones, Urine NEGATIVE NEGATIVE mg/dL    Bilirubin, Urine NEGATIVE NEGATIVE    Urobilinogen, Urine Normal Normal mg/dL    Nitrite, Urine NEGATIVE NEGATIVE    Leukocyte Esterase, Urine 75 Cecilia/µL (A) NEGATIVE   Microscopic Only, Urine   Result Value Ref Range    WBC, Urine 6-10 (A) 1-5, NONE /HPF    RBC, Urine 11-20 (A) NONE, 1-2, 3-5 /HPF   CBC   Result Value Ref Range    WBC 7.8 4.4 - 11.3 x10*3/uL     nRBC 0.0 0.0 - 0.0 /100 WBCs    RBC 3.40 (L) 4.50 - 5.90 x10*6/uL    Hemoglobin 9.9 (L) 13.5 - 17.5 g/dL    Hematocrit 29.3 (L) 41.0 - 52.0 %    MCV 86 80 - 100 fL    MCH 29.1 26.0 - 34.0 pg    MCHC 33.8 32.0 - 36.0 g/dL    RDW 14.4 11.5 - 14.5 %    Platelets 189 150 - 450 x10*3/uL   Basic metabolic panel   Result Value Ref Range    Glucose 100 (H) 74 - 99 mg/dL    Sodium 136 136 - 145 mmol/L    Potassium 4.4 3.5 - 5.3 mmol/L    Chloride 99 98 - 107 mmol/L    Bicarbonate 31 21 - 32 mmol/L    Anion Gap 10 10 - 20 mmol/L    Urea Nitrogen 50 (H) 6 - 23 mg/dL    Creatinine 6.82 (H) 0.50 - 1.30 mg/dL    eGFR 8 (L) >60 mL/min/1.73m*2    Calcium 10.7 (H) 8.6 - 10.3 mg/dL     *Note: Due to a large number of results and/or encounters for the requested time period, some results have not been displayed. A complete set of results can be found in Results Review.      Scheduled medications  [Held by provider] amLODIPine, 10 mg, oral, Daily  [Held by provider] azithromycin, 250 mg, oral, Daily  budesonide, 0.25 mg, nebulization, BID  heparin (porcine), 5,000 Units, subcutaneous, q8h GULSHAN  ipratropium-albuteroL, 3 mL, nebulization, TID  oxygen, , inhalation, Continuous - Inhalation  pantoprazole, 40 mg, intravenous, Daily before breakfast  polyethylene glycol, 17 g, oral, Daily  predniSONE, 5 mg, oral, Daily  rosuvastatin, 10 mg, oral, Daily      Continuous medications  dextrose 5 % and lactated Ringer's, 100 mL/hr, Last Rate: 100 mL/hr (12/20/24 0631)      PRN medications  PRN medications: acetaminophen **OR** acetaminophen **OR** acetaminophen, albuterol, melatonin, ondansetron ODT **OR** ondansetron, triamcinolone      Assessment/Plan      Kalyan Armstrong is a 74 y.o. male with past medical history of HGpEF, mod pulm HTN, COPD with chronic hypoxic respiratory failure on home 02, anemia, severe calorie protein malnutrition and weight loss, atherosclerosis, Hep C, and falls.  He presented to our ED 12/19/24 with reports of  ongoing weight loss, ABD pressure and chest x one month.  Patient has chronic ABD pain which has been getting worse.   He was to have a GI outpatient appt end of Jan.  He was hospitalized last month for acute on chronic renal failure and also had workup for weight loss, but non-contrast imaging was unrevealing.  He has continued to lose weight.  His weight in Nov 2023 was 136#, in Oct 2024 it was 131#, on this admission it is 100#.  On last admission he was started on mirtazapine.  Additionally he has CATHIE on CKD.  His baseline creatinine seems variable from 2-4 but on this admission it was 7.2.  Outside of the RFP, his labs are pretty unremarkable.      Nephrology and GI have been consulted.  He is already DNRCC.  Palliative medicine consulted for goals of care     Goals of care:  already DNRCC but patient eager to find out why he is losing weight and having ABD pain.  Workup seems reasonable as he is symptomatic and would seek at least tx that might improve his quality of life.     -continue to honor DNRCC  -will place [facility] Portable at bedside for transport  -will follow along to assist patient/family with resources as workup continues, will consider introduction of hospice as diagnostics occur that may clarify clinical status    ABD pain and thoracic pain:  non-contrast CT unremarkable.  ABD tender and slightly firm but normal bowels sounds.  Having dark stools.  Thoracic pain described as similar to ABD and does not seem to be cardiac.  Pain scores 5-7 when it occurs  -GI consult pending  -adding hydromorphone 0.2 mg IV Q 3 hours PRN  -continue Miralax    Severe protein calorie malnutrition:  has no appetite at all.  Did not feel mirtazapine helpful  -will await GI workup  -may consider addition of olanzapine 2.5 mg daily for appetite but will wait on GI findings first    COPD with chronic respiratory failure on supplemental 02  -defer management to primary team    CATHIE on CKD:  -pending nephrology eval  -avoid  nephrotoxic drugs             Darleen Wyatt, APRN-CNP

## 2024-12-20 NOTE — PROGRESS NOTES
Occupational Therapy    Evaluation    Patient Name: Kalyan Armstrong  MRN: [id number]  Department: [facility] GI LAB  Room: [facility]-A  Today's Date: 12/20/2024  Time Calculation  Start Time: 1037  Stop Time: 1059  Time Calculation (min): 22 min    Assessment  IP OT Assessment  OT Assessment: Pt seen for OT eval. Pt demonstrates with decreased endurance, assist with mobility and ADLS. Pt may benefit from MOD intensity therapy to increse functional mobility and [place]  Prognosis: Good  Barriers to Discharge Home: No anticipated barriers  Evaluation/Treatment Tolerance: Patient limited by fatigue  Medical Staff Made Aware: Yes  End of Session Communication: Bedside nurse  End of Session Patient Position: Bed, 3 rail up, Alarm on  Plan:  Treatment Interventions: ADL retraining, Functional transfer training, Endurance training, Equipment evaluation/education  OT Frequency: 3 times per week  OT Discharge Recommendations: Moderate intensity level of continued care  Equipment Recommended upon Discharge: Wheeled walker  OT Recommended Transfer Status:  (CGA)  OT - OK to Discharge: Yes    Subjective   Current Problem:  1. Abdominal pain        2. Anorexia  Esophagogastroduodenoscopy (EGD)    Esophagogastroduodenoscopy (EGD)      3. Failure to thrive in adult  Esophagogastroduodenoscopy (EGD)    Esophagogastroduodenoscopy (EGD)      4. Unintentional weight loss  Esophagogastroduodenoscopy (EGD)    Esophagogastroduodenoscopy (EGD)      5. Generalized abdominal pain  Esophagogastroduodenoscopy (EGD)    Esophagogastroduodenoscopy (EGD)      6. Mobility impaired          General:  General  Reason for Referral: 73 y/o M presenting with abdominal pain and chest pressure x 1 month.  Referred By: IBAN Cooley (PA-C)  Past Medical History Relevant to Rehab:   Past Medical History:   Diagnosis Date    CATHIE (acute kidney injury) (CMS-HCC) 05/03/2023    Cataract     COPD (chronic obstructive pulmonary disease) (Multi)     Cough, unspecified  06/20/2014    Cough    Emphysema of lung (Multi)     Encounter for immunization 01/19/2015    Need for influenza vaccination    Encounter for screening for malignant neoplasm of prostate     Encounter for screening for malignant neoplasm of prostate    Other conditions influencing health status 05/20/2013    Digital Blood Vessel Rupture    Personal history of colonic polyps     History of colon polyps    Personal history of other specified conditions 05/20/2013    History of shortness of breath    Personal history of other specified conditions 06/20/2014    History of shortness of breath       Family/Caregiver Present: Yes  Caregiver Feedback: Wife in at end of session supportive  Co-Treatment: PT  Co-Treatment Reason: Co eval with PT to maximize pt mobility, safetu and particiipation  Prior to Session Communication: Bedside nurse  Patient Position Received: Bed, 4 rail up, Alarm on  Preferred Learning Style: auditory, kinesthetic, verbal, visual, written  General Comment: pt agreeable to OT eval  Precautions:  Medical Precautions: Fall precautions, Oxygen therapy device and L/min    Vital Sign (Past 2hrs)        Date/Time Vitals Session Patient Position Pulse Resp SpO2 BP MAP (mmHg)    12/20/24 1036 --  Sitting  --  --  84 %  130/79  91     12/20/24 1037 During OT  Sitting  --  --  54 %  130/79  91     12/20/24 1045 --  --  --  --  --  130/79  91     12/20/24 1117 --  --  91  20  100 %  126/80  --                   Vital Signs Comment: pt SOB with activity    Pain:  Pain Assessment  Pain Assessment: 0-10  0-10 (Numeric) Pain Score: 0 - No pain    Objective   Cognition:  Overall Cognitive Status: Within Functional Limits  Orientation Level: Oriented X4  Attention: Within Functional Limits  Memory: Within Funtional Limits  Insight: Within function limits  Impulsive: Within functional limits           Home Living:  Type of Home: House  Lives With: Spouse  Home Adaptive Equipment: Walker rolling or standard  (Rollator)  Home Layout: Two level, Able to live on main level with bedroom/bathroom  Home Access: Stairs to enter with rails  Entrance Stairs-Rails: Both  Entrance Stairs-Number of Steps: 5  Bathroom Shower/Tub: Tub/shower unit, Walk-in shower  Bathroom Toilet: Standard  Bathroom Equipment: Grab bars in shower, Shower chair with back   Prior Function:  Level of [place]: Independent with ADLs and functional transfers, Needs assistance with homemaking  Receives Help From: Family  ADL Assistance: Independent  Homemaking Assistance: Needs assistance  Homemaking Assistance Comments: Wife completes homemaking tasks  Ambulatory Assistance: Independent (rollator)  Leisure: Pt enjoys watching tv  and taking care of his 2 dogs  Hand Dominance: Right  IADL History:  Homemaking Responsibilities: No  Current License: Yes  Mode of Transportation:  (spouse drives pt)  ADL:  Eating Assistance: Independent  Grooming Assistance: Independent  Bathing Assistance: Moderate  Bathing Deficit: Left lower leg including foot, Right lower leg including foot, Buttocks  UE Dressing Assistance: Minimal  UE Dressing Deficit: Pull down in back, Pull around back, Pull over head  LE Dressing Assistance: Moderate  LE Dressing Deficit: Don/doff R sock, Don/doff L sock, Thread RLE into pants, Thread LLE into pants  Toileting Assistance with Device: Total  Toileting Deficit: Other (Comment) (pure wick)  Activity Tolerance:  Endurance: Decreased tolerance for upright activites  Activity Tolerance Comments: SOB wih mobiity  Bed Mobility/Transfers: Bed Mobility  Bed Mobility: Yes  Bed Mobility 1  Bed Mobility 1: Supine to sitting, Sitting to supine  Level of Assistance 1: Contact guard  Bed Mobility Comments 1: assit bringing trunk to upright positin    Transfers  Transfer: Yes  Transfer 1  Technique 1: Sit to stand, Stand to sit  Transfer Device 1: Walker, Gait belt  Transfer Level of Assistance 1: Contact guard      Functional Mobility:  Functional  Mobility  Functional Mobility Performed: Yes  Functional Mobility 1  Surface 1: Level tile  Device 1: Rolling walker  Functional Mobility Support Devices: Gait belt  Assistance 1: Contact guard, Minimum assistance, Minimal tactile cues  Quality of Functional Mobility 1: Narrow base of support  Sitting Balance:  Static Sitting Balance  Static Sitting-Balance Support: Feet supported  Static Sitting-Level of Assistance: Contact guard  Dynamic Sitting Balance  Dynamic Sitting-Balance Support: Feet supported  Dynamic Sitting-Level of Assistance: Contact guard  Dynamic Sitting-Balance: Reaching for objects, Reaching across midline, Forward lean  Standing Balance:  Static Standing Balance  Static Standing-Balance Support: Bilateral upper extremity supported  Static Standing-Level of Assistance: Contact guard  Dynamic Standing Balance  Dynamic Standing-Balance Support: Bilateral upper extremity supported  Dynamic Standing-Level of Assistance: Contact guard  Dynamic Standing-Balance: Reaching for objects    IADL's:   Homemaking Responsibilities: No  Current License: Yes  Mode of Transportation:  (spouse drives pt)  Vision: Vision - Basic Assessment  Current Vision: No visual deficits  Sensation:  Light Touch: No apparent deficits  Strength:  Strength Comments: B UE WFL  Perception:     Coordination:  Movements are Fluid and Coordinated: Yes  Finger to Nose: Intact  Coordination Comment: appears intact   Hand Function:  Hand Function  Gross Grasp: Functional  Coordination: Functional  Extremities: RUE   RUE : Within Functional Limits and LUE   LUE: Within Functional Limits    Outcome Measures: [facility] Daily Activity  Putting on and taking off regular lower body clothing: A lot  Bathing (including washing, rinsing, drying): A lot  Putting on and taking off regular upper body clothing: A little  Toileting, which includes using toilet, bedpan or urinal: Total  Taking care of personal grooming such as brushing teeth: None  Eating  Meals: None  Daily Activity - Total Score: 16      Education Documentation  Handouts, taught by Darleen Navarro OT at 12/20/2024 12:23 PM.  Learner: Patient  Readiness: Acceptance  Method: Explanation  Response: Verbalizes Understanding, Needs Reinforcement    Precautions, taught by Darleen Navarro OT at 12/20/2024 12:23 PM.  Learner: Patient  Readiness: Acceptance  Method: Explanation  Response: Verbalizes Understanding, Needs Reinforcement    Home Exercise Program, taught by Darleen Navarro OT at 12/20/2024 12:23 PM.  Learner: Patient  Readiness: Acceptance  Method: Explanation  Response: Verbalizes Understanding, Needs Reinforcement    ADL Training, taught by Darleen Navarro OT at 12/20/2024 12:23 PM.  Learner: Patient  Readiness: Acceptance  Method: Explanation  Response: Verbalizes Understanding, Needs Reinforcement    Education Comments  No comments found.      Goals:   Encounter Problems       Encounter Problems (Active)       ADLs       Patient will perform UB and LB bathing 75% with minimal assist  level of assistance and adaotive equipment prn . (Progressing)       Start:  12/20/24            Patient with complete upper body dressing with contact guard assist level of assistance donning and doffing all UE clothes with no adaptive equipment while supported sitting (Progressing)       Start:  12/20/24            Patient with complete lower body dressing with minimal assist  level of assistance donning and doffing all LE clothes  with reacher, shoe horn, sock-aid, and dressing stick  while supported sitting (Progressing)       Start:  12/20/24            Pt will tolerate 30 min OT tx session w/o subj/obj c/o fatigue/SOB with activity to increase independence  (Progressing)       Start:  12/20/24               MOBILITY       Patient will perform Functional mobility mod  Household distances/Community Distances with stand by assist level of assistance and front wheeled walker in order to  improve safety and functional mobility. (Progressing)       Start:  12/20/24               TRANSFERS       Patient will perform bed mobility stand by assist level of assistance and bed rails in order to improve safety and independence with mobility (Progressing)       Start:  12/20/24            Patient will complete functional transfer to chair with front wheeled walker with stand by assist level of assistance. (Progressing)       Start:  12/20/24

## 2024-12-20 NOTE — CONSULTS
Reason For Consult  End-stage kidney disease    History Of Present Illness  Mr. Armstrong is a 74-year-old man we have met many times in the past, Dr. Xiong follows him for his chronic kidney disease.  He presented with abdominal and chest pressure for 1 month, worse in the last 2 weeks.  Anorexia, losing weight, what is felt to be severe malnutrition and unintentional weight loss.  He has COPD on 3 L, known hepatitis C.  10-12 pound weight loss in the past month per his wife.  He was hospitalized between November 20 and the 26 for acute on chronic kidney injury, plans to see Dr. Xiong in January but now he is back.    I saw him in November.  EF is slightly low, has the pulmonary hypertension, COPD with severe emphysema, takes prophylactic azithromycin, is on as many as 4 L of oxygen at home.  Hepatitis C status post oral treatment, had a negative viral load.  When I saw him last hospital stay he had lost 37 pounds which she claims was inexplicable.  But he claimed to have a good appetite and that he was eating.    He now has primarily the diffuse abdominal pain.  I reviewed his CT scan which notes the emphysema no adrenal thickening or nodules, kidneys are normal in size, no calculi.  No acute cardiopulmonary disease.  Does have diverticulosis coli.      On social history tobacco abuser until 2006, is still drinking scotch, denies other drugs.    On family history mother with dementia, sister with diabetes, there is no chronic kidney disease.     Past Medical History  He has a past medical history of CATHIE (acute kidney injury) (CMS-HCC) (05/03/2023), Cataract, COPD (chronic obstructive pulmonary disease) (Multi), Cough, unspecified (06/20/2014), Emphysema of lung (Multi), Encounter for immunization (01/19/2015), Encounter for screening for malignant neoplasm of prostate, Other conditions influencing health status (05/20/2013), Personal history of colonic polyps, Personal history of other specified conditions  (05/20/2013), and Personal history of other specified conditions (06/20/2014).    Surgical History  He has a past surgical history that includes Colonoscopy (02/21/2013); Appendectomy (02/21/2013); and Eye surgery.    Allergies  Patient has no known allergies.      Current Facility-Administered Medications:     acetaminophen (Tylenol) tablet 650 mg, 650 mg, oral, q4h PRN **OR** acetaminophen (Tylenol) oral liquid 650 mg, 650 mg, oral, q4h PRN **OR** acetaminophen (Tylenol) suppository 650 mg, 650 mg, rectal, q4h PRN, Rachel Cooley PA-C    albuterol 2.5 mg /3 mL (0.083 %) nebulizer solution 2.5 mg, 2.5 mg, nebulization, q2h PRN, Brayden Giordano MD    [Held by provider] amLODIPine (Norvasc) tablet 10 mg, 10 mg, oral, Daily, Rachel Cooley PA-C    azithromycin (Zithromax) tablet 250 mg, 250 mg, oral, Daily, Wan Bhakta MD    budesonide (Pulmicort) 0.25 mg/2 mL nebulizer solution 0.25 mg, 0.25 mg, nebulization, BID, Rachel Kenny, PharmD, 0.25 mg at 12/20/24 0705    dextrose 5 % and lactated Ringer's infusion, 100 mL/hr, intravenous, Continuous, Rachel Cooley PA-C, Last Rate: 100 mL/hr at 12/20/24 0631, 100 mL/hr at 12/20/24 0631    heparin (porcine) injection 5,000 Units, 5,000 Units, subcutaneous, q8h GULSHAN, Rachel Cooley PA-C, 5,000 Units at 12/19/24 2151    HYDROmorphone PF (Dilaudid) injection 0.2 mg, 0.2 mg, intravenous, q3h PRN, Darleen Wyatt APRN-CNP, 0.2 mg at 12/20/24 1034    ipratropium-albuteroL (Duo-Neb) 0.5-2.5 mg/3 mL nebulizer solution 3 mL, 3 mL, nebulization, TID, Brayden Giordano MD, 3 mL at 12/20/24 0705    melatonin tablet 3 mg, 3 mg, oral, Nightly PRN, Rachel Cooley PA-C    ondansetron ODT (Zofran-ODT) disintegrating tablet 4 mg, 4 mg, oral, q8h PRN **OR** ondansetron (Zofran) injection 4 mg, 4 mg, intravenous, q8h PRN, Rachel Cooley PA-C    oxygen (O2) therapy, , inhalation, Continuous - Inhalation, Rachel Cooley PA-C, 3 L/min at 12/20/24 1254    pantoprazole (ProtoNix)  injection 40 mg, 40 mg, intravenous, Daily before breakfast, Rachel Cooley PA-C, 40 mg at 12/20/24 0631    polyethylene glycol (Glycolax, Miralax) packet 17 g, 17 g, oral, Daily, Rachel Cooley PA-C    predniSONE (Deltasone) tablet 5 mg, 5 mg, oral, Daily, Rachel Cooley PA-C, 5 mg at 12/20/24 0856    rosuvastatin (Crestor) tablet 10 mg, 10 mg, oral, Daily, Rachel Cooley PA-C, 10 mg at 12/20/24 0856    triamcinolone (Kenalog) 0.1 % ointment 1 Application, 1 Application, Topical, BID PRN, Rachel Cooley PA-C     Medications Discontinued During This Encounter   Medication Reason    tiotropium (Spiriva Respimat) 2.5 mcg/actuation inhaler 2 puff     fluticasone furoate-vilanteroL (Breo Ellipta) 100-25 mcg/dose inhaler 1 puff     budesonide (Pulmicort) 0.5 mg/2 mL nebulizer solution 0.5 mg     albuterol 2.5 mg /3 mL (0.083 %) nebulizer solution 2.5 mg     ipratropium-albuteroL (Duo-Neb) 0.5-2.5 mg/3 mL nebulizer solution 3 mL          Social History  He reports that he quit smoking about 18 years ago. His smoking use included cigarettes. He started smoking about 33 years ago. He has been exposed to tobacco smoke. He has never used smokeless tobacco. He reports current alcohol use. He reports that he does not use drugs.    Family History  Family History   Problem Relation Name Age of Onset    Dementia Mother      Diabetes Sister          Physical Exam  Constitutional:       Appearance: Cachexia  HENT:      Mouth/Throat:      Mouth: Mucous membranes are moist.   Eyes:      Extraocular Movements: Extraocular movements intact.      Pupils: Pupils are equal, round, and reactive to light.   Cardiovascular:      Rate and Rhythm: Regular rhythm.      Heart sounds: S1 normal and S2 normal.  No rub  Pulmonary:      Breath sounds: Coarse breath sounds throughout  Abdominal:      Comments: Soft, NT/ND, no masses, normal bowel sounds   Genitourinary:     Comments: No zapata  Musculoskeletal:      Right lower leg: No edema.  "     Left lower leg: No edema.   Skin:     General: Skin is warm and dry.   Neurological:      General: No focal deficit present.      Mental Status: She is alert and oriented to person, place, and time.   Psychiatric:         Behavior: Behavior normal.      Last Recorded Vitals  Blood pressure 118/75, pulse 68, temperature 36.4 °C (97.5 °F), temperature source Oral, resp. rate 18, height 1.753 m (5' 9\"), weight 45.4 kg (100 lb), SpO2 100%.    Last 24 hour lab Results  Results for orders placed or performed during the hospital encounter of 12/19/24 (from the past 24 hours)   CBC and Auto Differential   Result Value Ref Range    WBC 8.2 4.4 - 11.3 x10*3/uL    nRBC 0.0 0.0 - 0.0 /100 WBCs    RBC 3.58 (L) 4.50 - 5.90 x10*6/uL    Hemoglobin 10.5 (L) 13.5 - 17.5 g/dL    Hematocrit 33.5 (L) 41.0 - 52.0 %    MCV 94 80 - 100 fL    MCH 29.3 26.0 - 34.0 pg    MCHC 31.3 (L) 32.0 - 36.0 g/dL    RDW 14.4 11.5 - 14.5 %    Platelets 168 150 - 450 x10*3/uL    Neutrophils % 84.0 40.0 - 80.0 %    Immature Granulocytes %, Automated 0.6 0.0 - 0.9 %    Lymphocytes % 9.9 13.0 - 44.0 %    Monocytes % 3.8 2.0 - 10.0 %    Eosinophils % 0.7 0.0 - 6.0 %    Basophils % 1.0 0.0 - 2.0 %    Neutrophils Absolute 6.87 (H) 1.60 - 5.50 x10*3/uL    Immature Granulocytes Absolute, Automated 0.05 0.00 - 0.50 x10*3/uL    Lymphocytes Absolute 0.81 0.80 - 3.00 x10*3/uL    Monocytes Absolute 0.31 0.05 - 0.80 x10*3/uL    Eosinophils Absolute 0.06 0.00 - 0.40 x10*3/uL    Basophils Absolute 0.08 0.00 - 0.10 x10*3/uL   Comprehensive metabolic panel   Result Value Ref Range    Glucose 107 (H) 74 - 99 mg/dL    Sodium 134 (L) 136 - 145 mmol/L    Potassium 5.0 3.5 - 5.3 mmol/L    Chloride 99 98 - 107 mmol/L    Bicarbonate 26 21 - 32 mmol/L    Anion Gap 14 10 - 20 mmol/L    Urea Nitrogen 52 (H) 6 - 23 mg/dL    Creatinine 7.28 (H) 0.50 - 1.30 mg/dL    eGFR 7 (L) >60 mL/min/1.73m*2    Calcium 11.5 (H) 8.6 - 10.3 mg/dL    Albumin 3.4 3.4 - 5.0 g/dL    Alkaline " Phosphatase 50 33 - 136 U/L    Total Protein 7.3 6.4 - 8.2 g/dL    AST 30 9 - 39 U/L    Bilirubin, Total 0.4 0.0 - 1.2 mg/dL    ALT 21 10 - 52 U/L   Lipase   Result Value Ref Range    Lipase 45 9 - 82 U/L   Troponin I, High Sensitivity   Result Value Ref Range    Troponin I, High Sensitivity 19 0 - 20 ng/L   TSH   Result Value Ref Range    Thyroid Stimulating Hormone 1.53 0.44 - 3.98 mIU/L   Phosphorus   Result Value Ref Range    Phosphorus 3.8 2.5 - 4.9 mg/dL   Troponin I, High Sensitivity   Result Value Ref Range    Troponin I, High Sensitivity 20 0 - 20 ng/L   Urinalysis with Reflex Culture and Microscopic   Result Value Ref Range    Color, Urine Light-Yellow Light-Yellow, Yellow, Dark-Yellow    Appearance, Urine Clear Clear    Specific Gravity, Urine 1.007 1.005 - 1.035    pH, Urine 7.0 5.0, 5.5, 6.0, 6.5, 7.0, 7.5, 8.0    Protein, Urine 20 (TRACE) NEGATIVE, 10 (TRACE), 20 (TRACE) mg/dL    Glucose, Urine Normal Normal mg/dL    Blood, Urine 0.2 (2+) (A) NEGATIVE    Ketones, Urine NEGATIVE NEGATIVE mg/dL    Bilirubin, Urine NEGATIVE NEGATIVE    Urobilinogen, Urine Normal Normal mg/dL    Nitrite, Urine NEGATIVE NEGATIVE    Leukocyte Esterase, Urine 75 Cecilia/µL (A) NEGATIVE   Microscopic Only, Urine   Result Value Ref Range    WBC, Urine 6-10 (A) 1-5, NONE /HPF    RBC, Urine 11-20 (A) NONE, 1-2, 3-5 /HPF   CBC   Result Value Ref Range    WBC 7.8 4.4 - 11.3 x10*3/uL    nRBC 0.0 0.0 - 0.0 /100 WBCs    RBC 3.40 (L) 4.50 - 5.90 x10*6/uL    Hemoglobin 9.9 (L) 13.5 - 17.5 g/dL    Hematocrit 29.3 (L) 41.0 - 52.0 %    MCV 86 80 - 100 fL    MCH 29.1 26.0 - 34.0 pg    MCHC 33.8 32.0 - 36.0 g/dL    RDW 14.4 11.5 - 14.5 %    Platelets 189 150 - 450 x10*3/uL   Basic metabolic panel   Result Value Ref Range    Glucose 100 (H) 74 - 99 mg/dL    Sodium 136 136 - 145 mmol/L    Potassium 4.4 3.5 - 5.3 mmol/L    Chloride 99 98 - 107 mmol/L    Bicarbonate 31 21 - 32 mmol/L    Anion Gap 10 10 - 20 mmol/L    Urea Nitrogen 50 (H) 6 - 23  mg/dL    Creatinine 6.82 (H) 0.50 - 1.30 mg/dL    eGFR 8 (L) >60 mL/min/1.73m*2    Calcium 10.7 (H) 8.6 - 10.3 mg/dL     *Note: Due to a large number of results and/or encounters for the requested time period, some results have not been displayed. A complete set of results can be found in Results Review.        Imaging results   Esophagogastroduodenoscopy (EGD)    Result Date: 12/20/2024  Table formatting from the original result was not included. Impression The upper third of the esophagus, middle third of the esophagus and lower third of the esophagus appeared normal. Mild atrophic mucosa in the body of the stomach and antrum; performed cold forceps biopsy The duodenal bulb and 2nd part of the duodenum appeared normal. Performed random biopsy to rule out celiac disease. Findings The upper third of the esophagus, middle third of the esophagus and lower third of the esophagus appeared normal. Mild, generalized atrophic mucosa in the body of the stomach and antrum; performed cold forceps biopsy The duodenal bulb and 2nd part of the duodenum appeared normal. Performed random biopsy using biopsy forceps to rule out celiac disease. Recommendation Await pathology results Follow inpatient GI team recommendations Diet and medications per inpatient team (see EPIC EMR)  Indication Anorexia, Generalized abdominal pain, Unintentional weight loss, Failure to thrive in adult Staff Staff Role Montse Braun MD, MS Proceduralist Medications See Anesthesia Record. Preprocedure A history and physical has been performed, and patient medication allergies have been reviewed. The patient's tolerance of previous anesthesia has been reviewed. The risks and benefits of the procedure and the sedation options and risks were discussed with the patient. All questions were answered and informed consent obtained. Details of the Procedure The patient underwent monitored anesthesia care, which was administered by an anesthesia professional. The  patient's blood pressure, ECG, ETCO2, heart rate, level of consciousness, oxygen and respirations were monitored throughout the procedure. The scope was introduced through the mouth and advanced to the second part of the duodenum. Retroflexion was performed in the cardia. The patient's estimated blood loss was minimal (<5 mL). The procedure was not difficult. The patient tolerated the procedure well. There were no apparent adverse events. Events Procedure Events Event Event Time ENDO SCOPE IN TIME 12/20/2024 12:25 PM ENDO SCOPE OUT TIME 12/20/2024 12:33 PM Specimens ID Type Source Tests Collected by Time 1 :  Tissue ANTRUM BODY BIOPSY SURGICAL PATHOLOGY EXAM Shaq Estrada MA 12/20/2024 1228 2 : 2nd portion and bulb Tissue DUODENUM SECOND PART BIOPSY SURGICAL PATHOLOGY EXAM Shaq Estrada MA 12/20/2024 1230 Procedure Location [address] [address] [phone] Referring Provider Montse Braun MD, MS Procedure Provider Montse Braun MD, MS     Electrocardiogram, 12-lead PRN ACS symptoms    Result Date: 12/20/2024  Normal sinus rhythm Low voltage QRS Septal infarct (cited on or before 24-SEP-2024) Abnormal ECG When compared with ECG of 24-SEP-2024 09:18, No significant change was found    CT chest abdomen pelvis wo IV contrast    Result Date: 12/19/2024  STUDY: CT Chest, Abdomen, and Pelvis without IV Contrast; 12/19/2024, 1450 INDICATION: Generalized abdominal pain and tenderness that radiates into chest. COMPARISON: XR chest 4/29/2023, CTA chest 12/15/2022, 6/17/2022. ACCESSION NUMBER(S): [id number] ORDERING CLINICIAN: CLARISA GARCIA TECHNIQUE: CT of the chest, abdomen, and pelvis was performed.  Contiguous axial images were obtained at 3 mm slice thickness through the chest, abdomen, and pelvis.  Coronal and sagittal reconstructions at 3 mm slice thickness were performed.  No intravenous contrast was administered.  FINDINGS: Please  note that the evaluation of vessels, lymph nodes and organs is limited without intravenous contrast. CHEST: MEDIASTINUM: The heart is normal in size without pericardial effusion.  Coronary artery calcifications are not identified.  LUNGS/PLEURA: There is no pleural effusion or pneumothorax.  Prominent emphysematous changes of the lungs again noted involving predominantly the lower lobes.  Stable fibronodular scarring within both lungs.  No evidence of a lung mass or suspicious pulmonary nodule. LYMPH NODES: Thoracic lymph nodes are not enlarged. ABDOMEN:  LIVER: No hepatomegaly.  Smooth surface contour.  Normal attenuation.  BILE DUCTS: No intrahepatic or extrahepatic biliary ductal dilatation.  GALLBLADDER: Small calcified stones are present within the gallbladder. STOMACH: No abnormalities identified.  PANCREAS: No masses or ductal dilatation.  SPLEEN: No splenomegaly or focal splenic lesion.  ADRENAL GLANDS: No thickening or nodules.  KIDNEYS AND URETERS: Kidneys are normal in size and location.  No renal or ureteral calculi.  PELVIS:  BLADDER: No abnormalities identified.  REPRODUCTIVE ORGANS: Prostate gland is mildly enlarged.  BOWEL: Diverticulosis of the colon noted without evidence of acute diverticulitis.  VESSELS: No abnormalities identified.  Abdominal aorta is normal in caliber.  PERITONEUM/RETROPERITONEUM/LYMPH NODES: No free fluid.  No pneumoperitoneum. No lymphadenopathy.  ABDOMINAL WALL: No abnormalities identified. SOFT TISSUES: No abnormalities identified.  BONES: No acute fracture or aggressive osseous lesion.    No acute cardiopulmonary abnormality. Pulmonary emphysema. Cholelithiasis. Diverticulosis coli. Signed by Drake Hernandez MD    XR elbow right 3+ views    Result Date: 12/10/2024  Interpreted By:  Juan Luis Mario, STUDY: XR ELBOW RIGHT 3+ VIEWS; ;  12/9/2024 2:10 pm   INDICATION: Signs/Symptoms:right elbow pain.   ,M25.521 Pain in right elbow   COMPARISON: None.   ACCESSION NUMBER(S):  [id number]   ORDERING CLINICIAN: GAIL BEUCLER   FINDINGS: Right elbow, five views   There is no fracture. There is no dislocation. There are no degenerative changes. There is no lytic or sclerotic lesion. There is no soft tissue abnormality seen. There is no effusion       Normal radiographs of the right elbow     MACRO: None   Signed by: Juan Luis Mario 12/10/2024 7:20 PM Dictation workstation:   [id number]    CT abdomen pelvis wo IV contrast    Result Date: 11/21/2024  Interpreted By:  Tavia Cifuentes, STUDY: CT ABDOMEN PELVIS WO IV CONTRAST;  11/20/2024 11:32 pm   INDICATION: Signs/Symptoms:Worsening renal function, hematuria, rule out ureteral stone.   COMPARISON: Renal ultrasound 09/24/2024. CT lung screening 02/13/2024. CT angiogram chest 12/15/2022. Liver ultrasound 06/13/2022.   ACCESSION NUMBER(S): [id number]   ORDERING CLINICIAN: CLARISA GARCIA   TECHNIQUE: CT of the abdomen and pelvis was performed with no contrast administration. Coronal and sagittal reformats were obtained.   FINDINGS: LOWER CHEST: Redemonstration of severe bilateral emphysematous changes.   ABDOMEN:   LIVER: Unremarkable unenhanced appearance.   BILE DUCTS: No obvious dilation.   GALLBLADDER: There is cholelithiasis.   PANCREAS: No peripancreatic inflammatory changes.   SPLEEN: Unremarkable unenhanced appearance.   ADRENAL GLANDS: Unremarkable.   KIDNEYS AND URETERS: No hydronephrosis or hydroureter.  No obstructing ureteral calculi.   BOWEL: No bowel obstruction. There is colonic diverticulosis with no CT evidence for acute diverticulitis.   VESSELS: Atherosclerotic calcifications at the abdominal aorta. No abdominal aortic aneurysm.   PELVIS: The urinary bladder is partially distended. The prostate measures 4.9 cm in transverse diameter.   PERITONEUM/RETROPERITONEUM/LYMPH NODES: No free fluid or free air.  No retroperitoneal hemorrhage. Calcified lymph nodes are noted at the periportal region.   ABDOMINAL WALL: The abdominal wall  soft tissues are within normal limits.   BONE AND SOFT TISSUE: Degenerative changes at the spine.   IMPRESSION 1.  No hydronephrosis or obstructing ureteral calculi. No acute findings on unenhanced CT. 2. Cholelithiasis. 3. Colonic diverticulosis.       MACRO: None.   Signed by: Tavia Cifuentes 11/21/2024 12:25 AM Dictation workstation:   [id number]        Assessment/Plan   Mr. Armstrong is a 74-year-old man we have met many times in the past, Dr. Xiong follows him for his chronic kidney disease.  He presented with abdominal and chest pressure for 1 month, worse in the last 2 weeks.  Anorexia, losing weight, what is felt to be severe malnutrition and unintentional weight loss.  He has COPD on 3 L, known hepatitis C.  10-12 pound weight loss in the past month per his wife.  He was hospitalized between November 20 and the 26 for acute on chronic kidney injury, plans to see Dr. Xiong in January but now he is back.    I saw him in November.  EF is slightly low, has the pulmonary hypertension, COPD with severe emphysema, takes prophylactic azithromycin, is on as many as 4 L of oxygen at home.  Hepatitis C status post oral treatment, had a negative viral load.  When I saw him last hospital stay he had lost 37 pounds which she claims was inexplicable.  But he claimed to have a good appetite and that he was eating.    He now has primarily the diffuse abdominal pain.  I reviewed his CT scan which notes the emphysema no adrenal thickening or nodules, kidneys are normal in size, no calculi.  No acute cardiopulmonary disease.  Does have diverticulosis coli.      He has end-stage kidney disease, anorexia, he needs to commence dialysis soon.  They had a class, they will choose peritoneal dialysis.  Dr. Xiong and I will see whether his COPD will allow the increased abdominal pressure but it is a reasonable start.  I will check an intact PTH, vitamin D has been monitored.  Blood pressures are slightly low at times.  Hoping to  place a PD catheter in January.  I we will give him a dose of intravenous iron and erythropoietin.    55 minutes in the care of Mr. Armstrong.  No absolute indications to commence dialysis.      Jaya Wynn MD

## 2024-12-20 NOTE — PROGRESS NOTES
12/20/24 0716   Discharge Planning   Living Arrangements Spouse/significant other   Support Systems Spouse/significant other   Type of Residence Private residence   Number of Stairs to Enter Residence 5   Number of Stairs Within Residence 14   Do you have animals or pets at home? Yes   Type of Animals or Pets 2 dogs   Home or Post Acute Services In home services   Type of Home Care Services Home PT;Home OT   Expected Discharge Disposition Home Health   Does the patient need discharge transport arranged? Yes   RoundTrip coordination needed? Yes   Has discharge transport been arranged? No   Financial Resource Strain   How hard is it for you to pay for the very basics like food, housing, medical care, and heating? Not very   Housing Stability   In the last 12 months, was there a time when you were not able to pay the mortgage or rent on time? N   At any time in the past 12 months, were you homeless or living in a shelter (including now)? N   Transportation Needs   In the past 12 months, has lack of transportation kept you from medical appointments or from getting medications? no   In the past 12 months, has lack of transportation kept you from meetings, work, or from getting things needed for daily living? No   Patient Choice   Provider Choice list and CMS website (https://medicare.gov/care-compare#search) for post-acute Quality and Resource Measure Data were provided and reviewed with: Patient     Patient is alertx3 at his baseline he is independent with his ADL's lives at home with his wife, has home o2 his baseline is 3L. Patient recently dc on 11/26, patient adm for weakness and failure to thrive, GI on consult and with nephro and pal care, will resume [facility] on discharge, patient has PT/OT eval placed. I will continue to monitor for dc planing and home going needs.

## 2024-12-20 NOTE — H&P (VIEW-ONLY)
Reason For Consult  End-stage kidney disease    History Of Present Illness  Mr. Armstrong is a 74-year-old man we have met many times in the past, Dr. Xiong follows him for his chronic kidney disease.  He presented with abdominal and chest pressure for 1 month, worse in the last 2 weeks.  Anorexia, losing weight, what is felt to be severe malnutrition and unintentional weight loss.  He has COPD on 3 L, known hepatitis C.  10-12 pound weight loss in the past month per his wife.  He was hospitalized between November 20 and the 26 for acute on chronic kidney injury, plans to see Dr. Xiong in January but now he is back.    I saw him in November.  EF is slightly low, has the pulmonary hypertension, COPD with severe emphysema, takes prophylactic azithromycin, is on as many as 4 L of oxygen at home.  Hepatitis C status post oral treatment, had a negative viral load.  When I saw him last hospital stay he had lost 37 pounds which she claims was inexplicable.  But he claimed to have a good appetite and that he was eating.    He now has primarily the diffuse abdominal pain.  I reviewed his CT scan which notes the emphysema no adrenal thickening or nodules, kidneys are normal in size, no calculi.  No acute cardiopulmonary disease.  Does have diverticulosis coli.      On social history tobacco abuser until 2006, is still drinking scotch, denies other drugs.    On family history mother with dementia, sister with diabetes, there is no chronic kidney disease.     Past Medical History  He has a past medical history of CATHIE (acute kidney injury) (CMS-HCC) (05/03/2023), Cataract, COPD (chronic obstructive pulmonary disease) (Multi), Cough, unspecified (06/20/2014), Emphysema of lung (Multi), Encounter for immunization (01/19/2015), Encounter for screening for malignant neoplasm of prostate, Other conditions influencing health status (05/20/2013), Personal history of colonic polyps, Personal history of other specified conditions  (05/20/2013), and Personal history of other specified conditions (06/20/2014).    Surgical History  He has a past surgical history that includes Colonoscopy (02/21/2013); Appendectomy (02/21/2013); and Eye surgery.    Allergies  Patient has no known allergies.      Current Facility-Administered Medications:     acetaminophen (Tylenol) tablet 650 mg, 650 mg, oral, q4h PRN **OR** acetaminophen (Tylenol) oral liquid 650 mg, 650 mg, oral, q4h PRN **OR** acetaminophen (Tylenol) suppository 650 mg, 650 mg, rectal, q4h PRN, Rachel Cooley PA-C    albuterol 2.5 mg /3 mL (0.083 %) nebulizer solution 2.5 mg, 2.5 mg, nebulization, q2h PRN, Brayden Giordano MD    [Held by provider] amLODIPine (Norvasc) tablet 10 mg, 10 mg, oral, Daily, Rachel Cooley PA-C    azithromycin (Zithromax) tablet 250 mg, 250 mg, oral, Daily, Wan Bhakta MD    budesonide (Pulmicort) 0.25 mg/2 mL nebulizer solution 0.25 mg, 0.25 mg, nebulization, BID, Rachel Kenny, PharmD, 0.25 mg at 12/20/24 0705    dextrose 5 % and lactated Ringer's infusion, 100 mL/hr, intravenous, Continuous, Rachel Cooley PA-C, Last Rate: 100 mL/hr at 12/20/24 0631, 100 mL/hr at 12/20/24 0631    heparin (porcine) injection 5,000 Units, 5,000 Units, subcutaneous, q8h GULSHAN, Rachel Cooley PA-C, 5,000 Units at 12/19/24 2151    HYDROmorphone PF (Dilaudid) injection 0.2 mg, 0.2 mg, intravenous, q3h PRN, Darleen Wyatt APRN-CNP, 0.2 mg at 12/20/24 1034    ipratropium-albuteroL (Duo-Neb) 0.5-2.5 mg/3 mL nebulizer solution 3 mL, 3 mL, nebulization, TID, Brayden Giordano MD, 3 mL at 12/20/24 0705    melatonin tablet 3 mg, 3 mg, oral, Nightly PRN, Rachel Cooley PA-C    ondansetron ODT (Zofran-ODT) disintegrating tablet 4 mg, 4 mg, oral, q8h PRN **OR** ondansetron (Zofran) injection 4 mg, 4 mg, intravenous, q8h PRN, Rachel Cooley PA-C    oxygen (O2) therapy, , inhalation, Continuous - Inhalation, Rachel Cooley PA-C, 3 L/min at 12/20/24 1254    pantoprazole (ProtoNix)  injection 40 mg, 40 mg, intravenous, Daily before breakfast, Rachel Cooley PA-C, 40 mg at 12/20/24 0631    polyethylene glycol (Glycolax, Miralax) packet 17 g, 17 g, oral, Daily, Rachel Cooley PA-C    predniSONE (Deltasone) tablet 5 mg, 5 mg, oral, Daily, Rachel Cooley PA-C, 5 mg at 12/20/24 0856    rosuvastatin (Crestor) tablet 10 mg, 10 mg, oral, Daily, Rachel Cooley PA-C, 10 mg at 12/20/24 0856    triamcinolone (Kenalog) 0.1 % ointment 1 Application, 1 Application, Topical, BID PRN, Rachel Cooley PA-C     Medications Discontinued During This Encounter   Medication Reason    tiotropium (Spiriva Respimat) 2.5 mcg/actuation inhaler 2 puff     fluticasone furoate-vilanteroL (Breo Ellipta) 100-25 mcg/dose inhaler 1 puff     budesonide (Pulmicort) 0.5 mg/2 mL nebulizer solution 0.5 mg     albuterol 2.5 mg /3 mL (0.083 %) nebulizer solution 2.5 mg     ipratropium-albuteroL (Duo-Neb) 0.5-2.5 mg/3 mL nebulizer solution 3 mL          Social History  He reports that he quit smoking about 18 years ago. His smoking use included cigarettes. He started smoking about 33 years ago. He has been exposed to tobacco smoke. He has never used smokeless tobacco. He reports current alcohol use. He reports that he does not use drugs.    Family History  Family History   Problem Relation Name Age of Onset    Dementia Mother      Diabetes Sister          Physical Exam  Constitutional:       Appearance: Cachexia  HENT:      Mouth/Throat:      Mouth: Mucous membranes are moist.   Eyes:      Extraocular Movements: Extraocular movements intact.      Pupils: Pupils are equal, round, and reactive to light.   Cardiovascular:      Rate and Rhythm: Regular rhythm.      Heart sounds: S1 normal and S2 normal.  No rub  Pulmonary:      Breath sounds: Coarse breath sounds throughout  Abdominal:      Comments: Soft, NT/ND, no masses, normal bowel sounds   Genitourinary:     Comments: No zapata  Musculoskeletal:      Right lower leg: No edema.  "     Left lower leg: No edema.   Skin:     General: Skin is warm and dry.   Neurological:      General: No focal deficit present.      Mental Status: She is alert and oriented to person, place, and time.   Psychiatric:         Behavior: Behavior normal.      Last Recorded Vitals  Blood pressure 118/75, pulse 68, temperature 36.4 °C (97.5 °F), temperature source Oral, resp. rate 18, height 1.753 m (5' 9\"), weight 45.4 kg (100 lb), SpO2 100%.    Last 24 hour lab Results  Results for orders placed or performed during the hospital encounter of 12/19/24 (from the past 24 hours)   CBC and Auto Differential   Result Value Ref Range    WBC 8.2 4.4 - 11.3 x10*3/uL    nRBC 0.0 0.0 - 0.0 /100 WBCs    RBC 3.58 (L) 4.50 - 5.90 x10*6/uL    Hemoglobin 10.5 (L) 13.5 - 17.5 g/dL    Hematocrit 33.5 (L) 41.0 - 52.0 %    MCV 94 80 - 100 fL    MCH 29.3 26.0 - 34.0 pg    MCHC 31.3 (L) 32.0 - 36.0 g/dL    RDW 14.4 11.5 - 14.5 %    Platelets 168 150 - 450 x10*3/uL    Neutrophils % 84.0 40.0 - 80.0 %    Immature Granulocytes %, Automated 0.6 0.0 - 0.9 %    Lymphocytes % 9.9 13.0 - 44.0 %    Monocytes % 3.8 2.0 - 10.0 %    Eosinophils % 0.7 0.0 - 6.0 %    Basophils % 1.0 0.0 - 2.0 %    Neutrophils Absolute 6.87 (H) 1.60 - 5.50 x10*3/uL    Immature Granulocytes Absolute, Automated 0.05 0.00 - 0.50 x10*3/uL    Lymphocytes Absolute 0.81 0.80 - 3.00 x10*3/uL    Monocytes Absolute 0.31 0.05 - 0.80 x10*3/uL    Eosinophils Absolute 0.06 0.00 - 0.40 x10*3/uL    Basophils Absolute 0.08 0.00 - 0.10 x10*3/uL   Comprehensive metabolic panel   Result Value Ref Range    Glucose 107 (H) 74 - 99 mg/dL    Sodium 134 (L) 136 - 145 mmol/L    Potassium 5.0 3.5 - 5.3 mmol/L    Chloride 99 98 - 107 mmol/L    Bicarbonate 26 21 - 32 mmol/L    Anion Gap 14 10 - 20 mmol/L    Urea Nitrogen 52 (H) 6 - 23 mg/dL    Creatinine 7.28 (H) 0.50 - 1.30 mg/dL    eGFR 7 (L) >60 mL/min/1.73m*2    Calcium 11.5 (H) 8.6 - 10.3 mg/dL    Albumin 3.4 3.4 - 5.0 g/dL    Alkaline " Phosphatase 50 33 - 136 U/L    Total Protein 7.3 6.4 - 8.2 g/dL    AST 30 9 - 39 U/L    Bilirubin, Total 0.4 0.0 - 1.2 mg/dL    ALT 21 10 - 52 U/L   Lipase   Result Value Ref Range    Lipase 45 9 - 82 U/L   Troponin I, High Sensitivity   Result Value Ref Range    Troponin I, High Sensitivity 19 0 - 20 ng/L   TSH   Result Value Ref Range    Thyroid Stimulating Hormone 1.53 0.44 - 3.98 mIU/L   Phosphorus   Result Value Ref Range    Phosphorus 3.8 2.5 - 4.9 mg/dL   Troponin I, High Sensitivity   Result Value Ref Range    Troponin I, High Sensitivity 20 0 - 20 ng/L   Urinalysis with Reflex Culture and Microscopic   Result Value Ref Range    Color, Urine Light-Yellow Light-Yellow, Yellow, Dark-Yellow    Appearance, Urine Clear Clear    Specific Gravity, Urine 1.007 1.005 - 1.035    pH, Urine 7.0 5.0, 5.5, 6.0, 6.5, 7.0, 7.5, 8.0    Protein, Urine 20 (TRACE) NEGATIVE, 10 (TRACE), 20 (TRACE) mg/dL    Glucose, Urine Normal Normal mg/dL    Blood, Urine 0.2 (2+) (A) NEGATIVE    Ketones, Urine NEGATIVE NEGATIVE mg/dL    Bilirubin, Urine NEGATIVE NEGATIVE    Urobilinogen, Urine Normal Normal mg/dL    Nitrite, Urine NEGATIVE NEGATIVE    Leukocyte Esterase, Urine 75 Cecilia/µL (A) NEGATIVE   Microscopic Only, Urine   Result Value Ref Range    WBC, Urine 6-10 (A) 1-5, NONE /HPF    RBC, Urine 11-20 (A) NONE, 1-2, 3-5 /HPF   CBC   Result Value Ref Range    WBC 7.8 4.4 - 11.3 x10*3/uL    nRBC 0.0 0.0 - 0.0 /100 WBCs    RBC 3.40 (L) 4.50 - 5.90 x10*6/uL    Hemoglobin 9.9 (L) 13.5 - 17.5 g/dL    Hematocrit 29.3 (L) 41.0 - 52.0 %    MCV 86 80 - 100 fL    MCH 29.1 26.0 - 34.0 pg    MCHC 33.8 32.0 - 36.0 g/dL    RDW 14.4 11.5 - 14.5 %    Platelets 189 150 - 450 x10*3/uL   Basic metabolic panel   Result Value Ref Range    Glucose 100 (H) 74 - 99 mg/dL    Sodium 136 136 - 145 mmol/L    Potassium 4.4 3.5 - 5.3 mmol/L    Chloride 99 98 - 107 mmol/L    Bicarbonate 31 21 - 32 mmol/L    Anion Gap 10 10 - 20 mmol/L    Urea Nitrogen 50 (H) 6 - 23  mg/dL    Creatinine 6.82 (H) 0.50 - 1.30 mg/dL    eGFR 8 (L) >60 mL/min/1.73m*2    Calcium 10.7 (H) 8.6 - 10.3 mg/dL     *Note: Due to a large number of results and/or encounters for the requested time period, some results have not been displayed. A complete set of results can be found in Results Review.        Imaging results   Esophagogastroduodenoscopy (EGD)    Result Date: 12/20/2024  Table formatting from the original result was not included. Impression The upper third of the esophagus, middle third of the esophagus and lower third of the esophagus appeared normal. Mild atrophic mucosa in the body of the stomach and antrum; performed cold forceps biopsy The duodenal bulb and 2nd part of the duodenum appeared normal. Performed random biopsy to rule out celiac disease. Findings The upper third of the esophagus, middle third of the esophagus and lower third of the esophagus appeared normal. Mild, generalized atrophic mucosa in the body of the stomach and antrum; performed cold forceps biopsy The duodenal bulb and 2nd part of the duodenum appeared normal. Performed random biopsy using biopsy forceps to rule out celiac disease. Recommendation Await pathology results Follow inpatient GI team recommendations Diet and medications per inpatient team (see EPIC EMR)  Indication Anorexia, Generalized abdominal pain, Unintentional weight loss, Failure to thrive in adult Staff Staff Role Montse Braun MD, MS Proceduralist Medications See Anesthesia Record. Preprocedure A history and physical has been performed, and patient medication allergies have been reviewed. The patient's tolerance of previous anesthesia has been reviewed. The risks and benefits of the procedure and the sedation options and risks were discussed with the patient. All questions were answered and informed consent obtained. Details of the Procedure The patient underwent monitored anesthesia care, which was administered by an anesthesia professional. The  patient's blood pressure, ECG, ETCO2, heart rate, level of consciousness, oxygen and respirations were monitored throughout the procedure. The scope was introduced through the mouth and advanced to the second part of the duodenum. Retroflexion was performed in the cardia. The patient's estimated blood loss was minimal (<5 mL). The procedure was not difficult. The patient tolerated the procedure well. There were no apparent adverse events. Events Procedure Events Event Event Time ENDO SCOPE IN TIME 12/20/2024 12:25 PM ENDO SCOPE OUT TIME 12/20/2024 12:33 PM Specimens ID Type Source Tests Collected by Time 1 :  Tissue ANTRUM BODY BIOPSY SURGICAL PATHOLOGY EXAM Shaq Estrada MA 12/20/2024 1228 2 : 2nd portion and bulb Tissue DUODENUM SECOND PART BIOPSY SURGICAL PATHOLOGY EXAM Shaq Estrada MA 12/20/2024 1230 Procedure Location [address] [address] [phone] Referring Provider Montse Braun MD, MS Procedure Provider Montse Braun MD, MS     Electrocardiogram, 12-lead PRN ACS symptoms    Result Date: 12/20/2024  Normal sinus rhythm Low voltage QRS Septal infarct (cited on or before 24-SEP-2024) Abnormal ECG When compared with ECG of 24-SEP-2024 09:18, No significant change was found    CT chest abdomen pelvis wo IV contrast    Result Date: 12/19/2024  STUDY: CT Chest, Abdomen, and Pelvis without IV Contrast; 12/19/2024, 1450 INDICATION: Generalized abdominal pain and tenderness that radiates into chest. COMPARISON: XR chest 4/29/2023, CTA chest 12/15/2022, 6/17/2022. ACCESSION NUMBER(S): [id number] ORDERING CLINICIAN: CLARISA GARCIA TECHNIQUE: CT of the chest, abdomen, and pelvis was performed.  Contiguous axial images were obtained at 3 mm slice thickness through the chest, abdomen, and pelvis.  Coronal and sagittal reconstructions at 3 mm slice thickness were performed.  No intravenous contrast was administered.  FINDINGS: Please  note that the evaluation of vessels, lymph nodes and organs is limited without intravenous contrast. CHEST: MEDIASTINUM: The heart is normal in size without pericardial effusion.  Coronary artery calcifications are not identified.  LUNGS/PLEURA: There is no pleural effusion or pneumothorax.  Prominent emphysematous changes of the lungs again noted involving predominantly the lower lobes.  Stable fibronodular scarring within both lungs.  No evidence of a lung mass or suspicious pulmonary nodule. LYMPH NODES: Thoracic lymph nodes are not enlarged. ABDOMEN:  LIVER: No hepatomegaly.  Smooth surface contour.  Normal attenuation.  BILE DUCTS: No intrahepatic or extrahepatic biliary ductal dilatation.  GALLBLADDER: Small calcified stones are present within the gallbladder. STOMACH: No abnormalities identified.  PANCREAS: No masses or ductal dilatation.  SPLEEN: No splenomegaly or focal splenic lesion.  ADRENAL GLANDS: No thickening or nodules.  KIDNEYS AND URETERS: Kidneys are normal in size and location.  No renal or ureteral calculi.  PELVIS:  BLADDER: No abnormalities identified.  REPRODUCTIVE ORGANS: Prostate gland is mildly enlarged.  BOWEL: Diverticulosis of the colon noted without evidence of acute diverticulitis.  VESSELS: No abnormalities identified.  Abdominal aorta is normal in caliber.  PERITONEUM/RETROPERITONEUM/LYMPH NODES: No free fluid.  No pneumoperitoneum. No lymphadenopathy.  ABDOMINAL WALL: No abnormalities identified. SOFT TISSUES: No abnormalities identified.  BONES: No acute fracture or aggressive osseous lesion.    No acute cardiopulmonary abnormality. Pulmonary emphysema. Cholelithiasis. Diverticulosis coli. Signed by Drake Hernandez MD    XR elbow right 3+ views    Result Date: 12/10/2024  Interpreted By:  Juan Luis Mario, STUDY: XR ELBOW RIGHT 3+ VIEWS; ;  12/9/2024 2:10 pm   INDICATION: Signs/Symptoms:right elbow pain.   ,M25.521 Pain in right elbow   COMPARISON: None.   ACCESSION NUMBER(S):  [id number]   ORDERING CLINICIAN: GAIL BEUCLER   FINDINGS: Right elbow, five views   There is no fracture. There is no dislocation. There are no degenerative changes. There is no lytic or sclerotic lesion. There is no soft tissue abnormality seen. There is no effusion       Normal radiographs of the right elbow     MACRO: None   Signed by: Juan Luis Mario 12/10/2024 7:20 PM Dictation workstation:   [id number]    CT abdomen pelvis wo IV contrast    Result Date: 11/21/2024  Interpreted By:  Tavia Cifuentes, STUDY: CT ABDOMEN PELVIS WO IV CONTRAST;  11/20/2024 11:32 pm   INDICATION: Signs/Symptoms:Worsening renal function, hematuria, rule out ureteral stone.   COMPARISON: Renal ultrasound 09/24/2024. CT lung screening 02/13/2024. CT angiogram chest 12/15/2022. Liver ultrasound 06/13/2022.   ACCESSION NUMBER(S): [id number]   ORDERING CLINICIAN: CLARISA GARCIA   TECHNIQUE: CT of the abdomen and pelvis was performed with no contrast administration. Coronal and sagittal reformats were obtained.   FINDINGS: LOWER CHEST: Redemonstration of severe bilateral emphysematous changes.   ABDOMEN:   LIVER: Unremarkable unenhanced appearance.   BILE DUCTS: No obvious dilation.   GALLBLADDER: There is cholelithiasis.   PANCREAS: No peripancreatic inflammatory changes.   SPLEEN: Unremarkable unenhanced appearance.   ADRENAL GLANDS: Unremarkable.   KIDNEYS AND URETERS: No hydronephrosis or hydroureter.  No obstructing ureteral calculi.   BOWEL: No bowel obstruction. There is colonic diverticulosis with no CT evidence for acute diverticulitis.   VESSELS: Atherosclerotic calcifications at the abdominal aorta. No abdominal aortic aneurysm.   PELVIS: The urinary bladder is partially distended. The prostate measures 4.9 cm in transverse diameter.   PERITONEUM/RETROPERITONEUM/LYMPH NODES: No free fluid or free air.  No retroperitoneal hemorrhage. Calcified lymph nodes are noted at the periportal region.   ABDOMINAL WALL: The abdominal wall  soft tissues are within normal limits.   BONE AND SOFT TISSUE: Degenerative changes at the spine.   IMPRESSION 1.  No hydronephrosis or obstructing ureteral calculi. No acute findings on unenhanced CT. 2. Cholelithiasis. 3. Colonic diverticulosis.       MACRO: None.   Signed by: Tavia Cifuentes 11/21/2024 12:25 AM Dictation workstation:   [id number]        Assessment/Plan   Mr. Armstrong is a 74-year-old man we have met many times in the past, Dr. Xiong follows him for his chronic kidney disease.  He presented with abdominal and chest pressure for 1 month, worse in the last 2 weeks.  Anorexia, losing weight, what is felt to be severe malnutrition and unintentional weight loss.  He has COPD on 3 L, known hepatitis C.  10-12 pound weight loss in the past month per his wife.  He was hospitalized between November 20 and the 26 for acute on chronic kidney injury, plans to see Dr. Xiong in January but now he is back.    I saw him in November.  EF is slightly low, has the pulmonary hypertension, COPD with severe emphysema, takes prophylactic azithromycin, is on as many as 4 L of oxygen at home.  Hepatitis C status post oral treatment, had a negative viral load.  When I saw him last hospital stay he had lost 37 pounds which she claims was inexplicable.  But he claimed to have a good appetite and that he was eating.    He now has primarily the diffuse abdominal pain.  I reviewed his CT scan which notes the emphysema no adrenal thickening or nodules, kidneys are normal in size, no calculi.  No acute cardiopulmonary disease.  Does have diverticulosis coli.      He has end-stage kidney disease, anorexia, he needs to commence dialysis soon.  They had a class, they will choose peritoneal dialysis.  Dr. Xiong and I will see whether his COPD will allow the increased abdominal pressure but it is a reasonable start.  I will check an intact PTH, vitamin D has been monitored.  Blood pressures are slightly low at times.  Hoping to  place a PD catheter in January.  I we will give him a dose of intravenous iron and erythropoietin.    55 minutes in the care of Mr. Armstrong.  No absolute indications to commence dialysis.      Jaya Wynn MD

## 2024-12-20 NOTE — PROGRESS NOTES
Physical Therapy    Physical Therapy Evaluation    Patient Name: Kalyan Armstrong  MRN: [id number]  Department: [facility] GI LAB  Room: [facility]  Today's Date: 12/20/2024   Time Calculation  Start Time: 1036  Stop Time: 1058  Time Calculation (min): 22 min    Assessment/Plan   PT Assessment  PT Assessment Results: Decreased strength, Decreased endurance, Impaired balance, Decreased mobility  Rehab Prognosis: Good  Barriers to Discharge Home: Physical needs  Physical Needs: Stair navigation into home limited by function/safety  Evaluation/Treatment Tolerance: Patient limited by fatigue  Medical Staff Made Aware: Yes  Strengths: Ability to acquire knowledge, Premorbid level of function  Barriers to Participation: Comorbidities  End of Session Communication: Bedside nurse  Assessment Comment: Pt presents today with fair functional BLE strength, balance, and decreased endurance/activity tolerance. Pt would benefit from continued PT to progress gait training, improve endurance, and attempt stair training to maximize independence with functional mobility at D/C. AT D/C, pt would currently benefit from moderate intensity therapy but demonstrates ability to progress to low recommendation pending progress with gait and stair training.  End of Session Patient Position: Bed, 3 rail up, Alarm on  IP OR SWING BED PT PLAN  Inpatient or Swing Bed: Inpatient  PT Plan  Treatment/Interventions: Bed mobility, Transfer training, Gait training, Stair training, Balance training, Strengthening, Endurance training, Therapeutic exercise, Therapeutic activity, Home exercise program, Postural re-education  PT Plan: Ongoing PT  PT Frequency: 3 times per week  PT Discharge Recommendations: Moderate intensity level of continued care (Possible low rec pending gait and stair training)  Equipment Recommended upon Discharge: Wheeled walker  PT Recommended Transfer Status: Contact guard, Assistive device  PT - OK to Discharge: Yes (PT POC initiated  today)    Subjective   General Visit Information:  General  Reason for Referral: 75 y/o M presenting with abdominal pain and chest pressure x 1 month.  Referred By: IBAN Cooley (JEFF)  Past Medical History Relevant to Rehab:   Past Medical History:   Diagnosis Date    CATHIE (acute kidney injury) (CMS-HCC) 05/03/2023    Cataract     COPD (chronic obstructive pulmonary disease) (Multi)     Cough, unspecified 06/20/2014    Cough    Emphysema of lung (Multi)     Encounter for immunization 01/19/2015    Need for influenza vaccination    Encounter for screening for malignant neoplasm of prostate     Encounter for screening for malignant neoplasm of prostate    Other conditions influencing health status 05/20/2013    Digital Blood Vessel Rupture    Personal history of colonic polyps     History of colon polyps    Personal history of other specified conditions 05/20/2013    History of shortness of breath    Personal history of other specified conditions 06/20/2014    History of shortness of breath     Family/Caregiver Present: Yes  Caregiver Feedback: Wife present at end of session  Co-Treatment: OT  Co-Treatment Reason: Co-evaluation with OT to maximize pt safety, transfer ability, and participation.  Prior to Session Communication: Bedside nurse  Patient Position Received: Bed, 4 rail up, Alarm on (RN exiting room)  Preferred Learning Style: auditory, kinesthetic, verbal  Home Living:  Home Living  Type of Home: House  Lives With: Spouse  Home Adaptive Equipment: Walker rolling or standard (Rollator)  Home Layout: Two level, Able to live on main level with bedroom/bathroom  Home Access: Stairs to enter with rails  Entrance Stairs-Rails: Both  Entrance Stairs-Number of Steps: 6  Bathroom Shower/Tub: Tub/shower unit, Walk-in shower  Bathroom Toilet: Standard  Bathroom Equipment: Shower chair with back  Bathroom Accessibility: Pt reports having both a tub/shower and WIS  Prior Level of Function:  Prior Function Per Pt/Caregiver  Report  Level of [place]: Independent with ADLs and functional transfers  ADL Assistance: Independent  Homemaking Assistance:  (Pt reports spouse completes IADLs)  Ambulatory Assistance: Independent (Mod IND using rollator)  Leisure: Pt enjoys watching tv  and taking care of his 2 dogs  Hand Dominance: Right  Prior Function Comments: Spouse provides transportation. Pt denies recent falls  Precautions:  Precautions  Medical Precautions: Fall precautions, Oxygen therapy device and L/min     Vital Signs (Past 2hrs)        Date/Time Vitals Session Patient Position Pulse Resp SpO2 BP MAP (mmHg)    12/20/24 1036 --  Sitting  --  --  84 %  130/79  91     12/20/24 1045 --  --  --  --  --  130/79  91     12/20/24 1117 --  --  91  20  100 %  126/80  --                   Vital Signs Comment: Pt visually short of breath during activity.     Objective   Pain:  Pain Assessment  Pain Assessment: 0-10  0-10 (Numeric) Pain Score: 0 - No pain  Cognition:  Cognition  Orientation Level: Oriented X4    General Assessments:  Activity Tolerance  Endurance: Decreased tolerance for upright activites  Activity Tolerance Comments: Shortness of breath with low SpO2 reading    Sensation  Light Touch: No apparent deficits    Coordination  Coordination Comment: Appears intact    Postural Control  Head Control: Forward head posture in standing  Posture Comment: Increased hip and mild trunk flexion in standing with FWW support    Static Sitting Balance  Static Sitting-Balance Support: Bilateral upper extremity supported, Feet supported  Static Sitting-Level of Assistance: Close supervision  Static Sitting-Comment/Number of Minutes: At EOB  Dynamic Sitting Balance  Dynamic Sitting-Balance Support: Bilateral upper extremity supported  Dynamic Sitting-Level of Assistance: Contact guard    Static Standing Balance  Static Standing-Balance Support: Bilateral upper extremity supported  Static Standing-Level of Assistance: Contact guard  Static  Standing-Comment/Number of Minutes: +Gait belt with FWW support  Dynamic Standing Balance  Dynamic Standing-Balance Support: Bilateral upper extremity supported  Dynamic Standing-Level of Assistance: Contact guard  Dynamic Standing-Comments: +Gait belt with FWW support  Functional Assessments:  Bed Mobility  Bed Mobility: Yes  Bed Mobility 1  Bed Mobility 1: Supine to sitting  Level of Assistance 1: Contact guard  Bed Mobility Comments 1: Pt able to progress BLE off the EOB with HOB elevated. CGA provided to trunk for stability.  Bed Mobility 2  Bed Mobility  2: Sitting to supine  Level of Assistance 2: Contact guard  Bed Mobility Comments 2: HOB lowered. Pt able to lift BLE into bed with CGA to trunk during descent to the bed.    Transfers  Transfer: Yes  Transfer 1  Transfer From 1: Bed to  Transfer to 1: Stand  Technique 1: Sit to stand  Transfer Device 1: Walker, Gait belt  Transfer Level of Assistance 1: Contact guard, Minimal verbal cues  Trials/Comments 1: Pt attempts to rest both hands on FWW to stand. VC for B hand placement on bed and BUE push to stand.  Transfers 2  Transfer From 2: Stand to  Transfer to 2: Bed  Technique 2: Stand to sit  Transfer Device 2: Walker, Gait belt  Transfer Level of Assistance 2: Contact guard, Minimal verbal cues  Trials/Comments 2: VC for BLE positioning against the bed before attempting to sit. VC for BUE reach for the bed when sitting. Pt able to demonstrate unilateral UE reach for the bed.    Ambulation/Gait Training  Ambulation/Gait Training Performed: Yes  Ambulation/Gait Training 1  Surface 1: Level tile  Device 1: Rolling walker  Gait Support Devices: Gait belt  Assistance 1: Contact guard, Minimal verbal cues  Quality of Gait 1: Forward flexed posture, Decreased step length  Comments/Distance (ft) 1: 4 steps forward/backward. 4-5 sidesteps right along the EOB. Pt demonstrated increased B hip flexion and elbows in increased extension. VC for forward gaze and proper  spacing between pt and FWW. Pt demonstrates increased lateral sway.    Stairs  Stairs: No  Extremity/Trunk Assessments:  RLE   RLE : Exceptions to WFL  Strength RLE  R Hip Flexion: 4/5  R Knee Extension: 4/5  R Ankle Dorsiflexion: 4-/5  R Ankle Plantar Flexion: 3+/5  LLE   LLE : Exceptions to WFL  Strength LLE  L Hip Flexion: 4/5  L Knee Extension: 4/5  L Ankle Dorsiflexion: 4-/5  L Ankle Plantar Flexion: 3+/5  Outcome Measures:  [facility] Basic Mobility  Turning from your back to your side while in a flat bed without using bedrails: A little  Moving from lying on your back to sitting on the side of a flat bed without using bedrails: A little  Moving to and from bed to chair (including a wheelchair): A little  Standing up from a chair using your arms (e.g. wheelchair or bedside chair): A little  To walk in hospital room: A little  Climbing 3-5 steps with railing: Total  Basic Mobility - Total Score: 16    Encounter Problems       Encounter Problems (Active)       Balance       Goal 1 (Progressing)       Start:  12/20/24    Expected End:  01/03/25       Pt performs all sitting balance IND and standing balance with supervision using FWW            Mobility       STG - Patient will navigate 4-6 steps with B HR support mod IND (Not Progressing)       Start:  12/20/24    Expected End:  01/03/25            STG - Patient will ambulate (Progressing)       Start:  12/20/24    Expected End:  01/03/25       100 ft with supervision using proper gait mechanics using FWW            PT Transfers       STG - Patient to transfer to and from sit to supine (Progressing)       Start:  12/20/24    Expected End:  01/03/25       IND         STG - Patient will transfer sit to and from stand (Progressing)       Start:  12/20/24    Expected End:  01/03/25       Mod IND using FWW and proper body mechanics              Education Documentation  Precautions, taught by Pedro Gordillo PT at 12/20/2024 11:47 AM.  Learner: Patient  Readiness:  Acceptance  Method: Explanation, Demonstration  Response: Verbalizes Understanding    Body Mechanics, taught by Pedro Gordillo PT at 12/20/2024 11:47 AM.  Learner: Patient  Readiness: Acceptance  Method: Explanation, Demonstration  Response: Verbalizes Understanding    Mobility Training, taught by Pedro Gordillo PT at 12/20/2024 11:47 AM.  Learner: Patient  Readiness: Acceptance  Method: Explanation, Demonstration  Response: Verbalizes Understanding

## 2024-12-20 NOTE — PROGRESS NOTES
[facility] Hospitalist Progress Note        Between 7AM-7PM please message me via Epic Secure Chat.  After 7PM please page Nocturnist on call.        Assessment/Plan     Reported unintentional weight loss/chronic abdominal pain/adult FTT  CATHIE on CKD 5  COPD/Chronic respiratory failure on 3-4L NC (he is on PO pred 5mg daily and PO azithro 250mg daily at home for COPD through his pulmonologist)  Severe protein calorie malnutrition  Anemia  HTN  Chronic Hep C reportedly post-treatment and negative viral load  DNR comfort care      - palliative care consult to assist with sx management  - GI consult for weight loss -> sounds like plan is for EGD  - nephrology consult for CTAHIE on CKD  - dietary/PT/OT consults    Fluids: D5LR infusion  Lytes: Replete as needed  Nutrition: NPO  Orr: None  Invasive lines: None  Drains: None    DVT Prophylaxis:  Heparin subq      Discharge Planning: Pending PT/OT evaluations      I personally examined the patient and reviewed chart.  Plan of care was discussed with patient, all questions answered.    Total time spent: At least 38 minutes, providing counseling or in coordination of care. Total time on this day of visit includes record and documentation review before and after visit including documentation and time not explicitly included on EMR time stamp.      Subjective     Kalyan Armstrong is a 74 y.o. male on day 1 of admission presenting with Generalized abdominal pain.    NAEON. Overall stable, does not complain of new acute issues. Pursed lip breathing noted, states felt a bit short of breath when he came into ER. Fairly stable today    Review of Systems   Constitutional:  Positive for unexpected weight change. Negative for fever.   Respiratory:  Positive for shortness of breath.    Cardiovascular:  Negative for chest pain.   Gastrointestinal:  Negative for abdominal distention, abdominal pain and vomiting.       Objective     Physical Exam  Constitutional:       Comments: Cachetic appearing  "  Cardiovascular:      Rate and Rhythm: Normal rate and regular rhythm.   Pulmonary:      Effort: No respiratory distress.   Abdominal:      Palpations: Abdomen is soft.      Tenderness: There is no abdominal tenderness.   Neurological:      Mental Status: He is alert. Mental status is at baseline.         Last Recorded Vitals  Blood pressure 107/79, pulse 79, temperature 36.1 °C (97 °F), temperature source Temporal, resp. rate 20, height 1.753 m (5' 9\"), weight 45.4 kg (100 lb), SpO2 100%.  Intake/Output last 3 Shifts:  I/O last 3 completed shifts:  In: 1101.3 (24.3 mL/kg) [I.V.:718.3 (15.8 mL/kg); IV Piggyback:383]  Out: 475 (10.5 mL/kg) [Urine:475 (0.3 mL/kg/hr)]  Weight: 45.4 kg     Relevant Results  Results for orders placed or performed during the hospital encounter of 12/19/24 (from the past 24 hours)   Electrocardiogram, 12-lead PRN ACS symptoms   Result Value Ref Range    Ventricular Rate 99 BPM    Atrial Rate 99 BPM    ID Interval 140 ms    QRS Duration 74 ms    QT Interval 306 ms    QTC Calculation(Bazett) 392 ms    P Axis 84 degrees    R Axis 89 degrees    T Axis 83 degrees    QRS Count 16 beats    Q Onset 226 ms    P Onset 156 ms    P Offset 206 ms    T Offset 379 ms    QTC Fredericia 361 ms   CBC and Auto Differential   Result Value Ref Range    WBC 8.2 4.4 - 11.3 x10*3/uL    nRBC 0.0 0.0 - 0.0 /100 WBCs    RBC 3.58 (L) 4.50 - 5.90 x10*6/uL    Hemoglobin 10.5 (L) 13.5 - 17.5 g/dL    Hematocrit 33.5 (L) 41.0 - 52.0 %    MCV 94 80 - 100 fL    MCH 29.3 26.0 - 34.0 pg    MCHC 31.3 (L) 32.0 - 36.0 g/dL    RDW 14.4 11.5 - 14.5 %    Platelets 168 150 - 450 x10*3/uL    Neutrophils % 84.0 40.0 - 80.0 %    Immature Granulocytes %, Automated 0.6 0.0 - 0.9 %    Lymphocytes % 9.9 13.0 - 44.0 %    Monocytes % 3.8 2.0 - 10.0 %    Eosinophils % 0.7 0.0 - 6.0 %    Basophils % 1.0 0.0 - 2.0 %    Neutrophils Absolute 6.87 (H) 1.60 - 5.50 x10*3/uL    Immature Granulocytes Absolute, Automated 0.05 0.00 - 0.50 x10*3/uL    " Lymphocytes Absolute 0.81 0.80 - 3.00 x10*3/uL    Monocytes Absolute 0.31 0.05 - 0.80 x10*3/uL    Eosinophils Absolute 0.06 0.00 - 0.40 x10*3/uL    Basophils Absolute 0.08 0.00 - 0.10 x10*3/uL   Comprehensive metabolic panel   Result Value Ref Range    Glucose 107 (H) 74 - 99 mg/dL    Sodium 134 (L) 136 - 145 mmol/L    Potassium 5.0 3.5 - 5.3 mmol/L    Chloride 99 98 - 107 mmol/L    Bicarbonate 26 21 - 32 mmol/L    Anion Gap 14 10 - 20 mmol/L    Urea Nitrogen 52 (H) 6 - 23 mg/dL    Creatinine 7.28 (H) 0.50 - 1.30 mg/dL    eGFR 7 (L) >60 mL/min/1.73m*2    Calcium 11.5 (H) 8.6 - 10.3 mg/dL    Albumin 3.4 3.4 - 5.0 g/dL    Alkaline Phosphatase 50 33 - 136 U/L    Total Protein 7.3 6.4 - 8.2 g/dL    AST 30 9 - 39 U/L    Bilirubin, Total 0.4 0.0 - 1.2 mg/dL    ALT 21 10 - 52 U/L   Lipase   Result Value Ref Range    Lipase 45 9 - 82 U/L   Troponin I, High Sensitivity   Result Value Ref Range    Troponin I, High Sensitivity 19 0 - 20 ng/L   TSH   Result Value Ref Range    Thyroid Stimulating Hormone 1.53 0.44 - 3.98 mIU/L   Phosphorus   Result Value Ref Range    Phosphorus 3.8 2.5 - 4.9 mg/dL   Troponin I, High Sensitivity   Result Value Ref Range    Troponin I, High Sensitivity 20 0 - 20 ng/L   Urinalysis with Reflex Culture and Microscopic   Result Value Ref Range    Color, Urine Light-Yellow Light-Yellow, Yellow, Dark-Yellow    Appearance, Urine Clear Clear    Specific Gravity, Urine 1.007 1.005 - 1.035    pH, Urine 7.0 5.0, 5.5, 6.0, 6.5, 7.0, 7.5, 8.0    Protein, Urine 20 (TRACE) NEGATIVE, 10 (TRACE), 20 (TRACE) mg/dL    Glucose, Urine Normal Normal mg/dL    Blood, Urine 0.2 (2+) (A) NEGATIVE    Ketones, Urine NEGATIVE NEGATIVE mg/dL    Bilirubin, Urine NEGATIVE NEGATIVE    Urobilinogen, Urine Normal Normal mg/dL    Nitrite, Urine NEGATIVE NEGATIVE    Leukocyte Esterase, Urine 75 Cecilia/µL (A) NEGATIVE   Microscopic Only, Urine   Result Value Ref Range    WBC, Urine 6-10 (A) 1-5, NONE /HPF    RBC, Urine 11-20 (A) NONE,  1-2, 3-5 /HPF   CBC   Result Value Ref Range    WBC 7.8 4.4 - 11.3 x10*3/uL    nRBC 0.0 0.0 - 0.0 /100 WBCs    RBC 3.40 (L) 4.50 - 5.90 x10*6/uL    Hemoglobin 9.9 (L) 13.5 - 17.5 g/dL    Hematocrit 29.3 (L) 41.0 - 52.0 %    MCV 86 80 - 100 fL    MCH 29.1 26.0 - 34.0 pg    MCHC 33.8 32.0 - 36.0 g/dL    RDW 14.4 11.5 - 14.5 %    Platelets 189 150 - 450 x10*3/uL   Basic metabolic panel   Result Value Ref Range    Glucose 100 (H) 74 - 99 mg/dL    Sodium 136 136 - 145 mmol/L    Potassium 4.4 3.5 - 5.3 mmol/L    Chloride 99 98 - 107 mmol/L    Bicarbonate 31 21 - 32 mmol/L    Anion Gap 10 10 - 20 mmol/L    Urea Nitrogen 50 (H) 6 - 23 mg/dL    Creatinine 6.82 (H) 0.50 - 1.30 mg/dL    eGFR 8 (L) >60 mL/min/1.73m*2    Calcium 10.7 (H) 8.6 - 10.3 mg/dL     *Note: Due to a large number of results and/or encounters for the requested time period, some results have not been displayed. A complete set of results can be found in Results Review.       Imaging Results  Electrocardiogram, 12-lead PRN ACS symptoms    Result Date: 12/20/2024  Normal sinus rhythm Low voltage QRS Septal infarct (cited on or before 24-SEP-2024) Abnormal ECG When compared with ECG of 24-SEP-2024 09:18, No significant change was found    CT chest abdomen pelvis wo IV contrast    Result Date: 12/19/2024  STUDY: CT Chest, Abdomen, and Pelvis without IV Contrast; 12/19/2024, 1450 INDICATION: Generalized abdominal pain and tenderness that radiates into chest. COMPARISON: XR chest 4/29/2023, CTA chest 12/15/2022, 6/17/2022. ACCESSION NUMBER(S): [id number] ORDERING CLINICIAN: CLARISA GARCIA TECHNIQUE: CT of the chest, abdomen, and pelvis was performed.  Contiguous axial images were obtained at 3 mm slice thickness through the chest, abdomen, and pelvis.  Coronal and sagittal reconstructions at 3 mm slice thickness were performed.  No intravenous contrast was administered.  FINDINGS: Please note that the evaluation of vessels, lymph nodes and organs is limited  without intravenous contrast. CHEST: MEDIASTINUM: The heart is normal in size without pericardial effusion.  Coronary artery calcifications are not identified.  LUNGS/PLEURA: There is no pleural effusion or pneumothorax.  Prominent emphysematous changes of the lungs again noted involving predominantly the lower lobes.  Stable fibronodular scarring within both lungs.  No evidence of a lung mass or suspicious pulmonary nodule. LYMPH NODES: Thoracic lymph nodes are not enlarged. ABDOMEN:  LIVER: No hepatomegaly.  Smooth surface contour.  Normal attenuation.  BILE DUCTS: No intrahepatic or extrahepatic biliary ductal dilatation.  GALLBLADDER: Small calcified stones are present within the gallbladder. STOMACH: No abnormalities identified.  PANCREAS: No masses or ductal dilatation.  SPLEEN: No splenomegaly or focal splenic lesion.  ADRENAL GLANDS: No thickening or nodules.  KIDNEYS AND URETERS: Kidneys are normal in size and location.  No renal or ureteral calculi.  PELVIS:  BLADDER: No abnormalities identified.  REPRODUCTIVE ORGANS: Prostate gland is mildly enlarged.  BOWEL: Diverticulosis of the colon noted without evidence of acute diverticulitis.  VESSELS: No abnormalities identified.  Abdominal aorta is normal in caliber.  PERITONEUM/RETROPERITONEUM/LYMPH NODES: No free fluid.  No pneumoperitoneum. No lymphadenopathy.  ABDOMINAL WALL: No abnormalities identified. SOFT TISSUES: No abnormalities identified.  BONES: No acute fracture or aggressive osseous lesion.    No acute cardiopulmonary abnormality. Pulmonary emphysema. Cholelithiasis. Diverticulosis coli. Signed by Drake Hernandez MD      Medications  [Held by provider] amLODIPine, 10 mg, oral, Daily  [Held by provider] azithromycin, 250 mg, oral, Daily  budesonide, 0.25 mg, nebulization, BID  heparin (porcine), 5,000 Units, subcutaneous, q8h GULSHAN  ipratropium-albuteroL, 3 mL, nebulization, TID  oxygen, , inhalation, Continuous - Inhalation  pantoprazole, 40 mg,  intravenous, Daily before breakfast  polyethylene glycol, 17 g, oral, Daily  predniSONE, 5 mg, oral, Daily  rosuvastatin, 10 mg, oral, Daily       PRN medications: acetaminophen **OR** acetaminophen **OR** acetaminophen, albuterol, HYDROmorphone, melatonin, ondansetron ODT **OR** ondansetron, triamcinolone                Wan Bhakta MD  [facility]

## 2024-12-20 NOTE — ANESTHESIA PREPROCEDURE EVALUATION
Patient: Kalyan Armstrong    Procedure Information       Date/Time: 12/20/24 1200    Scheduled providers: Montse Braun MD, MS; Jay White MD; Argentina Mancini RN; Shaq Estrada MA    Procedure: EGD    Location: [facility]            Relevant Problems   Anesthesia (within normal limits)      Cardiac   (+) Benign essential hypertension      Pulmonary   (+) Acute exacerbation of chronic obstructive pulmonary disease (COPD) (Multi)   (+) COPD (chronic obstructive pulmonary disease) (Multi)   (+) Chronic obstructive pulmonary disease, unspecified COPD type (Multi)   (+) Pulmonary nodules      Neuro (within normal limits)      GI (within normal limits)      /Renal (within normal limits)      Liver   (+) Chronic viral hepatitis C (Multi)   (+) Elevated LFTs   (+) Hepatitis C      Endocrine (within normal limits)      Hematology   (+) Macrocytic anemia      Musculoskeletal (within normal limits)      HEENT (within normal limits)      ID   (+) Chronic viral hepatitis C (Multi)   (+) Hepatitis C      Skin (within normal limits)       Clinical information reviewed:   Tobacco  Allergies  Meds  Problems  Med Hx  Surg Hx   Fam Hx  Soc   Hx        NPO Detail:  NPO/Void Status  Carbohydrate Drink Given Prior to Surgery? : N  Date of Last Liquid: 12/20/24  Time of Last Liquid: 0000  Date of Last Solid: 12/20/24  Time of Last Solid: 0000  Last Intake Type: Clear fluids  Time of Last Void: 1030 (male external cath)         Physical Exam    Airway  Mallampati: II  TM distance: >3 FB  Neck ROM: full     Cardiovascular - normal exam  Rhythm: regular  Rate: normal     Dental   (+) upper dentures, lower dentures     Pulmonary - normal exam     Abdominal - normal exam             Anesthesia Plan    History of general anesthesia?: yes  History of complications of general anesthesia?: no    ASA 4     general     The patient is not a current smoker.    Anesthetic plan and risks discussed with  patient.  Use of blood products discussed with patient who consented to blood products.

## 2024-12-20 NOTE — CONSULTS
Inpatient consult to Gastroenterology  Consult performed by: Clemente Walsh, BEATRIZ-CNP  Consult ordered by: Rachel Cooley PA-C        Reason For Consult  Patient with abdominal pain and chest pressure for 1 month, weight loss, anorexia, malnutrition, feels full, acute on chronic renal failure, unable to eat.    History Of Present Illness  Kalyan Armstrong is a 74 y.o. male with a PMHx of HFpEF, moderate pulmonary HTN, COPD with chronic hypoxic respiratory failure on 3L  home O2, CKD, anemia, severe calorie protein malnutrition, weight loss, atherosclerosis, Hep C, falls presented to [facility] ED with c/o ongoing weight loss, abdominal and chest pressure.   Patient reports that symptoms started 2 weeks ago and are getting progressively worse.  He had no food in 4 days because of lack of appetite and taste for food.  Reports that he lost 15 to 20 pounds over 1 month.  Denies fever/chills but endorses being cold.  Also denies dysphagia, odynophagia, nausea, vomiting, hematemesis, coffee-ground emesis, hematochezia, melena, hematuria, EtOH use, tobacco product use, marijuana use, street drugs use, NSAIDs, antithrombotics, PPIs with H2 blockers, and family history of GI cancer.  Last bowel movement on Wednesday.  Labs notable for BUN/creatinine 50/6.82, calcium 10.7, H&H 10.9/29.3, MCV 86, UA consistent with UTI.  12/19/2024 CT chest abdomen pelvis without IV contrast  -No acute cardiopulmonary abnormality. Pulmonary emphysema. Cholelithiasis. Diverticulosis coli.  No previous EGD.  9/9/22 colonoscopy  - Two 5 mm polyps in the ascending colon, removed with  a cold snare. Resected and retrieved.  - Four 4 to 6 mm polyps in the transverse colon, removed with a cold snare. Resected and retrieved.  - Severe diverticulosis in the sigmoid colon, in the descending colon and in the transverse colon. There was narrowing of the colon in association with the diverticular opening. There was evidence of diverticular spasm. There was no  evidence of diverticular bleeding.  Repeat colonoscopy in 5 years for surveillance.   FINAL DIAGNOSIS   A.  ASCENDING COLON POLYPS X 2, COLD SNARE:    -- TUBULAR ADENOMA X1.   -- FRAGMENTS OF COLONIC MUCOSA.   B.  TRANSVERSE COLON POLYP X 4, COLD SNARE:    -- TUBULAR ADENOMA (S).   GI service was consulted for EGD in the setting of abdominal pain, anorexia, unintentional weight loss.     Past Medical History  He has a past medical history of CATHIE (acute kidney injury) (CMS-HCC) (05/03/2023), Cataract, COPD (chronic obstructive pulmonary disease) (Multi), Cough, unspecified (06/20/2014), Emphysema of lung (Multi), Encounter for immunization (01/19/2015), Encounter for screening for malignant neoplasm of prostate, Other conditions influencing health status (05/20/2013), Personal history of colonic polyps, Personal history of other specified conditions (05/20/2013), and Personal history of other specified conditions (06/20/2014).    Surgical History  He has a past surgical history that includes Colonoscopy (02/21/2013); Appendectomy (02/21/2013); and Eye surgery.     Social History  He reports that he quit smoking about 18 years ago. His smoking use included cigarettes. He started smoking about 33 years ago. He has been exposed to tobacco smoke. He has never used smokeless tobacco. He reports current alcohol use. He reports that he does not use drugs.    Family History  Family History   Problem Relation Name Age of Onset    Dementia Mother      Diabetes Sister          Allergies  Patient has no known allergies.    Review of Systems  Review of Systems   Constitutional:  Positive for appetite change and unexpected weight change.   HENT: Negative.     Eyes: Negative.    Respiratory:  Positive for chest tightness.    Cardiovascular: Negative.    Gastrointestinal: Negative.    Endocrine: Positive for cold intolerance.   Genitourinary: Negative.    Musculoskeletal: Negative.    Skin: Negative.    Neurological: Negative.     Hematological: Negative.    Psychiatric/Behavioral: Negative.           Physical Exam  Physical Exam  Vitals reviewed.   Constitutional:       Appearance: He is toxic-appearing.   HENT:      Head: Atraumatic.   Cardiovascular:      Rate and Rhythm: Normal rate and regular rhythm.      Heart sounds: Normal heart sounds.   Abdominal:      General: Abdomen is flat. Bowel sounds are normal. There is no distension.      Palpations: Abdomen is soft.      Tenderness: There is no abdominal tenderness. There is no guarding or rebound.   Musculoskeletal:         General: Normal range of motion.   Skin:     General: Skin is warm and dry.   Neurological:      General: No focal deficit present.      Mental Status: He is alert and oriented to person, place, and time.   Psychiatric:         Mood and Affect: Mood normal.         Behavior: Behavior normal.             Last Recorded Vitals  /80   Pulse 91   Temp 36 °C (96.8 °F) (Temporal)   Resp 20   Wt 45.4 kg (100 lb)   SpO2 100%     Relevant Results  Scheduled medications  [Held by provider] amLODIPine, 10 mg, oral, Daily  azithromycin, 250 mg, oral, Daily  budesonide, 0.25 mg, nebulization, BID  heparin (porcine), 5,000 Units, subcutaneous, q8h GULSHAN  ipratropium-albuteroL, 3 mL, nebulization, TID  oxygen, , inhalation, Continuous - Inhalation  pantoprazole, 40 mg, intravenous, Daily before breakfast  polyethylene glycol, 17 g, oral, Daily  predniSONE, 5 mg, oral, Daily  rosuvastatin, 10 mg, oral, Daily      Continuous medications  dextrose 5 % and lactated Ringer's, 100 mL/hr, Last Rate: 100 mL/hr (12/20/24 0631)      PRN medications  PRN medications: acetaminophen **OR** acetaminophen **OR** acetaminophen, albuterol, HYDROmorphone, melatonin, ondansetron ODT **OR** ondansetron, triamcinolone      Results for orders placed or performed during the hospital encounter of 12/19/24 (from the past 24 hours)   Electrocardiogram, 12-lead PRN ACS symptoms   Result Value Ref  Range    Ventricular Rate 99 BPM    Atrial Rate 99 BPM    UT Interval 140 ms    QRS Duration 74 ms    QT Interval 306 ms    QTC Calculation(Bazett) 392 ms    P Axis 84 degrees    R Axis 89 degrees    T Axis 83 degrees    QRS Count 16 beats    Q Onset 226 ms    P Onset 156 ms    P Offset 206 ms    T Offset 379 ms    QTC Fredericia 361 ms   CBC and Auto Differential   Result Value Ref Range    WBC 8.2 4.4 - 11.3 x10*3/uL    nRBC 0.0 0.0 - 0.0 /100 WBCs    RBC 3.58 (L) 4.50 - 5.90 x10*6/uL    Hemoglobin 10.5 (L) 13.5 - 17.5 g/dL    Hematocrit 33.5 (L) 41.0 - 52.0 %    MCV 94 80 - 100 fL    MCH 29.3 26.0 - 34.0 pg    MCHC 31.3 (L) 32.0 - 36.0 g/dL    RDW 14.4 11.5 - 14.5 %    Platelets 168 150 - 450 x10*3/uL    Neutrophils % 84.0 40.0 - 80.0 %    Immature Granulocytes %, Automated 0.6 0.0 - 0.9 %    Lymphocytes % 9.9 13.0 - 44.0 %    Monocytes % 3.8 2.0 - 10.0 %    Eosinophils % 0.7 0.0 - 6.0 %    Basophils % 1.0 0.0 - 2.0 %    Neutrophils Absolute 6.87 (H) 1.60 - 5.50 x10*3/uL    Immature Granulocytes Absolute, Automated 0.05 0.00 - 0.50 x10*3/uL    Lymphocytes Absolute 0.81 0.80 - 3.00 x10*3/uL    Monocytes Absolute 0.31 0.05 - 0.80 x10*3/uL    Eosinophils Absolute 0.06 0.00 - 0.40 x10*3/uL    Basophils Absolute 0.08 0.00 - 0.10 x10*3/uL   Comprehensive metabolic panel   Result Value Ref Range    Glucose 107 (H) 74 - 99 mg/dL    Sodium 134 (L) 136 - 145 mmol/L    Potassium 5.0 3.5 - 5.3 mmol/L    Chloride 99 98 - 107 mmol/L    Bicarbonate 26 21 - 32 mmol/L    Anion Gap 14 10 - 20 mmol/L    Urea Nitrogen 52 (H) 6 - 23 mg/dL    Creatinine 7.28 (H) 0.50 - 1.30 mg/dL    eGFR 7 (L) >60 mL/min/1.73m*2    Calcium 11.5 (H) 8.6 - 10.3 mg/dL    Albumin 3.4 3.4 - 5.0 g/dL    Alkaline Phosphatase 50 33 - 136 U/L    Total Protein 7.3 6.4 - 8.2 g/dL    AST 30 9 - 39 U/L    Bilirubin, Total 0.4 0.0 - 1.2 mg/dL    ALT 21 10 - 52 U/L   Lipase   Result Value Ref Range    Lipase 45 9 - 82 U/L   Troponin I, High Sensitivity   Result  Value Ref Range    Troponin I, High Sensitivity 19 0 - 20 ng/L   TSH   Result Value Ref Range    Thyroid Stimulating Hormone 1.53 0.44 - 3.98 mIU/L   Phosphorus   Result Value Ref Range    Phosphorus 3.8 2.5 - 4.9 mg/dL   Troponin I, High Sensitivity   Result Value Ref Range    Troponin I, High Sensitivity 20 0 - 20 ng/L   Urinalysis with Reflex Culture and Microscopic   Result Value Ref Range    Color, Urine Light-Yellow Light-Yellow, Yellow, Dark-Yellow    Appearance, Urine Clear Clear    Specific Gravity, Urine 1.007 1.005 - 1.035    pH, Urine 7.0 5.0, 5.5, 6.0, 6.5, 7.0, 7.5, 8.0    Protein, Urine 20 (TRACE) NEGATIVE, 10 (TRACE), 20 (TRACE) mg/dL    Glucose, Urine Normal Normal mg/dL    Blood, Urine 0.2 (2+) (A) NEGATIVE    Ketones, Urine NEGATIVE NEGATIVE mg/dL    Bilirubin, Urine NEGATIVE NEGATIVE    Urobilinogen, Urine Normal Normal mg/dL    Nitrite, Urine NEGATIVE NEGATIVE    Leukocyte Esterase, Urine 75 Cecilia/µL (A) NEGATIVE   Microscopic Only, Urine   Result Value Ref Range    WBC, Urine 6-10 (A) 1-5, NONE /HPF    RBC, Urine 11-20 (A) NONE, 1-2, 3-5 /HPF   CBC   Result Value Ref Range    WBC 7.8 4.4 - 11.3 x10*3/uL    nRBC 0.0 0.0 - 0.0 /100 WBCs    RBC 3.40 (L) 4.50 - 5.90 x10*6/uL    Hemoglobin 9.9 (L) 13.5 - 17.5 g/dL    Hematocrit 29.3 (L) 41.0 - 52.0 %    MCV 86 80 - 100 fL    MCH 29.1 26.0 - 34.0 pg    MCHC 33.8 32.0 - 36.0 g/dL    RDW 14.4 11.5 - 14.5 %    Platelets 189 150 - 450 x10*3/uL   Basic metabolic panel   Result Value Ref Range    Glucose 100 (H) 74 - 99 mg/dL    Sodium 136 136 - 145 mmol/L    Potassium 4.4 3.5 - 5.3 mmol/L    Chloride 99 98 - 107 mmol/L    Bicarbonate 31 21 - 32 mmol/L    Anion Gap 10 10 - 20 mmol/L    Urea Nitrogen 50 (H) 6 - 23 mg/dL    Creatinine 6.82 (H) 0.50 - 1.30 mg/dL    eGFR 8 (L) >60 mL/min/1.73m*2    Calcium 10.7 (H) 8.6 - 10.3 mg/dL    Electrocardiogram, 12-lead PRN ACS symptoms    Result Date: 12/20/2024  Normal sinus rhythm Low voltage QRS Septal infarct (cited  on or before 24-SEP-2024) Abnormal ECG When compared with ECG of 24-SEP-2024 09:18, No significant change was found    CT chest abdomen pelvis wo IV contrast    Result Date: 12/19/2024  STUDY: CT Chest, Abdomen, and Pelvis without IV Contrast; 12/19/2024, 1450 INDICATION: Generalized abdominal pain and tenderness that radiates into chest. COMPARISON: XR chest 4/29/2023, CTA chest 12/15/2022, 6/17/2022. ACCESSION NUMBER(S): [id number] ORDERING CLINICIAN: CLARISA GARCIA TECHNIQUE: CT of the chest, abdomen, and pelvis was performed.  Contiguous axial images were obtained at 3 mm slice thickness through the chest, abdomen, and pelvis.  Coronal and sagittal reconstructions at 3 mm slice thickness were performed.  No intravenous contrast was administered.  FINDINGS: Please note that the evaluation of vessels, lymph nodes and organs is limited without intravenous contrast. CHEST: MEDIASTINUM: The heart is normal in size without pericardial effusion.  Coronary artery calcifications are not identified.  LUNGS/PLEURA: There is no pleural effusion or pneumothorax.  Prominent emphysematous changes of the lungs again noted involving predominantly the lower lobes.  Stable fibronodular scarring within both lungs.  No evidence of a lung mass or suspicious pulmonary nodule. LYMPH NODES: Thoracic lymph nodes are not enlarged. ABDOMEN:  LIVER: No hepatomegaly.  Smooth surface contour.  Normal attenuation.  BILE DUCTS: No intrahepatic or extrahepatic biliary ductal dilatation.  GALLBLADDER: Small calcified stones are present within the gallbladder. STOMACH: No abnormalities identified.  PANCREAS: No masses or ductal dilatation.  SPLEEN: No splenomegaly or focal splenic lesion.  ADRENAL GLANDS: No thickening or nodules.  KIDNEYS AND URETERS: Kidneys are normal in size and location.  No renal or ureteral calculi.  PELVIS:  BLADDER: No abnormalities identified.  REPRODUCTIVE ORGANS: Prostate gland is mildly enlarged.  BOWEL:  Diverticulosis of the colon noted without evidence of acute diverticulitis.  VESSELS: No abnormalities identified.  Abdominal aorta is normal in caliber.  PERITONEUM/RETROPERITONEUM/LYMPH NODES: No free fluid.  No pneumoperitoneum. No lymphadenopathy.  ABDOMINAL WALL: No abnormalities identified. SOFT TISSUES: No abnormalities identified.  BONES: No acute fracture or aggressive osseous lesion.    No acute cardiopulmonary abnormality. Pulmonary emphysema. Cholelithiasis. Diverticulosis coli. Signed by Drake Hernandez MD    XR elbow right 3+ views    Result Date: 12/10/2024  Interpreted By:  Juan Luis Mario, STUDY: XR ELBOW RIGHT 3+ VIEWS; ;  12/9/2024 2:10 pm   INDICATION: Signs/Symptoms:right elbow pain.   ,M25.521 Pain in right elbow   COMPARISON: None.   ACCESSION NUMBER(S): [id number]   ORDERING CLINICIAN: GAIL LOZANO   FINDINGS: Right elbow, five views   There is no fracture. There is no dislocation. There are no degenerative changes. There is no lytic or sclerotic lesion. There is no soft tissue abnormality seen. There is no effusion       Normal radiographs of the right elbow     MACRO: None   Signed by: Juan Luis Mario 12/10/2024 7:20 PM Dictation workstation:   [id number]    CT abdomen pelvis wo IV contrast    Result Date: 11/21/2024  Interpreted By:  Tavia Cifuentes, STUDY: CT ABDOMEN PELVIS WO IV CONTRAST;  11/20/2024 11:32 pm   INDICATION: Signs/Symptoms:Worsening renal function, hematuria, rule out ureteral stone.   COMPARISON: Renal ultrasound 09/24/2024. CT lung screening 02/13/2024. CT angiogram chest 12/15/2022. Liver ultrasound 06/13/2022.   ACCESSION NUMBER(S): [id number]   ORDERING CLINICIAN: CLARISA GARCIA   TECHNIQUE: CT of the abdomen and pelvis was performed with no contrast administration. Coronal and sagittal reformats were obtained.   FINDINGS: LOWER CHEST: Redemonstration of severe bilateral emphysematous changes.   ABDOMEN:   LIVER: Unremarkable unenhanced appearance.   BILE DUCTS:  No obvious dilation.   GALLBLADDER: There is cholelithiasis.   PANCREAS: No peripancreatic inflammatory changes.   SPLEEN: Unremarkable unenhanced appearance.   ADRENAL GLANDS: Unremarkable.   KIDNEYS AND URETERS: No hydronephrosis or hydroureter.  No obstructing ureteral calculi.   BOWEL: No bowel obstruction. There is colonic diverticulosis with no CT evidence for acute diverticulitis.   VESSELS: Atherosclerotic calcifications at the abdominal aorta. No abdominal aortic aneurysm.   PELVIS: The urinary bladder is partially distended. The prostate measures 4.9 cm in transverse diameter.   PERITONEUM/RETROPERITONEUM/LYMPH NODES: No free fluid or free air.  No retroperitoneal hemorrhage. Calcified lymph nodes are noted at the periportal region.   ABDOMINAL WALL: The abdominal wall soft tissues are within normal limits.   BONE AND SOFT TISSUE: Degenerative changes at the spine.   IMPRESSION 1.  No hydronephrosis or obstructing ureteral calculi. No acute findings on unenhanced CT. 2. Cholelithiasis. 3. Colonic diverticulosis.       MACRO: None.   Signed by: Tavia Cifuentes 11/21/2024 12:25 AM Dictation workstation:   [id number]        Assessment/Plan   Kalyan Armstrong is a 74 y.o. male with a PMHx of HFpEF, moderate pulmonary HTN, COPD with chronic hypoxic respiratory failure on 3L  home O2, CKD, anemia, severe calorie protein malnutrition, weight loss, atherosclerosis, Hep C, falls presented to [facility] ED with c/o ongoing weight loss, abdominal and chest pressure.   GI service was consulted for EGD in the setting of abdominal pain, anorexia, unintentional weight loss.  Etiologies include esophagitis/gastritis, gastric ulcer, PUD, H. Pylori, neoplasm.    -Keep patient n.p.o.  -Continue PPI  -EGD today  -Further recommendations pending EGD results  -Supportive care as per primary team  -GI will follow    Case discussed with Dr. Kade Walsh, APRN-CNP

## 2024-12-20 NOTE — CONSULTS
Nutrition Assessment Note    Reason for Assessment  Reason for Assessment: Provider consult order    Pt admitted for:  Abdominal pain [R10.9]    MST triggered for weight loss and eating poorly due to decreased appetite.  Pt known to nutrition services.  Chart reviewed and attempted to visit pt however pt off floor at time of attempt.    Pt to be NPO/CLD< 5 days.  Should pt require to be NPO/CLD > 5 days, consider alternate route for nutrition.    Past Medical History:   Diagnosis Date    CATHIE (acute kidney injury) (CMS-HCC) 05/03/2023    Cataract     COPD (chronic obstructive pulmonary disease) (Multi)     Cough, unspecified 06/20/2014    Cough    Emphysema of lung (Multi)     Encounter for immunization 01/19/2015    Need for influenza vaccination    Encounter for screening for malignant neoplasm of prostate     Encounter for screening for malignant neoplasm of prostate    Other conditions influencing health status 05/20/2013    Digital Blood Vessel Rupture    Personal history of colonic polyps     History of colon polyps    Personal history of other specified conditions 05/20/2013    History of shortness of breath    Personal history of other specified conditions 06/20/2014    History of shortness of breath     Results for orders placed or performed during the hospital encounter of 12/19/24 (from the past 24 hours)   CBC and Auto Differential   Result Value Ref Range    WBC 8.2 4.4 - 11.3 x10*3/uL    nRBC 0.0 0.0 - 0.0 /100 WBCs    RBC 3.58 (L) 4.50 - 5.90 x10*6/uL    Hemoglobin 10.5 (L) 13.5 - 17.5 g/dL    Hematocrit 33.5 (L) 41.0 - 52.0 %    MCV 94 80 - 100 fL    MCH 29.3 26.0 - 34.0 pg    MCHC 31.3 (L) 32.0 - 36.0 g/dL    RDW 14.4 11.5 - 14.5 %    Platelets 168 150 - 450 x10*3/uL    Neutrophils % 84.0 40.0 - 80.0 %    Immature Granulocytes %, Automated 0.6 0.0 - 0.9 %    Lymphocytes % 9.9 13.0 - 44.0 %    Monocytes % 3.8 2.0 - 10.0 %    Eosinophils % 0.7 0.0 - 6.0 %    Basophils % 1.0 0.0 - 2.0 %    Neutrophils  Absolute 6.87 (H) 1.60 - 5.50 x10*3/uL    Immature Granulocytes Absolute, Automated 0.05 0.00 - 0.50 x10*3/uL    Lymphocytes Absolute 0.81 0.80 - 3.00 x10*3/uL    Monocytes Absolute 0.31 0.05 - 0.80 x10*3/uL    Eosinophils Absolute 0.06 0.00 - 0.40 x10*3/uL    Basophils Absolute 0.08 0.00 - 0.10 x10*3/uL   Comprehensive metabolic panel   Result Value Ref Range    Glucose 107 (H) 74 - 99 mg/dL    Sodium 134 (L) 136 - 145 mmol/L    Potassium 5.0 3.5 - 5.3 mmol/L    Chloride 99 98 - 107 mmol/L    Bicarbonate 26 21 - 32 mmol/L    Anion Gap 14 10 - 20 mmol/L    Urea Nitrogen 52 (H) 6 - 23 mg/dL    Creatinine 7.28 (H) 0.50 - 1.30 mg/dL    eGFR 7 (L) >60 mL/min/1.73m*2    Calcium 11.5 (H) 8.6 - 10.3 mg/dL    Albumin 3.4 3.4 - 5.0 g/dL    Alkaline Phosphatase 50 33 - 136 U/L    Total Protein 7.3 6.4 - 8.2 g/dL    AST 30 9 - 39 U/L    Bilirubin, Total 0.4 0.0 - 1.2 mg/dL    ALT 21 10 - 52 U/L   Lipase   Result Value Ref Range    Lipase 45 9 - 82 U/L   Troponin I, High Sensitivity   Result Value Ref Range    Troponin I, High Sensitivity 19 0 - 20 ng/L   TSH   Result Value Ref Range    Thyroid Stimulating Hormone 1.53 0.44 - 3.98 mIU/L   Phosphorus   Result Value Ref Range    Phosphorus 3.8 2.5 - 4.9 mg/dL   Troponin I, High Sensitivity   Result Value Ref Range    Troponin I, High Sensitivity 20 0 - 20 ng/L   Urinalysis with Reflex Culture and Microscopic   Result Value Ref Range    Color, Urine Light-Yellow Light-Yellow, Yellow, Dark-Yellow    Appearance, Urine Clear Clear    Specific Gravity, Urine 1.007 1.005 - 1.035    pH, Urine 7.0 5.0, 5.5, 6.0, 6.5, 7.0, 7.5, 8.0    Protein, Urine 20 (TRACE) NEGATIVE, 10 (TRACE), 20 (TRACE) mg/dL    Glucose, Urine Normal Normal mg/dL    Blood, Urine 0.2 (2+) (A) NEGATIVE    Ketones, Urine NEGATIVE NEGATIVE mg/dL    Bilirubin, Urine NEGATIVE NEGATIVE    Urobilinogen, Urine Normal Normal mg/dL    Nitrite, Urine NEGATIVE NEGATIVE    Leukocyte Esterase, Urine 75 Cecilia/µL (A) NEGATIVE    Microscopic Only, Urine   Result Value Ref Range    WBC, Urine 6-10 (A) 1-5, NONE /HPF    RBC, Urine 11-20 (A) NONE, 1-2, 3-5 /HPF   CBC   Result Value Ref Range    WBC 7.8 4.4 - 11.3 x10*3/uL    nRBC 0.0 0.0 - 0.0 /100 WBCs    RBC 3.40 (L) 4.50 - 5.90 x10*6/uL    Hemoglobin 9.9 (L) 13.5 - 17.5 g/dL    Hematocrit 29.3 (L) 41.0 - 52.0 %    MCV 86 80 - 100 fL    MCH 29.1 26.0 - 34.0 pg    MCHC 33.8 32.0 - 36.0 g/dL    RDW 14.4 11.5 - 14.5 %    Platelets 189 150 - 450 x10*3/uL   Basic metabolic panel   Result Value Ref Range    Glucose 100 (H) 74 - 99 mg/dL    Sodium 136 136 - 145 mmol/L    Potassium 4.4 3.5 - 5.3 mmol/L    Chloride 99 98 - 107 mmol/L    Bicarbonate 31 21 - 32 mmol/L    Anion Gap 10 10 - 20 mmol/L    Urea Nitrogen 50 (H) 6 - 23 mg/dL    Creatinine 6.82 (H) 0.50 - 1.30 mg/dL    eGFR 8 (L) >60 mL/min/1.73m*2    Calcium 10.7 (H) 8.6 - 10.3 mg/dL     *Note: Due to a large number of results and/or encounters for the requested time period, some results have not been displayed. A complete set of results can be found in Results Review.     Scheduled medications  [Held by provider] amLODIPine, 10 mg, oral, Daily  azithromycin, 250 mg, oral, Daily  budesonide, 0.25 mg, nebulization, BID  heparin (porcine), 5,000 Units, subcutaneous, q8h GULSHAN  ipratropium-albuteroL, 3 mL, nebulization, TID  oxygen, , inhalation, Continuous - Inhalation  pantoprazole, 40 mg, intravenous, Daily before breakfast  polyethylene glycol, 17 g, oral, Daily  predniSONE, 5 mg, oral, Daily  rosuvastatin, 10 mg, oral, Daily      Continuous medications  dextrose 5 % and lactated Ringer's, 100 mL/hr, Last Rate: 100 mL/hr (12/20/24 0631)      PRN medications  PRN medications: acetaminophen **OR** acetaminophen **OR** acetaminophen, albuterol, HYDROmorphone, melatonin, ondansetron ODT **OR** ondansetron, triamcinolone  Dietary Orders (From admission, onward)       Start     Ordered    12/20/24 0001  NPO Diet Except: Other (specify); Additional  "Details: Clear liquids until 0500. May have clears up to 2 hours prior to procedure if exact time is known.; Effective midnight  Diet effective midnight        Question Answer Comment   Except: Other (specify)    Additional Details Clear liquids until 0500. May have clears up to 2 hours prior to procedure if exact time is known.        12/19/24 1955 12/19/24 1833  May Participate in Room Service  ( ROOM SERVICE MAY PARTICIPATE)  Once        Question:  .  Answer:  Yes    12/19/24 1832                    History:  Food and Nutrient History  Energy Intake: Poor < 50 %  Food and Nutrient History: per flowsheets    Anthropometrics:  Height: 175.3 cm (5' 9\")  Weight: 45.4 kg (100 lb)  BMI (Calculated): 14.76    Wt Readings from Last 14 Encounters:   12/19/24 45.4 kg (100 lb)   12/18/24 (!) 44.5 kg (98 lb 2 oz)   12/12/24 46.4 kg (102 lb 6 oz)   12/05/24 46.8 kg (103 lb 3 oz)   11/22/24 49.9 kg (110 lb 0.2 oz)   11/18/24 49.9 kg (110 lb)   10/09/24 59.4 kg (131 lb)   09/24/24 59.4 kg (131 lb)   08/13/24 55.3 kg (122 lb)   07/08/24 57.8 kg (127 lb 8 oz)   05/09/24 58.5 kg (129 lb)   04/23/24 59.4 kg (131 lb)   03/08/24 59 kg (130 lb)   01/29/24 59 kg (130 lb)         Weight Change  Significant Weight Loss: Yes  Interpretation of Weight Loss: >20% in 1 year    IBW/kg (Dietitian Calculated): 72.7 kg     Estimated Energy Needs  Total Energy Estimated Needs (kCal): 1820 kCal  Total Estimated Energy Need per Day (kCal/kg): 2180 kCal/kg  Method for Estimating Needs: 25-30 IBW    Estimated Protein Needs  Total Protein Estimated Needs (g): 55 g  Total Protein Estimated Needs (g/kg): 75 g/kg  Method for Estimating Needs: 0.8-1.0 IBW    Estimated Fluid Needs  Method for Estimating Needs: 1ml/kcal or per MD    Nutrition Focused Physical Findings:  Deferred: Pt unavailable. Will attempt upon follow up.    Edema  Edema: none    Physical Findings (Nutrition Deficiency/Toxicity)  Skin: Positive (pi sacrum)     Nutrition Diagnosis "   Patient has Nutrition Diagnosis: Yes  Nutrition Diagnosis 1: Underweight  Diagnosis Status (1): New  Related to (1): chronic illness  As Evidenced by (1): BMI 14.8       Nutrition Diagnosis 2: Predicted inadequate energy intake  Diagnosis Status (2): New  Related to (2): chronic illness  As Evidenced by (2): report of weight loss and eating poorly due to decreased appetite, > 20% weight loss in 1 year       Nutrition Diagnosis 3: Increased nutrient needs  Diagnosis Status (3): New  Related to (3): pressure injury  As Evidenced by (3): sacrum        Nutrition Interventions/Recommendations   Nutrition Prescription  Individualized Nutrition Prescription Provided for : Pt to be NPO/CLD< 5 days.  Should pt require to be NPO/CLD > 5 days, consider alternate route for nutrition.    Education Documentation  No documentation found.      Nutrition Monitoring and Evaluation   Food and Nutrient Related History  Energy Intake: Estimated energy intake    Fluid Intake: Estimated fluid intake    Anthropometrics: Body Composition/Growth/Weight History  Weight Change: Weight gain, Weight loss    Biochemical Data, Medical Tests and Procedures  Electrolyte and Renal Panel: Other (Comment)  Criteria: as clinically indicated    Gastrointestinal Profile: Other (Comment)  Criteria: as clinically indicated    Glucose/Endocrine Profile: Other (Comment)  Criteria: as clinically indicated    Nutritional Anemia Profile: Other (Comment)  Criteria: as clinically indicated    Vitamin Profile: Other (Comment)  Criteria: as clinically indicated    Nutrition Focused Physical Findings  Digestive System: Anorexia, Diarrhea, Decrease in appetite    Skin: Impaired wound healing    Other: Stool output, Urine volume, Overall appearance    Follow Up  Time Spent (min): 60 minutes  Last Date of Nutrition Visit: 12/20/24  Nutrition Follow-Up Needed?: Dietitian to reassess per policy  Follow up Comment: ALBERTO MONTEJO

## 2024-12-20 NOTE — ANESTHESIA POSTPROCEDURE EVALUATION
Patient: Kalyan Armstrong    Procedure Summary       Date: 12/20/24 Room / Location: [facility]    Anesthesia Start: 1204 Anesthesia Stop: 1240    Procedure: EGD Diagnosis:       Anorexia      Failure to thrive in adult      Unintentional weight loss      Generalized abdominal pain    Scheduled Providers: Montse Braun MD, MS; Jay White MD; Argentina Mancini RN; Shaq Estrada MA Responsible Provider: Jay White MD    Anesthesia Type: general ASA Status: 4            Anesthesia Type: general    Vitals Value Taken Time   /73 12/20/24 1310   Temp 36 °C (96.8 °F) 12/20/24 1238   Pulse 68 12/20/24 1315   Resp 17 12/20/24 1309   SpO2 99 % 12/20/24 1315   Vitals shown include unfiled device data.    Anesthesia Post Evaluation    Patient location during evaluation: bedside  Patient participation: complete - patient participated  Level of consciousness: awake and alert  Pain score: 0  Pain management: satisfactory to patient  Airway patency: patent  Cardiovascular status: acceptable  Respiratory status: acceptable  Hydration status: acceptable  Postoperative Nausea and Vomiting: none        No notable events documented.

## 2024-12-20 NOTE — CARE PLAN
The patient's goals for the shift include Rest.    The clinical goals for the shift include pt will remain free of injury this shift      Problem: Pain - Adult  Goal: Verbalizes/displays adequate comfort level or baseline comfort level  Outcome: Progressing     Problem: Safety - Adult  Goal: Free from fall injury  Outcome: Progressing     Problem: Discharge Planning  Goal: Discharge to home or other facility with appropriate resources  Outcome: Progressing     Problem: Chronic Conditions and Co-morbidities  Goal: Patient's chronic conditions and co-morbidity symptoms are monitored and maintained or improved  Outcome: Progressing     Problem: Fall/Injury  Goal: Not fall by end of shift  Outcome: Progressing  Goal: Be free from injury by end of the shift  Outcome: Progressing     Problem: Pain  Goal: Takes deep breaths with improved pain control throughout the shift  Outcome: Progressing  Goal: Performs ADL's with improved pain control throughout shift  Outcome: Progressing  Goal: Free from opioid side effects throughout the shift  Outcome: Progressing  Goal: Free from acute confusion related to pain meds throughout the shift  Outcome: Progressing

## 2024-12-21 LAB
ALBUMIN SERPL BCP-MCNC: 3.1 G/DL (ref 3.4–5)
ANION GAP SERPL CALC-SCNC: 11 MMOL/L (ref 10–20)
BUN SERPL-MCNC: 45 MG/DL (ref 6–23)
CALCIUM SERPL-MCNC: 10.6 MG/DL (ref 8.6–10.3)
CHLORIDE SERPL-SCNC: 100 MMOL/L (ref 98–107)
CO2 SERPL-SCNC: 26 MMOL/L (ref 21–32)
CREAT SERPL-MCNC: 6.25 MG/DL (ref 0.5–1.3)
EGFRCR SERPLBLD CKD-EPI 2021: 9 ML/MIN/1.73M*2
ERYTHROCYTE [DISTWIDTH] IN BLOOD BY AUTOMATED COUNT: 14.4 % (ref 11.5–14.5)
GLUCOSE SERPL-MCNC: 90 MG/DL (ref 74–99)
HCT VFR BLD AUTO: 27.3 % (ref 41–52)
HGB BLD-MCNC: 9.3 G/DL (ref 13.5–17.5)
MCH RBC QN AUTO: 29.5 PG (ref 26–34)
MCHC RBC AUTO-ENTMCNC: 34.1 G/DL (ref 32–36)
MCV RBC AUTO: 87 FL (ref 80–100)
NRBC BLD-RTO: 0 /100 WBCS (ref 0–0)
PHOSPHATE SERPL-MCNC: 3.4 MG/DL (ref 2.5–4.9)
PLATELET # BLD AUTO: 174 X10*3/UL (ref 150–450)
POTASSIUM SERPL-SCNC: 4.3 MMOL/L (ref 3.5–5.3)
RBC # BLD AUTO: 3.15 X10*6/UL (ref 4.5–5.9)
SODIUM SERPL-SCNC: 133 MMOL/L (ref 136–145)
WBC # BLD AUTO: 7.5 X10*3/UL (ref 4.4–11.3)

## 2024-12-21 PROCEDURE — 80069 RENAL FUNCTION PANEL: CPT | Performed by: STUDENT IN AN ORGANIZED HEALTH CARE EDUCATION/TRAINING PROGRAM

## 2024-12-21 PROCEDURE — 2500000001 HC RX 250 WO HCPCS SELF ADMINISTERED DRUGS (ALT 637 FOR MEDICARE OP): Performed by: STUDENT IN AN ORGANIZED HEALTH CARE EDUCATION/TRAINING PROGRAM

## 2024-12-21 PROCEDURE — 1100000001 HC PRIVATE ROOM DAILY

## 2024-12-21 PROCEDURE — 2500000002 HC RX 250 W HCPCS SELF ADMINISTERED DRUGS (ALT 637 FOR MEDICARE OP, ALT 636 FOR OP/ED): Performed by: STUDENT IN AN ORGANIZED HEALTH CARE EDUCATION/TRAINING PROGRAM

## 2024-12-21 PROCEDURE — 2500000005 HC RX 250 GENERAL PHARMACY W/O HCPCS: Performed by: STUDENT IN AN ORGANIZED HEALTH CARE EDUCATION/TRAINING PROGRAM

## 2024-12-21 PROCEDURE — 99232 SBSQ HOSP IP/OBS MODERATE 35: CPT | Performed by: INTERNAL MEDICINE

## 2024-12-21 PROCEDURE — 99232 SBSQ HOSP IP/OBS MODERATE 35: CPT | Performed by: STUDENT IN AN ORGANIZED HEALTH CARE EDUCATION/TRAINING PROGRAM

## 2024-12-21 PROCEDURE — 2500000002 HC RX 250 W HCPCS SELF ADMINISTERED DRUGS (ALT 637 FOR MEDICARE OP, ALT 636 FOR OP/ED): Performed by: PHYSICIAN ASSISTANT

## 2024-12-21 PROCEDURE — 2500000002 HC RX 250 W HCPCS SELF ADMINISTERED DRUGS (ALT 637 FOR MEDICARE OP, ALT 636 FOR OP/ED)

## 2024-12-21 PROCEDURE — 2500000004 HC RX 250 GENERAL PHARMACY W/ HCPCS (ALT 636 FOR OP/ED): Performed by: PHYSICIAN ASSISTANT

## 2024-12-21 PROCEDURE — 94640 AIRWAY INHALATION TREATMENT: CPT

## 2024-12-21 PROCEDURE — 85027 COMPLETE CBC AUTOMATED: CPT | Performed by: INTERNAL MEDICINE

## 2024-12-21 PROCEDURE — 2500000002 HC RX 250 W HCPCS SELF ADMINISTERED DRUGS (ALT 637 FOR MEDICARE OP, ALT 636 FOR OP/ED): Performed by: INTERNAL MEDICINE

## 2024-12-21 PROCEDURE — 36415 COLL VENOUS BLD VENIPUNCTURE: CPT | Performed by: STUDENT IN AN ORGANIZED HEALTH CARE EDUCATION/TRAINING PROGRAM

## 2024-12-21 RX ORDER — OXYCODONE HYDROCHLORIDE 5 MG/1
5 TABLET ORAL 3 TIMES DAILY
Status: DISCONTINUED | OUTPATIENT
Start: 2024-12-21 | End: 2024-12-29

## 2024-12-21 RX ADMIN — HEPARIN SODIUM 5000 UNITS: 5000 INJECTION, SOLUTION INTRAVENOUS; SUBCUTANEOUS at 10:06

## 2024-12-21 RX ADMIN — ROSUVASTATIN 10 MG: 10 TABLET, FILM COATED ORAL at 10:06

## 2024-12-21 RX ADMIN — AZITHROMYCIN 250 MG: 250 TABLET, FILM COATED ORAL at 10:06

## 2024-12-21 RX ADMIN — Medication 2 L/MIN: at 08:02

## 2024-12-21 RX ADMIN — BUDESONIDE 0.25 MG: 0.25 INHALANT RESPIRATORY (INHALATION) at 20:30

## 2024-12-21 RX ADMIN — Medication 2 L/MIN: at 12:17

## 2024-12-21 RX ADMIN — HEPARIN SODIUM 5000 UNITS: 5000 INJECTION, SOLUTION INTRAVENOUS; SUBCUTANEOUS at 17:30

## 2024-12-21 RX ADMIN — Medication 2 L/MIN: at 20:29

## 2024-12-21 RX ADMIN — OXYCODONE HYDROCHLORIDE 5 MG: 5 TABLET ORAL at 17:30

## 2024-12-21 RX ADMIN — PANTOPRAZOLE SODIUM 40 MG: 40 INJECTION, POWDER, FOR SOLUTION INTRAVENOUS at 10:06

## 2024-12-21 RX ADMIN — OXYCODONE HYDROCHLORIDE 5 MG: 5 TABLET ORAL at 13:22

## 2024-12-21 RX ADMIN — PREDNISONE 5 MG: 5 TABLET ORAL at 10:06

## 2024-12-21 RX ADMIN — BUDESONIDE 0.25 MG: 0.25 INHALANT RESPIRATORY (INHALATION) at 08:02

## 2024-12-21 RX ADMIN — IPRATROPIUM BROMIDE AND ALBUTEROL SULFATE 3 ML: 2.5; .5 SOLUTION RESPIRATORY (INHALATION) at 08:02

## 2024-12-21 RX ADMIN — HEPARIN SODIUM 5000 UNITS: 5000 INJECTION, SOLUTION INTRAVENOUS; SUBCUTANEOUS at 21:32

## 2024-12-21 RX ADMIN — POLYETHYLENE GLYCOL 3350 17 G: 17 POWDER, FOR SOLUTION ORAL at 10:06

## 2024-12-21 RX ADMIN — IPRATROPIUM BROMIDE AND ALBUTEROL SULFATE 3 ML: 2.5; .5 SOLUTION RESPIRATORY (INHALATION) at 20:29

## 2024-12-21 RX ADMIN — IPRATROPIUM BROMIDE AND ALBUTEROL SULFATE 3 ML: 2.5; .5 SOLUTION RESPIRATORY (INHALATION) at 12:17

## 2024-12-21 SDOH — ECONOMIC STABILITY: FOOD INSECURITY: WITHIN THE PAST 12 MONTHS, THE FOOD YOU BOUGHT JUST DIDN'T LAST AND YOU DIDN'T HAVE MONEY TO GET MORE.: NEVER TRUE

## 2024-12-21 SDOH — ECONOMIC STABILITY: HOUSING INSECURITY: IN THE PAST 12 MONTHS, HOW MANY TIMES HAVE YOU MOVED WHERE YOU WERE LIVING?: 1

## 2024-12-21 SDOH — ECONOMIC STABILITY: HOUSING INSECURITY: IN THE LAST 12 MONTHS, WAS THERE A TIME WHEN YOU WERE NOT ABLE TO PAY THE MORTGAGE OR RENT ON TIME?: NO

## 2024-12-21 SDOH — ECONOMIC STABILITY: INCOME INSECURITY: IN THE PAST 12 MONTHS HAS THE ELECTRIC, GAS, OIL, OR WATER COMPANY THREATENED TO SHUT OFF SERVICES IN YOUR HOME?: NO

## 2024-12-21 SDOH — SOCIAL STABILITY: SOCIAL INSECURITY: WITHIN THE LAST YEAR, HAVE YOU BEEN HUMILIATED OR EMOTIONALLY ABUSED IN OTHER WAYS BY YOUR PARTNER OR EX-PARTNER?: NO

## 2024-12-21 SDOH — ECONOMIC STABILITY: HOUSING INSECURITY: AT ANY TIME IN THE PAST 12 MONTHS, WERE YOU HOMELESS OR LIVING IN A SHELTER (INCLUDING NOW)?: NO

## 2024-12-21 SDOH — ECONOMIC STABILITY: FOOD INSECURITY: WITHIN THE PAST 12 MONTHS, YOU WORRIED THAT YOUR FOOD WOULD RUN OUT BEFORE YOU GOT THE MONEY TO BUY MORE.: NEVER TRUE

## 2024-12-21 SDOH — SOCIAL STABILITY: SOCIAL INSECURITY: WITHIN THE LAST YEAR, HAVE YOU BEEN AFRAID OF YOUR PARTNER OR EX-PARTNER?: NO

## 2024-12-21 SDOH — ECONOMIC STABILITY: FOOD INSECURITY: HOW HARD IS IT FOR YOU TO PAY FOR THE VERY BASICS LIKE FOOD, HOUSING, MEDICAL CARE, AND HEATING?: NOT VERY HARD

## 2024-12-21 SDOH — ECONOMIC STABILITY: TRANSPORTATION INSECURITY: IN THE PAST 12 MONTHS, HAS LACK OF TRANSPORTATION KEPT YOU FROM MEDICAL APPOINTMENTS OR FROM GETTING MEDICATIONS?: NO

## 2024-12-21 ASSESSMENT — COGNITIVE AND FUNCTIONAL STATUS - GENERAL
PERSONAL GROOMING: A LITTLE
MOBILITY SCORE: 12
MOVING TO AND FROM BED TO CHAIR: A LITTLE
MOVING TO AND FROM BED TO CHAIR: A LOT
TURNING FROM BACK TO SIDE WHILE IN FLAT BAD: A LITTLE
EATING MEALS: A LITTLE
WALKING IN HOSPITAL ROOM: A LITTLE
DRESSING REGULAR UPPER BODY CLOTHING: A LITTLE
DRESSING REGULAR LOWER BODY CLOTHING: A LOT
STANDING UP FROM CHAIR USING ARMS: A LOT
TOILETING: A LITTLE
TOILETING: A LITTLE
DRESSING REGULAR UPPER BODY CLOTHING: A LITTLE
WALKING IN HOSPITAL ROOM: A LITTLE
PERSONAL GROOMING: A LITTLE
CLIMB 3 TO 5 STEPS WITH RAILING: A LOT
STANDING UP FROM CHAIR USING ARMS: A LITTLE
MOVING FROM LYING ON BACK TO SITTING ON SIDE OF FLAT BED WITH BEDRAILS: A LITTLE
PERSONAL GROOMING: A LITTLE
STANDING UP FROM CHAIR USING ARMS: A LITTLE
HELP NEEDED FOR BATHING: A LITTLE
DRESSING REGULAR UPPER BODY CLOTHING: A LITTLE
TURNING FROM BACK TO SIDE WHILE IN FLAT BAD: A LITTLE
DAILY ACTIVITIY SCORE: 17
HELP NEEDED FOR BATHING: A LITTLE
HELP NEEDED FOR BATHING: A LITTLE
MOBILITY SCORE: 17
TOILETING: A LITTLE
DAILY ACTIVITIY SCORE: 18
DAILY ACTIVITIY SCORE: 18
MOVING TO AND FROM BED TO CHAIR: A LITTLE
MOBILITY SCORE: 17
MOVING FROM LYING ON BACK TO SITTING ON SIDE OF FLAT BED WITH BEDRAILS: A LOT
MOVING FROM LYING ON BACK TO SITTING ON SIDE OF FLAT BED WITH BEDRAILS: A LITTLE
CLIMB 3 TO 5 STEPS WITH RAILING: A LOT
DRESSING REGULAR LOWER BODY CLOTHING: A LOT
WALKING IN HOSPITAL ROOM: A LOT
CLIMB 3 TO 5 STEPS WITH RAILING: A LOT
TURNING FROM BACK TO SIDE WHILE IN FLAT BAD: A LOT
DRESSING REGULAR LOWER BODY CLOTHING: A LOT

## 2024-12-21 ASSESSMENT — ENCOUNTER SYMPTOMS
ABDOMINAL PAIN: 0
ABDOMINAL DISTENTION: 0
FEVER: 0
SHORTNESS OF BREATH: 1
VOMITING: 0
UNEXPECTED WEIGHT CHANGE: 1

## 2024-12-21 ASSESSMENT — PAIN SCALES - GENERAL
PAINLEVEL_OUTOF10: 0 - NO PAIN

## 2024-12-21 ASSESSMENT — PAIN - FUNCTIONAL ASSESSMENT: PAIN_FUNCTIONAL_ASSESSMENT: 0-10

## 2024-12-21 ASSESSMENT — ACTIVITIES OF DAILY LIVING (ADL): LACK_OF_TRANSPORTATION: NO

## 2024-12-21 NOTE — CARE PLAN
The patient's goals for the shift include Rest.    The clinical goals for the shift include Pt will remain HDS throughout shift

## 2024-12-21 NOTE — PROGRESS NOTES
12/21/24 0825   Discharge Planning   Expected Discharge Disposition Rehab     I talked to this patient at his bedside in regards to discharge planing, per notes PT/OT rec'd mod on 12/20 [facility] is following per notes patient agreeable to PD cath, patient is also agreeable for placement at [facility], will send referral over to see if they can accept and continue to monitor for discharge planing.

## 2024-12-21 NOTE — PROGRESS NOTES
Kalyan Armstrong is a 74 y.o. male on day 2 of admission presenting with Generalized abdominal pain.      Subjective   Patient seen and examined at bedside.  No complaints.  Discussed colonoscopy on Monday.       Objective     Last Recorded Vitals  /70 (BP Location: Right arm, Patient Position: Lying)   Pulse 78   Temp 36.4 °C (97.5 °F) (Temporal)   Resp 17   Wt 45.4 kg (100 lb)   SpO2 100%   Intake/Output last 3 Shifts:  No intake or output data in the 24 hours ending 12/21/24 1728    Admission Weight  Weight: 45.4 kg (100 lb) (12/19/24 1325)    Daily Weight  12/19/24 : 45.4 kg (100 lb)    Image Results  Electrocardiogram, 12-lead PRN ACS symptoms  Normal sinus rhythm  Low voltage QRS  Septal infarct (cited on or before 24-SEP-2024)  Abnormal ECG  When compared with ECG of 24-SEP-2024 09:18,  No significant change was found  See ED provider note for full interpretation and clinical correlation  Confirmed by Madison Fox ([id number]) on 12/20/2024 5:23:40 PM  Esophagogastroduodenoscopy (EGD)  Table formatting from the original result was not included.  Impression  The upper third of the esophagus, middle third of the esophagus and lower   third of the esophagus appeared normal.  Mild atrophic mucosa in the body of the stomach and antrum; performed cold   forceps biopsy  The duodenal bulb and 2nd part of the duodenum appeared normal. Performed   random biopsy to rule out celiac disease.    Findings  The upper third of the esophagus, middle third of the esophagus and lower   third of the esophagus appeared normal.  Mild, generalized atrophic mucosa in the body of the stomach and antrum;   performed cold forceps biopsy  The duodenal bulb and 2nd part of the duodenum appeared normal. Performed   random biopsy using biopsy forceps to rule out celiac disease.    Recommendation  Await pathology results   Follow inpatient GI team recommendations  Diet and medications per inpatient team (see EPIC EMR)          Indication  Anorexia, Generalized abdominal pain, Unintentional weight loss, Failure   to thrive in adult    Staff  Staff Role   Montse Braun MD, MS Proceduralist     Medications  See Anesthesia Record.     Preprocedure  A history and physical has been performed, and patient medication   allergies have been reviewed. The patient's tolerance of previous   anesthesia has been reviewed. The risks and benefits of the procedure and   the sedation options and risks were discussed with the patient. All   questions were answered and informed consent obtained.    Details of the Procedure  The patient underwent monitored anesthesia care, which was administered by   an anesthesia professional. The patient's blood pressure, ECG, ETCO2,   heart rate, level of consciousness, oxygen and respirations were monitored   throughout the procedure. The scope was introduced through the mouth and   advanced to the second part of the duodenum. Retroflexion was performed in   the cardia. The patient's estimated blood loss was minimal (<5 mL). The   procedure was not difficult. The patient tolerated the procedure well.   There were no apparent adverse events.     Events  Procedure Events   Event Event Time   ENDO SCOPE IN TIME 12/20/2024 12:25 PM   ENDO SCOPE OUT TIME 12/20/2024 12:33 PM     Specimens  ID Type Source Tests Collected by Time   1 :  Tissue ANTRUM BODY BIOPSY SURGICAL PATHOLOGY EXAM Shaq Estrada MA 12/20/2024 1228   2 : 2nd portion and bulb Tissue DUODENUM SECOND PART BIOPSY SURGICAL   PATHOLOGY EXAM Shaq Estrada MA 12/20/2024 1230     Procedure Location  [address] [id number] [phone]    Referring Provider  Montse Braun MD, MS    Procedure Provider  Montse Braun MD, MS      Physical Exam  Constitutional:       Appearance: He is ill-appearing.   HENT:      Head: Atraumatic.   Cardiovascular:      Rate and Rhythm: Regular  rhythm.   Pulmonary:      Effort: Pulmonary effort is normal.      Breath sounds: Normal breath sounds.   Abdominal:      General: Abdomen is flat. Bowel sounds are normal. There is no distension.      Palpations: Abdomen is soft.      Tenderness: There is no abdominal tenderness. There is no guarding or rebound.   Musculoskeletal:         General: Normal range of motion.      Cervical back: Neck supple.   Skin:     General: Skin is warm and dry.   Neurological:      General: No focal deficit present.      Mental Status: He is alert and oriented to person, place, and time.   Psychiatric:         Mood and Affect: Mood normal.         Behavior: Behavior normal.         Relevant Results             Scheduled medications  [Held by provider] amLODIPine, 10 mg, oral, Daily  azithromycin, 250 mg, oral, Daily  budesonide, 0.25 mg, nebulization, BID  heparin (porcine), 5,000 Units, subcutaneous, q8h GULSHAN  ipratropium-albuteroL, 3 mL, nebulization, TID  oxyCODONE, 5 mg, oral, TID  polyethylene glycol, 17 g, oral, Daily  predniSONE, 5 mg, oral, Daily  rosuvastatin, 10 mg, oral, Daily      Continuous medications     PRN medications  PRN medications: acetaminophen **OR** acetaminophen **OR** acetaminophen, albuterol, melatonin, ondansetron ODT **OR** ondansetron, oxygen, triamcinolone   Results for orders placed or performed during the hospital encounter of 12/19/24 (from the past 24 hours)   Renal Function Panel   Result Value Ref Range    Glucose 90 74 - 99 mg/dL    Sodium 133 (L) 136 - 145 mmol/L    Potassium 4.3 3.5 - 5.3 mmol/L    Chloride 100 98 - 107 mmol/L    Bicarbonate 26 21 - 32 mmol/L    Anion Gap 11 10 - 20 mmol/L    Urea Nitrogen 45 (H) 6 - 23 mg/dL    Creatinine 6.25 (H) 0.50 - 1.30 mg/dL    eGFR 9 (L) >60 mL/min/1.73m*2    Calcium 10.6 (H) 8.6 - 10.3 mg/dL    Phosphorus 3.4 2.5 - 4.9 mg/dL    Albumin 3.1 (L) 3.4 - 5.0 g/dL   CBC   Result Value Ref Range    WBC 7.5 4.4 - 11.3 x10*3/uL    nRBC 0.0 0.0 - 0.0 /100  WBCs    RBC 3.15 (L) 4.50 - 5.90 x10*6/uL    Hemoglobin 9.3 (L) 13.5 - 17.5 g/dL    Hematocrit 27.3 (L) 41.0 - 52.0 %    MCV 87 80 - 100 fL    MCH 29.5 26.0 - 34.0 pg    MCHC 34.1 32.0 - 36.0 g/dL    RDW 14.4 11.5 - 14.5 %    Platelets 174 150 - 450 x10*3/uL     *Note: Due to a large number of results and/or encounters for the requested time period, some results have not been displayed. A complete set of results can be found in Results Review.          Assessment/Plan                  Assessment & Plan  Generalized abdominal pain    Unspecified severe protein-calorie malnutrition (Multi)    Unintentional weight loss    Anorexia    Failure to thrive in adult    Acute on chronic renal failure (CMS-HCC)      Kalyan Armstrong is a 74 y.o. male with a PMHx of HFpEF, moderate pulmonary HTN, COPD with chronic hypoxic respiratory failure on 3L  home O2, CKD, anemia, severe calorie protein malnutrition, weight loss, atherosclerosis, Hep C, falls presented to [facility] ED with c/o ongoing weight loss, abdominal and chest pressure.   S/p EGD yesterday  The upper third of the esophagus, middle third of the esophagus and lower third of the esophagus appeared normal.  Mild, generalized atrophic mucosa in the body of the stomach and antrum; performed cold forceps biopsy  The duodenal bulb and 2nd part of the duodenum appeared normal. Performed random biopsy using biopsy forceps to rule out celiac disease.       -Await pathology results   -Clear liquids after MN  -Colonoscopy on Monday  -Continue PPI  -Supportive care as per primary team  -GI will follow     Case discussed with Dr. Kade Walsh, APRN-CNP

## 2024-12-21 NOTE — PROGRESS NOTES
[facility] Hospitalist Progress Note        Between 7AM-7PM please message me via Epic Secure Chat.  After 7PM please page Nocturnist on call.        Assessment/Plan     Reported unintentional weight loss/chronic abdominal pain/adult FTT - CT A/P no new acute process  CATHIE on CKD 5 - Scr around his baseline now  Likely end stage COPD/Chronic respiratory failure on 3-4L NC (he is on PO pred 5mg daily and PO azithro 250mg daily at home for COPD through his pulmonologist)  Severe protein calorie malnutrition  Anemia  HTN  Chronic Hep C reportedly post-treatment and negative viral load  DNR comfort care      - palliative care consult to assist with sx management -> we will try schedule PO oxy to see if it assists with air hunger/chronic abdominal pain  - GI consult for weight loss -> EGD done, biopsies pending but looking at report no obvious etiology  - nephrology following for CATHIE on CKD -> likely progressing towards dialysis  - dietary/PT/OT consults    Fluids: None  Lytes: Replete as needed  Nutrition: Regular  Orr: None  Invasive lines: None  Drains: None    DVT Prophylaxis:  Heparin subq      Discharge Planning: home vs SNF once med ready      I personally examined the patient and reviewed chart.  Plan of care was discussed with patient, all questions answered.    Total time spent: At least 38 minutes, providing counseling or in coordination of care. Total time on this day of visit includes record and documentation review before and after visit including documentation and time not explicitly included on EMR time stamp.      Subjective     Kalyan Armstrong is a 74 y.o. male on day 2 of admission presenting with Generalized abdominal pain.    NAEON. Overall stable, feels SOB we will try oxy for air hunger    Review of Systems   Constitutional:  Positive for unexpected weight change. Negative for fever.   Respiratory:  Positive for shortness of breath.    Cardiovascular:  Negative for chest pain.   Gastrointestinal:   "Negative for abdominal distention, abdominal pain and vomiting.       Objective     Physical Exam  Constitutional:       Comments: Cachetic appearing   Cardiovascular:      Rate and Rhythm: Normal rate and regular rhythm.   Pulmonary:      Effort: No respiratory distress.   Abdominal:      Palpations: Abdomen is soft.      Tenderness: There is no abdominal tenderness.   Neurological:      Mental Status: He is alert. Mental status is at baseline.         Last Recorded Vitals  Blood pressure 115/68, pulse 80, temperature 36.3 °C (97.4 °F), temperature source Temporal, resp. rate 17, height 1.753 m (5' 9\"), weight 45.4 kg (100 lb), SpO2 97%.  Intake/Output last 3 Shifts:  I/O last 3 completed shifts:  In: 1101.3 (24.3 mL/kg) [I.V.:718.3 (15.8 mL/kg); IV Piggyback:383]  Out: 475 (10.5 mL/kg) [Urine:475 (0.3 mL/kg/hr)]  Weight: 45.4 kg     Relevant Results  Results for orders placed or performed during the hospital encounter of 12/19/24 (from the past 24 hours)   Renal Function Panel   Result Value Ref Range    Glucose 90 74 - 99 mg/dL    Sodium 133 (L) 136 - 145 mmol/L    Potassium 4.3 3.5 - 5.3 mmol/L    Chloride 100 98 - 107 mmol/L    Bicarbonate 26 21 - 32 mmol/L    Anion Gap 11 10 - 20 mmol/L    Urea Nitrogen 45 (H) 6 - 23 mg/dL    Creatinine 6.25 (H) 0.50 - 1.30 mg/dL    eGFR 9 (L) >60 mL/min/1.73m*2    Calcium 10.6 (H) 8.6 - 10.3 mg/dL    Phosphorus 3.4 2.5 - 4.9 mg/dL    Albumin 3.1 (L) 3.4 - 5.0 g/dL   CBC   Result Value Ref Range    WBC 7.5 4.4 - 11.3 x10*3/uL    nRBC 0.0 0.0 - 0.0 /100 WBCs    RBC 3.15 (L) 4.50 - 5.90 x10*6/uL    Hemoglobin 9.3 (L) 13.5 - 17.5 g/dL    Hematocrit 27.3 (L) 41.0 - 52.0 %    MCV 87 80 - 100 fL    MCH 29.5 26.0 - 34.0 pg    MCHC 34.1 32.0 - 36.0 g/dL    RDW 14.4 11.5 - 14.5 %    Platelets 174 150 - 450 x10*3/uL       Imaging Results  Electrocardiogram, 12-lead PRN ACS symptoms    Result Date: 12/20/2024  Normal sinus rhythm Low voltage QRS Septal infarct (cited on or before " 24-SEP-2024) Abnormal ECG When compared with ECG of 24-SEP-2024 09:18, No significant change was found See ED provider note for full interpretation and clinical correlation Confirmed by Madison Fox ([id number]) on 12/20/2024 5:23:40 PM    Esophagogastroduodenoscopy (EGD)    Result Date: 12/20/2024  Table formatting from the original result was not included. Impression The upper third of the esophagus, middle third of the esophagus and lower third of the esophagus appeared normal. Mild atrophic mucosa in the body of the stomach and antrum; performed cold forceps biopsy The duodenal bulb and 2nd part of the duodenum appeared normal. Performed random biopsy to rule out celiac disease. Findings The upper third of the esophagus, middle third of the esophagus and lower third of the esophagus appeared normal. Mild, generalized atrophic mucosa in the body of the stomach and antrum; performed cold forceps biopsy The duodenal bulb and 2nd part of the duodenum appeared normal. Performed random biopsy using biopsy forceps to rule out celiac disease. Recommendation Await pathology results Follow inpatient GI team recommendations Diet and medications per inpatient team (see EPIC EMR)  Indication Anorexia, Generalized abdominal pain, Unintentional weight loss, Failure to thrive in adult Staff Staff Role Montse Braun MD, MS Proceduralist Medications See Anesthesia Record. Preprocedure A history and physical has been performed, and patient medication allergies have been reviewed. The patient's tolerance of previous anesthesia has been reviewed. The risks and benefits of the procedure and the sedation options and risks were discussed with the patient. All questions were answered and informed consent obtained. Details of the Procedure The patient underwent monitored anesthesia care, which was administered by an anesthesia professional. The patient's blood pressure, ECG, ETCO2, heart rate, level of consciousness, oxygen and  respirations were monitored throughout the procedure. The scope was introduced through the mouth and advanced to the second part of the duodenum. Retroflexion was performed in the cardia. The patient's estimated blood loss was minimal (<5 mL). The procedure was not difficult. The patient tolerated the procedure well. There were no apparent adverse events. Events Procedure Events Event Event Time ENDO SCOPE IN TIME 12/20/2024 12:25 PM ENDO SCOPE OUT TIME 12/20/2024 12:33 PM Specimens ID Type Source Tests Collected by Time 1 :  Tissue ANTRUM BODY BIOPSY SURGICAL PATHOLOGY EXAM Shaq Estrada MA 12/20/2024 1228 2 : 2nd portion and bulb Tissue DUODENUM SECOND PART BIOPSY SURGICAL PATHOLOGY EXAM Shaq Estrada MA 12/20/2024 1230 Procedure Location [address] [address] [phone] Referring Provider Montse Braun MD, MS Procedure Provider Montse Braun MD, MS     CT chest abdomen pelvis wo IV contrast    Result Date: 12/19/2024  STUDY: CT Chest, Abdomen, and Pelvis without IV Contrast; 12/19/2024, 1450 INDICATION: Generalized abdominal pain and tenderness that radiates into chest. COMPARISON: XR chest 4/29/2023, CTA chest 12/15/2022, 6/17/2022. ACCESSION NUMBER(S): [id number] ORDERING CLINICIAN: CLARISA GARCIA TECHNIQUE: CT of the chest, abdomen, and pelvis was performed.  Contiguous axial images were obtained at 3 mm slice thickness through the chest, abdomen, and pelvis.  Coronal and sagittal reconstructions at 3 mm slice thickness were performed.  No intravenous contrast was administered.  FINDINGS: Please note that the evaluation of vessels, lymph nodes and organs is limited without intravenous contrast. CHEST: MEDIASTINUM: The heart is normal in size without pericardial effusion.  Coronary artery calcifications are not identified.  LUNGS/PLEURA: There is no pleural effusion or pneumothorax.  Prominent emphysematous changes of the  lungs again noted involving predominantly the lower lobes.  Stable fibronodular scarring within both lungs.  No evidence of a lung mass or suspicious pulmonary nodule. LYMPH NODES: Thoracic lymph nodes are not enlarged. ABDOMEN:  LIVER: No hepatomegaly.  Smooth surface contour.  Normal attenuation.  BILE DUCTS: No intrahepatic or extrahepatic biliary ductal dilatation.  GALLBLADDER: Small calcified stones are present within the gallbladder. STOMACH: No abnormalities identified.  PANCREAS: No masses or ductal dilatation.  SPLEEN: No splenomegaly or focal splenic lesion.  ADRENAL GLANDS: No thickening or nodules.  KIDNEYS AND URETERS: Kidneys are normal in size and location.  No renal or ureteral calculi.  PELVIS:  BLADDER: No abnormalities identified.  REPRODUCTIVE ORGANS: Prostate gland is mildly enlarged.  BOWEL: Diverticulosis of the colon noted without evidence of acute diverticulitis.  VESSELS: No abnormalities identified.  Abdominal aorta is normal in caliber.  PERITONEUM/RETROPERITONEUM/LYMPH NODES: No free fluid.  No pneumoperitoneum. No lymphadenopathy.  ABDOMINAL WALL: No abnormalities identified. SOFT TISSUES: No abnormalities identified.  BONES: No acute fracture or aggressive osseous lesion.    No acute cardiopulmonary abnormality. Pulmonary emphysema. Cholelithiasis. Diverticulosis coli. Signed by Drake Hernandez MD      Medications  [Held by provider] amLODIPine, 10 mg, oral, Daily  azithromycin, 250 mg, oral, Daily  budesonide, 0.25 mg, nebulization, BID  heparin (porcine), 5,000 Units, subcutaneous, q8h GULSHAN  ipratropium-albuteroL, 3 mL, nebulization, TID  oxyCODONE, 5 mg, oral, TID  polyethylene glycol, 17 g, oral, Daily  predniSONE, 5 mg, oral, Daily  rosuvastatin, 10 mg, oral, Daily       PRN medications: acetaminophen **OR** acetaminophen **OR** acetaminophen, albuterol, melatonin, ondansetron ODT **OR** ondansetron, oxygen, triamcinolone                Wan Bhakta MD  [facility]

## 2024-12-21 NOTE — PROGRESS NOTES
Kalyan Armstrong is a 74 y.o. male on day 2 of admission presenting with Generalized abdominal pain.      Subjective   Overall stable no new change, patient will see Dr. Xiong as outpatient for peritoneal dialysis catheter placement       Objective        Vitals 24HR  Heart Rate:  [68-80]   Temp:  [36.3 °C (97.3 °F)-36.5 °C (97.7 °F)]   Resp:  [17-18]   BP: (112-142)/(68-80)   SpO2:  [89 %-100 %]     Intake/Output last 3 Shifts:  No intake or output data in the 24 hours ending 12/21/24 1355    Physical Exam  General appearance: Cachectic in no distress  Head and ENT: Normocephalic/atraumatic/supple neck/no JVD  Lungs: CTA  Heart: RRR  Abdomen; soft no tenderness  Extremities: No edema            Relevant Results     Results from last 7 days   Lab Units 12/21/24  0511 12/20/24  0507 12/19/24  1343   WBC AUTO x10*3/uL 7.5 7.8 8.2   HEMOGLOBIN g/dL 9.3* 9.9* 10.5*   HEMATOCRIT % 27.3* 29.3* 33.5*   PLATELETS AUTO x10*3/uL 174 189 168      Results from last 7 days   Lab Units 12/21/24  0511 12/20/24  0507 12/19/24  1343   SODIUM mmol/L 133* 136 134*   POTASSIUM mmol/L 4.3 4.4 5.0   CHLORIDE mmol/L 100 99 99   CO2 mmol/L 26 31 26   BUN mg/dL 45* 50* 52*   CREATININE mg/dL 6.25* 6.82* 7.28*   GLUCOSE mg/dL 90 100* 107*   CALCIUM mg/dL 10.6* 10.7* 11.5*        Current Facility-Administered Medications:     acetaminophen (Tylenol) tablet 650 mg, 650 mg, oral, q4h PRN **OR** acetaminophen (Tylenol) oral liquid 650 mg, 650 mg, oral, q4h PRN **OR** acetaminophen (Tylenol) suppository 650 mg, 650 mg, rectal, q4h PRN, Rachel Cooley PA-C    albuterol 2.5 mg /3 mL (0.083 %) nebulizer solution 2.5 mg, 2.5 mg, nebulization, q2h PRN, Brayden Giordano MD    [Held by provider] amLODIPine (Norvasc) tablet 10 mg, 10 mg, oral, Daily, Rachel Cooley PA-C    azithromycin (Zithromax) tablet 250 mg, 250 mg, oral, Daily, Wan Bhakta MD, 250 mg at 12/21/24 1006    budesonide (Pulmicort) 0.25 mg/2 mL nebulizer solution 0.25 mg, 0.25 mg,  nebulization, BID, Rachel Kenny, Bora, 0.25 mg at 12/21/24 0802    heparin (porcine) injection 5,000 Units, 5,000 Units, subcutaneous, q8h GULSHAN, Rachel Cooley PA-C, 5,000 Units at 12/21/24 1006    ipratropium-albuteroL (Duo-Neb) 0.5-2.5 mg/3 mL nebulizer solution 3 mL, 3 mL, nebulization, TID, Brayden Giordano MD, 3 mL at 12/21/24 1217    melatonin tablet 3 mg, 3 mg, oral, Nightly PRN, Rachel Cooley PA-C    ondansetron ODT (Zofran-ODT) disintegrating tablet 4 mg, 4 mg, oral, q8h PRN **OR** ondansetron (Zofran) injection 4 mg, 4 mg, intravenous, q8h PRN, Rachel Cooley PA-C    oxyCODONE (Roxicodone) immediate release tablet 5 mg, 5 mg, oral, TID, Wan Bhakta MD, 5 mg at 12/21/24 1322    oxygen (O2) therapy, , inhalation, Continuous PRN - O2/gases, Wan Bhakat MD, 2 L/min at 12/21/24 1217    polyethylene glycol (Glycolax, Miralax) packet 17 g, 17 g, oral, Daily, Rachel Cooley PA-C, 17 g at 12/21/24 1006    predniSONE (Deltasone) tablet 5 mg, 5 mg, oral, Daily, Rachel Cooley PA-C, 5 mg at 12/21/24 1006    rosuvastatin (Crestor) tablet 10 mg, 10 mg, oral, Daily, Rachel Cooley PA-C, 10 mg at 12/21/24 1006    triamcinolone (Kenalog) 0.1 % ointment 1 Application, 1 Application, Topical, BID PRN, Rachel Cooley PA-C           Assessment/Plan      1.  CATHIE on top of CKD stage V, followed by Dr. Xiong as outpatient will schedule for peritoneal dialysis catheter placement for CAPD.  2.  Hypertension continue current management  3.  Severe protein calorie malnutrition  4.  Anemia of CKD    Will continue his current treatment and follow patient daily reviewing labs and clinical condition        Assessment & Plan  Generalized abdominal pain    Unspecified severe protein-calorie malnutrition (Multi)    Unintentional weight loss    Anorexia    Failure to thrive in adult    Acute on chronic renal failure (CMS-HCC)              I spent 35 minutes in the professional and overall care of this  patient.      Ezra Tillman MD

## 2024-12-22 VITALS
DIASTOLIC BLOOD PRESSURE: 71 MMHG | SYSTOLIC BLOOD PRESSURE: 123 MMHG | HEIGHT: 69 IN | OXYGEN SATURATION: 98 % | RESPIRATION RATE: 18 BRPM | HEART RATE: 77 BPM | TEMPERATURE: 97.3 F | WEIGHT: 100 LBS | BODY MASS INDEX: 14.81 KG/M2

## 2024-12-22 LAB
ALBUMIN SERPL BCP-MCNC: 3.1 G/DL (ref 3.4–5)
ANION GAP SERPL CALC-SCNC: 12 MMOL/L (ref 10–20)
BACTERIA UR CULT: NO GROWTH
BUN SERPL-MCNC: 39 MG/DL (ref 6–23)
CALCIUM SERPL-MCNC: 10.1 MG/DL (ref 8.6–10.3)
CHLORIDE SERPL-SCNC: 100 MMOL/L (ref 98–107)
CO2 SERPL-SCNC: 27 MMOL/L (ref 21–32)
CREAT SERPL-MCNC: 5.75 MG/DL (ref 0.5–1.3)
EGFRCR SERPLBLD CKD-EPI 2021: 10 ML/MIN/1.73M*2
GLUCOSE SERPL-MCNC: 90 MG/DL (ref 74–99)
HCV RNA SERPL NAA+PROBE-ACNC: NOT DETECTED K[IU]/ML
HCV RNA SERPL NAA+PROBE-LOG IU: NORMAL {LOG_IU}/ML
HOLD SPECIMEN: NORMAL
HOLD SPECIMEN: NORMAL
LABORATORY COMMENT REPORT: NORMAL
PHOSPHATE SERPL-MCNC: 2.9 MG/DL (ref 2.5–4.9)
POTASSIUM SERPL-SCNC: 4.9 MMOL/L (ref 3.5–5.3)
SODIUM SERPL-SCNC: 134 MMOL/L (ref 136–145)

## 2024-12-22 PROCEDURE — 36415 COLL VENOUS BLD VENIPUNCTURE: CPT | Performed by: STUDENT IN AN ORGANIZED HEALTH CARE EDUCATION/TRAINING PROGRAM

## 2024-12-22 PROCEDURE — 2500000002 HC RX 250 W HCPCS SELF ADMINISTERED DRUGS (ALT 637 FOR MEDICARE OP, ALT 636 FOR OP/ED): Performed by: INTERNAL MEDICINE

## 2024-12-22 PROCEDURE — 2500000001 HC RX 250 WO HCPCS SELF ADMINISTERED DRUGS (ALT 637 FOR MEDICARE OP): Performed by: STUDENT IN AN ORGANIZED HEALTH CARE EDUCATION/TRAINING PROGRAM

## 2024-12-22 PROCEDURE — 2500000002 HC RX 250 W HCPCS SELF ADMINISTERED DRUGS (ALT 637 FOR MEDICARE OP, ALT 636 FOR OP/ED)

## 2024-12-22 PROCEDURE — 94640 AIRWAY INHALATION TREATMENT: CPT

## 2024-12-22 PROCEDURE — 2500000002 HC RX 250 W HCPCS SELF ADMINISTERED DRUGS (ALT 637 FOR MEDICARE OP, ALT 636 FOR OP/ED): Performed by: STUDENT IN AN ORGANIZED HEALTH CARE EDUCATION/TRAINING PROGRAM

## 2024-12-22 PROCEDURE — 99232 SBSQ HOSP IP/OBS MODERATE 35: CPT | Performed by: INTERNAL MEDICINE

## 2024-12-22 PROCEDURE — 2500000004 HC RX 250 GENERAL PHARMACY W/ HCPCS (ALT 636 FOR OP/ED): Performed by: PHYSICIAN ASSISTANT

## 2024-12-22 PROCEDURE — 80069 RENAL FUNCTION PANEL: CPT | Performed by: STUDENT IN AN ORGANIZED HEALTH CARE EDUCATION/TRAINING PROGRAM

## 2024-12-22 PROCEDURE — 2500000002 HC RX 250 W HCPCS SELF ADMINISTERED DRUGS (ALT 637 FOR MEDICARE OP, ALT 636 FOR OP/ED): Performed by: PHYSICIAN ASSISTANT

## 2024-12-22 PROCEDURE — 99232 SBSQ HOSP IP/OBS MODERATE 35: CPT | Performed by: STUDENT IN AN ORGANIZED HEALTH CARE EDUCATION/TRAINING PROGRAM

## 2024-12-22 PROCEDURE — 2500000005 HC RX 250 GENERAL PHARMACY W/O HCPCS: Performed by: STUDENT IN AN ORGANIZED HEALTH CARE EDUCATION/TRAINING PROGRAM

## 2024-12-22 PROCEDURE — 2500000001 HC RX 250 WO HCPCS SELF ADMINISTERED DRUGS (ALT 637 FOR MEDICARE OP)

## 2024-12-22 PROCEDURE — 1100000001 HC PRIVATE ROOM DAILY

## 2024-12-22 RX ORDER — POLYETHYLENE GLYCOL 3350, SODIUM CHLORIDE, SODIUM BICARBONATE, POTASSIUM CHLORIDE 420; 11.2; 5.72; 1.48 G/4L; G/4L; G/4L; G/4L
4000 POWDER, FOR SOLUTION ORAL ONCE
Status: COMPLETED | OUTPATIENT
Start: 2024-12-22 | End: 2024-12-22

## 2024-12-22 RX ADMIN — OXYCODONE HYDROCHLORIDE 5 MG: 5 TABLET ORAL at 10:05

## 2024-12-22 RX ADMIN — HEPARIN SODIUM 5000 UNITS: 5000 INJECTION, SOLUTION INTRAVENOUS; SUBCUTANEOUS at 21:24

## 2024-12-22 RX ADMIN — OXYCODONE HYDROCHLORIDE 5 MG: 5 TABLET ORAL at 17:40

## 2024-12-22 RX ADMIN — IPRATROPIUM BROMIDE AND ALBUTEROL SULFATE 3 ML: 2.5; .5 SOLUTION RESPIRATORY (INHALATION) at 07:11

## 2024-12-22 RX ADMIN — OXYCODONE HYDROCHLORIDE 5 MG: 5 TABLET ORAL at 13:38

## 2024-12-22 RX ADMIN — AZITHROMYCIN 250 MG: 250 TABLET, FILM COATED ORAL at 10:05

## 2024-12-22 RX ADMIN — ROSUVASTATIN 10 MG: 10 TABLET, FILM COATED ORAL at 10:05

## 2024-12-22 RX ADMIN — IPRATROPIUM BROMIDE AND ALBUTEROL SULFATE 3 ML: 2.5; .5 SOLUTION RESPIRATORY (INHALATION) at 12:03

## 2024-12-22 RX ADMIN — HEPARIN SODIUM 5000 UNITS: 5000 INJECTION, SOLUTION INTRAVENOUS; SUBCUTANEOUS at 13:38

## 2024-12-22 RX ADMIN — POLYETHYLENE GLYCOL 3350, SODIUM SULFATE ANHYDROUS, SODIUM BICARBONATE, SODIUM CHLORIDE, POTASSIUM CHLORIDE 4000 ML: 236; 22.74; 6.74; 5.86; 2.97 POWDER, FOR SOLUTION ORAL at 17:42

## 2024-12-22 RX ADMIN — Medication 2 L/MIN: at 07:11

## 2024-12-22 RX ADMIN — PREDNISONE 5 MG: 5 TABLET ORAL at 10:05

## 2024-12-22 RX ADMIN — BUDESONIDE 0.25 MG: 0.25 INHALANT RESPIRATORY (INHALATION) at 19:23

## 2024-12-22 RX ADMIN — HEPARIN SODIUM 5000 UNITS: 5000 INJECTION, SOLUTION INTRAVENOUS; SUBCUTANEOUS at 06:00

## 2024-12-22 RX ADMIN — BUDESONIDE 0.25 MG: 0.25 INHALANT RESPIRATORY (INHALATION) at 07:13

## 2024-12-22 RX ADMIN — IPRATROPIUM BROMIDE AND ALBUTEROL SULFATE 3 ML: 2.5; .5 SOLUTION RESPIRATORY (INHALATION) at 19:23

## 2024-12-22 RX ADMIN — POLYETHYLENE GLYCOL 3350 17 G: 17 POWDER, FOR SOLUTION ORAL at 10:05

## 2024-12-22 RX ADMIN — Medication 2 L/MIN: at 19:23

## 2024-12-22 RX ADMIN — Medication 2 L/MIN: at 12:03

## 2024-12-22 ASSESSMENT — COGNITIVE AND FUNCTIONAL STATUS - GENERAL
DRESSING REGULAR UPPER BODY CLOTHING: A LITTLE
TURNING FROM BACK TO SIDE WHILE IN FLAT BAD: A LITTLE
WALKING IN HOSPITAL ROOM: A LITTLE
TOILETING: A LITTLE
MOVING TO AND FROM BED TO CHAIR: A LITTLE
DAILY ACTIVITIY SCORE: 18
PERSONAL GROOMING: A LITTLE
MOBILITY SCORE: 18
MOVING FROM LYING ON BACK TO SITTING ON SIDE OF FLAT BED WITH BEDRAILS: A LITTLE
HELP NEEDED FOR BATHING: A LITTLE
DRESSING REGULAR LOWER BODY CLOTHING: A LOT
CLIMB 3 TO 5 STEPS WITH RAILING: A LOT

## 2024-12-22 ASSESSMENT — PAIN SCALES - GENERAL
PAINLEVEL_OUTOF10: 0 - NO PAIN
PAINLEVEL_OUTOF10: 7
PAINLEVEL_OUTOF10: 0 - NO PAIN

## 2024-12-22 ASSESSMENT — ENCOUNTER SYMPTOMS
UNEXPECTED WEIGHT CHANGE: 1
ABDOMINAL DISTENTION: 0
VOMITING: 0
FEVER: 0
SHORTNESS OF BREATH: 1
ABDOMINAL PAIN: 0

## 2024-12-22 ASSESSMENT — PAIN SCALES - PAIN ASSESSMENT IN ADVANCED DEMENTIA (PAINAD): TOTALSCORE: MEDICATION (SEE MAR)

## 2024-12-22 ASSESSMENT — PAIN - FUNCTIONAL ASSESSMENT: PAIN_FUNCTIONAL_ASSESSMENT: 0-10

## 2024-12-22 ASSESSMENT — PAIN DESCRIPTION - LOCATION: LOCATION: CHEST

## 2024-12-22 NOTE — PROGRESS NOTES
Kalyan Armstrong is a 74 y.o. male on day 3 of admission presenting with Generalized abdominal pain.      Subjective   Overall doing well no further complaints       Objective        Vitals 24HR  Heart Rate:  [65-80]   Temp:  [36.2 °C (97.1 °F)-36.7 °C (98 °F)]   Resp:  [16-18]   BP: (103-135)/(70-78)   SpO2:  [95 %-100 %]     Intake/Output last 3 Shifts:    Intake/Output Summary (Last 24 hours) at 12/22/2024 1433  Last data filed at 12/22/2024 0600  Gross per 24 hour   Intake 240 ml   Output 250 ml   Net -10 ml       Physical Exam      General appearance: Cachectic in no distress  Head and ENT: Normocephalic/atraumatic/supple neck/no JVD  Lungs: CTA  Heart: RRR  Abdomen; soft no tenderness  Extremities: No edema          Relevant Results     Results from last 7 days   Lab Units 12/21/24  0511 12/20/24  0507 12/19/24  1343   WBC AUTO x10*3/uL 7.5 7.8 8.2   HEMOGLOBIN g/dL 9.3* 9.9* 10.5*   HEMATOCRIT % 27.3* 29.3* 33.5*   PLATELETS AUTO x10*3/uL 174 189 168      Results from last 7 days   Lab Units 12/22/24  0644 12/21/24  0511 12/20/24  0507   SODIUM mmol/L 134* 133* 136   POTASSIUM mmol/L 4.9 4.3 4.4   CHLORIDE mmol/L 100 100 99   CO2 mmol/L 27 26 31   BUN mg/dL 39* 45* 50*   CREATININE mg/dL 5.75* 6.25* 6.82*   GLUCOSE mg/dL 90 90 100*   CALCIUM mg/dL 10.1 10.6* 10.7*        Current Facility-Administered Medications:     acetaminophen (Tylenol) tablet 650 mg, 650 mg, oral, q4h PRN **OR** acetaminophen (Tylenol) oral liquid 650 mg, 650 mg, oral, q4h PRN **OR** acetaminophen (Tylenol) suppository 650 mg, 650 mg, rectal, q4h PRN, Rachel Cooley PA-C    albuterol 2.5 mg /3 mL (0.083 %) nebulizer solution 2.5 mg, 2.5 mg, nebulization, q2h PRN, Brayden Giordano MD    [Held by provider] amLODIPine (Norvasc) tablet 10 mg, 10 mg, oral, Daily, Rachel Cooley PA-C    azithromycin (Zithromax) tablet 250 mg, 250 mg, oral, Daily, Wan Bhakta MD, 250 mg at 12/22/24 1005    budesonide (Pulmicort) 0.25 mg/2 mL nebulizer  solution 0.25 mg, 0.25 mg, nebulization, BID, Rachel Kenny, PharmD, 0.25 mg at 12/22/24 0713    heparin (porcine) injection 5,000 Units, 5,000 Units, subcutaneous, q8h GULSHAN, Rachel Cooley PA-C, 5,000 Units at 12/22/24 1338    ipratropium-albuteroL (Duo-Neb) 0.5-2.5 mg/3 mL nebulizer solution 3 mL, 3 mL, nebulization, TID, Brayden Giordano MD, 3 mL at 12/22/24 1203    melatonin tablet 3 mg, 3 mg, oral, Nightly PRN, Rachel Cooley PA-C    ondansetron ODT (Zofran-ODT) disintegrating tablet 4 mg, 4 mg, oral, q8h PRN **OR** ondansetron (Zofran) injection 4 mg, 4 mg, intravenous, q8h PRN, Rachel Cooley PA-C    oxyCODONE (Roxicodone) immediate release tablet 5 mg, 5 mg, oral, TID, Wan Bhakta MD, 5 mg at 12/22/24 1338    oxygen (O2) therapy, , inhalation, Continuous PRN - O2/gases, Wan Bhakta MD, 2 L/min at 12/22/24 1203    polyethylene glycol (Glycolax, Miralax) packet 17 g, 17 g, oral, Daily, Rachel Cooley PA-C, 17 g at 12/22/24 1005    polyethylene glycol-electrolytes (Nulytely) solution 4,000 mL, 4,000 mL, oral, Once, Clemente Hrytsyuk, APRN-CNP    predniSONE (Deltasone) tablet 5 mg, 5 mg, oral, Daily, Rachel Cooley PA-C, 5 mg at 12/22/24 1005    rosuvastatin (Crestor) tablet 10 mg, 10 mg, oral, Daily, Rachel Cooley PA-C, 10 mg at 12/22/24 1005    triamcinolone (Kenalog) 0.1 % ointment 1 Application, 1 Application, Topical, BID PRN, Rachel Cooley PA-C           Assessment/Plan        1.  CATHIE on top of CKD stage V, followed by Dr. Xiong as outpatient will schedule for peritoneal dialysis catheter placement for CAPD.  2.  Hypertension continue current management  3.  Severe protein calorie malnutrition  4.  Anemia of CKD     Will continue his current treatment and follow patient daily reviewing labs and clinical condition            Assessment & Plan  Generalized abdominal pain    Unspecified severe protein-calorie malnutrition (Multi)    Unintentional weight loss    Anorexia    Failure to thrive  in adult    Acute on chronic renal failure (CMS-HCC)              I spent 35 minutes in the professional and overall care of this patient.      Ezra Tillman MD

## 2024-12-22 NOTE — CARE PLAN
The patient's goals for the shift include Rest.    The clinical goals for the shift include Pt will remain hemodynamically stable by end of shift    Over the shift, the patient did make progress toward the following goals.     Problem: Pain - Adult  Goal: Verbalizes/displays adequate comfort level or baseline comfort level  Outcome: Progressing     Problem: Safety - Adult  Goal: Free from fall injury  Outcome: Progressing     Problem: Fall/Injury  Goal: Not fall by end of shift  Outcome: Progressing

## 2024-12-22 NOTE — PROGRESS NOTES
[facility] Hospitalist Progress Note        Between 7AM-7PM please message me via Epic Secure Chat.  After 7PM please page Nocturnist on call.        Assessment/Plan     Reported unintentional weight loss/chronic abdominal pain/adult FTT - CT A/P no new acute process  CATHIE on CKD 5 - Scr around his baseline now  Likely end stage COPD/Chronic respiratory failure on 3-4L NC (he is on PO pred 5mg daily and PO azithro 250mg daily at home for COPD through his pulmonologist)  Severe protein calorie malnutrition  Anemia  HTN  Chronic Hep C reportedly post-treatment and negative viral load  DNR comfort care      - palliative care consult to assist with sx management -> we will try schedule PO oxy to see if it assists with air hunger/chronic abdominal pain  - GI consult for weight loss -> EGD done, biopsies pending but looking at report no obvious etiology. C-scope planned for tomorrow  - nephrology following for CATHIE on CKD -> likely progressing towards dialysis, renal fx presently stable  - dietary/PT/OT consults  - overall, hospice appropriate should he ever choose to focus on comfort    Fluids: None  Lytes: Replete as needed  Nutrition: Regular  Orr: None  Invasive lines: None  Drains: None    DVT Prophylaxis:  Heparin subq      Discharge Planning: home vs SNF once med ready      I personally examined the patient and reviewed chart.  Plan of care was discussed with patient, all questions answered.    Total time spent: At least 38 minutes, providing counseling or in coordination of care. Total time on this day of visit includes record and documentation review before and after visit including documentation and time not explicitly included on EMR time stamp.      Subjective     Kalyan Armstrong is a 74 y.o. male on day 3 of admission presenting with Generalized abdominal pain.    NAEON. Overall stable, resting in bed comfortably no acute distress. Wife at bedside, discussed GI planning c-scope tomorrow    Review of Systems  "  Constitutional:  Positive for unexpected weight change. Negative for fever.   Respiratory:  Positive for shortness of breath.    Cardiovascular:  Negative for chest pain.   Gastrointestinal:  Negative for abdominal distention, abdominal pain and vomiting.       Objective     Physical Exam  Constitutional:       Comments: Cachetic appearing   Cardiovascular:      Rate and Rhythm: Normal rate and regular rhythm.   Pulmonary:      Effort: No respiratory distress.   Abdominal:      Palpations: Abdomen is soft.      Tenderness: There is no abdominal tenderness.   Neurological:      Mental Status: He is alert. Mental status is at baseline.         Last Recorded Vitals  Blood pressure 135/78, pulse 80, temperature 36.7 °C (98 °F), temperature source Oral, resp. rate 16, height 1.753 m (5' 9\"), weight 45.4 kg (100 lb), SpO2 95%.  Intake/Output last 3 Shifts:  I/O last 3 completed shifts:  In: 240 (5.3 mL/kg) [P.O.:240]  Out: 250 (5.5 mL/kg) [Urine:250 (0.2 mL/kg/hr)]  Weight: 45.4 kg     Relevant Results  Results for orders placed or performed during the hospital encounter of 12/19/24 (from the past 24 hours)   Lavender Top   Result Value Ref Range    Extra Tube Hold for add-ons.    SST TOP   Result Value Ref Range    Extra Tube Hold for add-ons.    Renal Function Panel   Result Value Ref Range    Glucose 90 74 - 99 mg/dL    Sodium 134 (L) 136 - 145 mmol/L    Potassium 4.9 3.5 - 5.3 mmol/L    Chloride 100 98 - 107 mmol/L    Bicarbonate 27 21 - 32 mmol/L    Anion Gap 12 10 - 20 mmol/L    Urea Nitrogen 39 (H) 6 - 23 mg/dL    Creatinine 5.75 (H) 0.50 - 1.30 mg/dL    eGFR 10 (L) >60 mL/min/1.73m*2    Calcium 10.1 8.6 - 10.3 mg/dL    Phosphorus 2.9 2.5 - 4.9 mg/dL    Albumin 3.1 (L) 3.4 - 5.0 g/dL       Imaging Results  No results found.     Medications  [Held by provider] amLODIPine, 10 mg, oral, Daily  azithromycin, 250 mg, oral, Daily  budesonide, 0.25 mg, nebulization, BID  heparin (porcine), 5,000 Units, subcutaneous, q8h " [facility]  ipratropium-albuteroL, 3 mL, nebulization, TID  oxyCODONE, 5 mg, oral, TID  polyethylene glycol, 17 g, oral, Daily  polyethylene glycol-electrolytes, 4,000 mL, oral, Once  predniSONE, 5 mg, oral, Daily  rosuvastatin, 10 mg, oral, Daily       PRN medications: acetaminophen **OR** acetaminophen **OR** acetaminophen, albuterol, melatonin, ondansetron ODT **OR** ondansetron, oxygen, triamcinolone                Wan Bhakta MD  [facility]

## 2024-12-22 NOTE — PROGRESS NOTES
Kalyan Armstrong is a 74 y.o. male on day 3 of admission presenting with Generalized abdominal pain.      Subjective   Patient seen and examined at bedside.  No complaints today.  Discussed colonoscopy tomorrow.       Objective     Last Recorded Vitals  /72 (BP Location: Right arm, Patient Position: Lying)   Pulse 57   Temp 36.6 °C (97.8 °F) (Temporal)   Resp 17   Wt 45.4 kg (100 lb)   SpO2 94%   Intake/Output last 3 Shifts:    Intake/Output Summary (Last 24 hours) at 12/22/2024 1700  Last data filed at 12/22/2024 0600  Gross per 24 hour   Intake 240 ml   Output 250 ml   Net -10 ml       Admission Weight  Weight: 45.4 kg (100 lb) (12/19/24 1325)    Daily Weight  12/19/24 : 45.4 kg (100 lb)    Image Results  Electrocardiogram, 12-lead PRN ACS symptoms  Normal sinus rhythm  Low voltage QRS  Septal infarct (cited on or before 24-SEP-2024)  Abnormal ECG  When compared with ECG of 24-SEP-2024 09:18,  No significant change was found  See ED provider note for full interpretation and clinical correlation  Confirmed by Madison Fox ([id number]) on 12/20/2024 5:23:40 PM  Esophagogastroduodenoscopy (EGD)  Table formatting from the original result was not included.  Impression  The upper third of the esophagus, middle third of the esophagus and lower   third of the esophagus appeared normal.  Mild atrophic mucosa in the body of the stomach and antrum; performed cold   forceps biopsy  The duodenal bulb and 2nd part of the duodenum appeared normal. Performed   random biopsy to rule out celiac disease.    Findings  The upper third of the esophagus, middle third of the esophagus and lower   third of the esophagus appeared normal.  Mild, generalized atrophic mucosa in the body of the stomach and antrum;   performed cold forceps biopsy  The duodenal bulb and 2nd part of the duodenum appeared normal. Performed   random biopsy using biopsy forceps to rule out celiac disease.    Recommendation  Await pathology results    Follow inpatient GI team recommendations  Diet and medications per inpatient team (see EPIC EMR)         Indication  Anorexia, Generalized abdominal pain, Unintentional weight loss, Failure   to thrive in adult    Staff  Staff Role   Montse Braun MD, MS Proceduralist     Medications  See Anesthesia Record.     Preprocedure  A history and physical has been performed, and patient medication   allergies have been reviewed. The patient's tolerance of previous   anesthesia has been reviewed. The risks and benefits of the procedure and   the sedation options and risks were discussed with the patient. All   questions were answered and informed consent obtained.    Details of the Procedure  The patient underwent monitored anesthesia care, which was administered by   an anesthesia professional. The patient's blood pressure, ECG, ETCO2,   heart rate, level of consciousness, oxygen and respirations were monitored   throughout the procedure. The scope was introduced through the mouth and   advanced to the second part of the duodenum. Retroflexion was performed in   the cardia. The patient's estimated blood loss was minimal (<5 mL). The   procedure was not difficult. The patient tolerated the procedure well.   There were no apparent adverse events.     Events  Procedure Events   Event Event Time   ENDO SCOPE IN TIME 12/20/2024 12:25 PM   ENDO SCOPE OUT TIME 12/20/2024 12:33 PM     Specimens  ID Type Source Tests Collected by Time   1 :  Tissue ANTRUM BODY BIOPSY SURGICAL PATHOLOGY EXAM Shaq Estrada MA 12/20/2024 1228   2 : 2nd portion and bulb Tissue DUODENUM SECOND PART BIOPSY SURGICAL   PATHOLOGY EXAM Shaq Estrada MA 12/20/2024 1230     Procedure Location  [address] [address] [phone]    Referring Provider  Montse Braun MD, MS    Procedure Provider  Montse Braun MD, MS      Physical Exam  Constitutional:       Appearance:  He is ill-appearing.   HENT:      Head: Atraumatic.   Cardiovascular:      Rate and Rhythm: Bradycardia present.      Heart sounds: Normal heart sounds.   Pulmonary:      Effort: Pulmonary effort is normal.      Breath sounds: Normal breath sounds.   Abdominal:      General: Abdomen is flat. Bowel sounds are normal. There is no distension.      Palpations: Abdomen is soft.      Tenderness: There is no abdominal tenderness. There is no guarding or rebound.   Musculoskeletal:         General: Normal range of motion.      Cervical back: Neck supple.   Skin:     General: Skin is warm and dry.   Neurological:      General: No focal deficit present.      Mental Status: He is alert and oriented to person, place, and time.   Psychiatric:         Mood and Affect: Mood normal.         Behavior: Behavior normal.         Relevant Results             Scheduled medications  [Held by provider] amLODIPine, 10 mg, oral, Daily  azithromycin, 250 mg, oral, Daily  budesonide, 0.25 mg, nebulization, BID  heparin (porcine), 5,000 Units, subcutaneous, q8h GULSHAN  ipratropium-albuteroL, 3 mL, nebulization, TID  oxyCODONE, 5 mg, oral, TID  polyethylene glycol, 17 g, oral, Daily  polyethylene glycol-electrolytes, 4,000 mL, oral, Once  predniSONE, 5 mg, oral, Daily  rosuvastatin, 10 mg, oral, Daily      Continuous medications     PRN medications  PRN medications: acetaminophen **OR** acetaminophen **OR** acetaminophen, albuterol, melatonin, ondansetron ODT **OR** ondansetron, oxygen, triamcinolone   Results for orders placed or performed during the hospital encounter of 12/19/24 (from the past 24 hours)   Lavender Top   Result Value Ref Range    Extra Tube Hold for add-ons.    SST TOP   Result Value Ref Range    Extra Tube Hold for add-ons.    Renal Function Panel   Result Value Ref Range    Glucose 90 74 - 99 mg/dL    Sodium 134 (L) 136 - 145 mmol/L    Potassium 4.9 3.5 - 5.3 mmol/L    Chloride 100 98 - 107 mmol/L    Bicarbonate 27 21 - 32  mmol/L    Anion Gap 12 10 - 20 mmol/L    Urea Nitrogen 39 (H) 6 - 23 mg/dL    Creatinine 5.75 (H) 0.50 - 1.30 mg/dL    eGFR 10 (L) >60 mL/min/1.73m*2    Calcium 10.1 8.6 - 10.3 mg/dL    Phosphorus 2.9 2.5 - 4.9 mg/dL    Albumin 3.1 (L) 3.4 - 5.0 g/dL     *Note: Due to a large number of results and/or encounters for the requested time period, some results have not been displayed. A complete set of results can be found in Results Review.              Assessment/Plan                  Assessment & Plan  Generalized abdominal pain    Unspecified severe protein-calorie malnutrition (Multi)    Unintentional weight loss    Anorexia    Failure to thrive in adult    Acute on chronic renal failure (CMS-HCC)      Kalyan Armstrong is a 74 y.o. male with a PMHx of HFpEF, moderate pulmonary HTN, COPD with chronic hypoxic respiratory failure on 3L  home O2, CKD, anemia, severe calorie protein malnutrition, weight loss, atherosclerosis, Hep C, falls presented to [facility] ED with c/o ongoing weight loss, abdominal and chest pressure.   S/p EGD yesterday  The upper third of the esophagus, middle third of the esophagus and lower third of the esophagus appeared normal.  Mild, generalized atrophic mucosa in the body of the stomach and antrum; performed cold forceps biopsy  The duodenal bulb and 2nd part of the duodenum appeared normal. Performed random biopsy using biopsy forceps to rule out celiac disease.        -Await pathology results   -N.p.o. after midnight  -Will order colonoscopy prep  -Colonoscopy on Monday  -Continue PPI  -Supportive care as per primary team  -GI will follow     Case discussed with Dr. Kade Walsh, APRN-CNP

## 2024-12-23 ENCOUNTER — ANESTHESIA (OUTPATIENT)
Dept: GASTROENTEROLOGY | Facility: HOSPITAL | Age: 74
End: 2024-12-23
Payer: MEDICARE

## 2024-12-23 ENCOUNTER — APPOINTMENT (OUTPATIENT)
Dept: GASTROENTEROLOGY | Facility: HOSPITAL | Age: 74
DRG: 673 | End: 2024-12-23
Payer: MEDICARE

## 2024-12-23 ENCOUNTER — ANESTHESIA EVENT (OUTPATIENT)
Dept: GASTROENTEROLOGY | Facility: HOSPITAL | Age: 74
End: 2024-12-23
Payer: MEDICARE

## 2024-12-23 LAB
ALBUMIN SERPL BCP-MCNC: 3.2 G/DL (ref 3.4–5)
ANION GAP SERPL CALC-SCNC: 12 MMOL/L (ref 10–20)
BUN SERPL-MCNC: 37 MG/DL (ref 6–23)
CALCIUM SERPL-MCNC: 10.2 MG/DL (ref 8.6–10.3)
CHLORIDE SERPL-SCNC: 96 MMOL/L (ref 98–107)
CO2 SERPL-SCNC: 32 MMOL/L (ref 21–32)
CREAT SERPL-MCNC: 5.79 MG/DL (ref 0.5–1.3)
EGFRCR SERPLBLD CKD-EPI 2021: 10 ML/MIN/1.73M*2
GLUCOSE SERPL-MCNC: 73 MG/DL (ref 74–99)
HOLD SPECIMEN: NORMAL
PHOSPHATE SERPL-MCNC: 3.6 MG/DL (ref 2.5–4.9)
POTASSIUM SERPL-SCNC: 4.9 MMOL/L (ref 3.5–5.3)
SODIUM SERPL-SCNC: 135 MMOL/L (ref 136–145)

## 2024-12-23 PROCEDURE — 1100000001 HC PRIVATE ROOM DAILY

## 2024-12-23 PROCEDURE — 2500000002 HC RX 250 W HCPCS SELF ADMINISTERED DRUGS (ALT 637 FOR MEDICARE OP, ALT 636 FOR OP/ED): Performed by: STUDENT IN AN ORGANIZED HEALTH CARE EDUCATION/TRAINING PROGRAM

## 2024-12-23 PROCEDURE — 99231 SBSQ HOSP IP/OBS SF/LOW 25: CPT | Performed by: NURSE PRACTITIONER

## 2024-12-23 PROCEDURE — 82652 VIT D 1 25-DIHYDROXY: CPT | Performed by: HOSPITALIST

## 2024-12-23 PROCEDURE — 7100000009 HC PHASE TWO TIME - INITIAL BASE CHARGE

## 2024-12-23 PROCEDURE — 3700000001 HC GENERAL ANESTHESIA TIME - INITIAL BASE CHARGE

## 2024-12-23 PROCEDURE — 2500000002 HC RX 250 W HCPCS SELF ADMINISTERED DRUGS (ALT 637 FOR MEDICARE OP, ALT 636 FOR OP/ED)

## 2024-12-23 PROCEDURE — 2500000005 HC RX 250 GENERAL PHARMACY W/O HCPCS: Performed by: STUDENT IN AN ORGANIZED HEALTH CARE EDUCATION/TRAINING PROGRAM

## 2024-12-23 PROCEDURE — 99232 SBSQ HOSP IP/OBS MODERATE 35: CPT | Performed by: STUDENT IN AN ORGANIZED HEALTH CARE EDUCATION/TRAINING PROGRAM

## 2024-12-23 PROCEDURE — 3700000002 HC GENERAL ANESTHESIA TIME - EACH INCREMENTAL 1 MINUTE

## 2024-12-23 PROCEDURE — 84100 ASSAY OF PHOSPHORUS: CPT | Performed by: STUDENT IN AN ORGANIZED HEALTH CARE EDUCATION/TRAINING PROGRAM

## 2024-12-23 PROCEDURE — 0DBN8ZX EXCISION OF SIGMOID COLON, VIA NATURAL OR ARTIFICIAL OPENING ENDOSCOPIC, DIAGNOSTIC: ICD-10-PCS | Performed by: INTERNAL MEDICINE

## 2024-12-23 PROCEDURE — 97116 GAIT TRAINING THERAPY: CPT | Mod: GP,CQ | Performed by: PHYSICAL THERAPY ASSISTANT

## 2024-12-23 PROCEDURE — 94640 AIRWAY INHALATION TREATMENT: CPT

## 2024-12-23 PROCEDURE — 45380 COLONOSCOPY AND BIOPSY: CPT | Performed by: INTERNAL MEDICINE

## 2024-12-23 PROCEDURE — 82306 VITAMIN D 25 HYDROXY: CPT | Mod: AHULAB | Performed by: HOSPITALIST

## 2024-12-23 PROCEDURE — 2500000002 HC RX 250 W HCPCS SELF ADMINISTERED DRUGS (ALT 637 FOR MEDICARE OP, ALT 636 FOR OP/ED): Performed by: PHYSICIAN ASSISTANT

## 2024-12-23 PROCEDURE — 88305 TISSUE EXAM BY PATHOLOGIST: CPT | Mod: TC,AHULAB | Performed by: INTERNAL MEDICINE

## 2024-12-23 PROCEDURE — 88305 TISSUE EXAM BY PATHOLOGIST: CPT | Performed by: PATHOLOGY

## 2024-12-23 PROCEDURE — 2500000001 HC RX 250 WO HCPCS SELF ADMINISTERED DRUGS (ALT 637 FOR MEDICARE OP): Performed by: STUDENT IN AN ORGANIZED HEALTH CARE EDUCATION/TRAINING PROGRAM

## 2024-12-23 PROCEDURE — 36415 COLL VENOUS BLD VENIPUNCTURE: CPT | Performed by: STUDENT IN AN ORGANIZED HEALTH CARE EDUCATION/TRAINING PROGRAM

## 2024-12-23 PROCEDURE — A45380 PR COLONOSCOPY,BIOPSY: Performed by: NURSE ANESTHETIST, CERTIFIED REGISTERED

## 2024-12-23 PROCEDURE — 2500000004 HC RX 250 GENERAL PHARMACY W/ HCPCS (ALT 636 FOR OP/ED): Performed by: PHYSICIAN ASSISTANT

## 2024-12-23 PROCEDURE — 7100000010 HC PHASE TWO TIME - EACH INCREMENTAL 1 MINUTE

## 2024-12-23 PROCEDURE — 2500000002 HC RX 250 W HCPCS SELF ADMINISTERED DRUGS (ALT 637 FOR MEDICARE OP, ALT 636 FOR OP/ED): Performed by: INTERNAL MEDICINE

## 2024-12-23 PROCEDURE — 2500000004 HC RX 250 GENERAL PHARMACY W/ HCPCS (ALT 636 FOR OP/ED): Performed by: NURSE ANESTHETIST, CERTIFIED REGISTERED

## 2024-12-23 PROCEDURE — 97530 THERAPEUTIC ACTIVITIES: CPT | Mod: GP,CQ | Performed by: PHYSICAL THERAPY ASSISTANT

## 2024-12-23 RX ORDER — LIDOCAINE HYDROCHLORIDE 20 MG/ML
INJECTION, SOLUTION EPIDURAL; INFILTRATION; INTRACAUDAL; PERINEURAL AS NEEDED
Status: DISCONTINUED | OUTPATIENT
Start: 2024-12-23 | End: 2024-12-23

## 2024-12-23 RX ORDER — PROPOFOL 10 MG/ML
INJECTION, EMULSION INTRAVENOUS CONTINUOUS PRN
Status: DISCONTINUED | OUTPATIENT
Start: 2024-12-23 | End: 2024-12-23

## 2024-12-23 RX ADMIN — HEPARIN SODIUM 5000 UNITS: 5000 INJECTION, SOLUTION INTRAVENOUS; SUBCUTANEOUS at 21:26

## 2024-12-23 RX ADMIN — Medication 3 L/MIN: at 19:59

## 2024-12-23 RX ADMIN — IPRATROPIUM BROMIDE AND ALBUTEROL SULFATE 3 ML: 2.5; .5 SOLUTION RESPIRATORY (INHALATION) at 07:25

## 2024-12-23 RX ADMIN — IPRATROPIUM BROMIDE AND ALBUTEROL SULFATE 3 ML: 2.5; .5 SOLUTION RESPIRATORY (INHALATION) at 19:59

## 2024-12-23 RX ADMIN — OXYCODONE HYDROCHLORIDE 5 MG: 5 TABLET ORAL at 18:32

## 2024-12-23 RX ADMIN — BUDESONIDE 0.25 MG: 0.25 INHALANT RESPIRATORY (INHALATION) at 19:59

## 2024-12-23 RX ADMIN — ROSUVASTATIN 10 MG: 10 TABLET, FILM COATED ORAL at 09:53

## 2024-12-23 RX ADMIN — BUDESONIDE 0.25 MG: 0.25 INHALANT RESPIRATORY (INHALATION) at 07:26

## 2024-12-23 RX ADMIN — OXYCODONE HYDROCHLORIDE 5 MG: 5 TABLET ORAL at 09:54

## 2024-12-23 RX ADMIN — AZITHROMYCIN 250 MG: 250 TABLET, FILM COATED ORAL at 09:54

## 2024-12-23 RX ADMIN — Medication 4 L/MIN: at 11:53

## 2024-12-23 RX ADMIN — PREDNISONE 5 MG: 5 TABLET ORAL at 09:54

## 2024-12-23 RX ADMIN — OXYCODONE HYDROCHLORIDE 5 MG: 5 TABLET ORAL at 14:33

## 2024-12-23 SDOH — HEALTH STABILITY: MENTAL HEALTH: CURRENT SMOKER: 0

## 2024-12-23 ASSESSMENT — COGNITIVE AND FUNCTIONAL STATUS - GENERAL
MOVING TO AND FROM BED TO CHAIR: A LITTLE
CLIMB 3 TO 5 STEPS WITH RAILING: TOTAL
STANDING UP FROM CHAIR USING ARMS: A LOT
MOBILITY SCORE: 22
WALKING IN HOSPITAL ROOM: A LITTLE
DAILY ACTIVITIY SCORE: 24
WALKING IN HOSPITAL ROOM: A LITTLE
TURNING FROM BACK TO SIDE WHILE IN FLAT BAD: A LITTLE
MOBILITY SCORE: 15
CLIMB 3 TO 5 STEPS WITH RAILING: A LITTLE
MOVING FROM LYING ON BACK TO SITTING ON SIDE OF FLAT BED WITH BEDRAILS: A LITTLE

## 2024-12-23 ASSESSMENT — PAIN - FUNCTIONAL ASSESSMENT
PAIN_FUNCTIONAL_ASSESSMENT: 0-10
PAIN_FUNCTIONAL_ASSESSMENT: 0-10

## 2024-12-23 ASSESSMENT — PAIN SCALES - GENERAL
PAIN_LEVEL: 0
PAINLEVEL_OUTOF10: 0 - NO PAIN

## 2024-12-23 ASSESSMENT — ENCOUNTER SYMPTOMS
UNEXPECTED WEIGHT CHANGE: 1
VOMITING: 0
FEVER: 0
ABDOMINAL DISTENTION: 0
ABDOMINAL PAIN: 0
SHORTNESS OF BREATH: 1

## 2024-12-23 NOTE — PROGRESS NOTES
Occupational Therapy                 Therapy Communication Note    Patient Name: Kalyan Armstrong  MRN: [id number]  Department: [facility] GI LAB  Room: [address] [place] [address]A  Today's Date: 12/23/2024     Discipline: Occupational Therapy    OT Missed Visit: Yes     Missed Visit Reason: Missed Visit Reason: Patient in a medical procedure (pt off the floor for colonoscopy, unavailable for OT)    Missed Time: Attempt

## 2024-12-23 NOTE — CONSULTS
Wound Care Consult     Visit Date: 12/23/2024      Patient Name: Kalyan Armstrong         MRN: [id number]           YOB: 1950     Reason for Consult: buttocks            Pertinent Labs:   Albumin   Date Value Ref Range Status   12/23/2024 3.2 (L) 3.4 - 5.0 g/dL Final   10/24/2024 3.5 3.4 - 5.0 g/dL Final     Albumin/Protein Total, Ur   Date Value Ref Range Status   03/24/2022 26.8 Not Established % Final       Wound Assessment:  Wound 11/30/24 Elbow Dorsal;Right (Active)   Site Assessment Clean;Dry;Intact 12/20/24 1125   Dressing Status Clean;Dry 12/20/24 1125       Wound 12/19/24 Pressure Injury Sacrum (Active)   Wound Image   12/23/24 1250   Site Assessment [place] 12/23/24 1250   Kerline-Wound Assessment Intact 12/23/24 1250   Non-staged Wound Description Partial thickness 12/23/24 1250   Pressure Injury Stage 2 12/23/24 1250   Shape round resolving PI 12/23/24 1250   Wound Length (cm) 0.5 cm 12/23/24 1250   Wound Width (cm) 0.5 cm 12/23/24 1250   Wound Surface Area (cm^2) 0.25 cm^2 12/23/24 1250   Margins Not attached 12/19/24 1845   Dressing Foam 12/23/24 1250   Dressing Status Clean;Dry 12/20/24 1125       Wound Team Summary Assessment: Additional supplies left at bedside.     Sacrum/left buttocks-resolving stage 2 PI with MASD/IAD   Irrigate with normal saline or wound cleanser, Pat dry.  Apply no sting skin barrier (cavilon) to kerline wound   Cover with Mepilex border dressing   Change every day and as needed.     Apply Critic-Aid clear ointment to buttocks/perineum/groin four times a day and as needed after incontinence.     While in bed patient should only be on one fitted sheet, and one chux. Please, do not use brief while patient is resting in bed. Elevate heels off the bed surface at all times. Turn and reposition at least every 2 hours.     Wound Team Plan: Thank you for this consult. Assessment has been completed and orders have been placed. Wound care to be completed by nursing per orders.  Please place new consult if there is a change in wound status.        Imelda Graham RN BSN,[facility],CWOCN  [phone]   12/23/2024  6:33 PM

## 2024-12-23 NOTE — ANESTHESIA POSTPROCEDURE EVALUATION
Patient: Kalyan Armstrong    Procedure Summary       Date: 12/23/24 Room / Location: [facility]    Anesthesia Start: 1120 Anesthesia Stop: 1154    Procedure: COLONOSCOPY Diagnosis:       Unintentional weight loss      Generalized abdominal pain    Scheduled Providers: Dorys Herndon MD; Jay White MD; Niru Muniz RN Responsible Provider: Jay White MD    Anesthesia Type: general ASA Status: 4            Anesthesia Type: general    Vitals Value Taken Time   /69 12/23/24 1224   Temp 36.7 °C (98.1 °F) 12/23/24 1153   Pulse 78 12/23/24 1229   Resp 16 12/23/24 1223   SpO2 100 % 12/23/24 1229   Vitals shown include unfiled device data.    Anesthesia Post Evaluation    Patient location during evaluation: bedside  Patient participation: complete - patient participated  Level of consciousness: awake and alert  Pain score: 0  Pain management: adequate  Airway patency: patent  Cardiovascular status: acceptable  Respiratory status: acceptable  Hydration status: acceptable  Postoperative Nausea and Vomiting: none        No notable events documented.

## 2024-12-23 NOTE — ANESTHESIA PREPROCEDURE EVALUATION
Patient: Kalyan Armstrong    Procedure Information       Date/Time: 12/20/24 1200    Scheduled providers: Montse Braun MD, MS; Jay White MD; Argentina Mancini RN; Shaq Estrada MA    Procedure: Colonoscopy    Location: [facility]            Relevant Problems   Anesthesia (within normal limits)      Cardiac   (+) Benign essential hypertension      Pulmonary   (+) Acute exacerbation of chronic obstructive pulmonary disease (COPD) (Multi)   (+) COPD (chronic obstructive pulmonary disease) (Multi)   (+) Chronic obstructive pulmonary disease, unspecified COPD type (Multi)   (+) Pulmonary nodules      Neuro (within normal limits)      GI (within normal limits)      /Renal (within normal limits)      Liver   (+) Chronic viral hepatitis C (Multi)   (+) Elevated LFTs   (+) Hepatitis C      Endocrine (within normal limits)      Hematology   (+) Macrocytic anemia      Musculoskeletal (within normal limits)      HEENT (within normal limits)      ID   (+) Chronic viral hepatitis C (Multi)   (+) Hepatitis C      Skin (within normal limits)       Clinical information reviewed:   Tobacco  Allergies  Meds  Problems  Med Hx  Surg Hx   Fam Hx  Soc   Hx        NPO Detail:  NPO/Void Status  Date of Last Liquid: 12/22/24  Time of Last Liquid: 0000  Date of Last Solid: 12/22/24  Time of Last Solid: 0000  Last Intake Type: Clear fluids         Physical Exam    Airway  Mallampati: II  TM distance: >3 FB  Neck ROM: full     Cardiovascular - normal exam  Rhythm: regular  Rate: normal     Dental   (+) upper dentures, lower dentures     Pulmonary - normal exam     Abdominal - normal exam             Anesthesia Plan    History of general anesthesia?: yes  History of complications of general anesthesia?: no    ASA 4     general   (Patient is a DNR. He agrees to rescind the DNR status for the the duration until he is back on the floor after the procedure.)  The patient is not a current  smoker.    Anesthetic plan and risks discussed with patient.  Use of blood products discussed with patient who consented to blood products.

## 2024-12-23 NOTE — PROGRESS NOTES
12/23/24 1102   Discharge Planning   Expected Discharge Disposition SNF     Per notes patient does not sound like [facility] will be able to accept, patient was seen by PT/OT on 12/19 and rec'd Mod , I have printed out a SNF list for the patient to review, will need updated PTOT notes as previous notes stated possible could be rec'd low, I have reached to SW to see if patient would be interested in placement.

## 2024-12-23 NOTE — CARE PLAN
The patient's goals for the shift include Rest.    The clinical goals for the shift include Pt will remain hemodynamically stable by end of shift    Over the shift, the patient did make progress toward the following goals.    Problem: Safety - Adult  Goal: Free from fall injury  Outcome: Progressing     Problem: Fall/Injury  Goal: Not fall by end of shift  Outcome: Progressing     Problem: Pain - Adult  Goal: Verbalizes/displays adequate comfort level or baseline comfort level  Outcome: Progressing

## 2024-12-23 NOTE — DISCHARGE INSTRUCTIONS
PLEASE CHECK CBC (PLATELETS) WITHIN ONE WEEK.  BLOOD PRESSURE ON SOFT SIDE; HE MAY NOT NEED THE AMLODIPINE.       Wound care consult/recommendations:     Sacrum/left buttocks-resolving stage 2 PI with MASD/IAD   Irrigate with normal saline or wound cleanser, Pat dry.  Apply no sting skin barrier (cavilon) to kerline wound   Cover with Mepilex border dressing   Change every day and as needed.     Apply Critic-Aid clear ointment to buttocks/perineum/groin four times a day and as needed after incontinence.     While in bed patient should only be on one fitted sheet, and one chux. Please, do not use brief while patient is resting in bed. Elevate heels off the bed surface at all times. Turn and reposition at least every 2 hours.         Central Venous Catheters (CVC)- Patient and Family Education    What is a Central Venous Catheter (CVC)?  A Central Venous Catheter (CVC) is a long tube used when you need special fluids or  medicines, blood samples for testing, dialysis, or blood transfusions (like red blood cells or platelets). It  is made of a material that is soft and easy to bend. It may have 1, 2, or 3 tubes. Once the  catheter is put in, it stays in place. This means you should not need an IV placed in your arm for  fluids or medicines.    Before the Procedure:  ? If you take blood-thinning medications, you Must ask your doctor when you should stop  taking the medications. You may need to stop taking the blood-thinning medication up to  7 days prior to the exam.  ? You will have a blood sample drawn.  ? Do Not Drink or Eat Anything 8 hours before your procure unless instructed otherwise.  ? You may take any medications you normally take with small amount of clear liquid.    During the Procedure:  ? You will lie on a cushioned X-Ray table.  ? An Intravenous (IV) line will be placed in your arm.  ? You may be given medicine to help you relax during the test through the IV.  ? Part of your chest or arm will be washed  with a germ killing solution to lessen the risk of  infection.  ? The area where the line is inserted will be numbed with medicine before your provider  inserts the line.  ? To place the CVC, two small skin cuts are made: one near the collarbone (insertion site)  and the other lower on your chest (exit site). A path is made under the skin between  these incisions. The catheter is then pulled through the path.  ? One end of the catheter is threaded into the large vein that leads to the heart. The other  end is outside your body and has a special cap. This is called the “injection port”.  Medications and blood products can be given through this port.  ? You will be awake during the test and your doctor will ask you to follow simple  commands. You may ask questions during the test.    After the Procedure:  ? After you have returned to recovery area, your vital signs will be checked often.  ? You will stay in bed with your head slightly raised, for 1-2 hours.  ? Your procedure site will be checked for bleeding or swelling. Some bruising is normal.  If you see bleeding, apply pressure with your fingertips and call your nurse right away. If  you feel swelling or increased pain at the site, call your nurse.    Line Care:  ? The ends of the catheter are called ports and will be used many times. To lessen the risk  of infection the green protection capsports cap(s) will need to be changed at least once a  week or more frequently if needed.  ? At the exit site, there is a small cuff that lies under the skin. Your body will form a small  scar around this cuff to keep the catheter from moving and stitches will also hold the  catheter in place.  ? The site will be covered with a dressing and will need special care. Your nurse will teach you   or your family member how to change the  dressing. The incision at the insertion site will heal in a short period of time, up to 2  weeks.  ? Each time the catheter is used, it must be  flushed out. This is done to keep it open and  free of blood clots. Some catheters require flushing with sterile saline while others use  both saline and heparin which is an “anticoagulant” to keep the blood inside the catheter  from clotting.  ? Remember to cover the dressing over the catheter and skin entry site when showering.  Avoid situations where water may sit on the entry site for prolonged periods (pools,  baths, Jacuzzis, etc.).    Follow these Guidelines for a Safer Recovery:    Activity:  ? Rest by sitting up for 4 - 6 hours.  ? Limit activity for 24 hours after the procedure.  ? No driving for 24 hours.  ? Do not do any heavy lifting, over 10 pounds, for 48 hours.  ? Avoid intense exercise and contact sports for 48 hours.    Diet:  ? You may resume your normal diet.    Medicines:  ? If you take blood thinning medications, ask your doctor when you can take these again.  ? You may take your other medicines as ordered by your doctor.  ? Bleeding from the procedure site.    If you go home after the procedure:  ? You must have someone drive you home after the test. You will not be allowed to drive  or travel home alone in a cab or on a bus. You should not drive for 24 hours after the  test.  ? Someone should stay with you through the night.  ? Resume your regular diet and medicines.  ? Rest until morning and avoid strenuous activities for the next 2 days.  ? Avoid getting the site wet if it is accessed with a needle or not completely healed after the  procedure.    Call Your Doctor Right Away:  ? Bleeding from the procedure site.  ? Shortness of breath or trouble breathing.  ? Increase in pain at the site.  ? Dizziness or fainting.  ? Shortness of breath or trouble breathing.  ? Increased pain at the procedure site.  ? Dizziness or fainting,  If you are not able to contact your doctor, call 911 and go to the nearest Emergency Room.    Also, Call your Doctor:  ? If the end caps come off. Be sure the line is  clamped.  ? If there is any bleeding around the tube.  ? If there is any signs of infection including:  o Redness, swelling or pus-like drainage at the biopsy site.  o Night sweats.  o Pain or tenderness at the procedure site.  o Fever of 100.4 degrees F or higher.  o Shaking or chills.  ? If the tube is damaged.  ? If you develop swelling in the arm, chest or neck on the tube side.  ? If the tube comes out.  o Do not try to push it back in. Apply pressure for 10 minutes where the tube came  out with a clean gauze or clean towel. If the bleeding does not stop, continue to  apply pressure and have someone take you to the nearest emergency room. If you  have trouble, call 911 and lie on your left side with your head down.  ? If you have any questions.     How to Reach your Doctor:    Call [phone] with problems or questions    This info is a general resource. It is not meant to replace your health care provider’s advice.  Ask your doctor or health care team any questions. Always follow their instructions.

## 2024-12-23 NOTE — PROGRESS NOTES
12/23/24 0828   Discharge Planning   Expected Discharge Disposition Rehab     Per notes Gi following for Colonoscopy scheduled for today, Nephro following for CATHIE, per notes planing on PD cath in Jan, PT/OT both rec'd mod on 12/19, referral sent to [facility], waiting for review and acceptance, patient will not need auth has Medicare, I will continue to monitor for discharge planing and home going needs.

## 2024-12-23 NOTE — PROGRESS NOTES
[facility]ist Progress Note        Between 7AM-7PM please message me via Epic Secure Chat.  After 7PM please page Nocturnist on call.        Assessment/Plan     Reported unintentional weight loss/chronic abdominal pain/adult FTT - CT A/P no new acute process. Suspect weight loss related to work of breathing with his chronic COPD  CATHIE on CKD 5 - Scr around his baseline now. Possible peritoneal dialysis in his future  Likely end stage COPD/Chronic respiratory failure on 3-4L NC (he is on PO pred 5mg daily and PO azithro 250mg daily at home for COPD through his pulmonologist)  Severe protein calorie malnutrition  Anemia  HTN - norvasc held due to softer BP  Chronic Hep C reportedly post-treatment and negative viral load  DNR comfort care      - palliative care consult to assist with sx management -> we will try schedule PO oxy to see if it assists with air hunger/chronic abdominal pain. Thus far seems like it is helping. May benefit from following with palliative care on the outpatient side  - GI consult for weight loss -> EGD done, biopsies pending but looking at report no obvious etiology. Colonoscopy planned for today  - nephrology following for CATHIE on CKD -> likely progressing towards dialysis, renal fx presently stable. Continued outpatient follow up needed  - dietary/PT/OT consults  - overall, he is hospice appropriate should he ever choose to focus on comfort    Fluids: None  Lytes: Replete as needed  Nutrition: NPO pending colonoscopy but can resume regular diet after  Orr: None  Invasive lines: None  Drains: None    DVT Prophylaxis:  Heparin subq      Discharge Planning: home vs [facility] once med ready      I personally examined the patient and reviewed chart.  Plan of care was discussed with patient, all questions answered.    Total time spent: At least 38 minutes, providing counseling or in coordination of care. Total time on this day of visit includes record and documentation review before and after visit  "including documentation and time not explicitly included on EMR time stamp.      Subjective     Kalyan Armstrong is a 74 y.o. male on day 4 of admission presenting with Generalized abdominal pain.    NAEON. Overall stable, resting in bed comfortably no acute distress. Reports scheduled oxy helping with his SOB and abdominal pain.    Review of Systems   Constitutional:  Positive for unexpected weight change. Negative for fever.   Respiratory:  Positive for shortness of breath.    Cardiovascular:  Negative for chest pain.   Gastrointestinal:  Negative for abdominal distention, abdominal pain and vomiting.       Objective     Physical Exam  Constitutional:       Comments: Cachetic appearing   Cardiovascular:      Rate and Rhythm: Normal rate and regular rhythm.   Pulmonary:      Effort: No respiratory distress.   Abdominal:      Palpations: Abdomen is soft.      Tenderness: There is no abdominal tenderness.   Neurological:      Mental Status: He is alert. Mental status is at baseline.         Last Recorded Vitals  Blood pressure 123/73, pulse 81, temperature 36.4 °C (97.5 °F), temperature source Temporal, resp. rate 17, height 1.753 m (5' 9\"), weight 45.4 kg (100 lb), SpO2 91%.  Intake/Output last 3 Shifts:  I/O last 3 completed shifts:  In: 1200 (26.5 mL/kg) [P.O.:1200]  Out: 450 (9.9 mL/kg) [Urine:450 (0.3 mL/kg/hr)]  Weight: 45.4 kg     Relevant Results  No results found for this or any previous visit (from the past 24 hours).      Imaging Results  No results found.     Medications  [Held by provider] amLODIPine, 10 mg, oral, Daily  azithromycin, 250 mg, oral, Daily  budesonide, 0.25 mg, nebulization, BID  heparin (porcine), 5,000 Units, subcutaneous, q8h GULSHAN  ipratropium-albuteroL, 3 mL, nebulization, TID  oxyCODONE, 5 mg, oral, TID  polyethylene glycol, 17 g, oral, Daily  predniSONE, 5 mg, oral, Daily  rosuvastatin, 10 mg, oral, Daily       PRN medications: acetaminophen **OR** acetaminophen **OR** acetaminophen, " albuterol, melatonin, ondansetron ODT **OR** ondansetron, oxygen, triamcinolone                Wan Bhakta MD  [facility]

## 2024-12-23 NOTE — PROGRESS NOTES
"Kalyan Armstrong is a 74 y.o. male on day 4 of admission presenting with Generalized abdominal pain.      Subjective   Overall stable scheduled for colonoscopy today       Objective        Vitals 24HR  Heart Rate:  [57-94]   Temp:  [36.3 °C (97.3 °F)-37.6 °C (99.7 °F)]   Resp:  [15-19]   BP: (103-133)/(66-81)   Height:  [175.3 cm (5' 9\")]   Weight:  [45.4 kg (100 lb 1.4 oz)]   SpO2:  [91 %-100 %]     Intake/Output last 3 Shifts:    Intake/Output Summary (Last 24 hours) at 12/23/2024 1337  Last data filed at 12/23/2024 1235  Gross per 24 hour   Intake 960 ml   Output 350 ml   Net 610 ml       Physical Exam            General appearance: Cachectic in no distress  Head and ENT: Normocephalic/atraumatic/supple neck/no JVD  Lungs: CTA  Heart: RRR  Abdomen; soft no tenderness  Extremities: No edema           Relevant Results     Results from last 7 days   Lab Units 12/21/24  0511 12/20/24  0507 12/19/24  1343   WBC AUTO x10*3/uL 7.5 7.8 8.2   HEMOGLOBIN g/dL 9.3* 9.9* 10.5*   HEMATOCRIT % 27.3* 29.3* 33.5*   PLATELETS AUTO x10*3/uL 174 189 168      Results from last 7 days   Lab Units 12/23/24  0749 12/22/24  0644 12/21/24  0511   SODIUM mmol/L 135* 134* 133*   POTASSIUM mmol/L 4.9 4.9 4.3   CHLORIDE mmol/L 96* 100 100   CO2 mmol/L 32 27 26   BUN mg/dL 37* 39* 45*   CREATININE mg/dL 5.79* 5.75* 6.25*   GLUCOSE mg/dL 73* 90 90   CALCIUM mg/dL 10.2 10.1 10.6*        Current Facility-Administered Medications:     acetaminophen (Tylenol) tablet 650 mg, 650 mg, oral, q4h PRN **OR** acetaminophen (Tylenol) oral liquid 650 mg, 650 mg, oral, q4h PRN **OR** acetaminophen (Tylenol) suppository 650 mg, 650 mg, rectal, q4h PRN, Rachel L Yasir, PA-C    albuterol 2.5 mg /3 mL (0.083 %) nebulizer solution 2.5 mg, 2.5 mg, nebulization, q2h PRN, Brayden Giordano MD    [Held by provider] amLODIPine (Norvasc) tablet 10 mg, 10 mg, oral, Daily, Rachel Cooley PA-C    azithromycin (Zithromax) tablet 250 mg, 250 mg, oral, Daily, Wan Bhakta, " MD, 250 mg at 12/23/24 0954    budesonide (Pulmicort) 0.25 mg/2 mL nebulizer solution 0.25 mg, 0.25 mg, nebulization, BID, Rachel Kenny, Bora, 0.25 mg at 12/23/24 0726    heparin (porcine) injection 5,000 Units, 5,000 Units, subcutaneous, q8h GULSHAN, Rachel Cooley PA-C, 5,000 Units at 12/22/24 2124    ipratropium-albuteroL (Duo-Neb) 0.5-2.5 mg/3 mL nebulizer solution 3 mL, 3 mL, nebulization, TID, Brayden Giordano MD, 3 mL at 12/23/24 0725    melatonin tablet 3 mg, 3 mg, oral, Nightly PRN, Rachel Cooley PA-C    ondansetron ODT (Zofran-ODT) disintegrating tablet 4 mg, 4 mg, oral, q8h PRN **OR** ondansetron (Zofran) injection 4 mg, 4 mg, intravenous, q8h PRN, Rachel Cooley PA-C    oxyCODONE (Roxicodone) immediate release tablet 5 mg, 5 mg, oral, TID, Wan Bhakta MD, 5 mg at 12/23/24 0954    oxygen (O2) therapy, , inhalation, Continuous PRN - O2/gases, Wan Bhakta MD, 4 L/min at 12/23/24 1153    polyethylene glycol (Glycolax, Miralax) packet 17 g, 17 g, oral, Daily, Rachel Cooley PA-C, 17 g at 12/22/24 1005    predniSONE (Deltasone) tablet 5 mg, 5 mg, oral, Daily, Rachel Cooley PA-C, 5 mg at 12/23/24 0954    rosuvastatin (Crestor) tablet 10 mg, 10 mg, oral, Daily, Rachel Cooley PA-C, 10 mg at 12/23/24 0953    triamcinolone (Kenalog) 0.1 % ointment 1 Application, 1 Application, Topical, BID PRN, Rachel Cooley PA-C           Assessment/Plan      1.  CATHIE on top of CKD stage V, followed by Dr. Xiong as outpatient will schedule for peritoneal dialysis catheter placement for CAPD.  2.  Hypertension continue current management  3.  Severe protein calorie malnutrition  4.  Anemia of CKD     Will continue his current treatment and follow patient daily reviewing labs and clinical condition                 I spent 35 minutes in the professional and overall care of this patient.      Ezra Tillman MD

## 2024-12-23 NOTE — PROGRESS NOTES
12/23/24 6198   Discharge Planning   Expected Discharge Disposition SNF     Met with patient and wife at bedside. Wife reviewed list, identified [facility] as choice. Referral sent.Wife will review list for additional choices. SW will follow.

## 2024-12-23 NOTE — PROGRESS NOTES
"Kalyan Armstrong is a 74 y.o. male on day 4 of admission presenting with Generalized abdominal pain.      Interval Hx and Subjective:  underwent colonoscopy this AM. Wife is at bedside and was told they did not find anything. Still awaiting reports  Gastric path from last week not yet back.  Patient reports ABD pain much better on current regimen although doesn't feel appetite is better       Objective  Blood pressure 107/69, pulse 84, temperature 36.7 °C (98.1 °F), temperature source Temporal, resp. rate 16, height 1.753 m (5' 9\"), weight 45.4 kg (100 lb 1.4 oz), SpO2 100%.    General:  appears comfortable, NAD  Neuro: alert, oriented to situation.  No focal findings  Psych:  normal affect, engaged, cooperative  HEENT:  oral mucosa moist without exudate, tongue midline   Resps:  resps unlabored on supplemental 02, lungs diminished, no cough noted  Card:  RRR, no murmurs, rubs, or gallops.  No JVD noted  GI:  normal bowel sounds, soft and non tender  :  deferred  MS:  No injury or deformity noted  Integ:  skin warm and dry overall              No results found.   No results found. However, due to the size of the patient record, not all encounters were searched. Please check Results Review for a complete set of results.   Scheduled medications  [Held by provider] amLODIPine, 10 mg, oral, Daily  azithromycin, 250 mg, oral, Daily  budesonide, 0.25 mg, nebulization, BID  heparin (porcine), 5,000 Units, subcutaneous, q8h GULSHAN  ipratropium-albuteroL, 3 mL, nebulization, TID  oxyCODONE, 5 mg, oral, TID  polyethylene glycol, 17 g, oral, Daily  predniSONE, 5 mg, oral, Daily  rosuvastatin, 10 mg, oral, Daily      Continuous medications     PRN medications  PRN medications: acetaminophen **OR** acetaminophen **OR** acetaminophen, albuterol, melatonin, ondansetron ODT **OR** ondansetron, oxygen, triamcinolone     Dietary Orders (From admission, onward)       Start     Ordered    12/23/24 0001  NPO Diet; Effective midnight  Diet " effective midnight         12/22/24 1026    12/19/24 1833  May Participate in Room Service  ( ROOM SERVICE MAY PARTICIPATE)  Once        Question:  .  Answer:  Yes    12/19/24 1832                     Assessment/Plan    Kalyan Armstrong is a 74 y.o. male with past medical history of HGpEF, mod pulm HTN, COPD with chronic hypoxic respiratory failure on home 02, anemia, severe calorie protein malnutrition and weight loss, atherosclerosis, Hep C, and falls.  He presented to our ED 12/19/24 with reports of ongoing weight loss, ABD pressure and chest x one month.  Patient has chronic ABD pain which has been getting worse.   He was to have a GI outpatient appt end of Jan.  He was hospitalized last month for acute on chronic renal failure and also had workup for weight loss, but non-contrast imaging was unrevealing.  He has continued to lose weight.  His weight in Nov 2023 was 136#, in Oct 2024 it was 131#, on this admission it is 100#.  On last admission he was started on mirtazapine.  Additionally he has CATHIE on severe CKD,plan is for PD to start as outpatient.     Nephrology and GI have been consulted.  He is already DNRCC.  Palliative medicine consulted for goals of care      Goals of care:  already DNRCC  -continue to honor DNRCC  -will place [facility] Portable at bedside for transport  -will follow along to assist patient/family with resources as workup continues, will consider introduction of hospice as diagnostics occur that may clarify clinical status     ABD pain and thoracic pain:  non-contrast CT unremarkable.  Awaiting GI path, colonoscopy today but no notes yet  -placed on oxycodone 5 mg TID scheduled over weekend  -continue Miralax     Severe protein calorie malnutrition:    Did not feel mirtazapine helpful, unsure if he has appetite as he has been NPO mostly for testing.  I offered addition of olanzapine 2.5 mg daily for appetite but patient reluctant to take more medication. We did discuss if his goals are to  pursue dialysis, nutrition becomes more important.  Patient at this time wants to see if he has more of an appetite now that testing is done  -honor patient's wishes at this time     COPD with chronic respiratory failure on supplemental 02  -defer management to primary team  -oxycodone for breathlessness as above    CATHIE on CKD:  prior to admission, plan was to start PD, still needs to have catheter placed  -pending nephrology eval  -avoid nephrotoxic drugs        Time spent in chart review, assessment, coordination of care and documentation was 26 minutes  Cathy Wyatt APRN CNP

## 2024-12-23 NOTE — PROGRESS NOTES
Physical Therapy    Physical Therapy Treatment    Patient Name: Kalyan Armstrong  MRN: [id number]  Department: [facility]  Room: [address] [place] [address]  Today's Date: 12/23/2024  Time Calculation  Start Time: 1042  Stop Time: 1106  Time Calculation (min): 24 min         Assessment/Plan   PT Assessment  End of Session Communication: Bedside nurse  Assessment Comment:  (pt demo fair leo to activity, increased time needed to complete tasks)  End of Session Patient Position: Bed, 2 rail up (transport cart)  PT Plan  Inpatient/Swing Bed or Outpatient: Inpatient  PT Plan  Treatment/Interventions: Bed mobility, Transfer training, Gait training, Therapeutic activity  PT Plan: Ongoing PT  PT Frequency: 3 times per week  PT Discharge Recommendations: Moderate intensity level of continued care  Equipment Recommended upon Discharge: Wheeled walker  PT Recommended Transfer Status: Contact guard, Assistive device  PT - OK to Discharge: Yes (per PT POC)      General Visit Information:   PT  Visit  PT Received On: 12/23/24  General  Reason for Referral: 75 y/o M presenting with abdominal pain and chest pressure x 1 month.  Referred By: IBAN Cooley (JEFF)  Prior to Session Communication: Bedside nurse  Patient Position Received: Bed, 3 rail up, Alarm on  Preferred Learning Style: auditory, kinesthetic, verbal, visual, written  General Comment:  (pt agreeable to tx.)    Subjective   Precautions:  Precautions  Medical Precautions: Fall precautions, Oxygen therapy device and L/min    Vital Signs (Past 2hrs)        Date/Time Vitals Session Patient Position Pulse Resp SpO2 BP MAP (mmHg)    12/23/24 1153 --  --  83  16  100 %  113/66  74     12/23/24 1208 --  --  84  15  100 %  103/68  76     12/23/24 1223 --  --  --  16  100 %  107/69  78                         Objective   Pain:  Pain Assessment  Pain Assessment: 0-10  0-10 (Numeric) Pain Score: 0 - No pain  Cognition:  Cognition  Orientation Level: Disoriented to situation  Coordination:     Postural  Control:     Extremity/Trunk Assessments:    Activity Tolerance:     Treatments:            Bed Mobility  Bed Mobility: Yes  Bed Mobility 1  Bed Mobility 1: Supine to sitting  Level of Assistance 1: Minimum assistance, Minimal verbal cues, Minimal tactile cues  Bed Mobility Comments 1:  (pt req min cues for proper hand placement and sequencing.)    Ambulation/Gait Training  Ambulation/Gait Training Performed: Yes  Ambulation/Gait Training 1  Surface 1: Level tile  Device 1: Rolling walker  Gait Support Devices: Gait belt  Assistance 1: Minimum assistance, Moderate verbal cues, Moderate tactile cues  Quality of Gait 1: Forward flexed posture, Decreased step length  Comments/Distance (ft) 1: 5-6 short steps x 3 (pt req multipel cues for proper FWW placement, unsteady shuffle like steps. transport arrived to take pt for procedure, pt transfered to transport cart.)  Transfers  Transfer: Yes  Transfer 1  Transfer From 1: Sit to, Stand to  Technique 1: Sit to stand, Stand to sit  Transfer Device 1: Walker, Gait belt  Transfer Level of Assistance 1: Moderate assistance, Moderate verbal cues, Moderate tactile cues  Trials/Comments 1: 3 (pt unsteady with initial sequencing of transfer.)         Outcome Measures:  [facility] Basic Mobility  Turning from your back to your side while in a flat bed without using bedrails: A little  Moving from lying on your back to sitting on the side of a flat bed without using bedrails: A little  Moving to and from bed to chair (including a wheelchair): A little  Standing up from a chair using your arms (e.g. wheelchair or bedside chair): A lot  To walk in hospital room: A little  Climbing 3-5 steps with railing: Total  Basic Mobility - Total Score: 15    Education Documentation  Precautions, taught by Chase Shane PTA at 12/23/2024  1:42 PM.  Learner: Patient  Readiness: Acceptance  Method: Explanation  Response: Needs Reinforcement    Body Mechanics, taught by Chase Shane PTA  at 12/23/2024  1:42 PM.  Learner: Patient  Readiness: Acceptance  Method: Explanation  Response: Needs Reinforcement    Mobility Training, taught by Chase Shane PTA at 12/23/2024  1:42 PM.  Learner: Patient  Readiness: Acceptance  Method: Explanation  Response: Needs Reinforcement    Education Comments  No comments found.        OP EDUCATION:       Encounter Problems       Encounter Problems (Active)       Balance       Goal 1 (Progressing)       Start:  12/20/24    Expected End:  01/03/25       Pt performs all sitting balance IND and standing balance with supervision using FWW            Mobility       STG - Patient will navigate 4-6 steps with B HR support mod IND (Not Progressing)       Start:  12/20/24    Expected End:  01/03/25            STG - Patient will ambulate (Progressing)       Start:  12/20/24    Expected End:  01/03/25       100 ft with supervision using proper gait mechanics using FWW            PT Transfers       STG - Patient to transfer to and from sit to supine (Progressing)       Start:  12/20/24    Expected End:  01/03/25       IND         STG - Patient will transfer sit to and from stand (Progressing)       Start:  12/20/24    Expected End:  01/03/25       Mod IND using FWW and proper body mechanics            Pain - Adult

## 2024-12-24 LAB
ALBUMIN SERPL BCP-MCNC: 3.2 G/DL (ref 3.4–5)
ANION GAP SERPL CALC-SCNC: 20 MMOL/L (ref 10–20)
BUN SERPL-MCNC: 43 MG/DL (ref 6–23)
CALCIUM SERPL-MCNC: 10.5 MG/DL (ref 8.6–10.3)
CHLORIDE SERPL-SCNC: 97 MMOL/L (ref 98–107)
CO2 SERPL-SCNC: 25 MMOL/L (ref 21–32)
CREAT SERPL-MCNC: 6.27 MG/DL (ref 0.5–1.3)
EGFRCR SERPLBLD CKD-EPI 2021: 9 ML/MIN/1.73M*2
GLUCOSE BLD MANUAL STRIP-MCNC: 121 MG/DL (ref 74–99)
GLUCOSE SERPL-MCNC: 59 MG/DL (ref 74–99)
HOLD SPECIMEN: NORMAL
HOLD SPECIMEN: NORMAL
PHOSPHATE SERPL-MCNC: 5.2 MG/DL (ref 2.5–4.9)
POTASSIUM SERPL-SCNC: 5.2 MMOL/L (ref 3.5–5.3)
SODIUM SERPL-SCNC: 137 MMOL/L (ref 136–145)

## 2024-12-24 PROCEDURE — 2500000002 HC RX 250 W HCPCS SELF ADMINISTERED DRUGS (ALT 637 FOR MEDICARE OP, ALT 636 FOR OP/ED): Performed by: PHYSICIAN ASSISTANT

## 2024-12-24 PROCEDURE — 82947 ASSAY GLUCOSE BLOOD QUANT: CPT

## 2024-12-24 PROCEDURE — 2500000004 HC RX 250 GENERAL PHARMACY W/ HCPCS (ALT 636 FOR OP/ED): Performed by: PHYSICIAN ASSISTANT

## 2024-12-24 PROCEDURE — 2500000002 HC RX 250 W HCPCS SELF ADMINISTERED DRUGS (ALT 637 FOR MEDICARE OP, ALT 636 FOR OP/ED): Performed by: STUDENT IN AN ORGANIZED HEALTH CARE EDUCATION/TRAINING PROGRAM

## 2024-12-24 PROCEDURE — 2500000002 HC RX 250 W HCPCS SELF ADMINISTERED DRUGS (ALT 637 FOR MEDICARE OP, ALT 636 FOR OP/ED): Performed by: INTERNAL MEDICINE

## 2024-12-24 PROCEDURE — 2500000002 HC RX 250 W HCPCS SELF ADMINISTERED DRUGS (ALT 637 FOR MEDICARE OP, ALT 636 FOR OP/ED)

## 2024-12-24 PROCEDURE — 36415 COLL VENOUS BLD VENIPUNCTURE: CPT | Performed by: STUDENT IN AN ORGANIZED HEALTH CARE EDUCATION/TRAINING PROGRAM

## 2024-12-24 PROCEDURE — 94640 AIRWAY INHALATION TREATMENT: CPT

## 2024-12-24 PROCEDURE — 2500000005 HC RX 250 GENERAL PHARMACY W/O HCPCS: Performed by: STUDENT IN AN ORGANIZED HEALTH CARE EDUCATION/TRAINING PROGRAM

## 2024-12-24 PROCEDURE — 2500000001 HC RX 250 WO HCPCS SELF ADMINISTERED DRUGS (ALT 637 FOR MEDICARE OP): Performed by: STUDENT IN AN ORGANIZED HEALTH CARE EDUCATION/TRAINING PROGRAM

## 2024-12-24 PROCEDURE — 80069 RENAL FUNCTION PANEL: CPT | Performed by: STUDENT IN AN ORGANIZED HEALTH CARE EDUCATION/TRAINING PROGRAM

## 2024-12-24 PROCEDURE — 1100000001 HC PRIVATE ROOM DAILY

## 2024-12-24 PROCEDURE — 99231 SBSQ HOSP IP/OBS SF/LOW 25: CPT | Performed by: NURSE PRACTITIONER

## 2024-12-24 PROCEDURE — 97116 GAIT TRAINING THERAPY: CPT | Mod: GP,CQ

## 2024-12-24 PROCEDURE — 97535 SELF CARE MNGMENT TRAINING: CPT | Mod: GO

## 2024-12-24 PROCEDURE — 97110 THERAPEUTIC EXERCISES: CPT | Mod: GP,CQ

## 2024-12-24 PROCEDURE — 99232 SBSQ HOSP IP/OBS MODERATE 35: CPT | Performed by: HOSPITALIST

## 2024-12-24 RX ORDER — DEXTROSE 50 % IN WATER (D50W) INTRAVENOUS SYRINGE
25
Status: DISCONTINUED | OUTPATIENT
Start: 2024-12-24 | End: 2024-12-30 | Stop reason: HOSPADM

## 2024-12-24 RX ORDER — DEXTROSE 50 % IN WATER (D50W) INTRAVENOUS SYRINGE
12.5
Status: DISCONTINUED | OUTPATIENT
Start: 2024-12-24 | End: 2024-12-30 | Stop reason: HOSPADM

## 2024-12-24 RX ADMIN — Medication 3 L/MIN: at 07:48

## 2024-12-24 RX ADMIN — OXYCODONE HYDROCHLORIDE 5 MG: 5 TABLET ORAL at 18:36

## 2024-12-24 RX ADMIN — BUDESONIDE 0.25 MG: 0.25 INHALANT RESPIRATORY (INHALATION) at 18:26

## 2024-12-24 RX ADMIN — POLYETHYLENE GLYCOL 3350 17 G: 17 POWDER, FOR SOLUTION ORAL at 09:11

## 2024-12-24 RX ADMIN — BUDESONIDE 0.25 MG: 0.25 INHALANT RESPIRATORY (INHALATION) at 07:48

## 2024-12-24 RX ADMIN — AZITHROMYCIN 250 MG: 250 TABLET, FILM COATED ORAL at 09:11

## 2024-12-24 RX ADMIN — Medication 3 L/MIN: at 12:49

## 2024-12-24 RX ADMIN — ROSUVASTATIN 10 MG: 10 TABLET, FILM COATED ORAL at 09:11

## 2024-12-24 RX ADMIN — OXYCODONE HYDROCHLORIDE 5 MG: 5 TABLET ORAL at 09:11

## 2024-12-24 RX ADMIN — IPRATROPIUM BROMIDE AND ALBUTEROL SULFATE 3 ML: 2.5; .5 SOLUTION RESPIRATORY (INHALATION) at 12:49

## 2024-12-24 RX ADMIN — HEPARIN SODIUM 5000 UNITS: 5000 INJECTION, SOLUTION INTRAVENOUS; SUBCUTANEOUS at 21:21

## 2024-12-24 RX ADMIN — IPRATROPIUM BROMIDE AND ALBUTEROL SULFATE 3 ML: 2.5; .5 SOLUTION RESPIRATORY (INHALATION) at 07:48

## 2024-12-24 RX ADMIN — HEPARIN SODIUM 5000 UNITS: 5000 INJECTION, SOLUTION INTRAVENOUS; SUBCUTANEOUS at 14:26

## 2024-12-24 RX ADMIN — HEPARIN SODIUM 5000 UNITS: 5000 INJECTION, SOLUTION INTRAVENOUS; SUBCUTANEOUS at 05:56

## 2024-12-24 RX ADMIN — Medication 3 L/MIN: at 18:26

## 2024-12-24 RX ADMIN — IPRATROPIUM BROMIDE AND ALBUTEROL SULFATE 3 ML: 2.5; .5 SOLUTION RESPIRATORY (INHALATION) at 18:26

## 2024-12-24 RX ADMIN — OXYCODONE HYDROCHLORIDE 5 MG: 5 TABLET ORAL at 14:26

## 2024-12-24 RX ADMIN — PREDNISONE 5 MG: 5 TABLET ORAL at 09:11

## 2024-12-24 ASSESSMENT — COGNITIVE AND FUNCTIONAL STATUS - GENERAL
MOVING FROM LYING ON BACK TO SITTING ON SIDE OF FLAT BED WITH BEDRAILS: A LITTLE
MOBILITY SCORE: 16
TOILETING: A LOT
MOVING TO AND FROM BED TO CHAIR: A LITTLE
WALKING IN HOSPITAL ROOM: A LOT
DAILY ACTIVITIY SCORE: 20
DRESSING REGULAR LOWER BODY CLOTHING: A LITTLE
HELP NEEDED FOR BATHING: A LITTLE
DRESSING REGULAR UPPER BODY CLOTHING: A LOT
MOBILITY SCORE: 16
DAILY ACTIVITIY SCORE: 20
HELP NEEDED FOR BATHING: A LITTLE
CLIMB 3 TO 5 STEPS WITH RAILING: TOTAL
CLIMB 3 TO 5 STEPS WITH RAILING: A LOT
MOVING FROM LYING ON BACK TO SITTING ON SIDE OF FLAT BED WITH BEDRAILS: A LITTLE
TOILETING: A LITTLE
DRESSING REGULAR LOWER BODY CLOTHING: A LITTLE
WALKING IN HOSPITAL ROOM: A LOT
TURNING FROM BACK TO SIDE WHILE IN FLAT BAD: A LITTLE
HELP NEEDED FOR BATHING: A LOT
DRESSING REGULAR UPPER BODY CLOTHING: A LITTLE
DRESSING REGULAR LOWER BODY CLOTHING: A LOT
DRESSING REGULAR UPPER BODY CLOTHING: A LITTLE
STANDING UP FROM CHAIR USING ARMS: A LITTLE
MOVING TO AND FROM BED TO CHAIR: A LITTLE
TURNING FROM BACK TO SIDE WHILE IN FLAT BAD: A LITTLE
MOVING FROM LYING ON BACK TO SITTING ON SIDE OF FLAT BED WITH BEDRAILS: A LITTLE
MOVING TO AND FROM BED TO CHAIR: A LITTLE
STANDING UP FROM CHAIR USING ARMS: A LITTLE
WALKING IN HOSPITAL ROOM: A LITTLE
TOILETING: A LITTLE
STANDING UP FROM CHAIR USING ARMS: A LITTLE
TURNING FROM BACK TO SIDE WHILE IN FLAT BAD: A LITTLE
CLIMB 3 TO 5 STEPS WITH RAILING: A LOT
MOBILITY SCORE: 16
PERSONAL GROOMING: A LITTLE
DAILY ACTIVITIY SCORE: 15

## 2024-12-24 ASSESSMENT — PAIN SCALES - GENERAL
PAINLEVEL_OUTOF10: 0 - NO PAIN

## 2024-12-24 ASSESSMENT — PAIN - FUNCTIONAL ASSESSMENT
PAIN_FUNCTIONAL_ASSESSMENT: 0-10
PAIN_FUNCTIONAL_ASSESSMENT: 0-10

## 2024-12-24 ASSESSMENT — ACTIVITIES OF DAILY LIVING (ADL): HOME_MANAGEMENT_TIME_ENTRY: 19

## 2024-12-24 NOTE — PROGRESS NOTES
Occupational Therapy    Occupational Therapy Treatment    Name: Kalyan Armstrong  MRN: [id number]  Department: [facility]  Room: [address] [place] [address]  Date: 12/24/24  Time Calculation  Start Time: 1636  Stop Time: 1655  Time Calculation (min): 19 min    Assessment:  OT Assessment:  (Patient is weak and deconditioned. Patient with decreased standing tolerance, decreased ADLs, decreased transfers. Moderate intensity therapy to maximize functional independence.)  Prognosis: Good  Barriers to Discharge Home: No anticipated barriers  Evaluation/Treatment Tolerance: Patient limited by fatigue  Medical Staff Made Aware: Yes  End of Session Communication: Bedside nurse  End of Session Patient Position: Bed, 3 rail up, Alarm on  Plan:  Treatment Interventions: ADL retraining, Functional transfer training, Endurance training, Patient/family training, Neuromuscular reeducation  OT Frequency: 3 times per week  OT Discharge Recommendations: Moderate intensity level of continued care  Equipment Recommended upon Discharge: Wheeled walker  OT Recommended Transfer Status:  (CGA)  OT - OK to Discharge: Yes    Subjective   Previous Visit Info:  OT Last Visit  OT Received On: 12/24/24  General:  General  Reason for Referral: 75 y/o M presenting with abdominal pain and chest pressure x 1 month.  Referred By: IBAN Cooley (PAKonstantinC)  Past Medical History Relevant to Rehab:   Past Medical History:   Diagnosis Date    CATHIE (acute kidney injury) (CMS-HCC) 05/03/2023    Cataract     COPD (chronic obstructive pulmonary disease) (Multi)     Cough, unspecified 06/20/2014    Cough    Emphysema of lung (Multi)     Encounter for immunization 01/19/2015    Need for influenza vaccination    Encounter for screening for malignant neoplasm of prostate     Encounter for screening for malignant neoplasm of prostate    Other conditions influencing health status 05/20/2013    Digital Blood Vessel Rupture    Personal history of colonic polyps     History of colon polyps    Personal  history of other specified conditions 05/20/2013    History of shortness of breath    Personal history of other specified conditions 06/20/2014    History of shortness of breath     Family/Caregiver Present: Yes  Caregiver Feedback:  (Wife supportive)  Prior to Session Communication: Bedside nurse  Patient Position Received: Bed, 3 rail up, Alarm on  General Comment:  (Patient is weak and deconditioned. Patient with decreased activity tolerance. Multiple rest periods.)  Precautions:  Medical Precautions: Fall precautions    Pain Assessment:  Pain Assessment  Pain Assessment: 0-10  0-10 (Numeric) Pain Score: 0 - No pain     Objective   Cognition:  Overall Cognitive Status: Within Functional Limits  Orientation Level: Oriented X4    Activities of Daily Living:    Grooming  Grooming Level of Assistance: Minimum assistance  Grooming Where Assessed: Standing sinkside  Grooming Comments:  (Assist to reach for soap to wash hands and increased rest periods.)    UE Dressing  UE Dressing Level of Assistance: Moderate assistance  UE Dressing Where Assessed: Edge of bed  UE Dressing Comments:  (Assist with hospital gown around shoulders.)    LE Dressing  LE Dressing: Yes  Pants Level of Assistance: Moderate assistance  Sock Level of Assistance: Moderate assistance  LE Dressing Where Assessed: Edge of bed  LE Dressing Comments:  (Assist to initiate over feet and to stand to arrange over hips.)    Bed Mobility/Transfers: Bed Mobility  Bed Mobility: Yes  Bed Mobility 1  Bed Mobility 1: Supine to sitting  Level of Assistance 1: Contact guard  Bed Mobility Comments 1:  (Increased time required.)  Bed Mobility 2  Bed Mobility  2: Sitting to supine  Level of Assistance 2: Contact guard    Transfers  Transfer: Yes  Transfer 1  Transfer From 1: Sit to  Transfer to 1: Stand  Technique 1: Sit to stand  Transfer Device 1: Walker  Transfer Level of Assistance 1: Contact guard  Trials/Comments 1:  (Cues for hand placement)    Sitting  Balance:  Static Sitting Balance  Static Sitting-Balance Support: Feet supported  Static Sitting-Level of Assistance: Contact guard  Dynamic Sitting Balance  Dynamic Sitting-Balance Support: Feet supported  Dynamic Sitting-Level of Assistance: Contact guard  Dynamic Sitting-Balance: Reaching for objects  Standing Balance:  Static Standing Balance  Static Standing-Balance Support: Bilateral upper extremity supported  Static Standing-Level of Assistance: Contact guard  Dynamic Standing Balance  Dynamic Standing-Balance Support: Bilateral upper extremity supported  Dynamic Standing-Level of Assistance: Contact guard  Dynamic Standing-Balance: Reaching for objects    Outcome Measures:  [facility] Daily Activity  Putting on and taking off regular lower body clothing: A lot  Bathing (including washing, rinsing, drying): A lot  Putting on and taking off regular upper body clothing: A lot  Toileting, which includes using toilet, bedpan or urinal: A lot  Taking care of personal grooming such as brushing teeth: A little  Eating Meals: None  Daily Activity - Total Score: 15    Goals:  Encounter Problems       Encounter Problems (Active)       ADLs       Patient will perform UB and LB bathing 75% with minimal assist  level of assistance and adaotive equipment prn . (Progressing)       Start:  12/20/24            Patient with complete upper body dressing with contact guard assist level of assistance donning and doffing all UE clothes with no adaptive equipment while supported sitting (Progressing)       Start:  12/20/24            Patient with complete lower body dressing with minimal assist  level of assistance donning and doffing all LE clothes  with reacher, shoe horn, sock-aid, and dressing stick  while supported sitting (Progressing)       Start:  12/20/24            Pt will tolerate 30 min OT tx session w/o subj/obj c/o fatigue/SOB with activity to increase independence  (Progressing)       Start:  12/20/24                MOBILITY       Patient will perform Functional mobility mod  Household distances/Community Distances with stand by assist level of assistance and front wheeled walker in order to improve safety and functional mobility. (Progressing)       Start:  12/20/24               TRANSFERS       Patient will perform bed mobility stand by assist level of assistance and bed rails in order to improve safety and independence with mobility (Progressing)       Start:  12/20/24            Patient will complete functional transfer to chair with front wheeled walker with stand by assist level of assistance. (Progressing)       Start:  12/20/24

## 2024-12-24 NOTE — CARE PLAN
The patient's goals for the shift include Rest.    The clinical goals for the shift include pt remains hds    Problem: Pain - Adult  Goal: Verbalizes/displays adequate comfort level or baseline comfort level  Outcome: Progressing     Problem: Safety - Adult  Goal: Free from fall injury  Outcome: Progressing     Problem: Discharge Planning  Goal: Discharge to home or other facility with appropriate resources  Outcome: Progressing     Problem: Chronic Conditions and Co-morbidities  Goal: Patient's chronic conditions and co-morbidity symptoms are monitored and maintained or improved  Outcome: Progressing     Problem: Fall/Injury  Goal: Not fall by end of shift  Outcome: Progressing  Goal: Be free from injury by end of the shift  Outcome: Progressing     Problem: Pain  Goal: Takes deep breaths with improved pain control throughout the shift  Outcome: Progressing  Goal: Performs ADL's with improved pain control throughout shift  Outcome: Progressing  Goal: Free from opioid side effects throughout the shift  Outcome: Progressing  Goal: Free from acute confusion related to pain meds throughout the shift  Outcome: Progressing     Problem: Nutrition  Goal: Less than 5 days NPO/clear liquids  Outcome: Progressing  Goal: Lab values WNL  Outcome: Progressing  Goal: Electrolytes WNL  Outcome: Progressing  Goal: Promote healing  Outcome: Progressing  Goal: Gradual weight gain  Outcome: Progressing     Problem: Skin  Goal: Decreased wound size/increased tissue granulation at next dressing change  Outcome: Progressing  Goal: Participates in plan/prevention/treatment measures  Outcome: Progressing  Goal: Prevent/manage excess moisture  Outcome: Progressing  Goal: Prevent/minimize sheer/friction injuries  Outcome: Progressing  Goal: Promote/optimize nutrition  Outcome: Progressing  Goal: Promote skin healing  Outcome: Progressing

## 2024-12-24 NOTE — PROGRESS NOTES
12/24/24 0953   Discharge Planning   Expected Discharge Disposition SNF     Per notes PT/OT both rec'd Mod, referral sent to [facility] to review, I will continue to monitor for discharge planing. Patient will not need auth has medicare.

## 2024-12-24 NOTE — PROGRESS NOTES
"Kalyan Armstrong is a 74 y.o. male on day 5 of admission presenting with Generalized abdominal pain.      Interval Hx and Subjective:  surgery consult in place to see about PD catheter.  Patient currently NPO in anticipation       Objective  Blood pressure 108/78, pulse 78, temperature 35.1 °C (95.2 °F), temperature source Temporal, resp. rate 16, height 1.753 m (5' 9\"), weight 45.4 kg (100 lb 1.4 oz), SpO2 98%.    General:  appears comfortable, NAD  Neuro: alert, oriented to situation.  No focal findings  Psych:  normal affect, engaged, cooperative  HEENT:  oral mucosa moist without exudate, tongue midline   Resps:  resps unlabored on supplemental 02, lungs diminished, no cough noted  Card:  RRR, no murmurs, rubs, or gallops.  No JVD noted  GI:  normal bowel sounds, scaphoid, non tender  :  deferred  MS:  No injury or deformity noted  Integ:  skin warm and dry overall     Colonoscopy Diagnostic    Result Date: 12/23/2024  Table formatting from the original result was not included. Impression Diverticulosis in the appendiceal orifice, descending colon, sigmoid colon and rectosigmoid (grade 2) hemorrhoids History of chronic prednisone use.  Colonic wall mucosa appears to be somewhat fragile.  Spontaneous barotrauma noted in right colon. Subcentimeter polyp in the sigmoid colon was removed with cold forceps biopsy The ileocecal valve and cecum appeared normal.     No evidence of neoplasm. Findings Multiple medium and large diverticula with no inflammation in the appendiceal orifice, descending colon, sigmoid colon and rectosigmoid; no bleeding was observed Internal (grade 2) hemorrhoids observed during retroflexion; no bleeding was observed History of chronic prednisone use.  Colonic wall mucosa appears to be somewhat fragile.  Spontaneous barotrauma noted in right colon. One 3 mm polyp in the sigmoid colon; performed cold forceps biopsy with complete en bloc removal All observed locations appeared normal, including " the ileocecal valve and cecum.  Recommendation Await pathology results No further screening colonoscopies necessary High-fiber low-fat diet. Metamucil as needed [place] H rectal cream as needed Avoid NSAIDs Hold heparin subcu today EGD, colonoscopy findings do not explain etiology of patient's weight loss Further workup as needed by primary team. GI will sign off  Indication Generalized abdominal pain, Unintentional weight loss Anemia. EGD on 12/20/2024: Mild atrophic gastritis, biopsy-pending. Previous colonoscopy in 2022: Tubular adenoma removed, also found diverticulosis. Staff Staff Role Dorys Herndon MD Proceduralist Medications See Anesthesia Record. Preprocedure A history and physical has been performed, and patient medication allergies have been reviewed. The patient's tolerance of previous anesthesia has been reviewed. The risks and benefits of the procedure and the sedation options and risks were discussed with the patient. All questions were answered and informed consent obtained. Details of the Procedure The patient underwent monitored anesthesia care, which was administered by an anesthesia professional. The patient's blood pressure, ECG, ETCO2, heart rate, level of consciousness, oxygen and respirations were monitored throughout the procedure. A digital rectal exam was performed. The scope was introduced through the anus and advanced to the cecum. Retroflexion was performed in the rectum. The patient's estimated blood loss was minimal (<5 mL). The procedure was not difficult. The patient tolerated the procedure well. There were no apparent adverse events. Events Procedure Events Event Event Time ENDO SCOPE IN TIME 12/23/2024 11:28 AM ENDO CECUM REACHED 12/23/2024 11:40 AM ENDO SCOPE OUT TIME 12/23/2024 11:48 AM Specimens ID Type Source Tests Collected by Time 1 :  Tissue COLON - SIGMOID POLYP SURGICAL PATHOLOGY EXAM Winsome Mckeon, RN 12/23/2024 1146 Procedure Location [facility]  [facility] [address] [facility] [id number] [phone] Referring Provider Clemente Walsh, APRN-CNP Procedure Provider MD Clmeente Madrid     Results for orders placed or performed during the hospital encounter of 12/19/24 (from the past 24 hours)   Lavender Top   Result Value Ref Range    Extra Tube Hold for add-ons.    Renal Function Panel   Result Value Ref Range    Glucose 73 (L) 74 - 99 mg/dL    Sodium 135 (L) 136 - 145 mmol/L    Potassium 4.9 3.5 - 5.3 mmol/L    Chloride 96 (L) 98 - 107 mmol/L    Bicarbonate 32 21 - 32 mmol/L    Anion Gap 12 10 - 20 mmol/L    Urea Nitrogen 37 (H) 6 - 23 mg/dL    Creatinine 5.79 (H) 0.50 - 1.30 mg/dL    eGFR 10 (L) >60 mL/min/1.73m*2    Calcium 10.2 8.6 - 10.3 mg/dL    Phosphorus 3.6 2.5 - 4.9 mg/dL    Albumin 3.2 (L) 3.4 - 5.0 g/dL   Renal Function Panel   Result Value Ref Range    Glucose 59 (L) 74 - 99 mg/dL    Sodium 137 136 - 145 mmol/L    Potassium 5.2 3.5 - 5.3 mmol/L    Chloride 97 (L) 98 - 107 mmol/L    Bicarbonate 25 21 - 32 mmol/L    Anion Gap 20 10 - 20 mmol/L    Urea Nitrogen 43 (H) 6 - 23 mg/dL    Creatinine 6.27 (H) 0.50 - 1.30 mg/dL    eGFR 9 (L) >60 mL/min/1.73m*2    Calcium 10.5 (H) 8.6 - 10.3 mg/dL    Phosphorus 5.2 (H) 2.5 - 4.9 mg/dL    Albumin 3.2 (L) 3.4 - 5.0 g/dL     *Note: Due to a large number of results and/or encounters for the requested time period, some results have not been displayed. A complete set of results can be found in Results Review.      Scheduled medications  [Held by provider] amLODIPine, 10 mg, oral, Daily  azithromycin, 250 mg, oral, Daily  budesonide, 0.25 mg, nebulization, BID  heparin (porcine), 5,000 Units, subcutaneous, q8h GULSHAN  ipratropium-albuteroL, 3 mL, nebulization, TID  oxyCODONE, 5 mg, oral, TID  polyethylene glycol, 17 g, oral, Daily  predniSONE, 5 mg, oral, Daily  rosuvastatin, 10 mg, oral, Daily      Continuous medications     PRN medications  PRN medications: acetaminophen  **OR** acetaminophen **OR** acetaminophen, albuterol, melatonin, ondansetron ODT **OR** ondansetron, oxygen, triamcinolone     Dietary Orders (From admission, onward)       Start     Ordered    12/23/24 0809  NPO Diet Except: Sips with meds; Effective midnight  Diet effective midnight        Question:  Except:  Answer:  Sips with meds    12/23/24 0808    12/19/24 1833  May Participate in Room Service  ( ROOM SERVICE MAY PARTICIPATE)  Once        Question:  .  Answer:  Yes    12/19/24 1832                     Assessment/Plan    Kalyan Armstrong is a 74 y.o. male with past medical history of HGpEF, mod pulm HTN, COPD with chronic hypoxic respiratory failure on home 02, anemia, severe calorie protein malnutrition and weight loss, atherosclerosis, Hep C, and falls.  He presented to our ED 12/19/24 with reports of ongoing weight loss, ABD pressure and chest x one month.  Patient has chronic ABD pain which has been getting worse.   He was to have a GI outpatient appt end of Jan.  He was hospitalized last month for acute on chronic renal failure and also had workup for weight loss, but non-contrast imaging was unrevealing.  He has continued to lose weight.  His weight in Nov 2023 was 136#, in Oct 2024 it was 131#, on this admission it is 100#.  On last admission he was started on mirtazapine.   He is also close to ESRD and PD expected to start shortly.  Plan is for catheter placement and surgery has been consulted.      Palliative medicine consulted for goals of care      Goals of care:  does not want resuscitative measures but is hoping for some longevity via dialysis and OK with accepting the burdens of PD, he is already DNRCC.  As he wants to proceed with dialysis he is not a hospice candidate   -continue to honor DNRCC  -will place OH Portable at bedside for transport     ABD pain and thoracic pain:  non-contrast CT unremarkable, EGD not revealing, colonoscopy completed, no gross evidence of malignancy although polyp  was sent.  Patient reports scheduled oxycodone does help his pain  -continue oxycodone 5 mg TID, would continue at discharge as he is going to SNF  -continue Miralax to avoid opioid induced constipation  -await path results     Severe protein calorie malnutrition:    Did not feel mirtazapine helpful, unsure if he has appetite as he has been NPO mostly for testing.  I offered addition of olanzapine 2.5 mg daily for appetite but patient reluctant to take more medication. We did discuss if his goals are to pursue dialysis, nutrition becomes more important.  As GI workup has not pointed to clear cut, this may be related to his progressive renal and/or pulm issues.  This AM, patient still not interested in medications to increase appetite.     -honor patient's wishes at this time     COPD with chronic respiratory failure on supplemental 02  -defer management to primary team  -oxycodone for breathlessness as above     CATHIE on CKD:  prior to admission, plan was to start PD, still needs to have catheter placed  -surgery consulted to place catheter  -avoid nephrotoxic drugs        Time spent in chart review, assessment, coordination of care and documentation was 26 minutes  Cathy Wyatt APRN CNP

## 2024-12-24 NOTE — PROGRESS NOTES
Kalyan Armstrong is a 74 y.o. male on day 5 of admission presenting with Generalized abdominal pain.      Subjective   Doing better no new complaints       Objective        Vitals 24HR  Heart Rate:  [77-78]   Temp:  [35.1 °C (95.2 °F)-36.4 °C (97.5 °F)]   Resp:  [16-17]   BP: (108-129)/(78-79)   SpO2:  [88 %-100 %]     Intake/Output last 3 Shifts:    Intake/Output Summary (Last 24 hours) at 12/24/2024 1339  Last data filed at 12/24/2024 1100  Gross per 24 hour   Intake 240 ml   Output --   Net 240 ml       Physical Exam          General appearance: Cachectic in no distress  Head and ENT: Normocephalic/atraumatic/supple neck/no JVD  Lungs: CTA  Heart: RRR  Abdomen; soft no tenderness  Extremities: No edema         Relevant Results     Results from last 7 days   Lab Units 12/21/24  0511 12/20/24  0507 12/19/24  1343   WBC AUTO x10*3/uL 7.5 7.8 8.2   HEMOGLOBIN g/dL 9.3* 9.9* 10.5*   HEMATOCRIT % 27.3* 29.3* 33.5*   PLATELETS AUTO x10*3/uL 174 189 168      Results from last 7 days   Lab Units 12/24/24  0516 12/23/24  0749 12/22/24  0644   SODIUM mmol/L 137 135* 134*   POTASSIUM mmol/L 5.2 4.9 4.9   CHLORIDE mmol/L 97* 96* 100   CO2 mmol/L 25 32 27   BUN mg/dL 43* 37* 39*   CREATININE mg/dL 6.27* 5.79* 5.75*   GLUCOSE mg/dL 59* 73* 90   CALCIUM mg/dL 10.5* 10.2 10.1        Current Facility-Administered Medications:     acetaminophen (Tylenol) tablet 650 mg, 650 mg, oral, q4h PRN **OR** acetaminophen (Tylenol) oral liquid 650 mg, 650 mg, oral, q4h PRN **OR** acetaminophen (Tylenol) suppository 650 mg, 650 mg, rectal, q4h PRN, Rachel Cooley PA-C    albuterol 2.5 mg /3 mL (0.083 %) nebulizer solution 2.5 mg, 2.5 mg, nebulization, q2h PRN, Brayden Giordano MD    [Held by provider] amLODIPine (Norvasc) tablet 10 mg, 10 mg, oral, Daily, Rachel Cooley PA-C    azithromycin (Zithromax) tablet 250 mg, 250 mg, oral, Daily, Wan Bhakta MD, 250 mg at 12/24/24 0911    budesonide (Pulmicort) 0.25 mg/2 mL nebulizer solution 0.25  mg, 0.25 mg, nebulization, BID, Rachel Kenny, PharmD, 0.25 mg at 12/24/24 0748    heparin (porcine) injection 5,000 Units, 5,000 Units, subcutaneous, q8h GULSHAN, Rachel Cooley PA-C, 5,000 Units at 12/24/24 0556    ipratropium-albuteroL (Duo-Neb) 0.5-2.5 mg/3 mL nebulizer solution 3 mL, 3 mL, nebulization, TID, Brayden Giordano MD, 3 mL at 12/24/24 1249    melatonin tablet 3 mg, 3 mg, oral, Nightly PRN, Rachel Cooley PA-C    ondansetron ODT (Zofran-ODT) disintegrating tablet 4 mg, 4 mg, oral, q8h PRN **OR** ondansetron (Zofran) injection 4 mg, 4 mg, intravenous, q8h PRN, Rachel Cooley PA-C    oxyCODONE (Roxicodone) immediate release tablet 5 mg, 5 mg, oral, TID, Wan Bhakta MD, 5 mg at 12/24/24 0911    oxygen (O2) therapy, , inhalation, Continuous PRN - O2/gases, Wan Bhakta MD, 3 L/min at 12/24/24 1249    polyethylene glycol (Glycolax, Miralax) packet 17 g, 17 g, oral, Daily, Rachel Cooley PA-C, 17 g at 12/24/24 0911    predniSONE (Deltasone) tablet 5 mg, 5 mg, oral, Daily, Rachel Cooley PA-C, 5 mg at 12/24/24 0911    rosuvastatin (Crestor) tablet 10 mg, 10 mg, oral, Daily, Rachel Cooley PA-C, 10 mg at 12/24/24 0911    triamcinolone (Kenalog) 0.1 % ointment 1 Application, 1 Application, Topical, BID PRN, Rachel Cooley PA-C           Assessment/Plan        1.  CATHIE on top of CKD stage V, followed by Dr. Xiong as outpatient will schedule for peritoneal dialysis catheter placement for CAPD.  2.  Hypertension continue current management  3.  Severe protein calorie malnutrition  4.  Anemia of CKD     Will continue his current treatment and follow patient daily reviewing labs and clinical condition         Assessment & Plan  Generalized abdominal pain    Unspecified severe protein-calorie malnutrition (Multi)    Unintentional weight loss    Anorexia    Failure to thrive in adult    Acute on chronic renal failure (CMS-HCC)              I spent 35 minutes in the professional and overall care of this  patient.      Ezra Tillman MD

## 2024-12-24 NOTE — PROGRESS NOTES
Between 7AM-7PM please message me via Epic Secure Chat.  After 7PM please page Nocturnist on call.    [facility] Hospitalist Progress Note      Kalyan Armstrong    :  1950(74 y.o.)    MRN:  [id number]  Date: 24     Assessment and Plan:     Reported unintentional weight loss/chronic abdominal pain/adult FTT - CT A/P no new acute process. Suspect weight loss related to work of breathing with his chronic COPD  CATHIE on CKD 5 - Scr around his baseline now. Plan for PD placement and PD initiation as outpatient per nephrology.   Likely end stage COPD/Chronic respiratory failure on 3-4L NC (he is on PO pred 5mg daily and PO azithro 250mg daily at home for COPD through his pulmonologist). Continue shantanu nebs.   Severe protein calorie malnutrition- patient not interested in medications to increase appetite. Dietician consulted.   Chronic anemia- recent b12/folate/iron studies wnl.   HTN - norvasc held due to softer BP  HLD- continue statin  Chronic Hep C reportedly post-treatment and negative viral load  Hypercalcemia- mild, suspect due to renal impairment, check PTH, vitamin d levels  DNR comfort care      DVT Prophylaxis: subcutaneous Heparin    Disposition: continue to monitor inpatient, await consultant recommendations, await test results, and await clinical improvement    Electronically signed by Jaskaran Garrett DO on 24 at 5:59 PM     Subjective:      Interval History:   Vitals and chart notes from overnight reviewed.   No acute issues overnight.   Patient seen and evaluated at bedside.   No new complaints. No nausea, vomiting. Po intake remains poor.    Review of Systems:   Other than patient's chronic conditions and those complaints in the history above, the rest of the 10 systems review were done and were negative.     Current medications:  Scheduled Meds:[Held by provider] amLODIPine, 10 mg, oral, Daily  azithromycin, 250 mg, oral, Daily  budesonide, 0.25 mg, nebulization, BID  heparin  (porcine), 5,000 Units, subcutaneous, q8h GULSHAN  ipratropium-albuteroL, 3 mL, nebulization, TID  oxyCODONE, 5 mg, oral, TID  polyethylene glycol, 17 g, oral, Daily  predniSONE, 5 mg, oral, Daily  rosuvastatin, 10 mg, oral, Daily      Continuous Infusions:   PRN Meds:PRN medications: acetaminophen **OR** acetaminophen **OR** acetaminophen, albuterol, melatonin, ondansetron ODT **OR** ondansetron, oxygen, triamcinolone      Objective:     Heart Rate:  [77-78]   Temp:  [35.1 °C (95.2 °F)-36.4 °C (97.5 °F)]   Resp:  [16-17]   BP: (108-129)/(78-79)   SpO2:  [88 %-100 %]     Oxygen Dose: *3 L/min    Physical Exam  Vitals and nursing note reviewed.   HENT:      Mouth/Throat:      Mouth: Mucous membranes are moist.      Pharynx: Oropharynx is clear.   Cardiovascular:      Rate and Rhythm: Normal rate and regular rhythm.   Pulmonary:      Effort: Pulmonary effort is normal.   Abdominal:      Palpations: Abdomen is soft.   Neurological:      Mental Status: He is alert.         Labs:   Lab Results   Component Value Date     12/24/2024    K 5.2 12/24/2024    CL 97 (L) 12/24/2024    CO2 25 12/24/2024    BUN 43 (H) 12/24/2024    CREATININE 6.27 (H) 12/24/2024    GLUCOSE 59 (L) 12/24/2024    CALCIUM 10.5 (H) 12/24/2024    PROT 7.3 12/19/2024    BILITOT 0.4 12/19/2024    ALKPHOS 50 12/19/2024    AST 30 12/19/2024    ALT 21 12/19/2024       Lab Results   Component Value Date    WBC 7.5 12/21/2024    HGB 9.3 (L) 12/21/2024    HCT 27.3 (L) 12/21/2024    MCV 87 12/21/2024     12/21/2024

## 2024-12-24 NOTE — PROGRESS NOTES
Physical Therapy    Physical Therapy Treatment    Patient Name: Kalyan Armstrong  MRN: [id number]  Department: [facility] A 5  Room: [address] [place] [address]  Today's Date: 12/24/2024  Time Calculation  Start Time: 1225  Stop Time: 1250  Time Calculation (min): 25 min      Assessment/Plan   PT Assessment  End of Session Communication: Bedside nurse (Discussed pt's progress and current mobility as described in mobility section)  Assessment Comment: Pt demo'd improved tolerance to gait training. SpO2 decreased to 88% with activity but improved to 94% with seated rest breaks to manage SOB.  End of Session Patient Position: Up in chair, Alarm on (B feet elevated on foot rest, call light and needs in reach)  PT Plan  Inpatient/Swing Bed or Outpatient: Inpatient  PT Plan  Treatment/Interventions: Bed mobility, Transfer training, Gait training, Therapeutic exercise  PT Plan: Ongoing PT  PT Frequency: 3 times per week  PT Discharge Recommendations: Moderate intensity level of continued care  Equipment Recommended upon Discharge: Wheeled walker  PT Recommended Transfer Status: Assist x1  PT - OK to Discharge: Yes (per POC)      General Visit Information:   PT  Visit  PT Received On: 12/24/24  General  Reason for Referral: 75 y/o M presenting with abdominal pain and chest pressure x 1 month.  Referred By: IBAN Cooley (JEFF)  Past Medical History Relevant to Rehab:   Past Medical History:   Diagnosis Date    CATHIE (acute kidney injury) (CMS-HCC) 05/03/2023    Cataract     COPD (chronic obstructive pulmonary disease) (Multi)     Cough, unspecified 06/20/2014    Cough    Emphysema of lung (Multi)     Encounter for immunization 01/19/2015    Need for influenza vaccination    Encounter for screening for malignant neoplasm of prostate     Encounter for screening for malignant neoplasm of prostate    Other conditions influencing health status 05/20/2013    Digital Blood Vessel Rupture    Personal history of colonic polyps     History of colon polyps    Personal  history of other specified conditions 05/20/2013    History of shortness of breath    Personal history of other specified conditions 06/20/2014    History of shortness of breath       Family/Caregiver Present: Yes  Caregiver Feedback: wife supportive throughout session  Prior to Session Communication: Bedside nurse (RN cleared pt. for PT Tx. All charts reviewed. Per handoff with nursing prior to therapy visit, patient’s pain and vitals are stable. Additional concern for this patient related to therapy session: none.)  Patient Position Received: Bed, 3 rail up, Alarm off, not on at start of session  Preferred Learning Style: auditory, kinesthetic, verbal, visual, written  General Comment: Pt semi-supine in bed on arrival    Subjective   Precautions:  Precautions  Medical Precautions: Fall precautions, Oxygen therapy device and L/min    Vital Signs (Past 2hrs)        Date/Time Vitals Session Patient Position Pulse Resp SpO2 BP MAP (mmHg)    12/24/24 1148 --  --  --  17  --  --  --     12/24/24 1225 During PT  --  --  --  88 %  --  --     12/24/24 1249 --  --  --  --  100 %  --  --                   Vital Signs Comment: SpO2 increased to 94% with seated rest and VCs for PLB     Objective   Pain:  Pain Assessment  Pain Assessment: 0-10  0-10 (Numeric) Pain Score: 0 - No pain  Cognition:  Cognition  Overall Cognitive Status: Within Functional Limits  Coordination:  Movements are Fluid and Coordinated: Yes  Postural Control:  Postural Control  Postural Control: Within Functional Limits  Static Sitting Balance  Static Sitting-Balance Support: Bilateral upper extremity supported, Feet supported  Static Sitting-Level of Assistance: Distant supervision  Static Sitting-Comment/Number of Minutes: Seated at EOB    Activity Tolerance:  Activity Tolerance  Endurance: Tolerates 10 - 20 min exercise with multiple rests  Treatments:  Therapeutic Exercise  Therapeutic Exercise Performed: Yes  Therapeutic Exercise Activity 1: Pt  instructed in B LE ther-ex: AP, LAQ, Hip flexion, Hip ABD x__. Rest as needed to manage fatigue. Done to improve muscular strength and endurance to facilitate increased independence with balance, transfers, ambulation, and participation in ADLs.  Verbal/nonverbal (demo/gestures) cuing for execution of exercises and for proper form to obtain maximal benefits.    Therapeutic Activity  Therapeutic Activity Performed: Yes  Therapeutic Activity 1: For increased balance and standing tolerance needed for increased independence with functional mobility, pt. performs static standing x2 trials, both </=45s duration with SBAx1 for balance and max safety. Verbal cuing for upright posture  Therapeutic Activity 2: For increased sitting balance, core strength and functional endurance needed for increased independence with mobility and transfers, pt. performs unsupported sitting at EOB x10 min duration with distant supervision x1 for balance, max safety and maintenance of midline orientation.    Bed Mobility  Bed Mobility: Yes  Bed Mobility 1  Bed Mobility 1: Supine to sitting  Level of Assistance 1: Close supervision  Bed Mobility Comments 1: HOB elevated    Ambulation/Gait Training  Ambulation/Gait Training Performed: Yes  Ambulation/Gait Training 1  Surface 1: Level tile  Device 1: Rolling walker  Gait Support Devices: Gait belt  Assistance 1: Contact guard  Quality of Gait 1: Forward flexed posture, Decreased step length  Comments/Distance (ft) 1: 5' FWD, 5' Retro stepping  Ambulation/Gait Training 2  Surface 2: Level tile  Device 2: Rolling walker  Gait Support Devices: Gait belt  Assistance 2: Contact guard  Quality of Gait 2: Forward flexed posture, Decreased step length  Comments/Distance (ft) 2: 30'  Transfers  Transfer: Yes  Transfer 1  Transfer From 1:  (sit<>stand to/from chair with arm rests and EOB)  Transfer Device 1: Walker, Gait belt  Transfer Level of Assistance 1: Contact guard  Trials/Comments 1: VCs for  strategic hand placement with sit<>stand    Outcome Measures:  [facility] Basic Mobility  Turning from your back to your side while in a flat bed without using bedrails: A little  Moving from lying on your back to sitting on the side of a flat bed without using bedrails: A little  Moving to and from bed to chair (including a wheelchair): A little  Standing up from a chair using your arms (e.g. wheelchair or bedside chair): A little  To walk in hospital room: A little  Climbing 3-5 steps with railing: Total  Basic Mobility - Total Score: 16    Encounter Problems       Encounter Problems (Active)       Balance       Goal 1 (Progressing)       Start:  12/20/24    Expected End:  01/03/25       Pt performs all sitting balance IND and standing balance with supervision using FWW            Mobility       STG - Patient will navigate 4-6 steps with B HR support mod IND (Progressing)       Start:  12/20/24    Expected End:  01/03/25            STG - Patient will ambulate (Progressing)       Start:  12/20/24    Expected End:  01/03/25       100 ft with supervision using proper gait mechanics using FWW            PT Transfers       STG - Patient to transfer to and from sit to supine (Progressing)       Start:  12/20/24    Expected End:  01/03/25       IND         STG - Patient will transfer sit to and from stand (Progressing)       Start:  12/20/24    Expected End:  01/03/25       Mod IND using FWW and proper body mechanics            Pain - Adult

## 2024-12-25 LAB
25(OH)D3 SERPL-MCNC: 62 NG/ML (ref 30–100)
ANION GAP SERPL CALC-SCNC: 14 MMOL/L (ref 10–20)
BASOPHILS # BLD AUTO: 0.05 X10*3/UL (ref 0–0.1)
BASOPHILS NFR BLD AUTO: 0.7 %
BUN SERPL-MCNC: 40 MG/DL (ref 6–23)
CALCIUM SERPL-MCNC: 9.7 MG/DL (ref 8.6–10.3)
CHLORIDE SERPL-SCNC: 94 MMOL/L (ref 98–107)
CO2 SERPL-SCNC: 30 MMOL/L (ref 21–32)
CREAT SERPL-MCNC: 6.08 MG/DL (ref 0.5–1.3)
EGFRCR SERPLBLD CKD-EPI 2021: 9 ML/MIN/1.73M*2
EOSINOPHIL # BLD AUTO: 0.23 X10*3/UL (ref 0–0.4)
EOSINOPHIL NFR BLD AUTO: 3.4 %
ERYTHROCYTE [DISTWIDTH] IN BLOOD BY AUTOMATED COUNT: 14.7 % (ref 11.5–14.5)
GLUCOSE BLD MANUAL STRIP-MCNC: 113 MG/DL (ref 74–99)
GLUCOSE SERPL-MCNC: 81 MG/DL (ref 74–99)
HCT VFR BLD AUTO: 25.2 % (ref 41–52)
HGB BLD-MCNC: 8.7 G/DL (ref 13.5–17.5)
IMM GRANULOCYTES # BLD AUTO: 0.05 X10*3/UL (ref 0–0.5)
IMM GRANULOCYTES NFR BLD AUTO: 0.7 % (ref 0–0.9)
LYMPHOCYTES # BLD AUTO: 0.94 X10*3/UL (ref 0.8–3)
LYMPHOCYTES NFR BLD AUTO: 13.8 %
MCH RBC QN AUTO: 29.1 PG (ref 26–34)
MCHC RBC AUTO-ENTMCNC: 34.5 G/DL (ref 32–36)
MCV RBC AUTO: 84 FL (ref 80–100)
MONOCYTES # BLD AUTO: 0.48 X10*3/UL (ref 0.05–0.8)
MONOCYTES NFR BLD AUTO: 7.1 %
NEUTROPHILS # BLD AUTO: 5.04 X10*3/UL (ref 1.6–5.5)
NEUTROPHILS NFR BLD AUTO: 74.3 %
NRBC BLD-RTO: 0 /100 WBCS (ref 0–0)
PLATELET # BLD AUTO: 154 X10*3/UL (ref 150–450)
POTASSIUM SERPL-SCNC: 4.6 MMOL/L (ref 3.5–5.3)
RBC # BLD AUTO: 2.99 X10*6/UL (ref 4.5–5.9)
SODIUM SERPL-SCNC: 133 MMOL/L (ref 136–145)
WBC # BLD AUTO: 6.8 X10*3/UL (ref 4.4–11.3)

## 2024-12-25 PROCEDURE — 82947 ASSAY GLUCOSE BLOOD QUANT: CPT

## 2024-12-25 PROCEDURE — 2500000001 HC RX 250 WO HCPCS SELF ADMINISTERED DRUGS (ALT 637 FOR MEDICARE OP): Performed by: STUDENT IN AN ORGANIZED HEALTH CARE EDUCATION/TRAINING PROGRAM

## 2024-12-25 PROCEDURE — 99232 SBSQ HOSP IP/OBS MODERATE 35: CPT | Performed by: HOSPITALIST

## 2024-12-25 PROCEDURE — 2500000002 HC RX 250 W HCPCS SELF ADMINISTERED DRUGS (ALT 637 FOR MEDICARE OP, ALT 636 FOR OP/ED): Performed by: HOSPITALIST

## 2024-12-25 PROCEDURE — 2500000002 HC RX 250 W HCPCS SELF ADMINISTERED DRUGS (ALT 637 FOR MEDICARE OP, ALT 636 FOR OP/ED): Performed by: PHYSICIAN ASSISTANT

## 2024-12-25 PROCEDURE — 94640 AIRWAY INHALATION TREATMENT: CPT

## 2024-12-25 PROCEDURE — 2500000004 HC RX 250 GENERAL PHARMACY W/ HCPCS (ALT 636 FOR OP/ED): Performed by: PHYSICIAN ASSISTANT

## 2024-12-25 PROCEDURE — 1100000001 HC PRIVATE ROOM DAILY

## 2024-12-25 PROCEDURE — 2500000002 HC RX 250 W HCPCS SELF ADMINISTERED DRUGS (ALT 637 FOR MEDICARE OP, ALT 636 FOR OP/ED)

## 2024-12-25 PROCEDURE — 80048 BASIC METABOLIC PNL TOTAL CA: CPT | Performed by: HOSPITALIST

## 2024-12-25 PROCEDURE — 2500000002 HC RX 250 W HCPCS SELF ADMINISTERED DRUGS (ALT 637 FOR MEDICARE OP, ALT 636 FOR OP/ED): Performed by: STUDENT IN AN ORGANIZED HEALTH CARE EDUCATION/TRAINING PROGRAM

## 2024-12-25 PROCEDURE — 2500000005 HC RX 250 GENERAL PHARMACY W/O HCPCS: Performed by: STUDENT IN AN ORGANIZED HEALTH CARE EDUCATION/TRAINING PROGRAM

## 2024-12-25 PROCEDURE — 36415 COLL VENOUS BLD VENIPUNCTURE: CPT | Performed by: HOSPITALIST

## 2024-12-25 PROCEDURE — 2500000002 HC RX 250 W HCPCS SELF ADMINISTERED DRUGS (ALT 637 FOR MEDICARE OP, ALT 636 FOR OP/ED): Performed by: INTERNAL MEDICINE

## 2024-12-25 PROCEDURE — 83970 ASSAY OF PARATHORMONE: CPT | Mod: AHULAB | Performed by: HOSPITALIST

## 2024-12-25 PROCEDURE — 85025 COMPLETE CBC W/AUTO DIFF WBC: CPT | Performed by: HOSPITALIST

## 2024-12-25 RX ORDER — ALBUTEROL SULFATE 0.83 MG/ML
1.25 SOLUTION RESPIRATORY (INHALATION)
Status: DISCONTINUED | OUTPATIENT
Start: 2024-12-25 | End: 2024-12-25 | Stop reason: ENTERED-IN-ERROR

## 2024-12-25 RX ORDER — LEVALBUTEROL INHALATION SOLUTION 0.63 MG/3ML
0.63 SOLUTION RESPIRATORY (INHALATION)
Status: DISCONTINUED | OUTPATIENT
Start: 2024-12-25 | End: 2024-12-25

## 2024-12-25 RX ORDER — IPRATROPIUM BROMIDE 0.5 MG/2.5ML
0.5 SOLUTION RESPIRATORY (INHALATION) 3 TIMES DAILY
Status: DISCONTINUED | OUTPATIENT
Start: 2024-12-25 | End: 2024-12-29

## 2024-12-25 RX ORDER — LEVALBUTEROL INHALATION SOLUTION 1.25 MG/3ML
0.63 SOLUTION RESPIRATORY (INHALATION)
Status: DISCONTINUED | OUTPATIENT
Start: 2024-12-25 | End: 2024-12-29

## 2024-12-25 RX ADMIN — OXYCODONE HYDROCHLORIDE 5 MG: 5 TABLET ORAL at 09:59

## 2024-12-25 RX ADMIN — IPRATROPIUM BROMIDE 0.5 MG: 0.5 SOLUTION RESPIRATORY (INHALATION) at 20:27

## 2024-12-25 RX ADMIN — HEPARIN SODIUM 5000 UNITS: 5000 INJECTION, SOLUTION INTRAVENOUS; SUBCUTANEOUS at 06:14

## 2024-12-25 RX ADMIN — Medication 3 L/MIN: at 15:21

## 2024-12-25 RX ADMIN — LEVALBUTEROL HYDROCHLORIDE 0.63 MG: 1.25 SOLUTION RESPIRATORY (INHALATION) at 20:27

## 2024-12-25 RX ADMIN — BUDESONIDE 0.25 MG: 0.25 INHALANT RESPIRATORY (INHALATION) at 07:53

## 2024-12-25 RX ADMIN — PREDNISONE 5 MG: 5 TABLET ORAL at 09:59

## 2024-12-25 RX ADMIN — HEPARIN SODIUM 5000 UNITS: 5000 INJECTION, SOLUTION INTRAVENOUS; SUBCUTANEOUS at 21:50

## 2024-12-25 RX ADMIN — OXYCODONE HYDROCHLORIDE 5 MG: 5 TABLET ORAL at 18:49

## 2024-12-25 RX ADMIN — IPRATROPIUM BROMIDE AND ALBUTEROL SULFATE 3 ML: 2.5; .5 SOLUTION RESPIRATORY (INHALATION) at 07:53

## 2024-12-25 RX ADMIN — HEPARIN SODIUM 5000 UNITS: 5000 INJECTION, SOLUTION INTRAVENOUS; SUBCUTANEOUS at 14:33

## 2024-12-25 RX ADMIN — OXYCODONE HYDROCHLORIDE 5 MG: 5 TABLET ORAL at 14:33

## 2024-12-25 RX ADMIN — AZITHROMYCIN 250 MG: 250 TABLET, FILM COATED ORAL at 09:59

## 2024-12-25 RX ADMIN — BUDESONIDE 0.25 MG: 0.25 INHALANT RESPIRATORY (INHALATION) at 20:28

## 2024-12-25 RX ADMIN — IPRATROPIUM BROMIDE 0.5 MG: 0.5 SOLUTION RESPIRATORY (INHALATION) at 15:21

## 2024-12-25 RX ADMIN — ROSUVASTATIN 10 MG: 10 TABLET, FILM COATED ORAL at 09:59

## 2024-12-25 ASSESSMENT — COGNITIVE AND FUNCTIONAL STATUS - GENERAL
DRESSING REGULAR LOWER BODY CLOTHING: A LITTLE
WALKING IN HOSPITAL ROOM: A LOT
TURNING FROM BACK TO SIDE WHILE IN FLAT BAD: A LITTLE
MOVING FROM LYING ON BACK TO SITTING ON SIDE OF FLAT BED WITH BEDRAILS: A LITTLE
MOBILITY SCORE: 16
DAILY ACTIVITIY SCORE: 20
WALKING IN HOSPITAL ROOM: A LOT
DRESSING REGULAR UPPER BODY CLOTHING: A LITTLE
HELP NEEDED FOR BATHING: A LITTLE
CLIMB 3 TO 5 STEPS WITH RAILING: A LOT
TOILETING: A LITTLE
MOVING TO AND FROM BED TO CHAIR: A LITTLE
TOILETING: A LITTLE
DRESSING REGULAR LOWER BODY CLOTHING: A LITTLE
MOBILITY SCORE: 16
STANDING UP FROM CHAIR USING ARMS: A LITTLE
STANDING UP FROM CHAIR USING ARMS: A LITTLE
DAILY ACTIVITIY SCORE: 20
DRESSING REGULAR UPPER BODY CLOTHING: A LITTLE
HELP NEEDED FOR BATHING: A LITTLE
TURNING FROM BACK TO SIDE WHILE IN FLAT BAD: A LITTLE
MOVING TO AND FROM BED TO CHAIR: A LITTLE
MOVING FROM LYING ON BACK TO SITTING ON SIDE OF FLAT BED WITH BEDRAILS: A LITTLE
CLIMB 3 TO 5 STEPS WITH RAILING: A LOT

## 2024-12-25 ASSESSMENT — PAIN SCALES - GENERAL
PAINLEVEL_OUTOF10: 0 - NO PAIN

## 2024-12-25 NOTE — CARE PLAN
Problem: Pain - Adult  Goal: Verbalizes/displays adequate comfort level or baseline comfort level  Outcome: Progressing     Problem: Safety - Adult  Goal: Free from fall injury  Outcome: Progressing     Problem: Discharge Planning  Goal: Discharge to home or other facility with appropriate resources  Outcome: Progressing     Problem: Chronic Conditions and Co-morbidities  Goal: Patient's chronic conditions and co-morbidity symptoms are monitored and maintained or improved  Outcome: Progressing     Problem: Fall/Injury  Goal: Not fall by end of shift  Outcome: Progressing  Goal: Be free from injury by end of the shift  Outcome: Progressing     Problem: Pain  Goal: Takes deep breaths with improved pain control throughout the shift  Outcome: Progressing  Goal: Performs ADL's with improved pain control throughout shift  Outcome: Progressing  Goal: Free from opioid side effects throughout the shift  Outcome: Progressing  Goal: Free from acute confusion related to pain meds throughout the shift  Outcome: Progressing     Problem: Nutrition  Goal: Less than 5 days NPO/clear liquids  Outcome: Progressing  Goal: Lab values WNL  Outcome: Progressing  Goal: Electrolytes WNL  Outcome: Progressing  Goal: Promote healing  Outcome: Progressing  Goal: Gradual weight gain  Outcome: Progressing     Problem: Skin  Goal: Decreased wound size/increased tissue granulation at next dressing change  Outcome: Progressing  Flowsheets (Taken 12/25/2024 1402)  Decreased wound size/increased tissue granulation at next dressing change:   Promote sleep for wound healing   Protective dressings over bony prominences  Goal: Participates in plan/prevention/treatment measures  Outcome: Progressing  Flowsheets (Taken 12/25/2024 1402)  Participates in plan/prevention/treatment measures: Elevate heels  Goal: Prevent/manage excess moisture  Outcome: Progressing  Flowsheets (Taken 12/25/2024 1402)  Prevent/manage excess moisture: Cleanse  incontinence/protect with barrier cream  Goal: Prevent/minimize sheer/friction injuries  Outcome: Progressing  Flowsheets (Taken 12/25/2024 1402)  Prevent/minimize sheer/friction injuries: HOB 30 degrees or less  Goal: Promote/optimize nutrition  Outcome: Progressing  Flowsheets (Taken 12/25/2024 1402)  Promote/optimize nutrition: Consume > 50% meals/supplements  Goal: Promote skin healing  Outcome: Progressing  Flowsheets (Taken 12/25/2024 1402)  Promote skin healing: Assess skin/pad under line(s)/device(s)   The patient's goals for the shift include Rest.    The clinical goals for the shift include pain managment

## 2024-12-25 NOTE — PROGRESS NOTES
Kalyan Armstrong is a 74 y.o. male on day 6 of admission presenting with Generalized abdominal pain.      Subjective   Doing better no new complaints       Objective        Vitals 24HR  Heart Rate:  [62-77]   Temp:  [36.3 °C (97.3 °F)-36.8 °C (98.3 °F)]   Resp:  [16-18]   BP: (117-126)/(66-79)   SpO2:  [88 %-100 %]     Intake/Output last 3 Shifts:  No intake or output data in the 24 hours ending 12/25/24 1152    Physical Exam        General appearance: Cachectic in no distress  Head and ENT: Normocephalic/atraumatic/supple neck/no JVD  Lungs: CTA  Heart: RRR  Abdomen; soft no tenderness  Extremities: No edema             Relevant Results     Results from last 7 days   Lab Units 12/25/24  0517 12/21/24  0511 12/20/24  0507   WBC AUTO x10*3/uL 6.8 7.5 7.8   HEMOGLOBIN g/dL 8.7* 9.3* 9.9*   HEMATOCRIT % 25.2* 27.3* 29.3*   PLATELETS AUTO x10*3/uL 154 174 189      Results from last 7 days   Lab Units 12/25/24  0517 12/24/24  0516 12/23/24  0749   SODIUM mmol/L 133* 137 135*   POTASSIUM mmol/L 4.6 5.2 4.9   CHLORIDE mmol/L 94* 97* 96*   CO2 mmol/L 30 25 32   BUN mg/dL 40* 43* 37*   CREATININE mg/dL 6.08* 6.27* 5.79*   GLUCOSE mg/dL 81 59* 73*   CALCIUM mg/dL 9.7 10.5* 10.2        Current Facility-Administered Medications:     acetaminophen (Tylenol) tablet 650 mg, 650 mg, oral, q4h PRN **OR** acetaminophen (Tylenol) oral liquid 650 mg, 650 mg, oral, q4h PRN **OR** acetaminophen (Tylenol) suppository 650 mg, 650 mg, rectal, q4h PRN, Rachel Cooley PA-C    albuterol 2.5 mg /3 mL (0.083 %) nebulizer solution 2.5 mg, 2.5 mg, nebulization, q2h PRN, Brayden Giordano MD    [Held by provider] amLODIPine (Norvasc) tablet 10 mg, 10 mg, oral, Daily, Rachel Cooley PA-C    azithromycin (Zithromax) tablet 250 mg, 250 mg, oral, Daily, Wan Bhakta MD, 250 mg at 12/25/24 0971    budesonide (Pulmicort) 0.25 mg/2 mL nebulizer solution 0.25 mg, 0.25 mg, nebulization, BID, Stefany LondonoD, 0.25 mg at 12/25/24 0750    dextrose  50 % injection 12.5 g, 12.5 g, intravenous, q15 min PRN, Deep Garrett, DO    dextrose 50 % injection 25 g, 25 g, intravenous, q15 min PRN, Deep Garrett, DO    glucagon (Glucagen) injection 1 mg, 1 mg, intramuscular, q15 min PRN, Deep Garrett, DO    glucagon (Glucagen) injection 1 mg, 1 mg, intramuscular, q15 min PRN, Deep Garrett, DO    heparin (porcine) injection 5,000 Units, 5,000 Units, subcutaneous, q8h GULSHAN, Rachel Cooley PA-C, 5,000 Units at 12/25/24 0614    ipratropium-albuteroL (Duo-Neb) 0.5-2.5 mg/3 mL nebulizer solution 3 mL, 3 mL, nebulization, TID, Brayden Giordano MD, 3 mL at 12/25/24 0753    melatonin tablet 3 mg, 3 mg, oral, Nightly PRN, Rachel Cooley PA-C    ondansetron ODT (Zofran-ODT) disintegrating tablet 4 mg, 4 mg, oral, q8h PRN **OR** ondansetron (Zofran) injection 4 mg, 4 mg, intravenous, q8h PRN, Rachel Cooley PA-C    oxyCODONE (Roxicodone) immediate release tablet 5 mg, 5 mg, oral, TID, Wan Bhakta MD, 5 mg at 12/25/24 0959    oxygen (O2) therapy, , inhalation, Continuous PRN - O2/gases, Wan Bhakta MD, 3 L/min at 12/24/24 1826    polyethylene glycol (Glycolax, Miralax) packet 17 g, 17 g, oral, Daily, Rachel Cooley PA-C, 17 g at 12/24/24 0911    predniSONE (Deltasone) tablet 5 mg, 5 mg, oral, Daily, Rachel Cooley PA-C, 5 mg at 12/25/24 0959    rosuvastatin (Crestor) tablet 10 mg, 10 mg, oral, Daily, Rachel Cooley PA-C, 10 mg at 12/25/24 0959    triamcinolone (Kenalog) 0.1 % ointment 1 Application, 1 Application, Topical, BID PRN, Rachel Cooley PA-C           Assessment/Plan        1.  CATHIE on top of CKD stage V, followed by Dr. Xiong as outpatient will schedule for peritoneal dialysis catheter placement for CAPD.  BUN/Cr=40/6.08  2.  Hypertension continue current management  3.  Severe protein calorie malnutrition  4.  Anemia of CKD     Will continue his current treatment and follow patient daily reviewing labs and clinical condition            Assessment & Plan  Generalized  abdominal pain    Unspecified severe protein-calorie malnutrition (Multi)    Unintentional weight loss    Anorexia    Failure to thrive in adult    Acute on chronic renal failure (CMS-HCC)              I spent 35 minutes in the professional and overall care of this patient.      Ezra Tillman MD

## 2024-12-25 NOTE — PROGRESS NOTES
Between 7AM-7PM please message me via Epic Secure Chat.  After 7PM please page Nocturnist on call.    [facility] Hospitalist Progress Note      Kalyan Armstrong    :  1950(74 y.o.)    MRN:  [id number]  Date: 24     Assessment and Plan:     Reported unintentional weight loss/chronic abdominal pain/adult FTT - CT A/P no new acute process. Suspect weight loss related to work of breathing with his chronic COPD  CATHIE on CKD 5 - Scr around his baseline now. Plan for PD placement and PD initiation as outpatient per nephrology.   Likely end stage COPD/Chronic respiratory failure on 3-4L NC (he is on PO pred 5mg daily and PO azithro 250mg daily at home for COPD through his pulmonologist). Reports intolerance with duo nebs, will trial pulmicort, xopenex, atrovent.   Severe protein calorie malnutrition- patient not interested in medications to increase appetite. Dietician consulted.   Chronic anemia- recent b12/folate/iron studies wnl.   HTN - norvasc held due to softer BP  HLD- continue statin  Chronic Hep C reportedly post-treatment and negative viral load  Hypercalcemia- mild, suspect due to renal impairment, PTHi pending.  DNR comfort care      DVT Prophylaxis: subcutaneous Heparin    Disposition: continue to monitor inpatient, await consultant recommendations, await test results, and await clinical improvement    Electronically signed by Jaskaran Garrett DO on 24 at 2:06 PM     Subjective:      Interval History:   Vitals and chart notes from overnight reviewed.   No acute issues overnight.   Patient seen and evaluated at bedside.   Reports breathing tx were making him very shaky then has to urinate afterwards. Will trial xopenex     Review of Systems:   Other than patient's chronic conditions and those complaints in the history above, the rest of the 10 systems review were done and were negative.     Current medications:  Scheduled Meds:[Held by provider] amLODIPine, 10 mg, oral,  Daily  azithromycin, 250 mg, oral, Daily  budesonide, 0.25 mg, nebulization, BID  heparin (porcine), 5,000 Units, subcutaneous, q8h GULSHAN  ipratropium-albuteroL, 3 mL, nebulization, TID  oxyCODONE, 5 mg, oral, TID  polyethylene glycol, 17 g, oral, Daily  predniSONE, 5 mg, oral, Daily  rosuvastatin, 10 mg, oral, Daily      Continuous Infusions:   PRN Meds:PRN medications: acetaminophen **OR** acetaminophen **OR** acetaminophen, albuterol, dextrose, dextrose, glucagon, glucagon, melatonin, ondansetron ODT **OR** ondansetron, oxygen, triamcinolone      Objective:     Heart Rate:  [62-77]   Temp:  [36.3 °C (97.3 °F)-36.8 °C (98.3 °F)]   Resp:  [16-20]   BP: (117-126)/(66-79)   SpO2:  [94 %-100 %]     Oxygen Dose: *3 L/min    Physical Exam  Vitals and nursing note reviewed.   HENT:      Mouth/Throat:      Mouth: Mucous membranes are moist.      Pharynx: Oropharynx is clear.   Cardiovascular:      Rate and Rhythm: Normal rate and regular rhythm.   Pulmonary:      Effort: Pulmonary effort is normal.   Abdominal:      Palpations: Abdomen is soft.   Neurological:      Mental Status: He is alert.         Labs:   Lab Results   Component Value Date     (L) 12/25/2024    K 4.6 12/25/2024    CL 94 (L) 12/25/2024    CO2 30 12/25/2024    BUN 40 (H) 12/25/2024    CREATININE 6.08 (H) 12/25/2024    GLUCOSE 81 12/25/2024    CALCIUM 9.7 12/25/2024    PROT 7.3 12/19/2024    BILITOT 0.4 12/19/2024    ALKPHOS 50 12/19/2024    AST 30 12/19/2024    ALT 21 12/19/2024       Lab Results   Component Value Date    WBC 6.8 12/25/2024    HGB 8.7 (L) 12/25/2024    HCT 25.2 (L) 12/25/2024    MCV 84 12/25/2024     12/25/2024

## 2024-12-26 ENCOUNTER — APPOINTMENT (OUTPATIENT)
Dept: CARDIOLOGY | Facility: HOSPITAL | Age: 74
DRG: 673 | End: 2024-12-26
Payer: MEDICARE

## 2024-12-26 LAB
ANION GAP SERPL CALC-SCNC: 11 MMOL/L (ref 10–20)
BASOPHILS # BLD AUTO: 0.08 X10*3/UL (ref 0–0.1)
BASOPHILS NFR BLD AUTO: 1 %
BUN SERPL-MCNC: 37 MG/DL (ref 6–23)
CALCIUM SERPL-MCNC: 10.6 MG/DL (ref 8.6–10.3)
CHLORIDE SERPL-SCNC: 94 MMOL/L (ref 98–107)
CO2 SERPL-SCNC: 32 MMOL/L (ref 21–32)
CREAT SERPL-MCNC: 6.21 MG/DL (ref 0.5–1.3)
EGFRCR SERPLBLD CKD-EPI 2021: 9 ML/MIN/1.73M*2
EOSINOPHIL # BLD AUTO: 0.43 X10*3/UL (ref 0–0.4)
EOSINOPHIL NFR BLD AUTO: 5.5 %
ERYTHROCYTE [DISTWIDTH] IN BLOOD BY AUTOMATED COUNT: 14.7 % (ref 11.5–14.5)
GLUCOSE BLD MANUAL STRIP-MCNC: 104 MG/DL (ref 74–99)
GLUCOSE BLD MANUAL STRIP-MCNC: 120 MG/DL (ref 74–99)
GLUCOSE BLD MANUAL STRIP-MCNC: 90 MG/DL (ref 74–99)
GLUCOSE SERPL-MCNC: 79 MG/DL (ref 74–99)
HCT VFR BLD AUTO: 27.6 % (ref 41–52)
HGB BLD-MCNC: 9.4 G/DL (ref 13.5–17.5)
HOLD SPECIMEN: NORMAL
IMM GRANULOCYTES # BLD AUTO: 0.05 X10*3/UL (ref 0–0.5)
IMM GRANULOCYTES NFR BLD AUTO: 0.6 % (ref 0–0.9)
INR PPP: 0.9 (ref 0.9–1.1)
LYMPHOCYTES # BLD AUTO: 0.97 X10*3/UL (ref 0.8–3)
LYMPHOCYTES NFR BLD AUTO: 12.3 %
MCH RBC QN AUTO: 28.7 PG (ref 26–34)
MCHC RBC AUTO-ENTMCNC: 34.1 G/DL (ref 32–36)
MCV RBC AUTO: 84 FL (ref 80–100)
MONOCYTES # BLD AUTO: 0.49 X10*3/UL (ref 0.05–0.8)
MONOCYTES NFR BLD AUTO: 6.2 %
NEUTROPHILS # BLD AUTO: 5.86 X10*3/UL (ref 1.6–5.5)
NEUTROPHILS NFR BLD AUTO: 74.4 %
NRBC BLD-RTO: 0 /100 WBCS (ref 0–0)
PLATELET # BLD AUTO: 152 X10*3/UL (ref 150–450)
POTASSIUM SERPL-SCNC: 5.1 MMOL/L (ref 3.5–5.3)
PROTHROMBIN TIME: 10.6 SECONDS (ref 9.8–12.8)
PTH-INTACT SERPL-MCNC: 29.3 PG/ML (ref 18.5–88)
RBC # BLD AUTO: 3.28 X10*6/UL (ref 4.5–5.9)
SODIUM SERPL-SCNC: 132 MMOL/L (ref 136–145)
WBC # BLD AUTO: 7.9 X10*3/UL (ref 4.4–11.3)

## 2024-12-26 PROCEDURE — 94640 AIRWAY INHALATION TREATMENT: CPT

## 2024-12-26 PROCEDURE — 36415 COLL VENOUS BLD VENIPUNCTURE: CPT

## 2024-12-26 PROCEDURE — 2500000002 HC RX 250 W HCPCS SELF ADMINISTERED DRUGS (ALT 637 FOR MEDICARE OP, ALT 636 FOR OP/ED): Performed by: STUDENT IN AN ORGANIZED HEALTH CARE EDUCATION/TRAINING PROGRAM

## 2024-12-26 PROCEDURE — 77001 FLUOROGUIDE FOR VEIN DEVICE: CPT | Performed by: RADIOLOGY

## 2024-12-26 PROCEDURE — 2500000005 HC RX 250 GENERAL PHARMACY W/O HCPCS: Performed by: STUDENT IN AN ORGANIZED HEALTH CARE EDUCATION/TRAINING PROGRAM

## 2024-12-26 PROCEDURE — 2500000004 HC RX 250 GENERAL PHARMACY W/ HCPCS (ALT 636 FOR OP/ED): Performed by: RADIOLOGY

## 2024-12-26 PROCEDURE — 2780000003 HC OR 278 NO HCPCS

## 2024-12-26 PROCEDURE — C1750 CATH, HEMODIALYSIS,LONG-TERM: HCPCS

## 2024-12-26 PROCEDURE — 99221 1ST HOSP IP/OBS SF/LOW 40: CPT

## 2024-12-26 PROCEDURE — 02HV33Z INSERTION OF INFUSION DEVICE INTO SUPERIOR VENA CAVA, PERCUTANEOUS APPROACH: ICD-10-PCS | Performed by: RADIOLOGY

## 2024-12-26 PROCEDURE — 0JH63XZ INSERTION OF TUNNELED VASCULAR ACCESS DEVICE INTO CHEST SUBCUTANEOUS TISSUE AND FASCIA, PERCUTANEOUS APPROACH: ICD-10-PCS | Performed by: RADIOLOGY

## 2024-12-26 PROCEDURE — 85025 COMPLETE CBC W/AUTO DIFF WBC: CPT | Performed by: HOSPITALIST

## 2024-12-26 PROCEDURE — 85610 PROTHROMBIN TIME: CPT

## 2024-12-26 PROCEDURE — 76937 US GUIDE VASCULAR ACCESS: CPT | Performed by: RADIOLOGY

## 2024-12-26 PROCEDURE — 36558 INSERT TUNNELED CV CATH: CPT | Performed by: RADIOLOGY

## 2024-12-26 PROCEDURE — 99152 MOD SED SAME PHYS/QHP 5/>YRS: CPT

## 2024-12-26 PROCEDURE — 1100000001 HC PRIVATE ROOM DAILY

## 2024-12-26 PROCEDURE — 2500000001 HC RX 250 WO HCPCS SELF ADMINISTERED DRUGS (ALT 637 FOR MEDICARE OP): Performed by: STUDENT IN AN ORGANIZED HEALTH CARE EDUCATION/TRAINING PROGRAM

## 2024-12-26 PROCEDURE — 36415 COLL VENOUS BLD VENIPUNCTURE: CPT | Performed by: HOSPITALIST

## 2024-12-26 PROCEDURE — 2500000002 HC RX 250 W HCPCS SELF ADMINISTERED DRUGS (ALT 637 FOR MEDICARE OP, ALT 636 FOR OP/ED)

## 2024-12-26 PROCEDURE — C1769 GUIDE WIRE: HCPCS

## 2024-12-26 PROCEDURE — 97530 THERAPEUTIC ACTIVITIES: CPT | Mod: GP,CQ | Performed by: PHYSICAL THERAPY ASSISTANT

## 2024-12-26 PROCEDURE — 2500000002 HC RX 250 W HCPCS SELF ADMINISTERED DRUGS (ALT 637 FOR MEDICARE OP, ALT 636 FOR OP/ED): Performed by: HOSPITALIST

## 2024-12-26 PROCEDURE — 2720000007 HC OR 272 NO HCPCS

## 2024-12-26 PROCEDURE — 2500000002 HC RX 250 W HCPCS SELF ADMINISTERED DRUGS (ALT 637 FOR MEDICARE OP, ALT 636 FOR OP/ED): Performed by: PHYSICIAN ASSISTANT

## 2024-12-26 PROCEDURE — 80048 BASIC METABOLIC PNL TOTAL CA: CPT | Performed by: HOSPITALIST

## 2024-12-26 PROCEDURE — 2500000004 HC RX 250 GENERAL PHARMACY W/ HCPCS (ALT 636 FOR OP/ED): Performed by: PHYSICIAN ASSISTANT

## 2024-12-26 PROCEDURE — 99233 SBSQ HOSP IP/OBS HIGH 50: CPT | Performed by: INTERNAL MEDICINE

## 2024-12-26 PROCEDURE — 94664 DEMO&/EVAL PT USE INHALER: CPT

## 2024-12-26 PROCEDURE — 82947 ASSAY GLUCOSE BLOOD QUANT: CPT

## 2024-12-26 PROCEDURE — 97116 GAIT TRAINING THERAPY: CPT | Mod: GP,CQ | Performed by: PHYSICAL THERAPY ASSISTANT

## 2024-12-26 PROCEDURE — 2500000001 HC RX 250 WO HCPCS SELF ADMINISTERED DRUGS (ALT 637 FOR MEDICARE OP): Performed by: PHYSICIAN ASSISTANT

## 2024-12-26 RX ORDER — FENTANYL CITRATE 50 UG/ML
INJECTION, SOLUTION INTRAMUSCULAR; INTRAVENOUS AS NEEDED
Status: DISCONTINUED | OUTPATIENT
Start: 2024-12-26 | End: 2024-12-26 | Stop reason: HOSPADM

## 2024-12-26 RX ADMIN — ROSUVASTATIN 10 MG: 10 TABLET, FILM COATED ORAL at 08:24

## 2024-12-26 RX ADMIN — IPRATROPIUM BROMIDE 0.5 MG: 0.5 SOLUTION RESPIRATORY (INHALATION) at 19:20

## 2024-12-26 RX ADMIN — IPRATROPIUM BROMIDE 0.5 MG: 0.5 SOLUTION RESPIRATORY (INHALATION) at 15:06

## 2024-12-26 RX ADMIN — BUDESONIDE 0.25 MG: 0.25 INHALANT RESPIRATORY (INHALATION) at 07:15

## 2024-12-26 RX ADMIN — OXYCODONE HYDROCHLORIDE 5 MG: 5 TABLET ORAL at 08:24

## 2024-12-26 RX ADMIN — IPRATROPIUM BROMIDE 0.5 MG: 0.5 SOLUTION RESPIRATORY (INHALATION) at 07:14

## 2024-12-26 RX ADMIN — HEPARIN SODIUM 5000 UNITS: 5000 INJECTION, SOLUTION INTRAVENOUS; SUBCUTANEOUS at 06:14

## 2024-12-26 RX ADMIN — PREDNISONE 5 MG: 5 TABLET ORAL at 08:24

## 2024-12-26 RX ADMIN — Medication 3 L/MIN: at 19:19

## 2024-12-26 RX ADMIN — LEVALBUTEROL HYDROCHLORIDE 0.63 MG: 1.25 SOLUTION RESPIRATORY (INHALATION) at 07:14

## 2024-12-26 RX ADMIN — OXYCODONE HYDROCHLORIDE 5 MG: 5 TABLET ORAL at 13:04

## 2024-12-26 RX ADMIN — LEVALBUTEROL HYDROCHLORIDE 0.63 MG: 1.25 SOLUTION RESPIRATORY (INHALATION) at 19:19

## 2024-12-26 RX ADMIN — Medication 3 L/MIN: at 15:06

## 2024-12-26 RX ADMIN — BUDESONIDE 0.25 MG: 0.25 INHALANT RESPIRATORY (INHALATION) at 19:19

## 2024-12-26 RX ADMIN — TRIAMCINOLONE ACETONIDE 1 APPLICATION: 1 OINTMENT TOPICAL at 16:03

## 2024-12-26 RX ADMIN — OXYCODONE HYDROCHLORIDE 5 MG: 5 TABLET ORAL at 17:44

## 2024-12-26 RX ADMIN — POLYETHYLENE GLYCOL 3350 17 G: 17 POWDER, FOR SOLUTION ORAL at 08:24

## 2024-12-26 RX ADMIN — HEPARIN SODIUM 5000 UNITS: 5000 INJECTION, SOLUTION INTRAVENOUS; SUBCUTANEOUS at 21:35

## 2024-12-26 RX ADMIN — LEVALBUTEROL HYDROCHLORIDE 0.63 MG: 1.25 SOLUTION RESPIRATORY (INHALATION) at 15:06

## 2024-12-26 RX ADMIN — HEPARIN SODIUM 5000 UNITS: 5000 INJECTION, SOLUTION INTRAVENOUS; SUBCUTANEOUS at 15:29

## 2024-12-26 RX ADMIN — AZITHROMYCIN 250 MG: 250 TABLET, FILM COATED ORAL at 08:24

## 2024-12-26 RX ADMIN — FENTANYL CITRATE 50 MCG: 50 INJECTION INTRAMUSCULAR; INTRAVENOUS at 14:21

## 2024-12-26 ASSESSMENT — COGNITIVE AND FUNCTIONAL STATUS - GENERAL
TOILETING: A LITTLE
DRESSING REGULAR UPPER BODY CLOTHING: A LITTLE
MOVING FROM LYING ON BACK TO SITTING ON SIDE OF FLAT BED WITH BEDRAILS: A LITTLE
TURNING FROM BACK TO SIDE WHILE IN FLAT BAD: A LITTLE
DAILY ACTIVITIY SCORE: 20
MOVING TO AND FROM BED TO CHAIR: A LITTLE
WALKING IN HOSPITAL ROOM: A LOT
TURNING FROM BACK TO SIDE WHILE IN FLAT BAD: A LITTLE
MOVING TO AND FROM BED TO CHAIR: A LITTLE
MOBILITY SCORE: 15
MOBILITY SCORE: 16
STANDING UP FROM CHAIR USING ARMS: A LITTLE
MOVING FROM LYING ON BACK TO SITTING ON SIDE OF FLAT BED WITH BEDRAILS: A LITTLE
WALKING IN HOSPITAL ROOM: A LOT
CLIMB 3 TO 5 STEPS WITH RAILING: A LOT
HELP NEEDED FOR BATHING: A LITTLE
STANDING UP FROM CHAIR USING ARMS: A LITTLE
CLIMB 3 TO 5 STEPS WITH RAILING: TOTAL
DRESSING REGULAR LOWER BODY CLOTHING: A LITTLE

## 2024-12-26 ASSESSMENT — PAIN SCALES - GENERAL
PAINLEVEL_OUTOF10: 0 - NO PAIN

## 2024-12-26 ASSESSMENT — PAIN - FUNCTIONAL ASSESSMENT
PAIN_FUNCTIONAL_ASSESSMENT: 0-10
PAIN_FUNCTIONAL_ASSESSMENT: 0-10

## 2024-12-26 NOTE — PROGRESS NOTES
12/26/24 0834   Discharge Planning   Expected Discharge Disposition SNF     Per notes , [facility] is not able to accept, I did call the patient's wife Nallely at [phone] to advise her [facility] was not able to accept ,  she is requesting referrals be sent to [facility], I [facility], I will continue to monitor for discharge planing.

## 2024-12-26 NOTE — POST-PROCEDURE NOTE
Interventional Radiology Brief Postprocedure Note    Attending: Mike Basilio MD    Assistant: none    Diagnosis: Renal failure    Description of procedure: Tunneled HD catheter placement via the right internal jugular vein.     Anesthesia:  Local    Complications: None    Estimated Blood Loss: minimal    Medications  As of 12/26/24 1449      amLODIPine (Norvasc) tablet 10 mg (mg) Total dose:  Cannot be calculated* Dosing weight:  45.4   *Administration dose not documented     Date/Time Rate/Dose/Volume Action       12/19/24 1814 *Not included in total Held by provider     12/20/24 0900 *10 mg Missed     12/21/24 0900 *10 mg Missed     12/22/24 0900 *10 mg Missed     12/23/24 0900 *10 mg Missed     12/24/24 0900 *10 mg Missed     12/25/24 0900 *Not included in total Automatically Held     12/26/24 0900 *10 mg Missed               azithromycin (Zithromax) tablet 250 mg (mg) Total dose:  1,750 mg      Date/Time Rate/Dose/Volume Action       12/19/24 1814 *Not included in total Held by provider     12/20/24  Canceled Entry      1025 *Not included in total Unheld by provider      1334 250 mg Given     12/21/24  1006 250 mg Given     12/22/24  1005 250 mg Given     12/23/24  0954 250 mg Given     12/24/24  0911 250 mg Given     12/25/24  0959 250 mg Given     12/26/24 0824 250 mg Given               fluticasone furoate-vilanteroL (Breo Ellipta) 100-25 mcg/dose inhaler 1 puff (puff) Total dose:  Cannot be calculated* Dosing weight:  45.4   *Administration dose not documented     Date/Time Rate/Dose/Volume Action       12/19/24  Canceled Entry               predniSONE (Deltasone) tablet 5 mg (mg) Total dose:  35 mg      Date/Time Rate/Dose/Volume Action       12/20/24  0856 5 mg Given     12/21/24  1006 5 mg Given     12/22/24  1005 5 mg Given     12/23/24  0954 5 mg Given     12/24/24  0911 5 mg Given     12/25/24  0959 5 mg Given     12/26/24  0824 5 mg Given               rosuvastatin (Crestor) tablet 10 mg  (mg) Total dose:  70 mg      Date/Time Rate/Dose/Volume Action       12/20/24  0856 10 mg Given     12/21/24  1006 10 mg Given     12/22/24  1005 10 mg Given     12/23/24  0953 10 mg Given     12/24/24  0911 10 mg Given     12/25/24  0959 10 mg Given     12/26/24  0824 10 mg Given               heparin (porcine) injection 5,000 Units (Units) Total dose:  85,000 Units* Dosing weight:  45.4   *Administration not included in total     Date/Time Rate/Dose/Volume Action       12/19/24  2151 5,000 Units Given     12/20/24  0641 *5,000 Units Missed      1528 5,000 Units Given      2030 5,000 Units Given     12/21/24  1006 5,000 Units Given      1730 5,000 Units Given      2132 5,000 Units Given     12/22/24  0600 5,000 Units Given      1338 5,000 Units Given      2124 5,000 Units Given     12/23/24  0502 *5,000 Units Missed      1434 *5,000 Units Missed      2126 5,000 Units Given     12/24/24  0556 5,000 Units Given      1426 5,000 Units Given      2121 5,000 Units Given     12/25/24  0614 5,000 Units Given      1433 5,000 Units Given      2150 5,000 Units Given     12/26/24  0614 5,000 Units Given               dextrose 5 % and lactated Ringer's infusion (mL/hr) Total volume:  718.34 mL* Dosing weight:  45.4   *From user-documented volume     Date/Time Rate/Dose/Volume Action       12/19/24  1849 100 mL/hr New Bag      2214 100 mL/hr - 341.67 mL Rate Verify      2330 126.67 mL      12/20/24  0104 156.67 mL       0200 93.33 mL       0631 100 mL/hr New Bag      2031  Stopped               pantoprazole (ProtoNix) injection 40 mg (mg) Total dose:  80 mg Dosing weight:  45.4      Date/Time Rate/Dose/Volume Action       12/20/24  0631 40 mg (over 2 min) Given     12/21/24  1006 40 mg (over 2 min) Given               polyethylene glycol (Glycolax, Miralax) packet 17 g (g) Total dose:  68 g* Dosing weight:  45.4   *Administration not included in total     Date/Time Rate/Dose/Volume Action       12/19/24  6726 *17 g Missed      12/20/24  0856 *17 g Missed     12/21/24  1006 17 g Given     12/22/24  1005 17 g Given     12/23/24  0956 *17 g Missed     12/24/24  0911 17 g Given     12/25/24  1000 *17 g Missed     12/26/24  0824 17 g Given               budesonide (Pulmicort) 0.25 mg/2 mL nebulizer solution 0.25 mg (mg) Total dose:  3.25 mg* Dosing weight:  45.4   *Administration not included in total     Date/Time Rate/Dose/Volume Action       12/19/24 2034 *0.25 mg Missed     12/20/24  0705 0.25 mg Given      2035 0.25 mg Given     12/21/24  0802 0.25 mg Given      2030 0.25 mg Given     12/22/24  0713 0.25 mg Given      1923 0.25 mg Given     12/23/24  0726 0.25 mg Given      1959 0.25 mg Given     12/24/24  0748 0.25 mg Given      1826 0.25 mg Given     12/25/24  0753 0.25 mg Given      2028 0.25 mg Given     12/26/24  0715 0.25 mg Given               ipratropium-albuteroL (Duo-Neb) 0.5-2.5 mg/3 mL nebulizer solution 3 mL (mL) Total volume:  42 mL* Dosing weight:  45.4   *Administration not included in total     Date/Time Rate/Dose/Volume Action       12/19/24 2034 *3 mL Missed     12/20/24  0705 3 mL Given      1203 *3 mL Missed      2035 3 mL Given     12/21/24  0802 3 mL Given      1217 3 mL Given      2029 3 mL Given     12/22/24  0711 3 mL Given      1203 3 mL Given      1923 3 mL Given     12/23/24  0725 3 mL Given      1428 *3 mL Missed      1959 3 mL Given     12/24/24  0748 3 mL Given      1249 3 mL Given      1826 3 mL Given     12/25/24  0753 3 mL Given      1353 *3 mL Missed               oxygen (O2) therapy (L/min) Total volume:  Not documented* Dosing weight:  45.4   *Total volume has not been documented. View each administration to see the amount administered.     Date/Time Rate/Dose/Volume Action       12/19/24  1850 3 L/min Start      2035 3 L/min Start     12/20/24  0705 2 L/min Start      1238 5 L/min Start      1254 3 L/min Rate Change Medical Gas      2038 2 L/min Rate Verify Medical Gas     12/21/24  0802 2 L/min  Rate Verify Medical Gas      1217 2 L/min Rate Verify Medical Gas      2029 2 L/min Rate Verify Medical Gas     12/22/24  0711 2 L/min Rate Verify Medical Gas      1203 2 L/min Rate Verify Medical Gas      1923 2 L/min Rate Verify Medical Gas     12/23/24  1153 4 L/min Rate Verify Medical Gas      1959 3 L/min Rate Verify Medical Gas     12/24/24  0748 3 L/min Rate Verify Medical Gas      1249 3 L/min Rate Verify Medical Gas      1826 3 L/min Rate Verify Medical Gas     12/25/24  1521 3 L/min Rate Verify Medical Gas      2027 3 L/min Rate Change Medical Gas               sodium chloride 0.9 % bolus 500 mL (mL/hr) Total volume:  382.95 mL* Dosing weight:  45.4   *From user-documented volume     Date/Time Rate/Dose/Volume Action       12/19/24  2151 500 mL - 999 mL/hr (over 30 min) New Bag      2214 999 mL/hr - 382.95 mL Rate Verify      2237  (over 30 min) Stopped               HYDROmorphone PF (Dilaudid) injection 0.2 mg (mg) Total dose:  0.2 mg Dosing weight:  45.4      Date/Time Rate/Dose/Volume Action       12/20/24  1034 0.2 mg Given               iron sucrose (Venofer) injection 200 mg (mg) Total dose:  200 mg Dosing weight:  45.4      Date/Time Rate/Dose/Volume Action       12/20/24  1527 200 mg (over 5 min) Given               epoetin jamil-epbx (Retacrit) injection 10,000 Units (Units) Total dose:  10,000 Units Dosing weight:  45.4      Date/Time Rate/Dose/Volume Action       12/20/24  1528 10,000 Units Given               lactated Ringer's infusion (mL/hr) Total volume:  Not documented* Dosing weight:  45.4   *Total volume has not been documented. View each administration to see the amount administered.     Date/Time Rate/Dose/Volume Action       12/20/24  1719 100 mL/hr New Bag     12/21/24  1007  Stopped               oxyCODONE (Roxicodone) immediate release tablet 5 mg (mg) Total dose:  80 mg Dosing weight:  45.4      Date/Time Rate/Dose/Volume Action       12/21/24  1322 5 mg Given      1730 5 mg Given      12/22/24  1005 5 mg Given      1338 5 mg Given      1740 5 mg Given     12/23/24  0954 5 mg Given      1433 5 mg Given      1832 5 mg Given     12/24/24  0911 5 mg Given      1426 5 mg Given      1836 5 mg Given     12/25/24  0959 5 mg Given      1433 5 mg Given      1849 5 mg Given     12/26/24  0824 5 mg Given      1304 5 mg Given               polyethylene glycol-electrolytes (Nulytely) solution 4,000 mL (mL) Total volume:  4,000 mL Dosing weight:  45.4      Date/Time Rate/Dose/Volume Action       12/22/24  1742 4,000 mL Given               ipratropium (Atrovent) 0.02 % nebulizer solution 0.5 mg (mg) Total dose:  1.5 mg Dosing weight:  45.4      Date/Time Rate/Dose/Volume Action       12/25/24  1521 0.5 mg Given      2027 0.5 mg Given     12/26/24  0714 0.5 mg Given               albuterol 2.5 mg /3 mL (0.083 %) nebulizer solution 1.25 mg (mg) Total dose:  0 mg* Dosing weight:  45.4   *Administration not included in total     Date/Time Rate/Dose/Volume Action       12/25/24  1523 *1.25 mg Missed               levalbuterol (Xopenex) 1.25 mg/3 mL nebulizer solution 0.63 mg (mg) Total dose:  1.26 mg Dosing weight:  45.4      Date/Time Rate/Dose/Volume Action       12/25/24  2027 0.63 mg Given     12/26/24  0714 0.63 mg Given               fentaNYL PF (Sublimaze) injection (mcg) Total dose:  50 mcg      Date/Time Rate/Dose/Volume Action       12/26/24  1421 50 mcg Given                   No specimens collected      See detailed result report with images in PACS.    The patient tolerated the procedure well without incident or complication and is in stable condition.

## 2024-12-26 NOTE — PROGRESS NOTES
Physical Therapy    Physical Therapy Treatment    Patient Name: Kalyan Armstrong  MRN: [id number]  Department: [facility]  Room: [address] [place] [address]-A  Today's Date: 12/26/2024  Time Calculation  Start Time: 1137  Stop Time: 1200  Time Calculation (min): 23 min         Assessment/Plan   PT Assessment  End of Session Communication: Bedside nurse  Assessment Comment:  (pt req increased time and assistance to complete tasks)  End of Session Patient Position: Up in chair, Alarm on  PT Plan  Inpatient/Swing Bed or Outpatient: Inpatient  PT Plan  Treatment/Interventions: Bed mobility, Transfer training, Gait training, Stair training, Therapeutic activity  PT Plan: Ongoing PT  PT Frequency: 3 times per week  PT Discharge Recommendations: Moderate intensity level of continued care  Equipment Recommended upon Discharge: Wheeled walker  PT Recommended Transfer Status: Assist x1  PT - OK to Discharge: Yes (per PT POC)      General Visit Information:   PT  Visit  PT Received On: 12/26/24  General  Reason for Referral: 73 y/o M presenting with abdominal pain and chest pressure x 1 month.  Referred By: IBAN Cooley (JEFF)  Prior to Session Communication: Bedside nurse  Patient Position Received: Bed, 3 rail up, Alarm on  Preferred Learning Style: auditory, kinesthetic, verbal, visual, written  General Comment:  (pt agreeable to tx)    Subjective   Precautions:  Precautions  Medical Precautions: Fall precautions    Vital Signs (Past 2hrs)        Date/Time Vitals Session Patient Position Pulse Resp SpO2 BP MAP (mmHg)    12/26/24 1349 --  --  89  16  99 %  129/85  --                         Objective   Pain:  Pain Assessment  Pain Assessment: 0-10  0-10 (Numeric) Pain Score: 0 - No pain  Cognition:  Cognition  Orientation Level: Disoriented to time  Coordination:     Postural Control:     Extremity/Trunk Assessments:    Activity Tolerance:     Treatments:            Bed Mobility  Bed Mobility: Yes  Bed Mobility 1  Bed Mobility 1: Supine to  sitting  Level of Assistance 1: Contact guard  Bed Mobility Comments 1:  (pt req multiple cues for proper hand placement and sequencing.)    Ambulation/Gait Training  Ambulation/Gait Training Performed: Yes  Ambulation/Gait Training 1  Surface 1: Level tile  Device 1: Rolling walker  Gait Support Devices: Gait belt  Assistance 1: Minimum assistance, Minimal verbal cues, Minimal tactile cues  Quality of Gait 1: Forward flexed posture, Decreased step length  Comments/Distance (ft) 1: 5ft (pt demo slwo short unsteady steps, req increased assitance to keep balance)  Transfers  Transfer: Yes  Transfer 1  Transfer From 1: Sit to, Stand to  Technique 1: Sit to stand, Stand to sit  Transfer Device 1: Walker, Gait belt  Transfer Level of Assistance 1: Moderate assistance, Moderate verbal cues, Moderate tactile cues  Trials/Comments 1:  (pt req min cues for proper hand placement.)         Outcome Measures:  [facility] Basic Mobility  Turning from your back to your side while in a flat bed without using bedrails: A little  Moving from lying on your back to sitting on the side of a flat bed without using bedrails: A little  Moving to and from bed to chair (including a wheelchair): A little  Standing up from a chair using your arms (e.g. wheelchair or bedside chair): A little  To walk in hospital room: A lot  Climbing 3-5 steps with railing: Total  Basic Mobility - Total Score: 15    Education Documentation  Precautions, taught by Chase Shane PTA at 12/26/2024  2:03 PM.  Learner: Patient  Readiness: Acceptance  Method: Explanation  Response: Needs Reinforcement    Body Mechanics, taught by Chase Shane PTA at 12/26/2024  2:03 PM.  Learner: Patient  Readiness: Acceptance  Method: Explanation  Response: Needs Reinforcement    Mobility Training, taught by Chase Shane PTA at 12/26/2024  2:03 PM.  Learner: Patient  Readiness: Acceptance  Method: Explanation  Response: Needs Reinforcement    Education  Comments  No comments found.        OP EDUCATION:       Encounter Problems       Encounter Problems (Active)       Balance       Goal 1 (Progressing)       Start:  12/20/24    Expected End:  01/03/25       Pt performs all sitting balance IND and standing balance with supervision using FWW            Mobility       STG - Patient will navigate 4-6 steps with B HR support mod IND (Progressing)       Start:  12/20/24    Expected End:  01/03/25            STG - Patient will ambulate (Progressing)       Start:  12/20/24    Expected End:  01/03/25       100 ft with supervision using proper gait mechanics using FWW            PT Transfers       STG - Patient to transfer to and from sit to supine (Progressing)       Start:  12/20/24    Expected End:  01/03/25       IND         STG - Patient will transfer sit to and from stand (Progressing)       Start:  12/20/24    Expected End:  01/03/25       Mod IND using FWW and proper body mechanics            Pain - Adult

## 2024-12-26 NOTE — PROGRESS NOTES
Between 7AM-7PM please message me via Epic Secure Chat.  After 7PM please page Nocturnist on call.    [facility] Hospitalist Progress Note      Kalyan Armstrong    :  1950(74 y.o.)    MRN:  [id number]  Date: 24     Assessment and Plan:       :  Umer/ckd5... no improvement. Renal team will start HD. HD line ordered today via IR.... HD per renal.   End stage COPD... on home o2 3L... cont azithro, prednisone, breathing Rxs, supportive care.   Hx Chronic hep C.  Home regimen for chronic conditions  Dvt px with heparin.   Dispo will be SNF when cleared by Renal.             :  Reported unintentional weight loss/chronic abdominal pain/adult FTT - CT A/P no new acute process. Suspect weight loss related to work of breathing with his chronic COPD  UMER on CKD 5 - Scr around his baseline now. Plan for PD placement and PD initiation as outpatient per nephrology.   Likely end stage COPD/Chronic respiratory failure on 3-4L NC (he is on PO pred 5mg daily and PO azithro 250mg daily at home for COPD through his pulmonologist). Reports intolerance with duo nebs, will trial pulmicort, xopenex, atrovent.   Severe protein calorie malnutrition- patient not interested in medications to increase appetite. Dietician consulted.   Chronic anemia- recent b12/folate/iron studies wnl.   HTN - norvasc held due to softer BP  HLD- continue statin  Chronic Hep C reportedly post-treatment and negative viral load  Hypercalcemia- mild, suspect due to renal impairment, PTHi pending.  DNR comfort care      DVT Prophylaxis: subcutaneous Heparin    Disposition: continue to monitor inpatient, await consultant recommendations, await test results, and await clinical improvement    Electronically signed by Brayden Giordano MD on 24 at 3:54 PM     Subjective:      Interval History:   Vitals and chart notes from overnight reviewed.   No acute issues overnight.   Patient seen and evaluated at bedside.   Reports  breathing tx were making him very shaky then has to urinate afterwards. Will trial xopenex     Review of Systems:   Other than patient's chronic conditions and those complaints in the history above, the rest of the 10 systems review were done and were negative.     Current medications:  Scheduled Meds:[Held by provider] amLODIPine, 10 mg, oral, Daily  azithromycin, 250 mg, oral, Daily  budesonide, 0.25 mg, nebulization, BID  heparin (porcine), 5,000 Units, subcutaneous, q8h GULSHAN  ipratropium, 0.5 mg, nebulization, TID  levalbuterol, 0.63 mg, nebulization, TID  oxyCODONE, 5 mg, oral, TID  polyethylene glycol, 17 g, oral, Daily  predniSONE, 5 mg, oral, Daily  rosuvastatin, 10 mg, oral, Daily      Continuous Infusions:   PRN Meds:PRN medications: acetaminophen **OR** acetaminophen **OR** acetaminophen, albuterol, dextrose, dextrose, glucagon, glucagon, melatonin, ondansetron ODT **OR** ondansetron, oxygen, triamcinolone      Objective:     Heart Rate:  [73-90]   Temp:  [36.4 °C (97.5 °F)-36.9 °C (98.5 °F)]   Resp:  [10-18]   BP: (107-179)/()   SpO2:  [96 %-100 %]     Oxygen Dose: *3 L/min    Physical Exam  Vitals and nursing note reviewed.   HENT:      Mouth/Throat:      Mouth: Mucous membranes are moist.      Pharynx: Oropharynx is clear.   Cardiovascular:      Rate and Rhythm: Normal rate and regular rhythm.   Pulmonary:      Effort: Pulmonary effort is normal.   Abdominal:      Palpations: Abdomen is soft.   Neurological:      Mental Status: He is alert.         Labs:   Lab Results   Component Value Date     (L) 12/26/2024    K 5.1 12/26/2024    CL 94 (L) 12/26/2024    CO2 32 12/26/2024    BUN 37 (H) 12/26/2024    CREATININE 6.21 (H) 12/26/2024    GLUCOSE 79 12/26/2024    CALCIUM 10.6 (H) 12/26/2024    PROT 7.3 12/19/2024    BILITOT 0.4 12/19/2024    ALKPHOS 50 12/19/2024    AST 30 12/19/2024    ALT 21 12/19/2024       Lab Results   Component Value Date    WBC 7.9 12/26/2024    HGB 9.4 (L) 12/26/2024     HCT 27.6 (L) 12/26/2024    MCV 84 12/26/2024     12/26/2024

## 2024-12-26 NOTE — CARE PLAN
Problem: Pain - Adult  Goal: Verbalizes/displays adequate comfort level or baseline comfort level  Outcome: Progressing     Problem: Safety - Adult  Goal: Free from fall injury  Outcome: Progressing     Problem: Discharge Planning  Goal: Discharge to home or other facility with appropriate resources  Outcome: Progressing     Problem: Chronic Conditions and Co-morbidities  Goal: Patient's chronic conditions and co-morbidity symptoms are monitored and maintained or improved  Outcome: Progressing     Problem: Fall/Injury  Goal: Not fall by end of shift  Outcome: Progressing  Goal: Be free from injury by end of the shift  Outcome: Progressing     Problem: Pain  Goal: Takes deep breaths with improved pain control throughout the shift  Outcome: Progressing  Goal: Performs ADL's with improved pain control throughout shift  Outcome: Progressing  Goal: Free from opioid side effects throughout the shift  Outcome: Progressing  Goal: Free from acute confusion related to pain meds throughout the shift  Outcome: Progressing     Problem: Nutrition  Goal: Less than 5 days NPO/clear liquids  Outcome: Progressing  Goal: Lab values WNL  Outcome: Progressing  Goal: Electrolytes WNL  Outcome: Progressing  Goal: Promote healing  Outcome: Progressing  Goal: Gradual weight gain  Outcome: Progressing     Problem: Skin  Goal: Decreased wound size/increased tissue granulation at next dressing change  Outcome: Progressing  Flowsheets (Taken 12/25/2024 1402 by Jordyn Quach LPN)  Decreased wound size/increased tissue granulation at next dressing change:   Promote sleep for wound healing   Protective dressings over bony prominences  Goal: Participates in plan/prevention/treatment measures  Outcome: Progressing  Flowsheets (Taken 12/25/2024 1402 by Jordyn Quach LPN)  Participates in plan/prevention/treatment measures: Elevate heels  Goal: Prevent/manage excess moisture  Outcome: Progressing  Flowsheets (Taken 12/25/2024 1402 by Jordyn Quach  LPN)  Prevent/manage excess moisture: Cleanse incontinence/protect with barrier cream  Goal: Prevent/minimize sheer/friction injuries  Outcome: Progressing  Flowsheets (Taken 12/25/2024 1402 by Jordyn Quach LPN)  Prevent/minimize sheer/friction injuries: HOB 30 degrees or less  Goal: Promote/optimize nutrition  Outcome: Progressing  Flowsheets (Taken 12/25/2024 1402 by Jordyn Quach LPN)  Promote/optimize nutrition: Consume > 50% meals/supplements  Goal: Promote skin healing  Outcome: Progressing   The patient's goals for the shift include Rest.    The clinical goals for the shift include pain managment    Over the shift, the patient did not make progress toward the following goals. Barriers to progression include . Recommendations to address these barriers include .

## 2024-12-26 NOTE — PROGRESS NOTES
Kalyan Armstrong is a 74 y.o. male on day 7 of admission presenting with Generalized abdominal pain.      Subjective   Overall doing the same with no new complaints       Objective        Vitals 24HR  Heart Rate:  [73-87]   Temp:  [36.4 °C (97.5 °F)-36.9 °C (98.4 °F)]   Resp:  [16-18]   BP: (107-179)/()   SpO2:  [96 %-100 %]     Intake/Output last 3 Shifts:    Intake/Output Summary (Last 24 hours) at 12/26/2024 1338  Last data filed at 12/26/2024 1000  Gross per 24 hour   Intake 480 ml   Output 300 ml   Net 180 ml       Physical Exam    General appearance: Cachectic in no distress  Head and ENT: Normocephalic/atraumatic/supple neck/no JVD  Lungs: CTA  Heart: RRR  Abdomen; soft no tenderness  Extremities: No edema                Relevant Results     Results from last 7 days   Lab Units 12/26/24  0739 12/25/24  0517 12/21/24  0511   WBC AUTO x10*3/uL 7.9 6.8 7.5   HEMOGLOBIN g/dL 9.4* 8.7* 9.3*   HEMATOCRIT % 27.6* 25.2* 27.3*   PLATELETS AUTO x10*3/uL 152 154 174      Results from last 7 days   Lab Units 12/26/24  0739 12/25/24  0517 12/24/24  0516   SODIUM mmol/L 132* 133* 137   POTASSIUM mmol/L 5.1 4.6 5.2   CHLORIDE mmol/L 94* 94* 97*   CO2 mmol/L 32 30 25   BUN mg/dL 37* 40* 43*   CREATININE mg/dL 6.21* 6.08* 6.27*   GLUCOSE mg/dL 79 81 59*   CALCIUM mg/dL 10.6* 9.7 10.5*        Current Facility-Administered Medications:     acetaminophen (Tylenol) tablet 650 mg, 650 mg, oral, q4h PRN **OR** acetaminophen (Tylenol) oral liquid 650 mg, 650 mg, oral, q4h PRN **OR** acetaminophen (Tylenol) suppository 650 mg, 650 mg, rectal, q4h PRN, Rachel Cooley PA-C    albuterol 2.5 mg /3 mL (0.083 %) nebulizer solution 2.5 mg, 2.5 mg, nebulization, q2h PRN, Brayden Giordano MD    [Held by provider] amLODIPine (Norvasc) tablet 10 mg, 10 mg, oral, Daily, Rachel Cooley PA-C    azithromycin (Zithromax) tablet 250 mg, 250 mg, oral, Daily, Wan Bhakta MD, 250 mg at 12/26/24 0824    budesonide (Pulmicort) 0.25 mg/2 mL  nebulizer solution 0.25 mg, 0.25 mg, nebulization, BID, Rachel Kenny, PharmD, 0.25 mg at 12/26/24 0715    dextrose 50 % injection 12.5 g, 12.5 g, intravenous, q15 min PRN, Deep Garrett, DO    dextrose 50 % injection 25 g, 25 g, intravenous, q15 min PRN, Deep Garrett, DO    glucagon (Glucagen) injection 1 mg, 1 mg, intramuscular, q15 min PRN, Deep Garrett, DO    glucagon (Glucagen) injection 1 mg, 1 mg, intramuscular, q15 min PRN, Deep Garrett, DO    heparin (porcine) injection 5,000 Units, 5,000 Units, subcutaneous, q8h GULSHAN, Rachel Cooley PA-C, 5,000 Units at 12/26/24 0614    ipratropium (Atrovent) 0.02 % nebulizer solution 0.5 mg, 0.5 mg, nebulization, TID, Deep Garrett, DO, 0.5 mg at 12/26/24 0714    levalbuterol (Xopenex) 1.25 mg/3 mL nebulizer solution 0.63 mg, 0.63 mg, nebulization, TID, Deep Garrett, DO, 0.63 mg at 12/26/24 0714    melatonin tablet 3 mg, 3 mg, oral, Nightly PRN, Rachel Cooley PA-C    ondansetron ODT (Zofran-ODT) disintegrating tablet 4 mg, 4 mg, oral, q8h PRN **OR** ondansetron (Zofran) injection 4 mg, 4 mg, intravenous, q8h PRN, Rachel Cooley PA-C    oxyCODONE (Roxicodone) immediate release tablet 5 mg, 5 mg, oral, TID, Wan Bhakta MD, 5 mg at 12/26/24 1304    oxygen (O2) therapy, , inhalation, Continuous PRN - O2/gases, Wan Bhakta MD, 3 L/min at 12/25/24 2027    polyethylene glycol (Glycolax, Miralax) packet 17 g, 17 g, oral, Daily, Rachel Cooley PA-C, 17 g at 12/26/24 0824    predniSONE (Deltasone) tablet 5 mg, 5 mg, oral, Daily, Rachel Cooley PA-C, 5 mg at 12/26/24 0824    rosuvastatin (Crestor) tablet 10 mg, 10 mg, oral, Daily, Rachel Cooley PA-C, 10 mg at 12/26/24 0824    triamcinolone (Kenalog) 0.1 % ointment 1 Application, 1 Application, Topical, BID PRN, Rachel Cooley PA-C           Assessment/Plan        1.  CATHIE on top of CKD stage V, no improvement in renal profile during resuscitation.  BUN/Cr=37/6.21  Discussed with the patient and wife that this process is  reversible at this point and it makes no much sense from waiting for peritoneal dialysis initiation.  Patient will have tunneled dialysis catheter placed today and initiation of dialysis tomorrow morning.  2.  Hypertension continue current management  3.  Severe protein calorie malnutrition  4.  Anemia of CKD     Will continue his current treatment and follow patient daily reviewing labs and clinical condition         Assessment & Plan  Generalized abdominal pain    Unspecified severe protein-calorie malnutrition (Multi)    Unintentional weight loss    Anorexia    Failure to thrive in adult    Acute on chronic renal failure (CMS-HCC)              I spent 35 minutes in the professional and overall care of this patient.      Ezra Tillman MD

## 2024-12-26 NOTE — CARE PLAN
The patient's goals for the shift include Rest.    The clinical goals for the shift include pt will remain safe thoughout shift    Over the shift, the patient did make progress toward the following goals.

## 2024-12-26 NOTE — PROGRESS NOTES
Occupational Therapy                 Therapy Communication Note    Patient Name: Kalyan Armstrong  MRN: [id number]  Department: [facility]  Room: [facility]/503-A  Today's Date: 12/26/2024     Discipline: Occupational Therapy    OT Missed Visit: Yes     Missed Visit Reason: Missed Visit Reason: Patient in a medical procedure (unavailable to OT tx at this time)    Missed Time: Attempt

## 2024-12-26 NOTE — CONSULTS
Consults    Reason For Consult  Consulted by Dr. Tillman for a TDC placement.     History Of Present Illness  Kalyan Armstrong is a 74 y.o. male presenting with a past medical history of pulmonary HTN, COPD/emphysema on 3 L NC at home, cataracts, HTN, hepatitis C, macrocytic anemia, vitamin D deficiency, stage 3a CKD, and multiple falls. Admitted for acute on chronic renal failure and malnutrition. He follows with Dr. Xiong for CKD. Due to his worsening kidney function and uremia, he will need dialysis and has elected to start PD. He will need alternate access until PD cath is placed and matured.     Kalyan denies any fever, chills, N/V, hematuria, dysuria, urinary urgency or frequency. He denies ever having a central line in the past.      Past Medical History  He has a past medical history of CATHIE (acute kidney injury) (CMS-HCC) (05/03/2023), Cataract, COPD (chronic obstructive pulmonary disease) (Multi), Cough, unspecified (06/20/2014), Emphysema of lung (Multi), Encounter for immunization (01/19/2015), Encounter for screening for malignant neoplasm of prostate, Other conditions influencing health status (05/20/2013), Personal history of colonic polyps, Personal history of other specified conditions (05/20/2013), and Personal history of other specified conditions (06/20/2014).    Surgical History  He has a past surgical history that includes Colonoscopy (02/21/2013); Appendectomy (02/21/2013); and Eye surgery.     Social History  He reports that he quit smoking about 18 years ago. His smoking use included cigarettes. He started smoking about 34 years ago. He has been exposed to tobacco smoke. He has never used smokeless tobacco. He reports current alcohol use. He reports that he does not use drugs.    Family History  Family History   Problem Relation Name Age of Onset    Dementia Mother      Diabetes Sister          Allergies  Patient has no known allergies.    MEDS:    Current Facility-Administered  Medications:     acetaminophen (Tylenol) tablet 650 mg, 650 mg, oral, q4h PRN **OR** acetaminophen (Tylenol) oral liquid 650 mg, 650 mg, oral, q4h PRN **OR** acetaminophen (Tylenol) suppository 650 mg, 650 mg, rectal, q4h PRN, Rachel Cooley PA-C    albuterol 2.5 mg /3 mL (0.083 %) nebulizer solution 2.5 mg, 2.5 mg, nebulization, q2h PRN, Brayden Giordano MD    [Held by provider] amLODIPine (Norvasc) tablet 10 mg, 10 mg, oral, Daily, Rachel Cooley PA-C    azithromycin (Zithromax) tablet 250 mg, 250 mg, oral, Daily, Wan Bhakta MD, 250 mg at 12/26/24 0824    budesonide (Pulmicort) 0.25 mg/2 mL nebulizer solution 0.25 mg, 0.25 mg, nebulization, BID, Stefany LondonoD, 0.25 mg at 12/26/24 0715    dextrose 50 % injection 12.5 g, 12.5 g, intravenous, q15 min PRN, Deep Garrett, DO    dextrose 50 % injection 25 g, 25 g, intravenous, q15 min PRN, Deep Garrett, DO    glucagon (Glucagen) injection 1 mg, 1 mg, intramuscular, q15 min PRN, Deep Garrett, DO    glucagon (Glucagen) injection 1 mg, 1 mg, intramuscular, q15 min PRN, Deep Garrett, DO    heparin (porcine) injection 5,000 Units, 5,000 Units, subcutaneous, q8h GULSHAN, Rachel Cooley PA-C, 5,000 Units at 12/26/24 0614    ipratropium (Atrovent) 0.02 % nebulizer solution 0.5 mg, 0.5 mg, nebulization, TID, Deep Garrett, DO, 0.5 mg at 12/26/24 0714    levalbuterol (Xopenex) 1.25 mg/3 mL nebulizer solution 0.63 mg, 0.63 mg, nebulization, TID, Deep Garrett, DO, 0.63 mg at 12/26/24 0714    melatonin tablet 3 mg, 3 mg, oral, Nightly PRN, Rachel Cooley PA-C    ondansetron ODT (Zofran-ODT) disintegrating tablet 4 mg, 4 mg, oral, q8h PRN **OR** ondansetron (Zofran) injection 4 mg, 4 mg, intravenous, q8h PRN, Rachel Cooley PA-C    oxyCODONE (Roxicodone) immediate release tablet 5 mg, 5 mg, oral, TID, Wan Bhakta MD, 5 mg at 12/26/24 0824    oxygen (O2) therapy, , inhalation, Continuous PRN - O2/gases, Wan Bhakta MD, 3 L/min at 12/25/24 2027    polyethylene glycol (Glycolax,  Miralax) packet 17 g, 17 g, oral, Daily, Rachel Cooley PA-C, 17 g at 12/26/24 0824    predniSONE (Deltasone) tablet 5 mg, 5 mg, oral, Daily, Rachel Cooley PA-C, 5 mg at 12/26/24 0824    rosuvastatin (Crestor) tablet 10 mg, 10 mg, oral, Daily, Rachel Cooley PA-C, 10 mg at 12/26/24 0824    triamcinolone (Kenalog) 0.1 % ointment 1 Application, 1 Application, Topical, BID PRN, Rachel Cooley PA-C    Review of Systems  Respiratory ROS: no cough, shortness of breath, or wheezing  Cardiovascular ROS: no chest pain or dyspnea on exertion  Gastrointestinal ROS: no abdominal pain, change in bowel habits, or black or bloody stools  Genitourinary ROS: no dysuria, trouble voiding, or hematuria  Neurological ROS: negative     Last Recorded Vitals  BP (!) 154/98 (BP Location: Right arm, Patient Position: Lying)   Pulse 77   Temp 36.9 °C (98.4 °F) (Temporal)   Resp 18   Wt 45.4 kg (100 lb 1.4 oz)   SpO2 98%      Physical Exam  Orientation: oriented to person, place, time, and general circumstances  HEENT: normocephalic, atraumatic  Pulm: clear to auscultation bilaterally, no wheezes, good air entry  Cardiac: Regular rate and rhythm or without murmur or extra heart sounds  GI: soft, nontender, normal bowel sounds  Pulses: peripheral pulses symmetrical    Relevant Results    LABS:  Lab Results   Component Value Date    WBC 7.9 12/26/2024    HGB 9.4 (L) 12/26/2024    HCT 27.6 (L) 12/26/2024    MCV 84 12/26/2024     12/26/2024      Results from last 72 hours   Lab Units 12/26/24  0739   SODIUM mmol/L 132*   POTASSIUM mmol/L 5.1   CHLORIDE mmol/L 94*   CO2 mmol/L 32   BUN mg/dL 37*   CREATININE mg/dL 6.21*   GLUCOSE mg/dL 79   CALCIUM mg/dL 10.6*   ANION GAP mmol/L 11   EGFR mL/min/1.73m*2 9*     Results from last 72 hours   Lab Units 12/24/24  0516   ALBUMIN g/dL 3.2*     Results from last 72 hours   Lab Units 12/26/24  1214   INR  0.9       MICRO:  No results found for the last 14 days.      IMAGING:   CT chest  abdomen pelvis wo IV contrast   Final Result   No acute cardiopulmonary abnormality. Pulmonary emphysema.   Cholelithiasis. Diverticulosis coli.   Signed by Drake Hernandez MD      IR CVC tunneled    (Results Pending)        Assessment/Plan     This is a 74 y.o. male presenting with a past medical history of pulmonary HTN, COPD/emphysema on 3 L NC at home, cataracts, HTN, hepatitis C, macrocytic anemia, vitamin D deficiency, stage 3a CKD, and multiple falls. Admitted for acute on chronic renal failure and malnutrition. He follows with Dr. Xiong for CKD. Due to his worsening kidney function and uremia, he will need dialysis and has elected to start PD. He will need alternate access until PD cath is placed and matured.     Kalyan is clinically stable, baseline creatinine 2-4 mg/dL, was 7.2 on admission and continues to fluctuate from 5-6 mg/dL. Has continuous uremia 30-50 mg/dL. He is amenable to start dialysis and prefers PD. Unfortunately he does not have a catheter at this time. He will discuss PD cath placement with his nephrologist. He is amenable to start HD via a TDC. He did eat today and is okay to have this placed without sedation.     Risks were discussed including but not limited to pain at insertion site, infection, bleeding and hematoma, and air embolism. Benefits of the procedure and alternatives to treatment were also reviewed. Patient verbalizes understanding and wishes to proceed. They will further discuss with IR attending prior to procedure.      Planned Sedation/Anesthesia: local    Airway assessment: normal    Mallampati: III (soft and hard palate and base of uvula visible)    ASA Score: ASA 3 - Patient with moderate systemic disease with functional limitations      BEATRIZ Arteaga-CNP    Time : Billing Time  Prep time on date of the patient encounter: 15 minutes.   Time spent directly with patient/family/caregiver: 15 minutes.   Documentation time: 15 minutes.   Total time (minutes):  45  minutes  Time Spent with this Patient (minutes).  More than 50% of This Time was Spent in Counseling and/or Coordination of Care

## 2024-12-26 NOTE — SIGNIFICANT EVENT
Attempted to see patient but he was en route to radiology for procedure.  He appears comfortable, NAD.  Suspect I may not see him today but will follow up tomorrow.

## 2024-12-27 ENCOUNTER — APPOINTMENT (OUTPATIENT)
Dept: DIALYSIS | Facility: HOSPITAL | Age: 74
End: 2024-12-27
Payer: MEDICARE

## 2024-12-27 ENCOUNTER — APPOINTMENT (OUTPATIENT)
Dept: RADIOLOGY | Facility: HOSPITAL | Age: 74
DRG: 673 | End: 2024-12-27
Payer: MEDICARE

## 2024-12-27 LAB
1,25(OH)2D SERPL-MCNC: 64.5 PG/ML (ref 19.9–79.3)
ANION GAP SERPL CALC-SCNC: 12 MMOL/L (ref 10–20)
BASOPHILS # BLD AUTO: 0.05 X10*3/UL (ref 0–0.1)
BASOPHILS NFR BLD AUTO: 0.7 %
BUN SERPL-MCNC: 35 MG/DL (ref 6–23)
CALCIUM SERPL-MCNC: 10.3 MG/DL (ref 8.6–10.3)
CHLORIDE SERPL-SCNC: 96 MMOL/L (ref 98–107)
CO2 SERPL-SCNC: 32 MMOL/L (ref 21–32)
CREAT SERPL-MCNC: 5.66 MG/DL (ref 0.5–1.3)
EGFRCR SERPLBLD CKD-EPI 2021: 10 ML/MIN/1.73M*2
EOSINOPHIL # BLD AUTO: 0.36 X10*3/UL (ref 0–0.4)
EOSINOPHIL NFR BLD AUTO: 5.4 %
ERYTHROCYTE [DISTWIDTH] IN BLOOD BY AUTOMATED COUNT: 15.2 % (ref 11.5–14.5)
GLUCOSE BLD MANUAL STRIP-MCNC: 100 MG/DL (ref 74–99)
GLUCOSE BLD MANUAL STRIP-MCNC: 122 MG/DL (ref 74–99)
GLUCOSE BLD MANUAL STRIP-MCNC: 89 MG/DL (ref 74–99)
GLUCOSE SERPL-MCNC: 72 MG/DL (ref 74–99)
HCT VFR BLD AUTO: 28.8 % (ref 41–52)
HGB BLD-MCNC: 9.7 G/DL (ref 13.5–17.5)
IMM GRANULOCYTES # BLD AUTO: 0.05 X10*3/UL (ref 0–0.5)
IMM GRANULOCYTES NFR BLD AUTO: 0.7 % (ref 0–0.9)
LYMPHOCYTES # BLD AUTO: 0.6 X10*3/UL (ref 0.8–3)
LYMPHOCYTES NFR BLD AUTO: 9 %
MCH RBC QN AUTO: 29.3 PG (ref 26–34)
MCHC RBC AUTO-ENTMCNC: 33.7 G/DL (ref 32–36)
MCV RBC AUTO: 87 FL (ref 80–100)
MONOCYTES # BLD AUTO: 0.42 X10*3/UL (ref 0.05–0.8)
MONOCYTES NFR BLD AUTO: 6.3 %
NEUTROPHILS # BLD AUTO: 5.22 X10*3/UL (ref 1.6–5.5)
NEUTROPHILS NFR BLD AUTO: 77.9 %
NRBC BLD-RTO: 0 /100 WBCS (ref 0–0)
PHOSPHATE SERPL-MCNC: 4.4 MG/DL (ref 2.5–4.9)
PLATELET # BLD AUTO: 144 X10*3/UL (ref 150–450)
POTASSIUM SERPL-SCNC: 5.2 MMOL/L (ref 3.5–5.3)
RBC # BLD AUTO: 3.31 X10*6/UL (ref 4.5–5.9)
SODIUM SERPL-SCNC: 135 MMOL/L (ref 136–145)
WBC # BLD AUTO: 6.7 X10*3/UL (ref 4.4–11.3)

## 2024-12-27 PROCEDURE — 1100000001 HC PRIVATE ROOM DAILY

## 2024-12-27 PROCEDURE — 82374 ASSAY BLOOD CARBON DIOXIDE: CPT | Performed by: HOSPITALIST

## 2024-12-27 PROCEDURE — 2500000002 HC RX 250 W HCPCS SELF ADMINISTERED DRUGS (ALT 637 FOR MEDICARE OP, ALT 636 FOR OP/ED): Performed by: STUDENT IN AN ORGANIZED HEALTH CARE EDUCATION/TRAINING PROGRAM

## 2024-12-27 PROCEDURE — 2500000002 HC RX 250 W HCPCS SELF ADMINISTERED DRUGS (ALT 637 FOR MEDICARE OP, ALT 636 FOR OP/ED): Performed by: PHYSICIAN ASSISTANT

## 2024-12-27 PROCEDURE — 70450 CT HEAD/BRAIN W/O DYE: CPT | Performed by: RADIOLOGY

## 2024-12-27 PROCEDURE — 85025 COMPLETE CBC W/AUTO DIFF WBC: CPT | Performed by: HOSPITALIST

## 2024-12-27 PROCEDURE — 2500000002 HC RX 250 W HCPCS SELF ADMINISTERED DRUGS (ALT 637 FOR MEDICARE OP, ALT 636 FOR OP/ED): Performed by: HOSPITALIST

## 2024-12-27 PROCEDURE — 2500000004 HC RX 250 GENERAL PHARMACY W/ HCPCS (ALT 636 FOR OP/ED): Performed by: PHYSICIAN ASSISTANT

## 2024-12-27 PROCEDURE — 94664 DEMO&/EVAL PT USE INHALER: CPT

## 2024-12-27 PROCEDURE — 2500000004 HC RX 250 GENERAL PHARMACY W/ HCPCS (ALT 636 FOR OP/ED): Performed by: INTERNAL MEDICINE

## 2024-12-27 PROCEDURE — 6350000001 HC RX 635 EPOETIN >10,000 UNITS: Performed by: INTERNAL MEDICINE

## 2024-12-27 PROCEDURE — 8010000001 HC DIALYSIS - HEMODIALYSIS PER DAY

## 2024-12-27 PROCEDURE — 94640 AIRWAY INHALATION TREATMENT: CPT

## 2024-12-27 PROCEDURE — 82947 ASSAY GLUCOSE BLOOD QUANT: CPT

## 2024-12-27 PROCEDURE — 2500000001 HC RX 250 WO HCPCS SELF ADMINISTERED DRUGS (ALT 637 FOR MEDICARE OP): Performed by: STUDENT IN AN ORGANIZED HEALTH CARE EDUCATION/TRAINING PROGRAM

## 2024-12-27 PROCEDURE — 5A1D70Z PERFORMANCE OF URINARY FILTRATION, INTERMITTENT, LESS THAN 6 HOURS PER DAY: ICD-10-PCS | Performed by: INTERNAL MEDICINE

## 2024-12-27 PROCEDURE — 36415 COLL VENOUS BLD VENIPUNCTURE: CPT | Performed by: HOSPITALIST

## 2024-12-27 PROCEDURE — 99233 SBSQ HOSP IP/OBS HIGH 50: CPT | Performed by: INTERNAL MEDICINE

## 2024-12-27 PROCEDURE — 70450 CT HEAD/BRAIN W/O DYE: CPT

## 2024-12-27 PROCEDURE — 84100 ASSAY OF PHOSPHORUS: CPT | Performed by: INTERNAL MEDICINE

## 2024-12-27 PROCEDURE — 2500000002 HC RX 250 W HCPCS SELF ADMINISTERED DRUGS (ALT 637 FOR MEDICARE OP, ALT 636 FOR OP/ED)

## 2024-12-27 RX ORDER — HEPARIN SODIUM 1000 [USP'U]/ML
2000 INJECTION, SOLUTION INTRAVENOUS; SUBCUTANEOUS
Status: DISCONTINUED | OUTPATIENT
Start: 2024-12-27 | End: 2024-12-30 | Stop reason: HOSPADM

## 2024-12-27 RX ADMIN — IPRATROPIUM BROMIDE 0.5 MG: 0.5 SOLUTION RESPIRATORY (INHALATION) at 19:06

## 2024-12-27 RX ADMIN — HEPARIN SODIUM 2000 UNITS: 1000 INJECTION INTRAVENOUS; SUBCUTANEOUS at 10:54

## 2024-12-27 RX ADMIN — HEPARIN SODIUM 5000 UNITS: 5000 INJECTION, SOLUTION INTRAVENOUS; SUBCUTANEOUS at 23:03

## 2024-12-27 RX ADMIN — ROSUVASTATIN 10 MG: 10 TABLET, FILM COATED ORAL at 13:17

## 2024-12-27 RX ADMIN — HEPARIN SODIUM 5000 UNITS: 5000 INJECTION, SOLUTION INTRAVENOUS; SUBCUTANEOUS at 13:18

## 2024-12-27 RX ADMIN — AZITHROMYCIN 250 MG: 250 TABLET, FILM COATED ORAL at 13:18

## 2024-12-27 RX ADMIN — BUDESONIDE 0.25 MG: 0.25 INHALANT RESPIRATORY (INHALATION) at 07:20

## 2024-12-27 RX ADMIN — LEVALBUTEROL HYDROCHLORIDE 0.63 MG: 1.25 SOLUTION RESPIRATORY (INHALATION) at 19:06

## 2024-12-27 RX ADMIN — IPRATROPIUM BROMIDE 0.5 MG: 0.5 SOLUTION RESPIRATORY (INHALATION) at 07:10

## 2024-12-27 RX ADMIN — EPOETIN ALFA-EPBX 4000 UNITS: 10000 INJECTION, SOLUTION INTRAVENOUS; SUBCUTANEOUS at 17:51

## 2024-12-27 RX ADMIN — OXYCODONE HYDROCHLORIDE 5 MG: 5 TABLET ORAL at 17:52

## 2024-12-27 RX ADMIN — HEPARIN SODIUM 5000 UNITS: 5000 INJECTION, SOLUTION INTRAVENOUS; SUBCUTANEOUS at 06:34

## 2024-12-27 RX ADMIN — BUDESONIDE 0.25 MG: 0.25 INHALANT RESPIRATORY (INHALATION) at 19:06

## 2024-12-27 RX ADMIN — PREDNISONE 5 MG: 5 TABLET ORAL at 13:18

## 2024-12-27 RX ADMIN — LEVALBUTEROL HYDROCHLORIDE 0.63 MG: 1.25 SOLUTION RESPIRATORY (INHALATION) at 07:10

## 2024-12-27 ASSESSMENT — COGNITIVE AND FUNCTIONAL STATUS - GENERAL
STANDING UP FROM CHAIR USING ARMS: A LOT
TURNING FROM BACK TO SIDE WHILE IN FLAT BAD: A LITTLE
WALKING IN HOSPITAL ROOM: A LOT
DAILY ACTIVITIY SCORE: 12
TOILETING: A LOT
CLIMB 3 TO 5 STEPS WITH RAILING: A LOT
EATING MEALS: A LOT
MOVING TO AND FROM BED TO CHAIR: A LITTLE
DRESSING REGULAR UPPER BODY CLOTHING: A LOT
DRESSING REGULAR LOWER BODY CLOTHING: A LOT
MOVING FROM LYING ON BACK TO SITTING ON SIDE OF FLAT BED WITH BEDRAILS: A LITTLE
MOVING TO AND FROM BED TO CHAIR: A LITTLE
DRESSING REGULAR UPPER BODY CLOTHING: A LOT
MOBILITY SCORE: 15
HELP NEEDED FOR BATHING: A LOT
STANDING UP FROM CHAIR USING ARMS: A LOT
PERSONAL GROOMING: A LOT
TOILETING: A LOT
MOBILITY SCORE: 15
DAILY ACTIVITIY SCORE: 12
PERSONAL GROOMING: A LOT
TURNING FROM BACK TO SIDE WHILE IN FLAT BAD: A LITTLE
MOVING FROM LYING ON BACK TO SITTING ON SIDE OF FLAT BED WITH BEDRAILS: A LITTLE
CLIMB 3 TO 5 STEPS WITH RAILING: A LOT
EATING MEALS: A LOT
HELP NEEDED FOR BATHING: A LOT
DRESSING REGULAR LOWER BODY CLOTHING: A LOT
WALKING IN HOSPITAL ROOM: A LOT

## 2024-12-27 ASSESSMENT — PAIN SCALES - GENERAL
PAINLEVEL_OUTOF10: 0 - NO PAIN

## 2024-12-27 ASSESSMENT — PAIN - FUNCTIONAL ASSESSMENT: PAIN_FUNCTIONAL_ASSESSMENT: 0-10

## 2024-12-27 NOTE — CARE PLAN
Problem: Pain - Adult  Goal: Verbalizes/displays adequate comfort level or baseline comfort level  Outcome: Progressing     Problem: Safety - Adult  Goal: Free from fall injury  Outcome: Progressing     Problem: Discharge Planning  Goal: Discharge to home or other facility with appropriate resources  Outcome: Progressing     Problem: Chronic Conditions and Co-morbidities  Goal: Patient's chronic conditions and co-morbidity symptoms are monitored and maintained or improved  Outcome: Progressing     Problem: Fall/Injury  Goal: Not fall by end of shift  Outcome: Progressing  Goal: Be free from injury by end of the shift  Outcome: Progressing     Problem: Skin  Goal: Decreased wound size/increased tissue granulation at next dressing change  Outcome: Progressing  Goal: Participates in plan/prevention/treatment measures  Outcome: Progressing  Goal: Prevent/manage excess moisture  Outcome: Progressing  Goal: Prevent/minimize sheer/friction injuries  Outcome: Progressing  Goal: Promote/optimize nutrition  Outcome: Progressing  Goal: Promote skin healing  Outcome: Progressing   The patient's goals for the shift include Rest.    The clinical goals for the shift include pt will remain safe thoughout shift    Over the shift, the patient did not make progress toward the following goals. Barriers to progression include . Recommendations to address these barriers include .

## 2024-12-27 NOTE — PROGRESS NOTES
Nutrition Follow-up Note     Chart reviewed and pt visited, wife at bedside.  Pt declined wanting to eat at all.  Spoke with wife, poor intake of < 1 meal for > 3 months.  Also reported chewing and spitting out food for 3 months.  Wife inquired about enteral nutrition.  Reported UBW 140s.    Discussed supplement options for pt with wife.    Pt considered at high risk for refeeding syndrome.    Should pt intake remain sub optimal, consider alternate route for nutrition vs goals of care discussion.    Past Medical History:   Diagnosis Date    CATHIE (acute kidney injury) (CMS-HCC) 05/03/2023    Cataract     COPD (chronic obstructive pulmonary disease) (Multi)     Cough, unspecified 06/20/2014    Cough    Emphysema of lung (Multi)     Encounter for immunization 01/19/2015    Need for influenza vaccination    Encounter for screening for malignant neoplasm of prostate     Encounter for screening for malignant neoplasm of prostate    Other conditions influencing health status 05/20/2013    Digital Blood Vessel Rupture    Personal history of colonic polyps     History of colon polyps    Personal history of other specified conditions 05/20/2013    History of shortness of breath    Personal history of other specified conditions 06/20/2014    History of shortness of breath     Results for orders placed or performed during the hospital encounter of 12/19/24 (from the past 24 hours)   CBC and Auto Differential   Result Value Ref Range    WBC 6.7 4.4 - 11.3 x10*3/uL    nRBC 0.0 0.0 - 0.0 /100 WBCs    RBC 3.31 (L) 4.50 - 5.90 x10*6/uL    Hemoglobin 9.7 (L) 13.5 - 17.5 g/dL    Hematocrit 28.8 (L) 41.0 - 52.0 %    MCV 87 80 - 100 fL    MCH 29.3 26.0 - 34.0 pg    MCHC 33.7 32.0 - 36.0 g/dL    RDW 15.2 (H) 11.5 - 14.5 %    Platelets 144 (L) 150 - 450 x10*3/uL    Neutrophils % 77.9 40.0 - 80.0 %    Immature Granulocytes %, Automated 0.7 0.0 - 0.9 %    Lymphocytes % 9.0 13.0 - 44.0 %    Monocytes % 6.3 2.0 - 10.0 %    Eosinophils % 5.4  0.0 - 6.0 %    Basophils % 0.7 0.0 - 2.0 %    Neutrophils Absolute 5.22 1.60 - 5.50 x10*3/uL    Immature Granulocytes Absolute, Automated 0.05 0.00 - 0.50 x10*3/uL    Lymphocytes Absolute 0.60 (L) 0.80 - 3.00 x10*3/uL    Monocytes Absolute 0.42 0.05 - 0.80 x10*3/uL    Eosinophils Absolute 0.36 0.00 - 0.40 x10*3/uL    Basophils Absolute 0.05 0.00 - 0.10 x10*3/uL   Basic Metabolic Panel   Result Value Ref Range    Glucose 72 (L) 74 - 99 mg/dL    Sodium 135 (L) 136 - 145 mmol/L    Potassium 5.2 3.5 - 5.3 mmol/L    Chloride 96 (L) 98 - 107 mmol/L    Bicarbonate 32 21 - 32 mmol/L    Anion Gap 12 10 - 20 mmol/L    Urea Nitrogen 35 (H) 6 - 23 mg/dL    Creatinine 5.66 (H) 0.50 - 1.30 mg/dL    eGFR 10 (L) >60 mL/min/1.73m*2    Calcium 10.3 8.6 - 10.3 mg/dL   Phosphorus   Result Value Ref Range    Phosphorus 4.4 2.5 - 4.9 mg/dL   POCT GLUCOSE   Result Value Ref Range    POCT Glucose 89 74 - 99 mg/dL     Scheduled medications  [Held by provider] amLODIPine, 10 mg, oral, Daily  azithromycin, 250 mg, oral, Daily  budesonide, 0.25 mg, nebulization, BID  epoetin jamil or biosimilar, 100 Units/kg, subcutaneous, Every Mon/Wed/Fri  heparin (porcine), 5,000 Units, subcutaneous, q8h GULSHAN  heparin, 2,000 Units, intra-catheter, After Dialysis  heparin, 2,000 Units, intra-catheter, After Dialysis  ipratropium, 0.5 mg, nebulization, TID  levalbuterol, 0.63 mg, nebulization, TID  oxyCODONE, 5 mg, oral, TID  polyethylene glycol, 17 g, oral, Daily  predniSONE, 5 mg, oral, Daily  rosuvastatin, 10 mg, oral, Daily      Continuous medications     PRN medications  PRN medications: acetaminophen **OR** acetaminophen **OR** acetaminophen, albuterol, dextrose, dextrose, glucagon, glucagon, melatonin, ondansetron ODT **OR** ondansetron, oxygen, triamcinolone  Dietary Orders (From admission, onward)       Start     Ordered    12/27/24 0731  Oral nutritional supplements  Until discontinued        Question Answer Comment   Deliver with All meals    Select  "supplement: Talia Hoang        12/27/24 1555    12/24/24 0727  Adult diet Regular  Diet effective now        Question:  Diet type  Answer:  Regular    12/24/24 0726    12/19/24 1833  May Participate in Room Service  ( ROOM SERVICE MAY PARTICIPATE)  Once        Question:  .  Answer:  Yes    12/19/24 1832                    History:  Food and Nutrient History  Energy Intake: Poor < 50 %  Food and Nutrient History: per flowsheets    Anthropometrics:  Height: 175.3 cm (5' 9\")  Weight: 45.4 kg (100 lb 1.4 oz)  BMI (Calculated): 14.77    Wt Readings from Last 14 Encounters:   12/23/24 45.4 kg (100 lb 1.4 oz)   12/18/24 (!) 44.5 kg (98 lb 2 oz)   12/12/24 46.4 kg (102 lb 6 oz)   12/05/24 46.8 kg (103 lb 3 oz)   11/22/24 49.9 kg (110 lb 0.2 oz)   11/18/24 49.9 kg (110 lb)   10/09/24 59.4 kg (131 lb)   09/24/24 59.4 kg (131 lb)   08/13/24 55.3 kg (122 lb)   07/08/24 57.8 kg (127 lb 8 oz)   05/09/24 58.5 kg (129 lb)   04/23/24 59.4 kg (131 lb)   03/08/24 59 kg (130 lb)   01/29/24 59 kg (130 lb)         Weight Change  Significant Weight Loss: Yes  Interpretation of Weight Loss: >20% in 1 year    IBW/kg (Dietitian Calculated): 72.7 kg     Estimated Energy Needs  Total Energy Estimated Needs (kCal): 1820 kCal  Total Estimated Energy Need per Day (kCal/kg): 2180 kCal/kg  Method for Estimating Needs: 25-30 IBW    Estimated Protein Needs  Total Protein Estimated Needs (g): 55 g  Total Protein Estimated Needs (g/kg): 75 g/kg  Method for Estimating Needs: 0.8-1.0 IBW    Estimated Fluid Needs  Method for Estimating Needs: 1ml/kcal or per MD    Nutrition Focused Physical Findings:  Subcutaneous Fat Loss  Orbital Fat Pads: Severe (dark circles, hollowing and loose skin)    Muscle Wasting  Temporalis: Severe (hollowed scooping depression)  Pectoralis (Clavicular Region): Severe (protruding prominent clavicle)  Deltoid/Trapezius: Severe (squared shoulders, acromion process prominent)    Edema  Edema: none    Physical Findings " (Nutrition Deficiency/Toxicity)  Skin: Positive (pi sacrum stage 2)     Nutrition Diagnosis   Malnutrition Diagnosis  Patient has Malnutrition Diagnosis: Yes  Diagnosis Status: New  Malnutrition Diagnosis: Severe malnutrition related to chronic disease or condition  As Evidenced by: > 20% weight loss in 1 year, prolonged poor intake prior to hospital admit of < 75% of estimated energy needs in > 1 month, severe subcutaneous fat loss and severe muscle wasting present    Patient has Nutrition Diagnosis: Yes  Nutrition Diagnosis 1: Underweight  Diagnosis Status (1): Ongoing  Related to (1): chronic illness  As Evidenced by (1): BMI 14.8       Nutrition Diagnosis 2: Predicted inadequate energy intake  Diagnosis Status (2): Resolved  Related to (2): chronic illness  As Evidenced by (2): report of weight loss and eating poorly due to decreased appetite, > 20% weight loss in 1 year       Nutrition Diagnosis 3: Increased nutrient needs  Diagnosis Status (3): Ongoing  Related to (3): pressure injury  As Evidenced by (3): sacrum        Nutrition Interventions/Recommendations   Nutrition Prescription  Individualized Nutrition Prescription Provided for : Should pt intake remain sub optimal, consider alternate route for nutrition vs goals of care discussion    Food and/or Nutrient Delivery Interventions  Interventions: Meals and snacks, Medical food supplement    Goal: > 75% of meals consumed    Medical Food Supplement: Commercial beverage  Goal: Glucerna TID for encouraged intake, added calories and protein for encouraged wound healing    Additional Interventions: Consider appetite stimulant; SLP consult    Coordination of Nutrition Care by a Nutrition Professional  Collaboration and Referral of Nutrition Care: Collaboration by nutrition professional with other providers    Education Documentation  No documentation found.      Nutrition Monitoring and Evaluation   Food and Nutrient Related History  Energy Intake: Estimated  energy intake    Fluid Intake: Estimated fluid intake    Amount of Food: Estimated amout of food, Medical food intake    Mealtime Behavior: Limited number of accepted foods    Anthropometrics: Body Composition/Growth/Weight History  Weight Change: Weight gain, Weight loss    Biochemical Data, Medical Tests and Procedures  Electrolyte and Renal Panel: Other (Comment)  Criteria: as clinically indicated    Gastrointestinal Profile: Other (Comment)  Criteria: as clinically indicated    Glucose/Endocrine Profile: Other (Comment)  Criteria: as clinically indicated    Nutritional Anemia Profile: Other (Comment)  Criteria: as clinically indicated    Vitamin Profile: Other (Comment)  Criteria: as clinically indicated    Nutrition Focused Physical Findings  Adipose: Loss of subcutaneous fat    Digestive System: Decrease in appetite, Anorexia, Diarrhea    Muscles: Muscle atrophy    Skin: Impaired wound healing    Other: Stool output, Urine volume, Overall appearance    Follow Up  Time Spent (min): 60 minutes  Last Date of Nutrition Visit: 12/27/24  Nutrition Follow-Up Needed?: Dietitian to reassess per policy  Follow up Comment: ALBERTO MONTEJO SPCM follow up

## 2024-12-27 NOTE — POST-PROCEDURE NOTE
Report to Receiving RN:    Report To: Nancy ROUSSEAU   Time Report Called: 1050    Hand-Off Communication: pt stable post hd. Did good during first hd tx. At end of treatment pt did complain of SOB, I increased o2 to 4 liters NC but was unable to get o2 reading due to patients hands shaking. After o2 increase, pt did feel better and said his SOB has resolved. Slight tachycardia during HD low 100s. Post bp is 128/91 pulse 112. Dr sanches notified of all of the above       Complications During Treatment: Yes, complaints of SOB, mild tachycardia   Ultrafiltration Treatment: No  Medications Administered During Dialysis: No  Blood Products Administered During Dialysis: No  Labs Sent During Dialysis: No  Heparin Drip Rate Changes: No  Dialysis Catheter Dressing: clean dry intact  Last Dressing Change: 12/26/24    Electronic Signatures:  Ron Milligan RN  (Signed )   Authored:    (Signed )   Authored:    Last Updated: 10:51 AM by RON MILLIGAN

## 2024-12-27 NOTE — PRE-PROCEDURE NOTE
Report from Sending RN:    Report From: Nancy ROUSSEAU   Recent Surgery of Procedure: No  Baseline Level of Consciousness (LOC): x3  Oxygen Use: No  Type: na  Diabetic: Yes  Last BP Med Given Day of Dialysis: see emar   Last Pain Med Given: see emar   Lab Tests to be Obtained with Dialysis: No  Blood Transfusion to be Given During Dialysis: No  Available IV Access: Yes  Medications to be Administered During Dialysis: No  Continuous IV Infusion Running: No  Restraints on Currently or in the Last 24 Hours: No  Hand-Off Communication: stable for hd, consent signed, ok to use order placed for hd cath.  Dialysis Catheter Dressing: clean dry intact  Last Dressing Change: 12/26/24

## 2024-12-27 NOTE — PROGRESS NOTES
12/27/24 0842   Discharge Planning   Expected Discharge Disposition SNF     Once med ready plan is to dc to SNF, per notes temp HD cath placed, patient will have HD today, creatine 5.66. [facility] does not provide HD, additional referrals sent out to see which facilities can accept with HD, I also asked [facility] to look over updated clinical information for possible acceptance, I have also reached out to  to follow up with patient's wife on accepting HD facilities, patient will not need auth, I will continue to monitor for discharge planing.    10:48 I called the patient's wife Nallely to discuss discharge planing I advised her facilities with HD include, [facility], [facility], [facility], I advised the patients' wife that a facility that offers HD on site would most likely be his best option, and that I am hoping to try to get him into [facility], she would like the patient to either go to [facility] or [facility], both facilities are still reviewing, I advised her that we will see which facilities can accept and update her from there.    12:31 [facility], [facility] able to accept, patient's wife is aware that Dr Wynn is able to follow the patient at [facility], patient's wife has not made a FOC yet.    12:52 I spoke to the patient's wife at bedside, she has confirmed [facility], I have sent them over the lab work for hep b antigen and HD flowsheets, I will also send a chest xray and continue to monitor for dc planing.

## 2024-12-27 NOTE — PROGRESS NOTES
Occupational Therapy                 Therapy Communication Note    Patient Name: Kalyan Armstrong  MRN: [id number]  Department: U A 5  Room: [address] [place] [address]  Today's Date: 12/27/2024     Discipline: Occupational Therapy          Missed Visit Reason: Missed Visit Reason: Patient refused (Per pt/wife and nursing pt had rough dialysis this date)    Missed Time: Attempt

## 2024-12-27 NOTE — PROCEDURES
"Patient seen on dialysis.  He is status post right IJ PermCath placement on 12/26.  This is his initial dialysis treatment.  He is dialyzed on F160 x 2 hours, BFR//300 mL/minute, 3K bath, 2.5 calcium with a UF target of 1 L.  Tolerating dialysis without issue.  I will order erythropoietin and check a phosphorus level.  I will place orders for dialysis again tomorrow.    /81 (BP Location: Right arm, Patient Position: Sitting)   Pulse (!) 112   Temp 37.1 °C (98.7 °F)   Resp 16   Ht 1.753 m (5' 9\")   Wt 45.4 kg (100 lb 1.4 oz)   SpO2 98%   BMI 14.78 kg/m²     "

## 2024-12-27 NOTE — PROGRESS NOTES
[facility] able to accept patient.Spouse is agreeable to transfer there once medically cleared for discharge.

## 2024-12-27 NOTE — PROGRESS NOTES
Between 7AM-7PM please message me via Epic Secure Chat.  After 7PM please page Nocturnist on call.    [facility] Hospitalist Progress Note      Kalyan Armstrong    :  1950(74 y.o.)    MRN:  [id number]  Date: 24     Assessment and Plan:       :  New HD... s/p first session yesterday... HD again today, f/up renal.   Pt will likely need a new facility and 3 IP HD sessions prior to DC to snf.   Not clear if HD is temp until he can get set up with PD or sticking with HD now... f/up renal plan today.     Cont COPD regimen.     Dc to snf pending placement and renal clearance.         :  Umer/ckd5... no improvement. Renal team will start HD. HD line ordered today via IR.... HD per renal.   End stage COPD... on home o2 3L... cont azithro, prednisone, breathing Rxs, supportive care.   Hx Chronic hep C.  Home regimen for chronic conditions  Dvt px with heparin.   Dispo will be SNF when cleared by Renal.             :  Reported unintentional weight loss/chronic abdominal pain/adult FTT - CT A/P no new acute process. Suspect weight loss related to work of breathing with his chronic COPD  UMER on CKD 5 - Scr around his baseline now. Plan for PD placement and PD initiation as outpatient per nephrology.   Likely end stage COPD/Chronic respiratory failure on 3-4L NC (he is on PO pred 5mg daily and PO azithro 250mg daily at home for COPD through his pulmonologist). Reports intolerance with duo nebs, will trial pulmicort, xopenex, atrovent.   Severe protein calorie malnutrition- patient not interested in medications to increase appetite. Dietician consulted.   Chronic anemia- recent b12/folate/iron studies wnl.   HTN - norvasc held due to softer BP  HLD- continue statin  Chronic Hep C reportedly post-treatment and negative viral load  Hypercalcemia- mild, suspect due to renal impairment, PTHi pending.  DNR comfort care      DVT Prophylaxis: subcutaneous Heparin    Disposition: continue to  monitor inpatient, await consultant recommendations, await test results, and await clinical improvement    Electronically signed by Brayden Giordano MD on 12/27/24 at 9:31 AM     Subjective:      Interval History:   Vitals and chart notes from overnight reviewed.   No acute issues overnight.   Patient seen and evaluated at bedside.   Reports breathing tx were making him very shaky then has to urinate afterwards. Will trial xopenex     Review of Systems:   Other than patient's chronic conditions and those complaints in the history above, the rest of the 10 systems review were done and were negative.     Current medications:  Scheduled Meds:[Held by provider] amLODIPine, 10 mg, oral, Daily  azithromycin, 250 mg, oral, Daily  budesonide, 0.25 mg, nebulization, BID  heparin (porcine), 5,000 Units, subcutaneous, q8h GULSHAN  ipratropium, 0.5 mg, nebulization, TID  levalbuterol, 0.63 mg, nebulization, TID  oxyCODONE, 5 mg, oral, TID  polyethylene glycol, 17 g, oral, Daily  predniSONE, 5 mg, oral, Daily  rosuvastatin, 10 mg, oral, Daily      Continuous Infusions:   PRN Meds:PRN medications: acetaminophen **OR** acetaminophen **OR** acetaminophen, albuterol, dextrose, dextrose, glucagon, glucagon, melatonin, ondansetron ODT **OR** ondansetron, oxygen, triamcinolone      Objective:     Heart Rate:  [63-90]   Temp:  [36.5 °C (97.7 °F)-37 °C (98.6 °F)]   Resp:  [10-18]   BP: (119-154)/(75-98)   SpO2:  [96 %-100 %]     Oxygen Dose: *3 L/min    Physical Exam  Vitals and nursing note reviewed.   HENT:      Mouth/Throat:      Mouth: Mucous membranes are moist.      Pharynx: Oropharynx is clear.   Cardiovascular:      Rate and Rhythm: Normal rate and regular rhythm.   Pulmonary:      Effort: Pulmonary effort is normal.   Abdominal:      Palpations: Abdomen is soft.   Neurological:      Mental Status: He is alert.         Labs:   Lab Results   Component Value Date     (L) 12/27/2024    K 5.2 12/27/2024    CL 96 (L) 12/27/2024     CO2 32 12/27/2024    BUN 35 (H) 12/27/2024    CREATININE 5.66 (H) 12/27/2024    GLUCOSE 72 (L) 12/27/2024    CALCIUM 10.3 12/27/2024    PROT 7.3 12/19/2024    BILITOT 0.4 12/19/2024    ALKPHOS 50 12/19/2024    AST 30 12/19/2024    ALT 21 12/19/2024       Lab Results   Component Value Date    WBC 6.7 12/27/2024    HGB 9.7 (L) 12/27/2024    HCT 28.8 (L) 12/27/2024    MCV 87 12/27/2024     (L) 12/27/2024

## 2024-12-27 NOTE — SIGNIFICANT EVENT
Palliative care sign off note.  Palliative care consulted to address goals of care and pain .  This has been addressed.  Palliative care remains available for re-consult if patient, family, or team's needs change.  Thank you for inviting us to participate in care.

## 2024-12-28 ENCOUNTER — APPOINTMENT (OUTPATIENT)
Dept: DIALYSIS | Facility: HOSPITAL | Age: 74
End: 2024-12-28
Payer: MEDICARE

## 2024-12-28 LAB
ANION GAP SERPL CALC-SCNC: 13 MMOL/L (ref 10–20)
BASOPHILS # BLD AUTO: 0.07 X10*3/UL (ref 0–0.1)
BASOPHILS NFR BLD AUTO: 0.9 %
BUN SERPL-MCNC: 33 MG/DL (ref 6–23)
CALCIUM SERPL-MCNC: 9.3 MG/DL (ref 8.6–10.3)
CHLORIDE SERPL-SCNC: 98 MMOL/L (ref 98–107)
CO2 SERPL-SCNC: 30 MMOL/L (ref 21–32)
CREAT SERPL-MCNC: 5.22 MG/DL (ref 0.5–1.3)
EGFRCR SERPLBLD CKD-EPI 2021: 11 ML/MIN/1.73M*2
EOSINOPHIL # BLD AUTO: 0.18 X10*3/UL (ref 0–0.4)
EOSINOPHIL NFR BLD AUTO: 2.4 %
ERYTHROCYTE [DISTWIDTH] IN BLOOD BY AUTOMATED COUNT: 15.5 % (ref 11.5–14.5)
GLUCOSE BLD MANUAL STRIP-MCNC: 120 MG/DL (ref 74–99)
GLUCOSE BLD MANUAL STRIP-MCNC: 123 MG/DL (ref 74–99)
GLUCOSE BLD MANUAL STRIP-MCNC: 92 MG/DL (ref 74–99)
GLUCOSE SERPL-MCNC: 82 MG/DL (ref 74–99)
HCT VFR BLD AUTO: 31.4 % (ref 41–52)
HGB BLD-MCNC: 10 G/DL (ref 13.5–17.5)
IMM GRANULOCYTES # BLD AUTO: 0.04 X10*3/UL (ref 0–0.5)
IMM GRANULOCYTES NFR BLD AUTO: 0.5 % (ref 0–0.9)
LYMPHOCYTES # BLD AUTO: 0.79 X10*3/UL (ref 0.8–3)
LYMPHOCYTES NFR BLD AUTO: 10.4 %
MCH RBC QN AUTO: 28.8 PG (ref 26–34)
MCHC RBC AUTO-ENTMCNC: 31.8 G/DL (ref 32–36)
MCV RBC AUTO: 91 FL (ref 80–100)
MONOCYTES # BLD AUTO: 0.56 X10*3/UL (ref 0.05–0.8)
MONOCYTES NFR BLD AUTO: 7.3 %
NEUTROPHILS # BLD AUTO: 5.99 X10*3/UL (ref 1.6–5.5)
NEUTROPHILS NFR BLD AUTO: 78.5 %
NRBC BLD-RTO: 0 /100 WBCS (ref 0–0)
PLATELET # BLD AUTO: 116 X10*3/UL (ref 150–450)
POTASSIUM SERPL-SCNC: 5.2 MMOL/L (ref 3.5–5.3)
RBC # BLD AUTO: 3.47 X10*6/UL (ref 4.5–5.9)
SODIUM SERPL-SCNC: 136 MMOL/L (ref 136–145)
WBC # BLD AUTO: 7.6 X10*3/UL (ref 4.4–11.3)

## 2024-12-28 PROCEDURE — 2500000005 HC RX 250 GENERAL PHARMACY W/O HCPCS: Performed by: STUDENT IN AN ORGANIZED HEALTH CARE EDUCATION/TRAINING PROGRAM

## 2024-12-28 PROCEDURE — 94640 AIRWAY INHALATION TREATMENT: CPT

## 2024-12-28 PROCEDURE — 2500000004 HC RX 250 GENERAL PHARMACY W/ HCPCS (ALT 636 FOR OP/ED): Performed by: PHYSICIAN ASSISTANT

## 2024-12-28 PROCEDURE — 82374 ASSAY BLOOD CARBON DIOXIDE: CPT | Performed by: HOSPITALIST

## 2024-12-28 PROCEDURE — 1100000001 HC PRIVATE ROOM DAILY

## 2024-12-28 PROCEDURE — 2500000001 HC RX 250 WO HCPCS SELF ADMINISTERED DRUGS (ALT 637 FOR MEDICARE OP): Performed by: STUDENT IN AN ORGANIZED HEALTH CARE EDUCATION/TRAINING PROGRAM

## 2024-12-28 PROCEDURE — 82947 ASSAY GLUCOSE BLOOD QUANT: CPT

## 2024-12-28 PROCEDURE — 36415 COLL VENOUS BLD VENIPUNCTURE: CPT | Performed by: HOSPITALIST

## 2024-12-28 PROCEDURE — 85025 COMPLETE CBC W/AUTO DIFF WBC: CPT | Performed by: HOSPITALIST

## 2024-12-28 PROCEDURE — 2500000002 HC RX 250 W HCPCS SELF ADMINISTERED DRUGS (ALT 637 FOR MEDICARE OP, ALT 636 FOR OP/ED): Performed by: PHYSICIAN ASSISTANT

## 2024-12-28 PROCEDURE — 2500000004 HC RX 250 GENERAL PHARMACY W/ HCPCS (ALT 636 FOR OP/ED): Performed by: INTERNAL MEDICINE

## 2024-12-28 PROCEDURE — 2500000002 HC RX 250 W HCPCS SELF ADMINISTERED DRUGS (ALT 637 FOR MEDICARE OP, ALT 636 FOR OP/ED): Performed by: HOSPITALIST

## 2024-12-28 PROCEDURE — 2500000002 HC RX 250 W HCPCS SELF ADMINISTERED DRUGS (ALT 637 FOR MEDICARE OP, ALT 636 FOR OP/ED): Performed by: STUDENT IN AN ORGANIZED HEALTH CARE EDUCATION/TRAINING PROGRAM

## 2024-12-28 PROCEDURE — 8010000001 HC DIALYSIS - HEMODIALYSIS PER DAY

## 2024-12-28 PROCEDURE — 2500000002 HC RX 250 W HCPCS SELF ADMINISTERED DRUGS (ALT 637 FOR MEDICARE OP, ALT 636 FOR OP/ED)

## 2024-12-28 PROCEDURE — 99233 SBSQ HOSP IP/OBS HIGH 50: CPT | Performed by: INTERNAL MEDICINE

## 2024-12-28 RX ADMIN — HEPARIN SODIUM 5000 UNITS: 5000 INJECTION, SOLUTION INTRAVENOUS; SUBCUTANEOUS at 13:48

## 2024-12-28 RX ADMIN — PREDNISONE 5 MG: 5 TABLET ORAL at 12:53

## 2024-12-28 RX ADMIN — HEPARIN SODIUM 2000 UNITS: 1000 INJECTION INTRAVENOUS; SUBCUTANEOUS at 12:03

## 2024-12-28 RX ADMIN — OXYCODONE HYDROCHLORIDE 5 MG: 5 TABLET ORAL at 12:53

## 2024-12-28 RX ADMIN — POLYETHYLENE GLYCOL 3350 17 G: 17 POWDER, FOR SOLUTION ORAL at 12:54

## 2024-12-28 RX ADMIN — IPRATROPIUM BROMIDE 0.5 MG: 0.5 SOLUTION RESPIRATORY (INHALATION) at 21:10

## 2024-12-28 RX ADMIN — LEVALBUTEROL HYDROCHLORIDE 0.63 MG: 1.25 SOLUTION RESPIRATORY (INHALATION) at 21:10

## 2024-12-28 RX ADMIN — AZITHROMYCIN 250 MG: 250 TABLET, FILM COATED ORAL at 12:53

## 2024-12-28 RX ADMIN — IPRATROPIUM BROMIDE 0.5 MG: 0.5 SOLUTION RESPIRATORY (INHALATION) at 07:31

## 2024-12-28 RX ADMIN — HEPARIN SODIUM 5000 UNITS: 5000 INJECTION, SOLUTION INTRAVENOUS; SUBCUTANEOUS at 22:31

## 2024-12-28 RX ADMIN — HEPARIN SODIUM 2000 UNITS: 1000 INJECTION INTRAVENOUS; SUBCUTANEOUS at 12:04

## 2024-12-28 RX ADMIN — LEVALBUTEROL HYDROCHLORIDE 1.25 MG: 1.25 SOLUTION RESPIRATORY (INHALATION) at 07:31

## 2024-12-28 RX ADMIN — Medication 3 L/MIN: at 07:31

## 2024-12-28 RX ADMIN — BUDESONIDE 0.25 MG: 0.25 INHALANT RESPIRATORY (INHALATION) at 21:10

## 2024-12-28 RX ADMIN — Medication 3 L/MIN: at 21:10

## 2024-12-28 RX ADMIN — BUDESONIDE 0.25 MG: 0.25 INHALANT RESPIRATORY (INHALATION) at 07:31

## 2024-12-28 RX ADMIN — ROSUVASTATIN 10 MG: 10 TABLET, FILM COATED ORAL at 12:54

## 2024-12-28 ASSESSMENT — PAIN SCALES - GENERAL
PAINLEVEL_OUTOF10: 0 - NO PAIN
PAINLEVEL_OUTOF10: 0 - NO PAIN
PAINLEVEL_OUTOF10: 5 - MODERATE PAIN
PAINLEVEL_OUTOF10: 0 - NO PAIN
PAINLEVEL_OUTOF10: 2

## 2024-12-28 ASSESSMENT — COGNITIVE AND FUNCTIONAL STATUS - GENERAL
DRESSING REGULAR LOWER BODY CLOTHING: A LITTLE
WALKING IN HOSPITAL ROOM: A LOT
HELP NEEDED FOR BATHING: A LOT
PERSONAL GROOMING: A LITTLE
TOILETING: A LITTLE
STANDING UP FROM CHAIR USING ARMS: A LOT
WALKING IN HOSPITAL ROOM: A LOT
MOBILITY SCORE: 18
DAILY ACTIVITIY SCORE: 19
STANDING UP FROM CHAIR USING ARMS: A LITTLE
MOBILITY SCORE: 15
DRESSING REGULAR UPPER BODY CLOTHING: A LITTLE
MOVING TO AND FROM BED TO CHAIR: A LITTLE
CLIMB 3 TO 5 STEPS WITH RAILING: A LOT
DRESSING REGULAR UPPER BODY CLOTHING: A LITTLE
CLIMB 3 TO 5 STEPS WITH RAILING: A LOT
MOVING TO AND FROM BED TO CHAIR: A LITTLE
TURNING FROM BACK TO SIDE WHILE IN FLAT BAD: A LITTLE
DRESSING REGULAR LOWER BODY CLOTHING: A LITTLE
MOVING FROM LYING ON BACK TO SITTING ON SIDE OF FLAT BED WITH BEDRAILS: A LITTLE
HELP NEEDED FOR BATHING: A LITTLE

## 2024-12-28 ASSESSMENT — PAIN - FUNCTIONAL ASSESSMENT: PAIN_FUNCTIONAL_ASSESSMENT: NO/DENIES PAIN

## 2024-12-28 ASSESSMENT — PAIN DESCRIPTION - DESCRIPTORS: DESCRIPTORS: ACHING

## 2024-12-28 ASSESSMENT — PAIN DESCRIPTION - ORIENTATION: ORIENTATION: MID

## 2024-12-28 ASSESSMENT — PAIN DESCRIPTION - LOCATION: LOCATION: ABDOMEN

## 2024-12-28 NOTE — POST-PROCEDURE NOTE
Report to Receiving RN:    Report To: Felix  Time Report Called: 1208  Hand-Off Communication: pt tolerated tx well did not remove any fluid, post /90   Complications During Treatment: No  Ultrafiltration Treatment: No  Medications Administered During Dialysis: No  Blood Products Administered During Dialysis: No  Labs Sent During Dialysis: No  Heparin Drip Rate Changes: No  Dialysis Catheter Dressing: C/D/I  Last Dressing Change: 12/27/2024    Electronic Signatures:  Erick Pichardo RN (Signed )   Authored:    (Signed )   Authored:     Last Updated: 12:09 PM by ERICK PICHARDO

## 2024-12-28 NOTE — PROGRESS NOTES
12/28/24 0805   Discharge Planning   Expected Discharge Disposition [facility]         Facility requested new labs. Sent Dr. Wynn message to order Hep B Surface Antigen lab draw. Has to be within 30 days and las one was done 11/21/24. Also hemodialysis order was sent to [facility]. Still waiting on precert for dialysis. Patient will go to [facility] when medically ready.    1100    Was notified:   If antibodies are positive his labs are good for 1 year from the date drawn. the outpatient centers follow the same rules on hepatits dates as well so these should be sufficient. if his antibodies were negative then yes we would have to continue drawing monthly.  Message sent to facility.

## 2024-12-28 NOTE — CARE PLAN
The patient's goals for the shift include Rest.    The clinical goals for the shift include patient will remain safe    Problem: Pain - Adult  Goal: Verbalizes/displays adequate comfort level or baseline comfort level  Outcome: Progressing     Problem: Safety - Adult  Goal: Free from fall injury  Outcome: Progressing     Problem: Discharge Planning  Goal: Discharge to home or other facility with appropriate resources  Outcome: Progressing     Problem: Chronic Conditions and Co-morbidities  Goal: Patient's chronic conditions and co-morbidity symptoms are monitored and maintained or improved  Outcome: Progressing     Problem: Fall/Injury  Goal: Not fall by end of shift  Outcome: Progressing  Goal: Be free from injury by end of the shift  Outcome: Progressing     Problem: Pain  Goal: Takes deep breaths with improved pain control throughout the shift  Outcome: Progressing  Goal: Performs ADL's with improved pain control throughout shift  Outcome: Progressing  Goal: Free from opioid side effects throughout the shift  Outcome: Progressing  Goal: Free from acute confusion related to pain meds throughout the shift  Outcome: Progressing     Problem: Nutrition  Goal: Lab values WNL  Outcome: Progressing  Goal: Electrolytes WNL  Outcome: Progressing  Goal: Promote healing  Outcome: Progressing  Goal: Gradual weight gain  Outcome: Progressing  Goal: Oral intake greater 75%  Outcome: Progressing  Goal: Consume prescribed supplement  Outcome: Progressing  Goal: Adequate PO fluid intake  Outcome: Progressing     Problem: Skin  Goal: Decreased wound size/increased tissue granulation at next dressing change  Outcome: Progressing  Flowsheets (Taken 12/28/2024 0512)  Decreased wound size/increased tissue granulation at next dressing change: Promote sleep for wound healing  Goal: Participates in plan/prevention/treatment measures  Outcome: Progressing  Flowsheets (Taken 12/28/2024 0512)  Participates in plan/prevention/treatment  measures: Elevate heels  Goal: Prevent/manage excess moisture  Outcome: Progressing  Flowsheets (Taken 12/28/2024 0512)  Prevent/manage excess moisture: Cleanse incontinence/protect with barrier cream  Goal: Prevent/minimize sheer/friction injuries  Outcome: Progressing  Flowsheets (Taken 12/28/2024 0512)  Prevent/minimize sheer/friction injuries: Turn/reposition every 2 hours/use positioning/transfer devices  Goal: Promote/optimize nutrition  Outcome: Progressing  Flowsheets (Taken 12/28/2024 0512)  Promote/optimize nutrition: Consume > 50% meals/supplements  Goal: Promote skin healing  Outcome: Progressing  Flowsheets (Taken 12/28/2024 0512)  Promote skin healing: Assess skin/pad under line(s)/device(s)

## 2024-12-28 NOTE — PRE-PROCEDURE NOTE
Report from Sending RN:    Report From: Felix ROUSSEAU   Recent Surgery of Procedure: No  Baseline Level of Consciousness (LOC): x2  Oxygen Use: Yes  Type: nc  Diabetic: Yes  Last BP Med Given Day of Dialysis: see emar   Last Pain Med Given: see emar   Lab Tests to be Obtained with Dialysis: No  Blood Transfusion to be Given During Dialysis: No  Available IV Access: Yes  Medications to be Administered During Dialysis: No  Continuous IV Infusion Running: No  Restraints on Currently or in the Last 24 Hours: No  Hand-Off Communication: stable for hd   Dialysis Catheter Dressing:   Last Dressing Change:

## 2024-12-28 NOTE — PROCEDURES
"Patient seen on dialysis.  This is Mr. Armstrong's second consecutive dialysis treatment.  He is dialyzing on an F1 60 x 2.5 hours, BFR//500 mL/minute, 2K bath, 2.5 calcium with no ultrafiltration.  Erythropoietin is ordered.  Tolerating dialysis, continue per submitted orders.    /80 (BP Location: Right arm, Patient Position: Lying)   Pulse 105   Temp 37.4 °C (99.3 °F) (Oral)   Resp 20   Ht 1.753 m (5' 9\")   Wt 45.4 kg (100 lb 1.4 oz)   SpO2 93%   BMI 14.78 kg/m²     "

## 2024-12-28 NOTE — PROGRESS NOTES
"Kalyan Armstrong is a 74 y.o. male on day 9 of admission presenting with Generalized abdominal pain.    Subjective   Leaving hemodialysis, will need to be seen by speech       Objective     Physical Exam  Vitals reviewed.   Constitutional:       Appearance: Normal appearance.   HENT:      Head: Normocephalic.      Nose: Nose normal.      Mouth/Throat:      Mouth: Mucous membranes are moist.   Pulmonary:      Effort: Pulmonary effort is normal.   Abdominal:      General: Abdomen is flat. Bowel sounds are normal.      Palpations: Abdomen is soft.   Neurological:      General: No focal deficit present.      Mental Status: He is alert.         Last Recorded Vitals  Blood pressure 116/80, pulse 105, temperature 37.4 °C (99.3 °F), temperature source Oral, resp. rate 20, height 1.753 m (5' 9\"), weight 45.4 kg (100 lb 1.4 oz), SpO2 93%.  Intake/Output last 3 Shifts:  I/O last 3 completed shifts:  In: 400 (8.8 mL/kg) [Other:400]  Out: 1710 (37.7 mL/kg) [Urine:310 (0.2 mL/kg/hr); Other:1400]  Weight: 45.4 kg     Relevant Results  Results for orders placed or performed during the hospital encounter of 12/19/24 (from the past 24 hours)   POCT GLUCOSE   Result Value Ref Range    POCT Glucose 122 (H) 74 - 99 mg/dL   POCT GLUCOSE   Result Value Ref Range    POCT Glucose 100 (H) 74 - 99 mg/dL   CBC and Auto Differential   Result Value Ref Range    WBC 7.6 4.4 - 11.3 x10*3/uL    nRBC 0.0 0.0 - 0.0 /100 WBCs    RBC 3.47 (L) 4.50 - 5.90 x10*6/uL    Hemoglobin 10.0 (L) 13.5 - 17.5 g/dL    Hematocrit 31.4 (L) 41.0 - 52.0 %    MCV 91 80 - 100 fL    MCH 28.8 26.0 - 34.0 pg    MCHC 31.8 (L) 32.0 - 36.0 g/dL    RDW 15.5 (H) 11.5 - 14.5 %    Platelets 116 (L) 150 - 450 x10*3/uL    Neutrophils % 78.5 40.0 - 80.0 %    Immature Granulocytes %, Automated 0.5 0.0 - 0.9 %    Lymphocytes % 10.4 13.0 - 44.0 %    Monocytes % 7.3 2.0 - 10.0 %    Eosinophils % 2.4 0.0 - 6.0 %    Basophils % 0.9 0.0 - 2.0 %    Neutrophils Absolute 5.99 (H) 1.60 - 5.50 " x10*3/uL    Immature Granulocytes Absolute, Automated 0.04 0.00 - 0.50 x10*3/uL    Lymphocytes Absolute 0.79 (L) 0.80 - 3.00 x10*3/uL    Monocytes Absolute 0.56 0.05 - 0.80 x10*3/uL    Eosinophils Absolute 0.18 0.00 - 0.40 x10*3/uL    Basophils Absolute 0.07 0.00 - 0.10 x10*3/uL   Basic Metabolic Panel   Result Value Ref Range    Glucose 82 74 - 99 mg/dL    Sodium 136 136 - 145 mmol/L    Potassium 5.2 3.5 - 5.3 mmol/L    Chloride 98 98 - 107 mmol/L    Bicarbonate 30 21 - 32 mmol/L    Anion Gap 13 10 - 20 mmol/L    Urea Nitrogen 33 (H) 6 - 23 mg/dL    Creatinine 5.22 (H) 0.50 - 1.30 mg/dL    eGFR 11 (L) >60 mL/min/1.73m*2    Calcium 9.3 8.6 - 10.3 mg/dL   POCT GLUCOSE   Result Value Ref Range    POCT Glucose 92 74 - 99 mg/dL     *Note: Due to a large number of results and/or encounters for the requested time period, some results have not been displayed. A complete set of results can be found in Results Review.       Imaging Results  Head ct:  IMPRESSION:  No acute intracranial bleed or focal mass effect.      Mild-to-moderate volume loss.      Mild chronic white matter ischemic disease in the deep  periventricular regions.          Medications:  [Held by provider] amLODIPine, 10 mg, oral, Daily  azithromycin, 250 mg, oral, Daily  budesonide, 0.25 mg, nebulization, BID  epoetin jamil or biosimilar, 100 Units/kg, subcutaneous, Every Mon/Wed/Fri  heparin (porcine), 5,000 Units, subcutaneous, q8h GULSHAN  heparin, 2,000 Units, intra-catheter, After Dialysis  heparin, 2,000 Units, intra-catheter, After Dialysis  ipratropium, 0.5 mg, nebulization, TID  levalbuterol, 0.63 mg, nebulization, TID  oxyCODONE, 5 mg, oral, TID  polyethylene glycol, 17 g, oral, Daily  predniSONE, 5 mg, oral, Daily  rosuvastatin, 10 mg, oral, Daily       PRN medications: acetaminophen **OR** acetaminophen **OR** acetaminophen, albuterol, dextrose, dextrose, glucagon, glucagon, melatonin, ondansetron ODT **OR** ondansetron, oxygen, triamcinolone      Assessment/Plan   12/28: HD again today  Dispo to snf with ok with renal  To be evaulated by speech    12/27:  New HD... s/p first session yesterday... HD again today, f/up renal.   Pt will likely need a new facility and 3 IP HD sessions prior to DC to snf.   Not clear if HD is temp until he can get set up with PD or sticking with HD now... f/up renal plan today.      Cont COPD regimen.      Dc to snf pending placement and renal clearance.            12/26:  Umer/ckd5... no improvement. Renal team will start HD. HD line ordered today via IR.... HD per renal.   End stage COPD... on home o2 3L... cont azithro, prednisone, breathing Rxs, supportive care.   Hx Chronic hep C.  Home regimen for chronic conditions  Dvt px with heparin.   Dispo will be SNF when cleared by Renal.                  12/25:  Reported unintentional weight loss/chronic abdominal pain/adult FTT - CT A/P no new acute process. Suspect weight loss related to work of breathing with his chronic COPD  UMER on CKD 5 - Scr around his baseline now. Plan for PD placement and PD initiation as outpatient per nephrology.   Likely end stage COPD/Chronic respiratory failure on 3-4L NC (he is on PO pred 5mg daily and PO azithro 250mg daily at home for COPD through his pulmonologist). Reports intolerance with duo nebs, will trial pulmicort, xopenex, atrovent.   Severe protein calorie malnutrition- patient not interested in medications to increase appetite. Dietician consulted.   Chronic anemia- recent b12/folate/iron studies wnl.   HTN - norvasc held due to softer BP  HLD- continue statin  Chronic Hep C reportedly post-treatment and negative viral load  Hypercalcemia- mild, suspect due to renal impairment, PTHi pending.  DNR comfort care    DVT Prophylaxis:  Heparin subq          Twyla Cerrato MD  [facility] Medicine

## 2024-12-29 LAB
ANION GAP SERPL CALC-SCNC: 10 MMOL/L (ref 10–20)
BASOPHILS # BLD AUTO: 0.03 X10*3/UL (ref 0–0.1)
BASOPHILS NFR BLD AUTO: 0.5 %
BUN SERPL-MCNC: 28 MG/DL (ref 6–23)
CALCIUM SERPL-MCNC: 8.8 MG/DL (ref 8.6–10.3)
CHLORIDE SERPL-SCNC: 96 MMOL/L (ref 98–107)
CO2 SERPL-SCNC: 32 MMOL/L (ref 21–32)
CREAT SERPL-MCNC: 3.87 MG/DL (ref 0.5–1.3)
EGFRCR SERPLBLD CKD-EPI 2021: 16 ML/MIN/1.73M*2
EOSINOPHIL # BLD AUTO: 0.23 X10*3/UL (ref 0–0.4)
EOSINOPHIL NFR BLD AUTO: 3.6 %
ERYTHROCYTE [DISTWIDTH] IN BLOOD BY AUTOMATED COUNT: 15.3 % (ref 11.5–14.5)
FIBRINOGEN PPP-MCNC: 429 MG/DL (ref 200–400)
GLUCOSE BLD MANUAL STRIP-MCNC: 102 MG/DL (ref 74–99)
GLUCOSE BLD MANUAL STRIP-MCNC: 117 MG/DL (ref 74–99)
GLUCOSE BLD MANUAL STRIP-MCNC: 85 MG/DL (ref 74–99)
GLUCOSE BLD MANUAL STRIP-MCNC: 89 MG/DL (ref 74–99)
GLUCOSE SERPL-MCNC: 83 MG/DL (ref 74–99)
HBV SURFACE AB SER-ACNC: 24.7 MIU/ML
HBV SURFACE AG SERPL QL IA: NONREACTIVE
HCT VFR BLD AUTO: 25 % (ref 41–52)
HGB BLD-MCNC: 8.3 G/DL (ref 13.5–17.5)
HGB RETIC QN: 29 PG (ref 28–38)
HOLD SPECIMEN: NORMAL
IMM GRANULOCYTES # BLD AUTO: 0.06 X10*3/UL (ref 0–0.5)
IMM GRANULOCYTES NFR BLD AUTO: 0.9 % (ref 0–0.9)
IMMATURE RETIC FRACTION: 21.1 %
IRON SATN MFR SERPL: 28 % (ref 25–45)
IRON SERPL-MCNC: 65 UG/DL (ref 35–150)
LDH SERPL L TO P-CCNC: 315 U/L (ref 84–246)
LYMPHOCYTES # BLD AUTO: 0.86 X10*3/UL (ref 0.8–3)
LYMPHOCYTES NFR BLD AUTO: 13.3 %
MCH RBC QN AUTO: 28.8 PG (ref 26–34)
MCHC RBC AUTO-ENTMCNC: 33.2 G/DL (ref 32–36)
MCV RBC AUTO: 87 FL (ref 80–100)
MONOCYTES # BLD AUTO: 0.56 X10*3/UL (ref 0.05–0.8)
MONOCYTES NFR BLD AUTO: 8.7 %
NEUTROPHILS # BLD AUTO: 4.72 X10*3/UL (ref 1.6–5.5)
NEUTROPHILS NFR BLD AUTO: 73 %
NRBC BLD-RTO: 0 /100 WBCS (ref 0–0)
PLATELET # BLD AUTO: 89 X10*3/UL (ref 150–450)
POTASSIUM SERPL-SCNC: 5 MMOL/L (ref 3.5–5.3)
RBC # BLD AUTO: 2.88 X10*6/UL (ref 4.5–5.9)
RETICS #: 0.1 X10*6/UL (ref 0.02–0.11)
RETICS/RBC NFR AUTO: 3.3 % (ref 0.5–2)
SODIUM SERPL-SCNC: 133 MMOL/L (ref 136–145)
TIBC SERPL-MCNC: 233 UG/DL (ref 240–445)
UIBC SERPL-MCNC: 168 UG/DL (ref 110–370)
WBC # BLD AUTO: 6.5 X10*3/UL (ref 4.4–11.3)

## 2024-12-29 PROCEDURE — 2500000002 HC RX 250 W HCPCS SELF ADMINISTERED DRUGS (ALT 637 FOR MEDICARE OP, ALT 636 FOR OP/ED): Performed by: STUDENT IN AN ORGANIZED HEALTH CARE EDUCATION/TRAINING PROGRAM

## 2024-12-29 PROCEDURE — 82947 ASSAY GLUCOSE BLOOD QUANT: CPT

## 2024-12-29 PROCEDURE — 85025 COMPLETE CBC W/AUTO DIFF WBC: CPT

## 2024-12-29 PROCEDURE — 99232 SBSQ HOSP IP/OBS MODERATE 35: CPT | Performed by: INTERNAL MEDICINE

## 2024-12-29 PROCEDURE — 80048 BASIC METABOLIC PNL TOTAL CA: CPT

## 2024-12-29 PROCEDURE — 94640 AIRWAY INHALATION TREATMENT: CPT

## 2024-12-29 PROCEDURE — 2500000002 HC RX 250 W HCPCS SELF ADMINISTERED DRUGS (ALT 637 FOR MEDICARE OP, ALT 636 FOR OP/ED): Performed by: PHYSICIAN ASSISTANT

## 2024-12-29 PROCEDURE — 85384 FIBRINOGEN ACTIVITY: CPT | Performed by: INTERNAL MEDICINE

## 2024-12-29 PROCEDURE — 1100000001 HC PRIVATE ROOM DAILY

## 2024-12-29 PROCEDURE — 87340 HEPATITIS B SURFACE AG IA: CPT | Mod: AHULAB | Performed by: INTERNAL MEDICINE

## 2024-12-29 PROCEDURE — 36415 COLL VENOUS BLD VENIPUNCTURE: CPT | Performed by: INTERNAL MEDICINE

## 2024-12-29 PROCEDURE — 2500000002 HC RX 250 W HCPCS SELF ADMINISTERED DRUGS (ALT 637 FOR MEDICARE OP, ALT 636 FOR OP/ED)

## 2024-12-29 PROCEDURE — 2500000002 HC RX 250 W HCPCS SELF ADMINISTERED DRUGS (ALT 637 FOR MEDICARE OP, ALT 636 FOR OP/ED): Performed by: INTERNAL MEDICINE

## 2024-12-29 PROCEDURE — 86022 PLATELET ANTIBODIES: CPT | Mod: AHULAB | Performed by: INTERNAL MEDICINE

## 2024-12-29 PROCEDURE — 83615 LACTATE (LD) (LDH) ENZYME: CPT | Performed by: INTERNAL MEDICINE

## 2024-12-29 PROCEDURE — 2500000002 HC RX 250 W HCPCS SELF ADMINISTERED DRUGS (ALT 637 FOR MEDICARE OP, ALT 636 FOR OP/ED): Performed by: HOSPITALIST

## 2024-12-29 PROCEDURE — 2500000004 HC RX 250 GENERAL PHARMACY W/ HCPCS (ALT 636 FOR OP/ED): Performed by: PHYSICIAN ASSISTANT

## 2024-12-29 PROCEDURE — 2500000005 HC RX 250 GENERAL PHARMACY W/O HCPCS: Performed by: STUDENT IN AN ORGANIZED HEALTH CARE EDUCATION/TRAINING PROGRAM

## 2024-12-29 PROCEDURE — 85045 AUTOMATED RETICULOCYTE COUNT: CPT | Performed by: INTERNAL MEDICINE

## 2024-12-29 PROCEDURE — 83540 ASSAY OF IRON: CPT | Performed by: INTERNAL MEDICINE

## 2024-12-29 PROCEDURE — 86706 HEP B SURFACE ANTIBODY: CPT | Mod: AHULAB | Performed by: INTERNAL MEDICINE

## 2024-12-29 RX ORDER — IPRATROPIUM BROMIDE 0.5 MG/2.5ML
0.5 SOLUTION RESPIRATORY (INHALATION) 2 TIMES DAILY
Status: DISCONTINUED | OUTPATIENT
Start: 2024-12-29 | End: 2024-12-30 | Stop reason: HOSPADM

## 2024-12-29 RX ORDER — OXYCODONE HYDROCHLORIDE 5 MG/1
5 TABLET ORAL EVERY 6 HOURS PRN
Status: DISCONTINUED | OUTPATIENT
Start: 2024-12-29 | End: 2024-12-30 | Stop reason: HOSPADM

## 2024-12-29 RX ADMIN — AZITHROMYCIN 250 MG: 250 TABLET, FILM COATED ORAL at 08:24

## 2024-12-29 RX ADMIN — ROSUVASTATIN 10 MG: 10 TABLET, FILM COATED ORAL at 08:25

## 2024-12-29 RX ADMIN — Medication 3 L/MIN: at 20:53

## 2024-12-29 RX ADMIN — HEPARIN SODIUM 5000 UNITS: 5000 INJECTION, SOLUTION INTRAVENOUS; SUBCUTANEOUS at 06:49

## 2024-12-29 RX ADMIN — Medication 3 L/MIN: at 07:36

## 2024-12-29 RX ADMIN — BUDESONIDE 0.25 MG: 0.25 INHALANT RESPIRATORY (INHALATION) at 20:53

## 2024-12-29 RX ADMIN — BUDESONIDE 0.25 MG: 0.25 INHALANT RESPIRATORY (INHALATION) at 07:36

## 2024-12-29 RX ADMIN — IPRATROPIUM BROMIDE 0.5 MG: 0.5 SOLUTION RESPIRATORY (INHALATION) at 07:36

## 2024-12-29 RX ADMIN — PREDNISONE 5 MG: 5 TABLET ORAL at 08:25

## 2024-12-29 RX ADMIN — IPRATROPIUM BROMIDE 0.5 MG: 0.5 SOLUTION RESPIRATORY (INHALATION) at 20:53

## 2024-12-29 ASSESSMENT — PAIN SCALES - GENERAL
PAINLEVEL_OUTOF10: 0 - NO PAIN
PAINLEVEL_OUTOF10: 0 - NO PAIN

## 2024-12-29 NOTE — PROGRESS NOTES
"Kalyan Armstrong is a 74 y.o. male on day 10 of admission presenting with Generalized abdominal pain.    Assessment/Plan        Assessment & Plan  Generalized abdominal pain    Unspecified severe protein-calorie malnutrition (Multi)    Acute on chronic renal failure (CMS-HCC)    Chronic obstructive pulmonary disease, unspecified COPD type (Multi)  Thrombocytopenia  Anemia  - has started dialysis, likely to continue in the community  - looks like he will need new facility; looking at [facility]  - hold norvasc due to soft BP  - will make oxy prn (not scheduled), since he has minimal pain at this point and seems to get irritable after the oxy  - cause of weight loss not entirely clear  - noted downtrend of platelets; will need to hold heparin and check PF4           Subjective   Doing ok. Appetite decent. No pain. No other complaints at this time.         Objective     Physical Exam  Cardiovascular:      Rate and Rhythm: Normal rate and regular rhythm.      Heart sounds: Normal heart sounds.   Pulmonary:      Breath sounds: Normal breath sounds.   Abdominal:      General: Bowel sounds are normal.      Palpations: Abdomen is soft.   Musculoskeletal:         General: Normal range of motion.   Neurological:      General: No focal deficit present.      Mental Status: He is alert and oriented to person, place, and time.   Psychiatric:         Mood and Affect: Mood normal.         Last Recorded Vitals  Blood pressure 126/74, pulse 87, temperature 36.4 °C (97.5 °F), temperature source Axillary, resp. rate 16, height 1.753 m (5' 9\"), weight 45.4 kg (100 lb 1.4 oz), SpO2 96%.  Intake/Output last 3 Shifts:  I/O last 3 completed shifts:  In: 800 (17.6 mL/kg) [P.O.:200; I.V.:200 (4.4 mL/kg); Other:400]  Out: 600 (13.2 mL/kg) [Urine:200 (0.1 mL/kg/hr); Other:400]  Weight: 45.4 kg     Relevant Results              This patient has a central line   Reason for the central line remaining today? Dialysis/Hemapheresis            Malnutrition " Diagnosis Status: New  Malnutrition Diagnosis: Severe malnutrition related to chronic disease or condition  As Evidenced by: > 20% weight loss in 1 year, prolonged poor intake prior to hospital admit of < 75% of estimated energy needs in > 1 month, severe subcutaneous fat loss and severe muscle wasting present  I agree with the dietitian's malnutrition diagnosis.            I spent 40 minutes in the professional and overall care of this patient.      Natanael Tovar MD

## 2024-12-29 NOTE — PROGRESS NOTES
Pt FOC is [facility]. [facility] unable to accept.  Referral sent to [facility] for review in [facility].

## 2024-12-29 NOTE — PROGRESS NOTES
Kalyan Armstrong is a 74 y.o. male on day 10 of admission presenting with Generalized abdominal pain.      Subjective   Seen and examined.   Uneventful dialysis yesterday.   ZAKI. He does not offer specific complaints.        Objective          Vitals 24HR  Heart Rate:  []   Temp:  [36.3 °C (97.3 °F)-37.3 °C (99.1 °F)]   Resp:  [16-18]   BP: (100-126)/(72-80)   SpO2:  [96 %-100 %]     Intake/Output last 3 Shifts:    Intake/Output Summary (Last 24 hours) at 12/29/2024 1200  Last data filed at 12/28/2024 1204  Gross per 24 hour   Intake 600 ml   Output 400 ml   Net 200 ml       Physical Exam  Constitutional:       Appearance: Cachexia  HENT:      Mouth: Mucous membranes are moist.   Eyes:      Extraocular Movements: Extraocular movements intact.      Pupils: Pupils are equal, round, and reactive to light.   Cardiovascular:      Rate and Rhythm: Regular rhythm.      Heart sounds: S1 normal and S2 normal.  No rub  Pulmonary:      Breath sounds: Mildly coarse breath sounds   Abdominal:      Comments: Soft, NT/ND, no masses, normal bowel sounds   Genitourinary:     Comments: No zapata  Musculoskeletal:      Right lower leg: No edema.      Left lower leg: No edema.   Skin:     General: Skin is warm and dry.   Neurological:      General: No focal deficit present.      Mental Status: She is alert and oriented to person, place, and time.   Psychiatric:         Behavior: Behavior normal.    Scheduled Medications  [Held by provider] amLODIPine, 10 mg, oral, Daily  azithromycin, 250 mg, oral, Daily  budesonide, 0.25 mg, nebulization, BID  epoetin jamil or biosimilar, 100 Units/kg, subcutaneous, Every Mon/Wed/Fri  heparin (porcine), 5,000 Units, subcutaneous, q8h GULSHAN  heparin, 2,000 Units, intra-catheter, After Dialysis  heparin, 2,000 Units, intra-catheter, After Dialysis  ipratropium, 0.5 mg, nebulization, TID  levalbuterol, 0.63 mg, nebulization, TID  oxyCODONE, 5 mg, oral, TID  polyethylene glycol, 17 g, oral,  Daily  predniSONE, 5 mg, oral, Daily  rosuvastatin, 10 mg, oral, Daily      Continuous medications       PRN medications: acetaminophen **OR** acetaminophen **OR** acetaminophen, albuterol, dextrose, dextrose, glucagon, glucagon, melatonin, ondansetron ODT **OR** ondansetron, oxygen, triamcinolone     Relevant Results  Results from last 7 days   Lab Units 12/29/24  0532 12/28/24  0634 12/27/24  0506   WBC AUTO x10*3/uL 6.5 7.6 6.7   HEMOGLOBIN g/dL 8.3* 10.0* 9.7*   HEMATOCRIT % 25.0* 31.4* 28.8*   PLATELETS AUTO x10*3/uL 89* 116* 144*   NEUTROS PCT AUTO % 73.0 78.5 77.9   LYMPHS PCT AUTO % 13.3 10.4 9.0   MONOS PCT AUTO % 8.7 7.3 6.3   EOS PCT AUTO % 3.6 2.4 5.4     Results from last 7 days   Lab Units 12/29/24  0532 12/28/24  0634 12/27/24  0506   SODIUM mmol/L 133* 136 135*   POTASSIUM mmol/L 5.0 5.2 5.2   CHLORIDE mmol/L 96* 98 96*   CO2 mmol/L 32 30 32   BUN mg/dL 28* 33* 35*   CREATININE mg/dL 3.87* 5.22* 5.66*   GLUCOSE mg/dL 83 82 72*   CALCIUM mg/dL 8.8 9.3 10.3       CT head wo IV contrast   Final Result   No acute intracranial bleed or focal mass effect.        Mild-to-moderate volume loss.        Mild chronic white matter ischemic disease in the deep   periventricular regions.        MACRO:   None        Signed by: Jaya Fernandez 12/27/2024 1:02 PM   Dictation workstation:   [id number]      IR CVC tunneled   Final Result   1.  Uncomplicated placement of a  right tunneled hemodialysis central   venous catheter . Flushed and ready to use.             Performed and dictated at [facility].        MACRO:   None        Signed by: Mike Basilio 12/26/2024 3:00 PM   Dictation workstation:   [id number]      CT chest abdomen pelvis wo IV contrast   Final Result   No acute cardiopulmonary abnormality. Pulmonary emphysema.   Cholelithiasis. Diverticulosis coli.   Signed by Drake Hernandez MD               Assessment/Plan      Kalyan Armstrong is a 74 y.o. male with a past medical  history of HFpEF with an LVEF of 50% on echo in 2020, moderate pulmonary hypertension, severe emphysematous changes of the lung treated with prophylactic azithromycin, Trelegy and albuterol , chronic hypoxic respiratory failure requiring 3-4 LPM of supplemental O2, hypertension, chronic hepatitis C with negative viral loads who initially suffered an acute kidney injury back in August who unfortunately has progressed to end-stage kidney disease.  Unfortunately he has had anorexia, significant weight loss with severe malnutrition.  He presented this admission with abdominal and chest pressure for 1 month, worse in the last 2 weeks.  He was seen by nephrology.  He had a dialysis line placed on 12/26 and initiated intermittent hemodialysis on 12/27.  He was dialyzed again on 12/28.  I will dialyze him again tomorrow.  He is planned for rehab placement afterward.  We will plan to transition him to peritoneal dialysis in the near future.  Nephrology will follow while in house.  Assessment & Plan  Generalized abdominal pain    Unspecified severe protein-calorie malnutrition (Multi)    Unintentional weight loss    Anorexia    Failure to thrive in adult    Acute on chronic renal failure (CMS-HCC)         I spent 40 minutes in the professional and overall care of this patient.      Barrington Xiong, DO

## 2024-12-29 NOTE — CARE PLAN
The patient's goals for the shift include Rest.    The clinical goals for the shift include pt will remain safe throughout the shift    Over the shift, the patient did make progress toward the following goals.

## 2024-12-29 NOTE — CARE PLAN
The patient's goals for the shift include Rest.    The clinical goals for the shift include pt will remain free from injury this shift      Problem: Pain - Adult  Goal: Verbalizes/displays adequate comfort level or baseline comfort level  Outcome: Progressing     Problem: Safety - Adult  Goal: Free from fall injury  Outcome: Progressing     Problem: Fall/Injury  Goal: Not fall by end of shift  Outcome: Progressing  Goal: Be free from injury by end of the shift  Outcome: Progressing     Problem: Pain  Goal: Takes deep breaths with improved pain control throughout the shift  Outcome: Progressing  Goal: Performs ADL's with improved pain control throughout shift  Outcome: Progressing  Goal: Free from opioid side effects throughout the shift  Outcome: Progressing  Goal: Free from acute confusion related to pain meds throughout the shift  Outcome: Progressing

## 2024-12-30 ENCOUNTER — APPOINTMENT (OUTPATIENT)
Dept: DIALYSIS | Facility: HOSPITAL | Age: 74
End: 2024-12-30
Payer: MEDICARE

## 2024-12-30 VITALS
BODY MASS INDEX: 14.82 KG/M2 | SYSTOLIC BLOOD PRESSURE: 107 MMHG | HEART RATE: 89 BPM | WEIGHT: 100.09 LBS | RESPIRATION RATE: 18 BRPM | TEMPERATURE: 97.7 F | DIASTOLIC BLOOD PRESSURE: 70 MMHG | HEIGHT: 69 IN | OXYGEN SATURATION: 98 %

## 2024-12-30 LAB
ANION GAP SERPL CALC-SCNC: 11 MMOL/L (ref 10–20)
BASOPHILS # BLD AUTO: 0.04 X10*3/UL (ref 0–0.1)
BASOPHILS NFR BLD AUTO: 0.6 %
BUN SERPL-MCNC: 38 MG/DL (ref 6–23)
CALCIUM SERPL-MCNC: 9.7 MG/DL (ref 8.6–10.3)
CHLORIDE SERPL-SCNC: 95 MMOL/L (ref 98–107)
CO2 SERPL-SCNC: 30 MMOL/L (ref 21–32)
CREAT SERPL-MCNC: 4.86 MG/DL (ref 0.5–1.3)
EGFRCR SERPLBLD CKD-EPI 2021: 12 ML/MIN/1.73M*2
EOSINOPHIL # BLD AUTO: 0.18 X10*3/UL (ref 0–0.4)
EOSINOPHIL NFR BLD AUTO: 2.7 %
ERYTHROCYTE [DISTWIDTH] IN BLOOD BY AUTOMATED COUNT: 15.5 % (ref 11.5–14.5)
ERYTHROCYTE [DISTWIDTH] IN BLOOD BY AUTOMATED COUNT: 15.6 % (ref 11.5–14.5)
GLUCOSE SERPL-MCNC: 83 MG/DL (ref 74–99)
HCT VFR BLD AUTO: 25.4 % (ref 41–52)
HCT VFR BLD AUTO: 25.8 % (ref 41–52)
HGB BLD-MCNC: 8.5 G/DL (ref 13.5–17.5)
HGB BLD-MCNC: 8.6 G/DL (ref 13.5–17.5)
IMM GRANULOCYTES # BLD AUTO: 0.05 X10*3/UL (ref 0–0.5)
IMM GRANULOCYTES NFR BLD AUTO: 0.7 % (ref 0–0.9)
INR PPP: 0.9 (ref 0.9–1.1)
LAB AP ASR DISCLAIMER: NORMAL
LABORATORY COMMENT REPORT: NORMAL
LABORATORY COMMENT REPORT: NORMAL
LYMPHOCYTES # BLD AUTO: 0.73 X10*3/UL (ref 0.8–3)
LYMPHOCYTES NFR BLD AUTO: 10.8 %
MCH RBC QN AUTO: 29 PG (ref 26–34)
MCH RBC QN AUTO: 29.5 PG (ref 26–34)
MCHC RBC AUTO-ENTMCNC: 32.9 G/DL (ref 32–36)
MCHC RBC AUTO-ENTMCNC: 33.9 G/DL (ref 32–36)
MCV RBC AUTO: 87 FL (ref 80–100)
MCV RBC AUTO: 88 FL (ref 80–100)
MONOCYTES # BLD AUTO: 0.61 X10*3/UL (ref 0.05–0.8)
MONOCYTES NFR BLD AUTO: 9.1 %
NEUTROPHILS # BLD AUTO: 5.12 X10*3/UL (ref 1.6–5.5)
NEUTROPHILS NFR BLD AUTO: 76.1 %
NRBC BLD-RTO: 0 /100 WBCS (ref 0–0)
NRBC BLD-RTO: 0 /100 WBCS (ref 0–0)
PATH REPORT.COMMENTS IMP SPEC: NORMAL
PATH REPORT.FINAL DX SPEC: NORMAL
PATH REPORT.FINAL DX SPEC: NORMAL
PATH REPORT.GROSS SPEC: NORMAL
PATH REPORT.GROSS SPEC: NORMAL
PATH REPORT.RELEVANT HX SPEC: NORMAL
PATH REPORT.TOTAL CANCER: NORMAL
PATH REPORT.TOTAL CANCER: NORMAL
PLATELET # BLD AUTO: 102 X10*3/UL (ref 150–450)
PLATELET # BLD AUTO: 108 X10*3/UL (ref 150–450)
POTASSIUM SERPL-SCNC: 4.4 MMOL/L (ref 3.5–5.3)
PROTHROMBIN TIME: 10.2 SECONDS (ref 9.8–12.8)
RBC # BLD AUTO: 2.92 X10*6/UL (ref 4.5–5.9)
RBC # BLD AUTO: 2.93 X10*6/UL (ref 4.5–5.9)
SODIUM SERPL-SCNC: 132 MMOL/L (ref 136–145)
WBC # BLD AUTO: 6.7 X10*3/UL (ref 4.4–11.3)
WBC # BLD AUTO: 7 X10*3/UL (ref 4.4–11.3)

## 2024-12-30 PROCEDURE — 85025 COMPLETE CBC W/AUTO DIFF WBC: CPT

## 2024-12-30 PROCEDURE — 85027 COMPLETE CBC AUTOMATED: CPT | Performed by: INTERNAL MEDICINE

## 2024-12-30 PROCEDURE — 85610 PROTHROMBIN TIME: CPT | Performed by: INTERNAL MEDICINE

## 2024-12-30 PROCEDURE — 6350000001 HC RX 635 EPOETIN >10,000 UNITS: Performed by: INTERNAL MEDICINE

## 2024-12-30 PROCEDURE — 92610 EVALUATE SWALLOWING FUNCTION: CPT | Mod: GN | Performed by: SPEECH-LANGUAGE PATHOLOGIST

## 2024-12-30 PROCEDURE — 80048 BASIC METABOLIC PNL TOTAL CA: CPT

## 2024-12-30 PROCEDURE — 36415 COLL VENOUS BLD VENIPUNCTURE: CPT | Performed by: INTERNAL MEDICINE

## 2024-12-30 PROCEDURE — 2500000004 HC RX 250 GENERAL PHARMACY W/ HCPCS (ALT 636 FOR OP/ED): Performed by: PHYSICIAN ASSISTANT

## 2024-12-30 PROCEDURE — 99239 HOSP IP/OBS DSCHRG MGMT >30: CPT | Performed by: INTERNAL MEDICINE

## 2024-12-30 PROCEDURE — 2500000004 HC RX 250 GENERAL PHARMACY W/ HCPCS (ALT 636 FOR OP/ED): Performed by: INTERNAL MEDICINE

## 2024-12-30 PROCEDURE — 94640 AIRWAY INHALATION TREATMENT: CPT

## 2024-12-30 PROCEDURE — 2500000002 HC RX 250 W HCPCS SELF ADMINISTERED DRUGS (ALT 637 FOR MEDICARE OP, ALT 636 FOR OP/ED): Performed by: PHYSICIAN ASSISTANT

## 2024-12-30 PROCEDURE — 2500000002 HC RX 250 W HCPCS SELF ADMINISTERED DRUGS (ALT 637 FOR MEDICARE OP, ALT 636 FOR OP/ED): Performed by: STUDENT IN AN ORGANIZED HEALTH CARE EDUCATION/TRAINING PROGRAM

## 2024-12-30 PROCEDURE — 8010000001 HC DIALYSIS - HEMODIALYSIS PER DAY

## 2024-12-30 PROCEDURE — 2500000002 HC RX 250 W HCPCS SELF ADMINISTERED DRUGS (ALT 637 FOR MEDICARE OP, ALT 636 FOR OP/ED): Performed by: INTERNAL MEDICINE

## 2024-12-30 PROCEDURE — 2500000002 HC RX 250 W HCPCS SELF ADMINISTERED DRUGS (ALT 637 FOR MEDICARE OP, ALT 636 FOR OP/ED)

## 2024-12-30 RX ORDER — PREDNISONE 5 MG/1
5 TABLET ORAL DAILY
Start: 2024-12-30

## 2024-12-30 RX ORDER — AMLODIPINE BESYLATE 10 MG/1
5 TABLET ORAL DAILY
Start: 2024-12-30

## 2024-12-30 RX ADMIN — HEPARIN SODIUM 2000 UNITS: 1000 INJECTION INTRAVENOUS; SUBCUTANEOUS at 11:56

## 2024-12-30 RX ADMIN — AZITHROMYCIN 250 MG: 250 TABLET, FILM COATED ORAL at 12:28

## 2024-12-30 RX ADMIN — EPOETIN ALFA-EPBX 4000 UNITS: 10000 INJECTION, SOLUTION INTRAVENOUS; SUBCUTANEOUS at 17:50

## 2024-12-30 RX ADMIN — IPRATROPIUM BROMIDE 0.5 MG: 0.5 SOLUTION RESPIRATORY (INHALATION) at 19:17

## 2024-12-30 RX ADMIN — PREDNISONE 5 MG: 5 TABLET ORAL at 12:28

## 2024-12-30 RX ADMIN — ROSUVASTATIN 10 MG: 10 TABLET, FILM COATED ORAL at 12:28

## 2024-12-30 RX ADMIN — BUDESONIDE 0.25 MG: 0.25 INHALANT RESPIRATORY (INHALATION) at 19:12

## 2024-12-30 ASSESSMENT — COGNITIVE AND FUNCTIONAL STATUS - GENERAL
CLIMB 3 TO 5 STEPS WITH RAILING: TOTAL
DRESSING REGULAR UPPER BODY CLOTHING: A LITTLE
STANDING UP FROM CHAIR USING ARMS: A LITTLE
HELP NEEDED FOR BATHING: A LITTLE
DAILY ACTIVITIY SCORE: 19
MOVING TO AND FROM BED TO CHAIR: A LITTLE
TURNING FROM BACK TO SIDE WHILE IN FLAT BAD: A LITTLE
WALKING IN HOSPITAL ROOM: A LOT
TOILETING: A LITTLE
PERSONAL GROOMING: A LITTLE
DRESSING REGULAR LOWER BODY CLOTHING: A LITTLE
MOVING FROM LYING ON BACK TO SITTING ON SIDE OF FLAT BED WITH BEDRAILS: A LITTLE
MOBILITY SCORE: 15

## 2024-12-30 ASSESSMENT — PAIN - FUNCTIONAL ASSESSMENT
PAIN_FUNCTIONAL_ASSESSMENT: NO/DENIES PAIN
PAIN_FUNCTIONAL_ASSESSMENT: NO/DENIES PAIN
PAIN_FUNCTIONAL_ASSESSMENT: 0-10

## 2024-12-30 ASSESSMENT — PAIN SCALES - GENERAL
PAINLEVEL_OUTOF10: 0 - NO PAIN

## 2024-12-30 NOTE — PROGRESS NOTES
Speech-Language Pathology                 Therapy Communication Note    Patient Name: Kalyan Armstrong  MRN: [id number]  Department: [place] [id number]  Room: [address] [place] [address]  Today's Date: 12/30/2024     Discipline: Speech Language Pathology          Missed Visit Reason: Missed Time Reason: Unavailable (Comment)    Missed Time: Attempt    Comment:  Patient was at dialysis

## 2024-12-30 NOTE — PROGRESS NOTES
Physical Therapy                 Therapy Communication Note    Patient Name: Kalyan Armstrong  MRN: [id number]  Department: [facility]  Room: [address] [place] [address]A  Today's Date: 12/30/2024     Discipline: Physical Therapy    PT Missed Visit: Yes     Missed Visit Reason: Missed Visit Reason: Patient in a medical procedure (Attempted PT f/u tx, per chart review and RN report pt currently off the floor at hospitals)    Missed Time: Attempt    Comment:

## 2024-12-30 NOTE — POST-PROCEDURE NOTE
Report to Receiving RN:    Report To: Jeanie  Time Report Called: 1200  Hand-Off Communication: pt stable tolerated tx well, no fluid removal, post /94 HR 94  Complications During Treatment: No  Ultrafiltration Treatment: No  Medications Administered During Dialysis: No  Blood Products Administered During Dialysis: No  Labs Sent During Dialysis: No  Heparin Drip Rate Changes: No  Dialysis Catheter Dressing: C/D/I  Last Dressing Change: 12/26/2024    Electronic Signatures:  Harvey Pichardo RN (Signed )   Authored:    (Signed )   Authored:     Last Updated: 12:06 PM by HARVEY PICHARDO

## 2024-12-30 NOTE — PROGRESS NOTES
12/30/24 0812   Discharge Planning   Expected Discharge Disposition SNF     Per notes, patient's wife had chosen [facility], [facility] and [facility] are not able to accept this patient, per notes in [facility] facility is able to accept they are waiting on acceptance from [facility], patient will not need auth has medicare , I will send clinical updates to facility in [facility].    12:13 [facility] is able to accept patient today per notes in [facility], they received approval for HD, it is good for 5 days, provider has been updated, facility is able to accept patient today I will watch for dc orders.    14:44 [facility], I have contacted facility they are able to accept today I did request transport through the [facility] for 4pm, report # [phone], I have also updated the patient's wife in regards to discharge planing today and updated the bedside nurse with above information.

## 2024-12-30 NOTE — CARE PLAN
The clinical goals for the shift include pt remain free from falls & hds    Over the shift, the patient is making progress toward the following goals.       Problem: Safety - Adult  Goal: Free from fall injury  Outcome: Progressing     Problem: Discharge Planning  Goal: Discharge to home or other facility with appropriate resources  Outcome: Progressing     Problem: Chronic Conditions and Co-morbidities  Goal: Patient's chronic conditions and co-morbidity symptoms are monitored and maintained or improved  Outcome: Progressing

## 2024-12-30 NOTE — CARE PLAN
The patient's goals for the shift include Rest.    The clinical goals for the shift include pt free from fall & hds    Over the shift, the patient did not make progress toward the following goals. Barriers to progression include     Problem: Pain - Adult  Goal: Verbalizes/displays adequate comfort level or baseline comfort level  Outcome: Progressing  Flowsheets (Taken 12/30/2024 1448)  Verbalizes/displays adequate comfort level or baseline comfort level:   Encourage patient to monitor pain and request assistance   Assess pain using appropriate pain scale   Administer analgesics based on type and severity of pain and evaluate response   Implement non-pharmacological measures as appropriate and evaluate response   Consider cultural and social influences on pain and pain management   Notify Licensed Independent Practitioner if interventions unsuccessful or patient reports new pain     Problem: Safety - Adult  Goal: Free from fall injury  Outcome: Progressing  Flowsheets (Taken 12/30/2024 1448)  Free from fall injury: Instruct family/caregiver on patient safety     Problem: Discharge Planning  Goal: Discharge to home or other facility with appropriate resources  Outcome: Progressing  Flowsheets (Taken 12/30/2024 1448)  Discharge to home or other facility with appropriate resources:   Identify barriers to discharge with patient and caregiver   Arrange for needed discharge resources and transportation as appropriate   Identify discharge learning needs (meds, wound care, etc)   Arrange for interpreters to assist at discharge as needed   Refer to discharge planning if patient needs post-hospital services based on physician order or complex needs related to functional status, cognitive ability or social support system     Problem: Chronic Conditions and Co-morbidities  Goal: Patient's chronic conditions and co-morbidity symptoms are monitored and maintained or improved  Outcome: Progressing  Flowsheets (Taken 12/30/2024  7257)  Care Plan - Patient's Chronic Conditions and Co-Morbidity Symptoms are Monitored and Maintained or Improved:   Monitor and assess patient's chronic conditions and comorbid symptoms for stability, deterioration, or improvement   Collaborate with multidisciplinary team to address chronic and comorbid conditions and prevent exacerbation or deterioration   Update acute care plan with appropriate goals if chronic or comorbid symptoms are exacerbated and prevent overall improvement and discharge     Problem: Fall/Injury  Goal: Not fall by end of shift  Outcome: Progressing  Goal: Be free from injury by end of the shift  Outcome: Progressing     Problem: Pain  Goal: Takes deep breaths with improved pain control throughout the shift  Outcome: Progressing  Goal: Performs ADL's with improved pain control throughout shift  Outcome: Progressing  Goal: Free from opioid side effects throughout the shift  Outcome: Progressing  Goal: Free from acute confusion related to pain meds throughout the shift  Outcome: Progressing     Problem: Nutrition  Goal: Lab values WNL  Outcome: Progressing  Goal: Electrolytes WNL  Outcome: Progressing  Goal: Promote healing  Outcome: Progressing  Goal: Gradual weight gain  Outcome: Progressing  Goal: Oral intake greater 75%  Outcome: Progressing  Goal: Consume prescribed supplement  Outcome: Progressing  Goal: Adequate PO fluid intake  Outcome: Progressing     Problem: Skin  Goal: Decreased wound size/increased tissue granulation at next dressing change  Outcome: Progressing  Flowsheets (Taken 12/30/2024 1449)  Decreased wound size/increased tissue granulation at next dressing change:   Promote sleep for wound healing   Utilize specialty bed per algorithm   Protective dressings over bony prominences  Goal: Participates in plan/prevention/treatment measures  Outcome: Progressing  Flowsheets (Taken 12/30/2024 1444)  Participates in plan/prevention/treatment measures:   Discuss with provider  PT/OT consult   Elevate heels   Increase activity/out of bed for meals  Goal: Prevent/manage excess moisture  Outcome: Progressing  Flowsheets (Taken 12/30/2024 1448)  Prevent/manage excess moisture:   Cleanse incontinence/protect with barrier cream   Moisturize dry skin   Follow provider orders for dressing changes   Monitor for/manage infection if present  Goal: Prevent/minimize sheer/friction injuries  Outcome: Progressing  Flowsheets (Taken 12/30/2024 1448)  Prevent/minimize sheer/friction injuries:   Complete micro-shifts as needed if patient unable. Adjust patient position to relieve pressure points, not a full turn   Increase activity/out of bed for meals   Use pull sheet   HOB 30 degrees or less   Turn/reposition every 2 hours/use positioning/transfer devices   Utilize specialty bed per algorithm  Goal: Promote/optimize nutrition  Outcome: Progressing  Flowsheets (Taken 12/30/2024 1448)  Promote/optimize nutrition:   Assist with feeding   Monitor/record intake including meals   Consume > 50% meals/supplements   Offer water/supplements/favorite foods   Discuss with provider if NPO > 2 days   Reassess MST if dietician not consulted  Goal: Promote skin healing  Outcome: Progressing  Flowsheets (Taken 12/30/2024 1448)  Promote skin healing:   Assess skin/pad under line(s)/device(s)   Protective dressings over bony prominences   Turn/reposition every 2 hours/use positioning/transfer devices   Ensure correct size (line/device) and apply per  instructions   Rotate device position/do not position patient on device

## 2024-12-30 NOTE — PROGRESS NOTES
Occupational Therapy                 Therapy Communication Note    Patient Name: Kalyan Armstrong  MRN: [id number]  Department: [facility]  Room: [address] [place] [address]  Today's Date: 12/30/2024     Discipline: Occupational Therapy    OT Missed Visit: Yes     Missed Visit Reason: Missed Visit Reason: Patient refused (Attempted to see pt for OT tx. PEr pt he is supposeed to leasve this afternoon and he beck not want to work with therapy at this time due to fatigue post dialysis)    Missed Time: Attempt

## 2024-12-30 NOTE — DISCHARGE SUMMARY
"Admitting Provider: Brayden Giordano MD  Discharge Provider: Natanael Tovar MD  Primary Care Physician at Discharge: Rosa LICEA NabeelDO marily [phone]   Admission Date: 12/19/2024     Discharge Date: 12/30/2024  Current Planned Discharge Disposition: SNF    Discharge Diagnoses  Assessment & Plan  Generalized abdominal pain    Unspecified severe protein-calorie malnutrition (Multi)    Acute on chronic renal failure (CMS-HCC)    Chronic obstructive pulmonary disease, unspecified COPD type (Multi)  Thrombocytopenia  Anemia    Hospital Course  74 yoM with failure to thrive.    EGD and colonoscopy did not find a cause of his weight loss. His kidney function did not improve much with IV fluids, so renal initiated dialysis. Tunneled catheter was placed; he will follow up with surgeon to discuss peritoneal dialysis. He began to feel overall a bit better. He will continue dialysis on discharge. Of note, platelets dipped for a few days and then began to recover; no clinical evidence of VTE; this is felt less likely to be related to heparin although PF4 was sent and is pending. Discharged to SNF in stable condition.     Test Results Pending At Discharge  Pending Labs       Order Current Status    Extra Urine Gray Tube Collected (12/20/24 0108)    Anti-Plt Factor 4 AB with Serotonin Release Assay Reflex In process    Anti-Plt Factor 4 AB with Serotonin Release Assay Reflex In process    Urinalysis with Reflex Culture and Microscopic In process            Pertinent Physical Exam At Time of Discharge  /75 (BP Location: Right arm, Patient Position: Lying)   Pulse 60   Temp 37.3 °C (99.1 °F) (Temporal)   Resp 17   Ht 1.753 m (5' 9\")   Wt 45.4 kg (100 lb 1.4 oz)   SpO2 97%   BMI 14.78 kg/m²   Physical Exam  Cardiovascular:      Rate and Rhythm: Normal rate and regular rhythm.      Heart sounds: Normal heart sounds.   Pulmonary:      Breath sounds: Normal breath sounds.   Abdominal:      General: Bowel sounds are " normal.      Palpations: Abdomen is soft.   Musculoskeletal:         General: Normal range of motion.   Neurological:      General: No focal deficit present.      Mental Status: He is alert and oriented to person, place, and time.   Psychiatric:         Mood and Affect: Mood normal.         Home Medications     Medication List      CHANGE how you take these medications     amLODIPine 10 mg tablet; Commonly known as: Norvasc; Take 0.5 tablets (5   mg) by mouth once daily.; What changed: how much to take   azithromycin 250 mg tablet; Commonly known as: Zithromax; One PO every   day; What changed: how much to take, how to take this, when to take this,   additional instructions     CONTINUE taking these medications     acetaminophen 325 mg tablet; Commonly known as: Tylenol   * albuterol 90 mcg/actuation inhaler   * albuterol 2.5 mg /3 mL (0.083 %) nebulizer solution   budesonide 0.5 mg/2 mL nebulizer solution; Commonly known as: Pulmicort   Multiple Vitamins tablet; Generic drug: multivitamin   predniSONE 5 mg tablet; Commonly known as: Deltasone; Take 1 tablet (5   mg) by mouth once daily.   rosuvastatin 40 mg tablet; Commonly known as: Crestor; Take 1 tablet (40   mg) by mouth once daily.   Trelegy Ellipta 100-62.5-25 mcg blister with device; Generic drug:   fluticasone-umeclidin-vilanter; Inhale 1 puff once daily.   triamcinolone 0.1 % cream; Commonly known as: Kenalog; Apply topically 2   times a day. Apply to affected area 1-2 times daily as needed.  * This list has 2 medication(s) that are the same as other medications   prescribed for you. Read the directions carefully, and ask your doctor or   other care provider to review them with you.     STOP taking these medications     cholecalciferol 50,000 unit capsule; Commonly known as: Vitamin D-3       Outpatient Follow-Up  Future Appointments   Date Time Provider Department Center   1/6/2025  1:40 PM Zoran Infante MD [id number] [facility]   1/31/2025  9:30 AM Dorys  CARMEN Herndon MD [id number] [facility]   2/10/2025  2:00 PM Rosa Lees DO [id number] [facility]   2/17/2025  1:40 PM Rosa Lees DO [id number] [facility]       Natanael Tovar MD    I spent more than 30 min coordinating this patient's discharge.

## 2024-12-30 NOTE — PROGRESS NOTES
Occupational Therapy                 Therapy Communication Note    Patient Name: Kalyan Armstrong  MRN: [id number]  Department: [facility]  Room: [id number]-[id number]  Today's Date: 12/30/2024     Discipline: Occupational Therapy    OT Missed Visit: Yes     Missed Visit Reason: Missed Visit Reason: Patient in a medical procedure (pt currently in dialysis at this time inable to be seen by OT)    Missed Time: Attempt

## 2024-12-30 NOTE — CARE PLAN
The patient's goals for the shift include Rest.    The clinical goals for the shift include pt will remain free from injury this shift      Problem: Pain - Adult  Goal: Verbalizes/displays adequate comfort level or baseline comfort level  Outcome: Progressing     Problem: Safety - Adult  Goal: Free from fall injury  Outcome: Progressing     Problem: Discharge Planning  Goal: Discharge to home or other facility with appropriate resources  Outcome: Progressing     Problem: Chronic Conditions and Co-morbidities  Goal: Patient's chronic conditions and co-morbidity symptoms are monitored and maintained or improved  Outcome: Progressing     Problem: Fall/Injury  Goal: Not fall by end of shift  Outcome: Progressing  Goal: Be free from injury by end of the shift  Outcome: Progressing

## 2024-12-30 NOTE — PROCEDURES
Patient seen on dialysis.  This is his third dialysis treatment.  He is dialyzing on an F160 x 3 hours, BFR//600 mL/minute, 2K bath, 2.5 calcium with no ultrafiltration.  Erythropoietin is ordered.  He is tolerating dialysis, continue per submitted orders.

## 2024-12-30 NOTE — PROGRESS NOTES
Speech-Language Pathology    SLP Adult Inpatient Speech-Language Pathology Clinical Swallow Evaluation    Patient Name: Kalyan Armstrong  MRN: [id number]  Today's Date: 12/30/2024             Current Problem:   1. Abdominal pain  Surgical Pathology Exam    Surgical Pathology Exam    Surgical Pathology Exam    Surgical Pathology Exam      2. Anorexia  Esophagogastroduodenoscopy (EGD)    Esophagogastroduodenoscopy (EGD)    Surgical Pathology Exam    Surgical Pathology Exam    Surgical Pathology Exam    Surgical Pathology Exam      3. Failure to thrive in adult  Esophagogastroduodenoscopy (EGD)    Esophagogastroduodenoscopy (EGD)    Surgical Pathology Exam    Surgical Pathology Exam    Surgical Pathology Exam    Surgical Pathology Exam      4. Unintentional weight loss  Esophagogastroduodenoscopy (EGD)    Esophagogastroduodenoscopy (EGD)    Surgical Pathology Exam    Surgical Pathology Exam    Colonoscopy Diagnostic    Colonoscopy Diagnostic    Surgical Pathology Exam    Surgical Pathology Exam      5. Generalized abdominal pain  Esophagogastroduodenoscopy (EGD)    Esophagogastroduodenoscopy (EGD)    Surgical Pathology Exam    Surgical Pathology Exam    Colonoscopy Diagnostic    Colonoscopy Diagnostic    Surgical Pathology Exam    Surgical Pathology Exam      6. Mobility impaired  Surgical Pathology Exam    Surgical Pathology Exam    Surgical Pathology Exam    Surgical Pathology Exam      7. Chronic obstructive pulmonary disease, unspecified COPD type (Multi)  predniSONE (Deltasone) 5 mg tablet      8. Elevated LFTs  amLODIPine (Norvasc) 10 mg tablet    Referral to General Surgery              Subjective   Current Problem: Failure to thrive  Pt alert, cooperative. Caregiver at bedside.     Oral/Motor Assessment:  Dentition: Edentulous  Oral Motor: Within Functional Limits  Labial ROM: Within Functional Limits  Vocal Quality: Within Functional Limits  Intelligibility: Intelligible  Breath Support: Adequate for  speech  Hearing: Within Functional Limits      Consistencies Trialed:  Consistencies Trialed: Yes  Consistencies Trialed: Thin (IDDSI Level 0) - Straw, Regular (IDDSI Level 7)    Clinical Observations:  Patient Positioning: Upright in Bed  Management of Oral Secretions: Adequate  Clinical Observation Comment: No overt s/s of aspiration noted      General Visit Information:  Patient Class: Inpatient  Living Environment: Nursing home (skilled/long-term)  Reason for Referral: Swallow evaluation  Past Medical History Relevant to Rehab: Failure to thrive: 74-year-old male with a history of COPD on 3 L around-the-clock and chronic kidney disease presents for diffuse abdominal pain.  He states that his pain has been intermittent for the past month and his family wanted the patient to come to the ED for evaluation for possible failure to thrive.  He cites no provocative or palliative factors and reports that his pain occasionally radiates into his chest.  His shortness of breath is at his baseline and he denies fever, chills, dysuria, diarrhea and rectal bleeding  Prior Level of Function: WFL  Developmental Status: Age Appropriate  Patient Seen During This Visit: Yes  Missed Time Reason: Unavailable (Comment)  Prior to Session Communication: Bedside nurse  Current Diet : Regular/thin  Dysphagia Diagnosis: Within functional limits      Objective   Baseline Assessment:  Behavior/Cognition: Alert, Cooperative  Hearing: Within Functional Limits  Baseline Vocal Quality: Normal, Weak      Pain:  Pain Assessment  Pain Assessment: 0-10  0-10 (Numeric) Pain Score: 0 - No pain      Assessment:  Prognosis: Good  Treatment Provided: No  Strengths: Cognition, Family/Caregiver Support  Barriers: Comorbidities, Motivation      Plan:  Inpatient/Swing Bed or Outpatient: Inpatient  SLP Plan: No skilled SLP  Diet Recommendations: Solid, Liquid  Solid Consistency: Regular (IDDSI Level 7)  Liquid Consistency: Thin (IDDSI Level  0)  Patient/Caregiver Agreeable: Yes  SLP - OK to Discharge: Yes    Recommendations:  Solid Diet Recommendations : Regular (IDDSI Level 7)  Liquid Diet Recommendations: Thin (IDDSI Level 0)  Compensatory Swallowing Strategies: Upright 90 degrees as possible for all oral intake, Remain upright for 20-30 minutes after meals, Small bites/sips, Eat/feed slowly      Outpatient Education:  Adult Outpatient Education  Individual(s) Educated: Patient, Caregiver  Verbal Education : Re: slow intake, alternate solids/liquids  Patient/Caregiver Demonstrated Understanding: yes      Pt does not require further ST at this time; discharging to SNF this date, per nursing.

## 2024-12-31 LAB
INTERPRETATION FOR ANTI-PLATELET FACTOR 4 ANTIBODY: NORMAL
SERUM AND PLATELET FACTOR 4: NORMAL

## 2025-01-06 ENCOUNTER — APPOINTMENT (OUTPATIENT)
Dept: CARDIOLOGY | Facility: CLINIC | Age: 75
End: 2025-01-06
Payer: MEDICARE

## 2025-01-10 ENCOUNTER — APPOINTMENT (OUTPATIENT)
Dept: RADIOLOGY | Facility: HOSPITAL | Age: 75
End: 2025-01-10
Payer: MEDICARE

## 2025-01-10 ENCOUNTER — APPOINTMENT (OUTPATIENT)
Dept: CARDIOLOGY | Facility: HOSPITAL | Age: 75
End: 2025-01-10
Payer: MEDICARE

## 2025-01-10 ENCOUNTER — HOSPITAL ENCOUNTER (EMERGENCY)
Facility: HOSPITAL | Age: 75
Discharge: NURSING, REHAB OR LONG-TERM CARE | End: 2025-01-11
Attending: EMERGENCY MEDICINE
Payer: MEDICARE

## 2025-01-10 DIAGNOSIS — R00.1 BRADYCARDIA: Primary | ICD-10-CM

## 2025-01-10 DIAGNOSIS — R06.02 SHORTNESS OF BREATH: ICD-10-CM

## 2025-01-10 LAB
ALBUMIN SERPL BCP-MCNC: 3.7 G/DL (ref 3.4–5)
ALP SERPL-CCNC: 62 U/L (ref 33–136)
ALT SERPL W P-5'-P-CCNC: 18 U/L (ref 10–52)
ANION GAP BLDV CALCULATED.4IONS-SCNC: 12 MMOL/L (ref 10–25)
ANION GAP SERPL CALC-SCNC: 20 MMOL/L (ref 10–20)
AST SERPL W P-5'-P-CCNC: 30 U/L (ref 9–39)
BASE EXCESS BLDV CALC-SCNC: 2.9 MMOL/L (ref -2–3)
BASOPHILS # BLD AUTO: 0.02 X10*3/UL (ref 0–0.1)
BASOPHILS NFR BLD AUTO: 0.2 %
BILIRUB SERPL-MCNC: 0.8 MG/DL (ref 0–1.2)
BODY TEMPERATURE: 37 DEGREES CELSIUS
BUN SERPL-MCNC: 18 MG/DL (ref 6–23)
CA-I BLDV-SCNC: 1.16 MMOL/L (ref 1.1–1.33)
CALCIUM SERPL-MCNC: 9.3 MG/DL (ref 8.6–10.3)
CARDIAC TROPONIN I PNL SERPL HS: 21 NG/L (ref 0–20)
CHLORIDE BLDV-SCNC: 93 MMOL/L (ref 98–107)
CHLORIDE SERPL-SCNC: 94 MMOL/L (ref 98–107)
CO2 SERPL-SCNC: 24 MMOL/L (ref 21–32)
CREAT SERPL-MCNC: 2.89 MG/DL (ref 0.5–1.3)
EGFRCR SERPLBLD CKD-EPI 2021: 22 ML/MIN/1.73M*2
ELECTRONICALLY SIGNED BY: NORMAL
EOSINOPHIL # BLD AUTO: 0.01 X10*3/UL (ref 0–0.4)
EOSINOPHIL NFR BLD AUTO: 0.1 %
ERYTHROCYTE [DISTWIDTH] IN BLOOD BY AUTOMATED COUNT: 18.2 % (ref 11.5–14.5)
FLUAV RNA RESP QL NAA+PROBE: NOT DETECTED
FLUBV RNA RESP QL NAA+PROBE: NOT DETECTED
GLUCOSE BLDV-MCNC: 156 MG/DL (ref 74–99)
GLUCOSE SERPL-MCNC: 120 MG/DL (ref 74–99)
H. PYLORI DRUG SUSCEPTIBILITY RESULTS: NORMAL
HCO3 BLDV-SCNC: 28.8 MMOL/L (ref 22–26)
HCT VFR BLD AUTO: 32 % (ref 41–52)
HCT VFR BLD EST: 24 % (ref 41–52)
HGB BLD-MCNC: 10.5 G/DL (ref 13.5–17.5)
HGB BLDV-MCNC: 8.1 G/DL (ref 13.5–17.5)
IMM GRANULOCYTES # BLD AUTO: 0.13 X10*3/UL (ref 0–0.5)
IMM GRANULOCYTES NFR BLD AUTO: 1.3 % (ref 0–0.9)
INHALED O2 CONCENTRATION: 32 %
LACTATE BLDV-SCNC: 3.1 MMOL/L (ref 0.4–2)
LACTATE BLDV-SCNC: 4.3 MMOL/L (ref 0.4–2)
LYMPHOCYTES # BLD AUTO: 0.68 X10*3/UL (ref 0.8–3)
LYMPHOCYTES NFR BLD AUTO: 6.9 %
MCH RBC QN AUTO: 29.9 PG (ref 26–34)
MCHC RBC AUTO-ENTMCNC: 32.8 G/DL (ref 32–36)
MCV RBC AUTO: 91 FL (ref 80–100)
MONOCYTES # BLD AUTO: 0.74 X10*3/UL (ref 0.05–0.8)
MONOCYTES NFR BLD AUTO: 7.5 %
NEUTROPHILS # BLD AUTO: 8.34 X10*3/UL (ref 1.6–5.5)
NEUTROPHILS NFR BLD AUTO: 84 %
NRBC BLD-RTO: 0 /100 WBCS (ref 0–0)
OXYHGB MFR BLDV: 25.7 % (ref 45–75)
PCO2 BLDV: 51 MM HG (ref 41–51)
PH BLDV: 7.36 PH (ref 7.33–7.43)
PLATELET # BLD AUTO: 127 X10*3/UL (ref 150–450)
PO2 BLDV: 24 MM HG (ref 35–45)
POTASSIUM BLDV-SCNC: 3.3 MMOL/L (ref 3.5–5.3)
POTASSIUM SERPL-SCNC: 4.4 MMOL/L (ref 3.5–5.3)
PROT SERPL-MCNC: 7.6 G/DL (ref 6.4–8.2)
RBC # BLD AUTO: 3.51 X10*6/UL (ref 4.5–5.9)
SAO2 % BLDV: 26 % (ref 45–75)
SARS-COV-2 RNA RESP QL NAA+PROBE: NOT DETECTED
SODIUM BLDV-SCNC: 130 MMOL/L (ref 136–145)
SODIUM SERPL-SCNC: 134 MMOL/L (ref 136–145)
WBC # BLD AUTO: 9.9 X10*3/UL (ref 4.4–11.3)

## 2025-01-10 PROCEDURE — 84484 ASSAY OF TROPONIN QUANT: CPT | Performed by: EMERGENCY MEDICINE

## 2025-01-10 PROCEDURE — 2500000004 HC RX 250 GENERAL PHARMACY W/ HCPCS (ALT 636 FOR OP/ED): Performed by: EMERGENCY MEDICINE

## 2025-01-10 PROCEDURE — 99285 EMERGENCY DEPT VISIT HI MDM: CPT | Mod: 25 | Performed by: EMERGENCY MEDICINE

## 2025-01-10 PROCEDURE — 87636 SARSCOV2 & INF A&B AMP PRB: CPT | Performed by: EMERGENCY MEDICINE

## 2025-01-10 PROCEDURE — 36415 COLL VENOUS BLD VENIPUNCTURE: CPT | Performed by: EMERGENCY MEDICINE

## 2025-01-10 PROCEDURE — 96360 HYDRATION IV INFUSION INIT: CPT | Mod: 59

## 2025-01-10 PROCEDURE — 71275 CT ANGIOGRAPHY CHEST: CPT | Performed by: STUDENT IN AN ORGANIZED HEALTH CARE EDUCATION/TRAINING PROGRAM

## 2025-01-10 PROCEDURE — 2500000002 HC RX 250 W HCPCS SELF ADMINISTERED DRUGS (ALT 637 FOR MEDICARE OP, ALT 636 FOR OP/ED): Performed by: EMERGENCY MEDICINE

## 2025-01-10 PROCEDURE — 80053 COMPREHEN METABOLIC PANEL: CPT | Performed by: EMERGENCY MEDICINE

## 2025-01-10 PROCEDURE — 71046 X-RAY EXAM CHEST 2 VIEWS: CPT

## 2025-01-10 PROCEDURE — 71275 CT ANGIOGRAPHY CHEST: CPT

## 2025-01-10 PROCEDURE — 93005 ELECTROCARDIOGRAM TRACING: CPT

## 2025-01-10 PROCEDURE — 83605 ASSAY OF LACTIC ACID: CPT | Performed by: EMERGENCY MEDICINE

## 2025-01-10 PROCEDURE — 82435 ASSAY OF BLOOD CHLORIDE: CPT | Performed by: EMERGENCY MEDICINE

## 2025-01-10 PROCEDURE — 94640 AIRWAY INHALATION TREATMENT: CPT

## 2025-01-10 PROCEDURE — 85025 COMPLETE CBC W/AUTO DIFF WBC: CPT | Performed by: EMERGENCY MEDICINE

## 2025-01-10 PROCEDURE — 84132 ASSAY OF SERUM POTASSIUM: CPT | Performed by: EMERGENCY MEDICINE

## 2025-01-10 PROCEDURE — 2550000001 HC RX 255 CONTRASTS: Performed by: EMERGENCY MEDICINE

## 2025-01-10 PROCEDURE — 71046 X-RAY EXAM CHEST 2 VIEWS: CPT | Performed by: RADIOLOGY

## 2025-01-10 RX ORDER — IPRATROPIUM BROMIDE AND ALBUTEROL SULFATE 2.5; .5 MG/3ML; MG/3ML
3 SOLUTION RESPIRATORY (INHALATION) ONCE
Status: COMPLETED | OUTPATIENT
Start: 2025-01-10 | End: 2025-01-10

## 2025-01-10 RX ADMIN — IPRATROPIUM BROMIDE AND ALBUTEROL SULFATE 3 ML: 2.5; .5 SOLUTION RESPIRATORY (INHALATION) at 21:00

## 2025-01-10 RX ADMIN — IOHEXOL 75 ML: 350 INJECTION, SOLUTION INTRAVENOUS at 20:50

## 2025-01-10 RX ADMIN — SODIUM CHLORIDE 500 ML: 9 INJECTION, SOLUTION INTRAVENOUS at 22:36

## 2025-01-10 ASSESSMENT — PAIN - FUNCTIONAL ASSESSMENT: PAIN_FUNCTIONAL_ASSESSMENT: 0-10

## 2025-01-10 ASSESSMENT — COLUMBIA-SUICIDE SEVERITY RATING SCALE - C-SSRS
6. HAVE YOU EVER DONE ANYTHING, STARTED TO DO ANYTHING, OR PREPARED TO DO ANYTHING TO END YOUR LIFE?: NO
2. HAVE YOU ACTUALLY HAD ANY THOUGHTS OF KILLING YOURSELF?: NO
1. IN THE PAST MONTH, HAVE YOU WISHED YOU WERE DEAD OR WISHED YOU COULD GO TO SLEEP AND NOT WAKE UP?: NO

## 2025-01-10 ASSESSMENT — PAIN SCALES - GENERAL: PAINLEVEL_OUTOF10: 0 - NO PAIN

## 2025-01-10 NOTE — ED TRIAGE NOTES
Pt arrived to ED from dialysis with c/o SOB. Pt resides at the [facility]. Pt states SOB worsened after dialysis. Pt 100% on 3 liters home O2. Pt denies CP. Pt alert and oriented x3.

## 2025-01-11 VITALS
BODY MASS INDEX: 14.81 KG/M2 | HEART RATE: 92 BPM | RESPIRATION RATE: 17 BRPM | OXYGEN SATURATION: 100 % | HEIGHT: 69 IN | SYSTOLIC BLOOD PRESSURE: 108 MMHG | DIASTOLIC BLOOD PRESSURE: 73 MMHG | WEIGHT: 100 LBS | TEMPERATURE: 98.4 F

## 2025-01-11 LAB — LACTATE SERPL-SCNC: 2.6 MMOL/L (ref 0.4–2)

## 2025-01-11 PROCEDURE — 36415 COLL VENOUS BLD VENIPUNCTURE: CPT | Performed by: EMERGENCY MEDICINE

## 2025-01-11 PROCEDURE — 83605 ASSAY OF LACTIC ACID: CPT | Performed by: EMERGENCY MEDICINE

## 2025-01-11 NOTE — ED PROVIDER NOTES
HPI   Chief Complaint   Patient presents with    Shortness of Breath       74-year-old male with history of COPD on 3 L baseline, failure to thrive and cachexia, end-stage renal disease recently initiated on dialysis the right tunneled line catheter who is presenting from dialysis.  He states that he does not feel well, is not able to elaborate further.  Per family member at the bedside, they called and said that he was bradycardic during dialysis but did not provide a number for the heart rate.  He denies chest pain, abdominal pain, dysuria, fever, headache.  When I asked if he was short of breath because he was tachypneic on arrival, he did say he felt a little short of breath but that was started before dialysis.    Patient is DNR comfort care, I discussed with him goals of care and he would like to be worked up for his symptoms as I see fit.      History provided by:  Medical records, patient and relative          Patient History   Past Medical History:   Diagnosis Date    CATHIE (acute kidney injury) (CMS-HCC) 05/03/2023    Cataract     COPD (chronic obstructive pulmonary disease) (Multi)     Cough, unspecified 06/20/2014    Cough    Emphysema of lung (Multi)     Encounter for immunization 01/19/2015    Need for influenza vaccination    Encounter for screening for malignant neoplasm of prostate     Encounter for screening for malignant neoplasm of prostate    Other conditions influencing health status 05/20/2013    Digital Blood Vessel Rupture    Personal history of colonic polyps     History of colon polyps    Personal history of other specified conditions 05/20/2013    History of shortness of breath    Personal history of other specified conditions 06/20/2014    History of shortness of breath     Past Surgical History:   Procedure Laterality Date    APPENDECTOMY  02/21/2013    Appendectomy    COLONOSCOPY  02/21/2013    Complete Colonoscopy    EYE SURGERY       Family History   Problem Relation Name Age of Onset     Dementia Mother      Diabetes Sister       Social History     Tobacco Use    Smoking status: Former     Current packs/day: 0.00     Types: Cigarettes     Start date:      Quit date: 2006     Years since quittin.0     Passive exposure: Past    Smokeless tobacco: Never   Substance Use Topics    Alcohol use: Yes     Comment: 1 cup of scotch a week    Drug use: Never     Comment: Former drug user, has not used any in 50 years       Physical Exam   ED Triage Vitals [01/10/25 1825]   Temperature Heart Rate Respirations BP   36.9 °C (98.4 °F) (!) 111 (!) 29 92/59      Pulse Ox Temp Source Heart Rate Source Patient Position   100 % Temporal Monitor --      BP Location FiO2 (%)     -- --       Physical Exam  Vitals and nursing note reviewed.   Constitutional:       Comments: Thin and ill-appearing   HENT:      Head: Normocephalic and atraumatic.      Mouth/Throat:      Mouth: Mucous membranes are moist.   Eyes:      Pupils: Pupils are equal, round, and reactive to light.   Cardiovascular:      Rate and Rhythm: Regular rhythm. Tachycardia present.   Pulmonary:      Effort: Tachypnea present. No accessory muscle usage or respiratory distress.      Breath sounds: No decreased breath sounds, wheezing, rhonchi or rales.   Chest:      Chest wall: No tenderness.   Abdominal:      Palpations: Abdomen is soft. There is no mass.      Tenderness: There is no abdominal tenderness.   Musculoskeletal:         General: Normal range of motion.      Cervical back: Normal range of motion.      Right lower leg: No edema.      Left lower leg: No edema.   Skin:     General: Skin is warm.      Capillary Refill: Capillary refill takes less than 2 seconds.   Neurological:      General: No focal deficit present.      Mental Status: He is alert and oriented to person, place, and time.      Sensory: Sensation is intact.      Motor: Motor function is intact.      Coordination: Coordination is intact.           ED Course & MDM   Diagnoses as  of 01/11/25 0047   Bradycardia   Shortness of breath                 No data recorded     Brett Coma Scale Score: 15 (01/10/25 1945 : Alex Cronin RN)                           Medical Decision Making  74-year-old male who is presenting with mild shortness of breath and reported bradycardia during dialysis.  In the ER, he is tachycardic with mild hypotension compared to baseline although he is very thin.  His troponin was at his baseline, creatinine is elevated but he is getting dialysis and had it today.  Electrolytes are otherwise reassuring.  Obtained a chest x-ray which was negative, with tachycardia I got a CT PE which did not show any evidence of pneumonia or pulmonary embolism.  Given the tachycardia, I did give him a small fluid bolus in the event that he had too much fluid taken off and dialysis resulting in the mild hypotension tachycardia, this significantly improved his tachycardia.  He did get a DuoNeb and felt improved afterward.  He had initially elevated lactate although that was drawn after the albuterol treatment, and downtrended with fluids.  I have low suspicion for sepsis, he feels improved and feels that he can go home.  He was discharged in stable condition with good return precautions.    Amount and/or Complexity of Data Reviewed  ECG/medicine tests: ordered.     Details: EKG sinus tachycardia with no ST segment elevations or depressions, no T wave inversions        Procedure  Procedures     Jona Cifuentes MD  01/11/25 0112

## 2025-01-11 NOTE — DISCHARGE INSTRUCTIONS
EKG emergency department after your heart rate was reportedly low at dialysis.  In the ER, your heart rate was a normal rate.  We evaluated you for any electrolyte abnormalities given that you are on dialysis, and your blood work was normal for you.  You did not show any evidence of heart damage on your labs.  We did a CT scan of your chest that did not show any evidence of a blood clot given you are having some trouble breathing, no evidence of pneumonia.  Your breathing improved after some nebulizers.  You do not have COVID, you do not have pneumonia.  He feels significantly improved, and you are safe for discharge home.    Please return to the emergency department if you have any new or concerning symptoms.

## 2025-01-13 LAB
ATRIAL RATE: 103 BPM
P AXIS: 87 DEGREES
P OFFSET: 196 MS
P ONSET: 151 MS
PR INTERVAL: 132 MS
Q ONSET: 217 MS
QRS COUNT: 17 BEATS
QRS DURATION: 82 MS
QT INTERVAL: 342 MS
QTC CALCULATION(BAZETT): 448 MS
QTC FREDERICIA: 409 MS
R AXIS: 85 DEGREES
T AXIS: 75 DEGREES
T OFFSET: 388 MS
VENTRICULAR RATE: 103 BPM

## 2025-01-14 ENCOUNTER — APPOINTMENT (OUTPATIENT)
Dept: CARDIOLOGY | Facility: CLINIC | Age: 75
End: 2025-01-14
Payer: MEDICARE

## 2025-01-15 ENCOUNTER — TELEPHONE (OUTPATIENT)
Dept: PULMONOLOGY | Facility: CLINIC | Age: 75
End: 2025-01-15
Payer: MEDICARE

## 2025-01-15 ENCOUNTER — DOCUMENTATION (OUTPATIENT)
Dept: PULMONOLOGY | Facility: HOSPITAL | Age: 75
End: 2025-01-15
Payer: MEDICARE

## 2025-01-15 NOTE — PROGRESS NOTES
Because of patient's severe COPD and limitations he will need a wheelchair ramp, wheelchair left and a transport wheelchair.

## 2025-01-15 NOTE — TELEPHONE ENCOUNTER
Wife is requesting scripts in order for her to get assistance with a wheel chair ramp, chair lift and reimbursement for a transport wheel chair.    Are you able to write out scripts for her? Please advise

## 2025-01-16 DIAGNOSIS — L29.9 ITCHING: ICD-10-CM

## 2025-01-21 ENCOUNTER — APPOINTMENT (OUTPATIENT)
Dept: SURGERY | Facility: HOSPITAL | Age: 75
End: 2025-01-21
Payer: MEDICARE

## 2025-01-22 RX ORDER — TRIAMCINOLONE ACETONIDE 1 MG/G
CREAM TOPICAL
Qty: 454 G | Refills: 1 | Status: SHIPPED | OUTPATIENT
Start: 2025-01-22

## 2025-01-23 PROBLEM — R62.7 ADULT FAILURE TO THRIVE: Status: ACTIVE | Noted: 2024-12-30

## 2025-01-23 PROBLEM — F32.A DEPRESSION, UNSPECIFIED: Status: ACTIVE | Noted: 2024-12-30

## 2025-01-23 PROBLEM — D63.1 ANEMIA IN CHRONIC KIDNEY DISEASE: Status: ACTIVE | Noted: 2024-12-30

## 2025-01-23 PROBLEM — Z99.2 DEPENDENCE ON RENAL DIALYSIS (CMS-HCC): Status: ACTIVE | Noted: 2024-12-30

## 2025-01-23 PROBLEM — N18.6 ESRD (END STAGE RENAL DISEASE) ON DIALYSIS (MULTI): Status: ACTIVE | Noted: 2024-12-30

## 2025-01-23 PROBLEM — J96.10 CHRONIC RESPIRATORY FAILURE: Status: ACTIVE | Noted: 2024-12-30

## 2025-01-23 PROBLEM — Z99.81 DEPENDENCE ON SUPPLEMENTAL OXYGEN: Status: ACTIVE | Noted: 2024-12-30

## 2025-01-23 PROBLEM — I25.10 ATHEROSCLEROTIC HEART DISEASE OF NATIVE CORONARY ARTERY WITHOUT ANGINA PECTORIS: Status: ACTIVE | Noted: 2024-12-30

## 2025-01-23 PROBLEM — J43.9 EMPHYSEMA, UNSPECIFIED: Status: ACTIVE | Noted: 2024-12-30

## 2025-01-23 PROBLEM — M62.81 MUSCLE WEAKNESS (GENERALIZED): Status: ACTIVE | Noted: 2024-12-30

## 2025-01-23 PROBLEM — H26.9 UNSPECIFIED CATARACT: Status: ACTIVE | Noted: 2024-12-30

## 2025-01-23 PROBLEM — J44.9 CHRONIC OBSTRUCTIVE PULMONARY DISEASE, UNSPECIFIED: Status: ACTIVE | Noted: 2024-12-30

## 2025-01-23 PROBLEM — N18.9 ANEMIA IN CHRONIC KIDNEY DISEASE: Status: ACTIVE | Noted: 2024-12-30

## 2025-01-23 PROBLEM — R13.10 DYSPHAGIA: Status: ACTIVE | Noted: 2025-01-22

## 2025-01-23 PROBLEM — Z99.2 ESRD (END STAGE RENAL DISEASE) ON DIALYSIS (MULTI): Status: ACTIVE | Noted: 2024-12-30

## 2025-01-23 PROBLEM — E87.5 HYPERKALEMIA: Status: ACTIVE | Noted: 2025-01-22

## 2025-01-23 RX ORDER — MIRTAZAPINE 7.5 MG/1
7.5 TABLET, FILM COATED ORAL NIGHTLY
COMMUNITY
Start: 2025-01-02

## 2025-01-23 RX ORDER — BISACODYL 5 MG/1
1 TABLET, COATED ORAL
COMMUNITY

## 2025-01-23 RX ORDER — MIDODRINE HYDROCHLORIDE 5 MG/1
5 TABLET ORAL
COMMUNITY
Start: 2025-01-17

## 2025-01-24 ENCOUNTER — APPOINTMENT (OUTPATIENT)
Dept: CARDIOLOGY | Facility: CLINIC | Age: 75
End: 2025-01-24
Payer: MEDICARE

## 2025-01-29 ENCOUNTER — TELEPHONE (OUTPATIENT)
Dept: GASTROENTEROLOGY | Facility: CLINIC | Age: 75
End: 2025-01-29
Payer: MEDICARE

## 2025-01-29 NOTE — TELEPHONE ENCOUNTER
----- Message from Dorys Herndon sent at 1/25/2025 11:01 PM EST -----  Path report on colon polyp-precancerous polyp called tubular adenoma.  I recommend next colonoscopy 5 years depending on health status.      Onofre, Please inform patient.

## 2025-01-30 DIAGNOSIS — A04.8 H. PYLORI INFECTION: Primary | ICD-10-CM

## 2025-01-30 RX ORDER — LEVOFLOXACIN 500 MG/1
500 TABLET, FILM COATED ORAL DAILY
Qty: 14 TABLET | Refills: 0 | Status: SHIPPED | OUTPATIENT
Start: 2025-01-30 | End: 2025-02-13

## 2025-01-30 RX ORDER — AMOXICILLIN 500 MG/1
1000 TABLET, FILM COATED ORAL 2 TIMES DAILY
Qty: 56 TABLET | Refills: 0 | Status: SHIPPED | OUTPATIENT
Start: 2025-01-30 | End: 2025-02-13

## 2025-01-30 RX ORDER — OMEPRAZOLE 40 MG/1
40 CAPSULE, DELAYED RELEASE ORAL
Qty: 28 CAPSULE | Refills: 0 | Status: SHIPPED | OUTPATIENT
Start: 2025-01-30 | End: 2025-02-13

## 2025-01-30 NOTE — RESULT ENCOUNTER NOTE
Dear Mr. Armstrong:    This message is to inform you the result of the biopsies taken during your recent EGD (upper endoscopy).    During the procedure, a sample was taken and sent to the laboratory to be examined and evaluated for the presence of Helicobacter pylori bacteria. The laboratory notified us that Helicobacter pylori bacterium is present in your stomach.     The bacterium can cause stomach irritation, ulcers and other complications. As such, I recommend that we treat you for this condition. I have sent a prescription containing a combination of antibiotics and acid suppressants to your pharmacy. Please note that all of the medications need to be taken exactly as prescribed to treat this organism effectively. The treatment is for 14 days.    Eight weeks (8) weeks after completion of treatment, we need to check for eradication of bacteria. This is usually done with a breath test. I will order this and my office staff will help you schedule the test. It is important that you get this test done, as H.pylori is a tough bacteria to eradicate.    I would also like to see you in office 4-6 weeks after your breath test. My office will arrange for an office follow up.    Your stomach biopsies also show evidence of intestinal metaplasia. Gastric Intestinal Metaplasia (GIM) is a condition where the lining of the stomach changes to a type of lining that is usually found in the intestine. Gastric Intestinal Metaplasia (GIM) is thought to be a pre-malignant condition of the stomach; however, the risk of developing stomach cancer is low. Current biopsies, do not show any dysplastic meaning precancerous or cancerous changes. There was no evidence of H.pylori infection on your biopsies as well.     Current literature does not provide clear guidance on the need and interval on repeat EGD for surveillance. Considering your overall health, I suggest a repeat EGD in three (3) years for surveillance.    We hope this information  is helpful to you. Your health and the quality care you receive are important to us. Please call our office should you have any questions or concerns.    Sincerely,    Montse Braun MD [facility]  Phone: [phone], option [phone]  Fax: [phone]

## 2025-01-31 ENCOUNTER — APPOINTMENT (OUTPATIENT)
Dept: GASTROENTEROLOGY | Facility: CLINIC | Age: 75
End: 2025-01-31
Payer: MEDICARE

## 2025-02-03 ENCOUNTER — TELEPHONE (OUTPATIENT)
Dept: PULMONOLOGY | Facility: CLINIC | Age: 75
End: 2025-02-03
Payer: MEDICARE

## 2025-02-03 NOTE — TELEPHONE ENCOUNTER
Patient's wife sts he is currently admitted at [facility]. He was diagnosed with COVID on Friday.     Patient has been having lots of trouble with his breathing and the hospital has been giving him warm breathing treatments. In which they state he is doing better.    Before being admitted in the hospital he has been in a rehab facility. He is now on dialysis, lost a lot of weight (currently under 100lbs) and now has peg tube.    The patient is currently scheduled to see you on Tuesday Feb 18th.     In the meantime do you have any additional recommendations regarding his breathing? Please advise

## 2025-02-04 ENCOUNTER — APPOINTMENT (OUTPATIENT)
Dept: CARDIOLOGY | Facility: CLINIC | Age: 75
End: 2025-02-04
Payer: MEDICARE

## 2025-02-10 ENCOUNTER — APPOINTMENT (OUTPATIENT)
Dept: PRIMARY CARE | Facility: CLINIC | Age: 75
End: 2025-02-10
Payer: MEDICARE

## 2025-02-11 ENCOUNTER — APPOINTMENT (OUTPATIENT)
Dept: SURGERY | Facility: HOSPITAL | Age: 75
End: 2025-02-11
Payer: MEDICARE

## 2025-02-11 ENCOUNTER — TELEPHONE (OUTPATIENT)
Dept: PULMONOLOGY | Facility: CLINIC | Age: 75
End: 2025-02-11
Payer: MEDICARE

## 2025-02-11 NOTE — TELEPHONE ENCOUNTER
2 PM this Friday 12/1 ok to double book Wife Nallely wanted to let you know that Kalyan passed away on Sunday.

## 2025-02-16 NOTE — PROGRESS NOTES
History of Present Illness   Patient presents today for evaluation of side: right upper extremity pain.    The patient sustained an acute injury on  9/1/2024 .  He had right elbow pain at that time.  He did not seek any medical care.  The patient denies any loss of consciousness or additional significant injuries.  The patient denies any current numbness or tingling.  Pain is overall mild.         Past Medical History:   Diagnosis Date    CATHIE (acute kidney injury) (CMS-HCC) 05/03/2023    Cataract     COPD (chronic obstructive pulmonary disease) (Multi)     Cough, unspecified 06/20/2014    Cough    Emphysema of lung (Multi)     Encounter for immunization 01/19/2015    Need for influenza vaccination    Encounter for screening for malignant neoplasm of prostate     Encounter for screening for malignant neoplasm of prostate    Other conditions influencing health status 05/20/2013    Digital Blood Vessel Rupture    Personal history of colonic polyps     History of colon polyps    Personal history of other specified conditions 05/20/2013    History of shortness of breath    Personal history of other specified conditions 06/20/2014    History of shortness of breath       Medication Documentation Review Audit       Reviewed by Briana Bartlett RN (Registered Nurse) on 12/23/24 at 1101      Medication Order Taking? Sig Documenting Provider Last Dose Status   acetaminophen (Tylenol) 325 mg tablet [id number]  Take 1 tablet (325 mg) by mouth every 4 hours if needed for moderate pain (4 - 6). Historical Provider, MD  Active   albuterol 2.5 mg /3 mL (0.083 %) nebulizer solution [id number]  Take 3 mL (2.5 mg) by nebulization 4 times a day as needed for wheezing or shortness of breath. 1 vial Historical Provider, MD  Active   albuterol 90 mcg/actuation inhaler [id number]  Inhale 1 puff every 4 hours if needed for wheezing or shortness of breath. Every 4-6 hrs prn Historical Provider, MD  Active   amLODIPine (Norvasc) 10 mg tablet  [id number] Yes Take 1 tablet (10 mg) by mouth once daily. Historical Provider, MD 12/19/2024 Active   azithromycin (Zithromax) 250 mg tablet [id number] Yes One PO every day   Patient taking differently: Take 1 tablet (250 mg) by mouth once daily.    Maynor Villagomez MD MPH 12/19/2024 Active   budesonide (Pulmicort) 0.5 mg/2 mL nebulizer solution [id number]  Take 2 mL (0.5 mg) by nebulization 2 times a day as needed (wheezing). Historical Provider, MD  Active   cholecalciferol (Vitamin D-3) 50,000 unit capsule [id number]  Take 1 capsule (50,000 Units) by mouth 1 (one) time per week.   Patient not taking: Reported on 11/21/2024    Rosa Lees DO  Flag for Review   fluticasone-umeclidin-vilanter (Trelegy Ellipta) 100-62.5-25 mcg blister with device [id number] Yes Inhale 1 puff once daily. Maynor Villagomez MD MPH 12/19/2024 Active   multivitamin (Multiple Vitamins) tablet [id number] Yes Take 1 tablet by mouth once daily. Historical Provider, MD 12/19/2024 Active   predniSONE (Deltasone) 5 mg tablet [id number] Yes Take 1 tablet (5 mg) by mouth once daily. Jaskaran Garrett,  12/19/2024 Active   rosuvastatin (Crestor) 40 mg tablet [id number] Yes Take 1 tablet (40 mg) by mouth once daily. Rosa Lees DO 12/19/2024 Active   triamcinolone (Kenalog) 0.1 % cream [id number]  Apply topically 2 times a day. Apply to affected area 1-2 times daily as needed.   Patient taking differently: Apply 1 Application topically 2 times a day as needed for irritation. Apply to affected area 1-2 times daily as needed.    Maria Alejandra Hernandez, APRN-CNP  Active                    No Known Allergies    Social History     Socioeconomic History    Marital status:      Spouse name: Not on file    Number of children: Not on file    Years of education: Not on file    Highest education level: Not on file   Occupational History    Not on file   Tobacco Use    Smoking status: Former     Current packs/day: 0.00     Types: Cigarettes     Start date: 1991      Quit date: 2006     Years since quittin.1     Passive exposure: Past    Smokeless tobacco: Never   Substance and Sexual Activity    Alcohol use: Yes     Comment: 1 cup of scotch a week    Drug use: Never     Comment: Former drug user, has not used any in 50 years    Sexual activity: Defer   Other Topics Concern    Not on file   Social History Narrative    Not on file     Social Drivers of Health     Financial Resource Strain: Low Risk  (2024)    Overall Financial Resource Strain (CARDIA)     Difficulty of Paying Living Expenses: Not very hard   Food Insecurity: No Food Insecurity (2/3/2025)    Received from [facility]    Hunger Vital Sign     Worried About Running Out of Food in the Last Year: Never true     Ran Out of Food in the Last Year: Never true   Transportation Needs: Unmet Transportation Needs (2/3/2025)    Received from [facility]    PRAPARE - Transportation     Lack of Transportation (Medical): Yes     Lack of Transportation (Non-Medical): Yes   Physical Activity: Insufficiently Active (2024)    Exercise Vital Sign     Days of Exercise per Week: 7 days     Minutes of Exercise per Session: 10 min   Stress: Stress Concern Present (2023)    Citizen of [place] of Occupational Health - Occupational Stress Questionnaire     Feeling of Stress : To some extent   Social Connections: Unknown (2024)    Social Connection and Isolation Panel [NHANES]     Frequency of Communication with Friends and Family: Not on file     Frequency of Social Gatherings with Friends and Family: Not on file     Attends Protestant Services: Not on file     Active Member of Clubs or Organizations: Not on file     Attends Club or Organization Meetings: Not on file     Marital Status:    Intimate Partner Violence: Not At Risk (2024)    Humiliation, Afraid, Rape, and Kick questionnaire     Fear of Current or Ex-Partner: No     Emotionally Abused: No     Physically Abused: No     Sexually  Abused: No   Housing Stability: Low Risk  (2/3/2025)    Received from [facility]    Housing Stability Vital Sign     Unable to Pay for Housing in the Last Year: No     Number of Times Moved in the Last Year: 0     Homeless in the Last Year: No       Past Surgical History:   Procedure Laterality Date    APPENDECTOMY  02/21/2013    Appendectomy    COLONOSCOPY  02/21/2013    Complete Colonoscopy    EYE SURGERY            Review of Systems   GENERAL: Negative  GI: Negative  MUSCULOSKELETAL: See HPI  SKIN: Negative  NEURO:  Negative     Physical Exam:  side: right upper extremity:  Skin healthy to gross inspection, no breakdown  Minimal swelling to right elbow  No ecchymosis noted  Intact flexion and extension of 1st IP joint and finger abduction  Sensation intact to light touch medial / ulnar and radial nerve distribution   Good cap refill     Imaging  XR right elbow was taken and reviewed in office today.  No fracture dislocations noted.  Normal     Assessment   Patient with an acute right elbow contusion     plan:  Patient's elbow pain is pretty much resolved.  Is mild swelling and mild intermittent pain.  His x-rays were negative.  He may return to activity as tolerated.  He has no limitations.     Follow-up  as needed

## 2025-02-17 ENCOUNTER — APPOINTMENT (OUTPATIENT)
Dept: PRIMARY CARE | Facility: CLINIC | Age: 75
End: 2025-02-17
Payer: MEDICARE

## 2025-02-18 ENCOUNTER — APPOINTMENT (OUTPATIENT)
Dept: PULMONOLOGY | Facility: CLINIC | Age: 75
End: 2025-02-18
Payer: MEDICARE

## 2025-02-24 ENCOUNTER — APPOINTMENT (OUTPATIENT)
Dept: CARDIOLOGY | Facility: CLINIC | Age: 75
End: 2025-02-24
Payer: MEDICARE